# Patient Record
Sex: FEMALE | Race: WHITE | NOT HISPANIC OR LATINO | Employment: FULL TIME | URBAN - METROPOLITAN AREA
[De-identification: names, ages, dates, MRNs, and addresses within clinical notes are randomized per-mention and may not be internally consistent; named-entity substitution may affect disease eponyms.]

---

## 2017-01-02 ENCOUNTER — GENERIC CONVERSION - ENCOUNTER (OUTPATIENT)
Dept: OTHER | Facility: OTHER | Age: 62
End: 2017-01-02

## 2017-04-14 ENCOUNTER — GENERIC CONVERSION - ENCOUNTER (OUTPATIENT)
Dept: OTHER | Facility: OTHER | Age: 62
End: 2017-04-14

## 2017-04-14 ENCOUNTER — APPOINTMENT (OUTPATIENT)
Dept: LAB | Facility: HOSPITAL | Age: 62
End: 2017-04-14
Payer: COMMERCIAL

## 2017-04-14 ENCOUNTER — ALLSCRIPTS OFFICE VISIT (OUTPATIENT)
Dept: OTHER | Facility: OTHER | Age: 62
End: 2017-04-14

## 2017-04-14 DIAGNOSIS — R82.998 OTHER ABNORMAL FINDINGS IN URINE: ICD-10-CM

## 2017-04-14 LAB
BILIRUB UR QL STRIP: NEGATIVE
GLUCOSE (HISTORICAL): NEGATIVE
HGB UR QL STRIP.AUTO: ABNORMAL
KETONES UR STRIP-MCNC: NEGATIVE MG/DL
LEUKOCYTE ESTERASE UR QL STRIP: ABNORMAL
NITRITE UR QL STRIP: NEGATIVE
PH UR STRIP.AUTO: 7.5 [PH]
PROT UR STRIP-MCNC: NEGATIVE MG/DL
SP GR UR STRIP.AUTO: 1.01
UROBILINOGEN UR QL STRIP.AUTO: 0.2

## 2017-04-14 PROCEDURE — 87086 URINE CULTURE/COLONY COUNT: CPT

## 2017-04-16 ENCOUNTER — GENERIC CONVERSION - ENCOUNTER (OUTPATIENT)
Dept: OTHER | Facility: OTHER | Age: 62
End: 2017-04-16

## 2017-04-16 LAB — BACTERIA UR CULT: NORMAL

## 2018-01-13 NOTE — RESULT NOTES
Verified Results  (1) URINE CULTURE 14Apr2017 05:20PM Waqas Young Order Number: [id number]     Test Name Result Flag Reference   CLINICAL REPORT (Report)     Test:        Urine culture  Specimen Type:   Urine  Specimen Date:   4/14/2017 5:20 PM  Result Date:    4/16/2017 4:19 PM  Result Status:   Final result  Resulting Lab:   [place] [address]            Tel: [phone]      CULTURE                                       ------------------                                   40,000-49,000 cfu/ml Mixed Contaminants X4

## 2018-01-14 VITALS
BODY MASS INDEX: 26.55 KG/M2 | HEIGHT: 65 IN | OXYGEN SATURATION: 98 % | TEMPERATURE: 97.7 F | WEIGHT: 159.38 LBS | HEART RATE: 80 BPM | SYSTOLIC BLOOD PRESSURE: 118 MMHG | DIASTOLIC BLOOD PRESSURE: 74 MMHG

## 2018-01-18 NOTE — RESULT NOTES
Message   1  CBC is normal    2  Vit D just slightly low 29  If she is currently taking 2000 units per day, would increase to 3000 units per day  3  Her triglycerides are slightly high  watch fats and carbs  Please fax these results to her rheumatologist      Verified Results  (1) VITAMIN D 25-HYDROXY [id number] 08:37AM Waqas Young Order Number: [id number]     Test Name Result Flag Reference   VIT D 25-HYDROX 29 1 ng/mL L 30 0-100 0   This assay is a certified procedure of the CDC Vitamin D Standardization Certification Program (VDSCP)     Deficiency <20ng/ml   Insufficiency 20-30ng/ml   Sufficient  ng/ml     *Patients undergoing fluorescein dye angiography may retain small amounts of fluorescein in the body for 48-72 hours post procedure  Samples containing fluorescein can produce falsely elevated Vitamin D values  If the patient had this procedure, a specimen should be resubmitted post fluorescein clearance  (1) LIPID PANEL FASTING W DIRECT LDL REFLEX [id number] 08:37AM Waqas Young Order Number: [id number]     Test Name Result Flag Reference   CHOLESTEROL 162 mg/dL     LDL CHOLESTEROL CALCULATED 65 mg/dL  0-100   - Patient Instructions: This is a fasting blood test  Water, black tea or black coffee only after 9:00pm the night before test   Drink 2 glasses of water the morning of test     - Patient Instructions:  This is a fasting blood test  Water, black tea or black coffee only after 9:00pm the night before test Drink 2 glasses of water the morning of test   Triglyceride:         Normal              <150 mg/dl       Borderline High    150-199 mg/dl       High               200-499 mg/dl       Very High          >499 mg/dl  Cholesterol:         Desirable        <200 mg/dl      Borderline High  200-239 mg/dl      High             >239 mg/dl  HDL Cholesterol:        High    >59 mg/dL      Low     <41 mg/dL  LDL Cholesterol:        Optimal          <100 mg/dl Near Optimal     100-129 mg/dl        Above Optimal          Borderline High   130-159 mg/dl          High              160-189 mg/dl          Very High        >189 mg/dl  LDL CALCULATED:    This screening LDL is a calculated result  It does not have the accuracy of the Direct Measured LDL in the monitoring of patients with hyperlipidemia and/or statin therapy  Direct Measure LDL (YJS537) must be ordered separately in these patients  TRIGLYCERIDES 165 mg/dL H <=150   Specimen collection should occur prior to N-Acetylcysteine or Metamizole administration due to the potential for falsely depressed results  HDL,DIRECT 64 mg/dL H 40-60   Specimen collection should occur prior to Metamizole administration due to the potential for falsely depressed results  (1) CBC/ PLT (NO DIFF) [id number] 08:37AM Lindsey Lemus Order Number: [id number]     Test Name Result Flag Reference   HEMATOCRIT 41 0 %  34 8-46 1   HEMOGLOBIN 14 0 g/dL  11 5-15 4   MCHC 34 1 g/dL  31 4-37 4   MCH 33 6 pg  26 8-34 3   MCV 98 fL  82-98   PLATELET COUNT 278 Thousands/uL  149-390   RBC COUNT 4 17 Million/uL  3 81-5 12   RDW 12 4 %  11 6-15 1   WBC COUNT 6 86 Thousand/uL  4 31-10 16   MPV 9 5 fL  8 9-12 7     (1) COMPREHENSIVE METABOLIC PANEL [id number] 47:51OZ Lindsey Lemus Order Number: [id number]     Test Name Result Flag Reference   GLUCOSE,RANDM 92 mg/dL     If the patient is fasting, the ADA then defines impaired fasting glucose as > 100 mg/dL and diabetes as > or equal to 123 mg/dL     SODIUM 140 mmol/L  136-145   POTASSIUM 3 8 mmol/L  3 5-5 3   CHLORIDE 104 mmol/L  100-108   CARBON DIOXIDE 28 mmol/L  21-32   ANION GAP (CALC) 8 mmol/L  4-13   BLOOD UREA NITROGEN 10 mg/dL  5-25   CREATININE 0 72 mg/dL  0 60-1 30   Standardized to IDMS reference method   CALCIUM 8 8 mg/dL  8 3-10 1   BILI, TOTAL 0 31 mg/dL  0 20-1 00   ALK PHOSPHATAS 93 U/L     ALT (SGPT) 25 U/L  12-78   AST(SGOT) 13 U/L  5-45 ALBUMIN 3 5 g/dL  3 5-5 0   TOTAL PROTEIN 7 4 g/dL  6 4-8 2   eGFR Non-African American      >60 0 ml/min/1 73sq m   - Patient Instructions: This is a fasting blood test  Water, black tea or black coffee only after 9:00pm the night before test Drink 2 glasses of water the morning of test   National Kidney Disease Education Program recommendations are as follows:  GFR calculation is accurate only with a steady state creatinine  Chronic Kidney disease less than 60 ml/min/1 73 sq  meters  Kidney failure less than 15 ml/min/1 73 sq  meters

## 2018-02-15 ENCOUNTER — OFFICE VISIT (OUTPATIENT)
Dept: FAMILY MEDICINE CLINIC | Facility: CLINIC | Age: 63
End: 2018-02-15
Payer: COMMERCIAL

## 2018-02-15 VITALS
HEIGHT: 66 IN | HEART RATE: 98 BPM | DIASTOLIC BLOOD PRESSURE: 78 MMHG | SYSTOLIC BLOOD PRESSURE: 108 MMHG | OXYGEN SATURATION: 72 % | WEIGHT: 166.2 LBS | BODY MASS INDEX: 26.71 KG/M2 | TEMPERATURE: 98.3 F

## 2018-02-15 DIAGNOSIS — J32.9 SINUSITIS, UNSPECIFIED CHRONICITY, UNSPECIFIED LOCATION: Primary | ICD-10-CM

## 2018-02-15 DIAGNOSIS — Z13.220 LIPID SCREENING: ICD-10-CM

## 2018-02-15 DIAGNOSIS — R53.83 FATIGUE, UNSPECIFIED TYPE: ICD-10-CM

## 2018-02-15 PROCEDURE — 99214 OFFICE O/P EST MOD 30 MIN: CPT | Performed by: FAMILY MEDICINE

## 2018-02-15 RX ORDER — MULTIVIT-MIN/IRON/FOLIC ACID/K 18-600-40
4000 CAPSULE ORAL DAILY
COMMUNITY

## 2018-02-15 RX ORDER — ACETAMINOPHEN 500 MG
TABLET ORAL EVERY 6 HOURS PRN
COMMUNITY

## 2018-02-15 RX ORDER — ESTROGEN,CON/M-PROGEST ACET 0.3-1.5MG
TABLET ORAL
COMMUNITY
Start: 2018-01-04 | End: 2019-09-25

## 2018-02-15 RX ORDER — AMOXICILLIN 875 MG/1
875 TABLET, COATED ORAL 2 TIMES DAILY
Qty: 20 TABLET | Refills: 0 | Status: SHIPPED | OUTPATIENT
Start: 2018-02-15 | End: 2018-02-25

## 2018-02-15 RX ORDER — IBANDRONATE SODIUM 150 MG/1
150 TABLET, FILM COATED ORAL
COMMUNITY

## 2018-02-15 NOTE — PROGRESS NOTES
Assessment/Plan:       Diagnoses and all orders for this visit:    Sinusitis, unspecified chronicity, unspecified location  -     amoxicillin (AMOXIL) 875 mg tablet; Take 1 tablet (875 mg total) by mouth 2 (two) times a day for 10 days    Fatigue, unspecified type  -     CBC and differential; Future  -     TSH, 3rd generation with T4 reflex; Future  -     Comprehensive metabolic panel; Future    Lipid screening  -     Lipid panel; Future    Other orders  -     ibandronate (BONIVA) 150 MG tablet; Take by mouth  -     Cholecalciferol (VITAMIN D) 2000 units CAPS; Take 4,000 Units by mouth  -     PREMPRO 0 3-1 5 MG per tablet;   -     acetaminophen (TYLENOL) 500 mg tablet; Take by mouth          Subjective:      Patient ID: Ana Boateng is a 63 y o  female  Fatigue, cough, congestion x 2 weeks  Taking OTC Dayquil  No relief  She is also complaining of fatigue x 2 months  Would like to get labs done  No GI symptoms  No current treatment  Does not sleep well  Up early  Sore Throat    This is a new problem  The current episode started 1 to 4 weeks ago  The problem has been gradually worsening  There has been no fever  The pain is mild  Associated symptoms include congestion and coughing  The treatment provided no relief  Cough   Associated symptoms include a sore throat  Pertinent negatives include no chills, fever or wheezing  The following portions of the patient's history were reviewed and updated as appropriate: allergies, current medications, past family history, past medical history, past social history, past surgical history and problem list     Review of Systems   Constitutional: Positive for fatigue  Negative for chills and fever  HENT: Positive for congestion and sore throat  Respiratory: Positive for cough  Negative for wheezing  Cardiovascular: Negative  Gastrointestinal: Negative  Musculoskeletal: Negative            Objective:    Vitals:    02/15/18 1635   BP: 108/78 Pulse: 98   Temp: 98 3 °F (36 8 °C)   SpO2: (!) 72%        Physical Exam   Constitutional: She is oriented to person, place, and time  She appears well-developed and well-nourished  No distress  HENT:   Right Ear: Hearing, tympanic membrane, external ear and ear canal normal    Left Ear: Hearing, tympanic membrane, external ear and ear canal normal    Nose: Nose normal    Mouth/Throat: Uvula is midline  Posterior oropharyngeal erythema present  No oropharyngeal exudate  Eyes: Conjunctivae are normal  Pupils are equal, round, and reactive to light  Cardiovascular: Normal rate and normal heart sounds  Exam reveals no gallop and no friction rub  No murmur heard  Pulmonary/Chest: Effort normal and breath sounds normal  No respiratory distress  She has no wheezes  She has no rales  Lymphadenopathy:     She has no cervical adenopathy  Neurological: She is alert and oriented to person, place, and time  Skin: Skin is warm  No rash noted  Psychiatric: She has a normal mood and affect  Her behavior is normal  Judgment and thought content normal    Nursing note and vitals reviewed

## 2018-02-16 ENCOUNTER — LAB (OUTPATIENT)
Dept: LAB | Facility: CLINIC | Age: 63
End: 2018-02-16
Payer: COMMERCIAL

## 2018-02-16 ENCOUNTER — TRANSCRIBE ORDERS (OUTPATIENT)
Dept: ADMINISTRATIVE | Facility: HOSPITAL | Age: 63
End: 2018-02-16

## 2018-02-16 DIAGNOSIS — R53.83 FATIGUE, UNSPECIFIED TYPE: ICD-10-CM

## 2018-02-16 DIAGNOSIS — Z13.220 LIPID SCREENING: ICD-10-CM

## 2018-02-16 LAB
ALBUMIN SERPL BCP-MCNC: 3.6 G/DL (ref 3.5–5)
ALP SERPL-CCNC: 71 U/L (ref 46–116)
ALT SERPL W P-5'-P-CCNC: 25 U/L (ref 12–78)
ANION GAP SERPL CALCULATED.3IONS-SCNC: 7 MMOL/L (ref 4–13)
AST SERPL W P-5'-P-CCNC: 16 U/L (ref 5–45)
BASOPHILS # BLD AUTO: 0.02 THOUSANDS/ΜL (ref 0–0.1)
BASOPHILS NFR BLD AUTO: 0 % (ref 0–1)
BILIRUB SERPL-MCNC: 0.59 MG/DL (ref 0.2–1)
BUN SERPL-MCNC: 12 MG/DL (ref 5–25)
CALCIUM SERPL-MCNC: 8.9 MG/DL (ref 8.3–10.1)
CHLORIDE SERPL-SCNC: 105 MMOL/L (ref 100–108)
CHOLEST SERPL-MCNC: 161 MG/DL (ref 50–200)
CO2 SERPL-SCNC: 28 MMOL/L (ref 21–32)
CREAT SERPL-MCNC: 0.7 MG/DL (ref 0.6–1.3)
EOSINOPHIL # BLD AUTO: 0.39 THOUSAND/ΜL (ref 0–0.61)
EOSINOPHIL NFR BLD AUTO: 5 % (ref 0–6)
ERYTHROCYTE [DISTWIDTH] IN BLOOD BY AUTOMATED COUNT: 12.6 % (ref 11.6–15.1)
GFR SERPL CREATININE-BSD FRML MDRD: 94 ML/MIN/1.73SQ M
GLUCOSE P FAST SERPL-MCNC: 81 MG/DL (ref 65–99)
HCT VFR BLD AUTO: 40.7 % (ref 34.8–46.1)
HDLC SERPL-MCNC: 64 MG/DL (ref 40–60)
HGB BLD-MCNC: 13.7 G/DL (ref 11.5–15.4)
LDLC SERPL CALC-MCNC: 68 MG/DL (ref 0–100)
LYMPHOCYTES # BLD AUTO: 2.92 THOUSANDS/ΜL (ref 0.6–4.47)
LYMPHOCYTES NFR BLD AUTO: 37 % (ref 14–44)
MCH RBC QN AUTO: 33.3 PG (ref 26.8–34.3)
MCHC RBC AUTO-ENTMCNC: 33.7 G/DL (ref 31.4–37.4)
MCV RBC AUTO: 99 FL (ref 82–98)
MONOCYTES # BLD AUTO: 0.52 THOUSAND/ΜL (ref 0.17–1.22)
MONOCYTES NFR BLD AUTO: 7 % (ref 4–12)
NEUTROPHILS # BLD AUTO: 4.01 THOUSANDS/ΜL (ref 1.85–7.62)
NEUTS SEG NFR BLD AUTO: 51 % (ref 43–75)
NRBC BLD AUTO-RTO: 0 /100 WBCS
PLATELET # BLD AUTO: 315 THOUSANDS/UL (ref 149–390)
PMV BLD AUTO: 9 FL (ref 8.9–12.7)
POTASSIUM SERPL-SCNC: 4.1 MMOL/L (ref 3.5–5.3)
PROT SERPL-MCNC: 7.2 G/DL (ref 6.4–8.2)
RBC # BLD AUTO: 4.12 MILLION/UL (ref 3.81–5.12)
SODIUM SERPL-SCNC: 140 MMOL/L (ref 136–145)
TRIGL SERPL-MCNC: 146 MG/DL
TSH SERPL DL<=0.05 MIU/L-ACNC: 1.29 UIU/ML (ref 0.36–3.74)
WBC # BLD AUTO: 7.88 THOUSAND/UL (ref 4.31–10.16)

## 2018-02-16 PROCEDURE — 36415 COLL VENOUS BLD VENIPUNCTURE: CPT

## 2018-02-16 PROCEDURE — 80053 COMPREHEN METABOLIC PANEL: CPT

## 2018-02-16 PROCEDURE — 85025 COMPLETE CBC W/AUTO DIFF WBC: CPT

## 2018-02-16 PROCEDURE — 80061 LIPID PANEL: CPT

## 2018-02-16 PROCEDURE — 84443 ASSAY THYROID STIM HORMONE: CPT

## 2018-02-21 ENCOUNTER — TELEPHONE (OUTPATIENT)
Dept: FAMILY MEDICINE CLINIC | Facility: CLINIC | Age: 63
End: 2018-02-21

## 2018-02-21 NOTE — TELEPHONE ENCOUNTER
NOTIFIED----- Message from Vivian Rashid MD sent at 2/18/2018  8:15 PM EST -----  Please call patient and let them know their labs were normal

## 2018-04-05 ENCOUNTER — HOSPITAL ENCOUNTER (EMERGENCY)
Facility: HOSPITAL | Age: 63
Discharge: HOME | End: 2018-04-05
Attending: EMERGENCY MEDICINE | Admitting: EMERGENCY MEDICINE
Payer: COMMERCIAL

## 2018-04-05 ENCOUNTER — APPOINTMENT (EMERGENCY)
Dept: RADIOLOGY | Facility: HOSPITAL | Age: 63
End: 2018-04-05
Payer: COMMERCIAL

## 2018-04-05 ENCOUNTER — APPOINTMENT (EMERGENCY)
Dept: CT IMAGING | Facility: HOSPITAL | Age: 63
End: 2018-04-05
Payer: COMMERCIAL

## 2018-04-05 VITALS
TEMPERATURE: 98 F | DIASTOLIC BLOOD PRESSURE: 80 MMHG | HEART RATE: 77 BPM | SYSTOLIC BLOOD PRESSURE: 133 MMHG | OXYGEN SATURATION: 100 % | RESPIRATION RATE: 18 BRPM | HEIGHT: 65 IN | WEIGHT: 164 LBS | BODY MASS INDEX: 27.32 KG/M2

## 2018-04-05 DIAGNOSIS — R55 SYNCOPE: Primary | ICD-10-CM

## 2018-04-05 LAB
ALBUMIN SERPL BCP-MCNC: 3.7 G/DL (ref 3.5–5)
ALP SERPL-CCNC: 77 U/L (ref 46–116)
ALT SERPL W P-5'-P-CCNC: 27 U/L (ref 12–78)
ANION GAP SERPL CALCULATED.3IONS-SCNC: 8 MMOL/L (ref 4–13)
AST SERPL W P-5'-P-CCNC: 15 U/L (ref 5–45)
ATRIAL RATE: 78 BPM
BASOPHILS # BLD AUTO: 0.04 THOUSANDS/ΜL (ref 0–0.1)
BASOPHILS NFR BLD AUTO: 1 % (ref 0–1)
BILIRUB SERPL-MCNC: 0.3 MG/DL (ref 0.2–1)
BUN SERPL-MCNC: 12 MG/DL (ref 5–25)
CALCIUM SERPL-MCNC: 9.4 MG/DL (ref 8.3–10.1)
CHLORIDE SERPL-SCNC: 103 MMOL/L (ref 100–108)
CO2 SERPL-SCNC: 29 MMOL/L (ref 21–32)
CREAT SERPL-MCNC: 0.72 MG/DL (ref 0.6–1.3)
EOSINOPHIL # BLD AUTO: 0.08 THOUSAND/ΜL (ref 0–0.61)
EOSINOPHIL NFR BLD AUTO: 1 % (ref 0–6)
ERYTHROCYTE [DISTWIDTH] IN BLOOD BY AUTOMATED COUNT: 12.5 % (ref 11.6–15.1)
GFR SERPL CREATININE-BSD FRML MDRD: 91 ML/MIN/1.73SQ M
GLUCOSE SERPL-MCNC: 101 MG/DL (ref 65–140)
HCT VFR BLD AUTO: 42.1 % (ref 34.8–46.1)
HGB BLD-MCNC: 14.1 G/DL (ref 11.5–15.4)
LYMPHOCYTES # BLD AUTO: 2.49 THOUSANDS/ΜL (ref 0.6–4.47)
LYMPHOCYTES NFR BLD AUTO: 31 % (ref 14–44)
MCH RBC QN AUTO: 32.9 PG (ref 26.8–34.3)
MCHC RBC AUTO-ENTMCNC: 33.5 G/DL (ref 31.4–37.4)
MCV RBC AUTO: 98 FL (ref 82–98)
MONOCYTES # BLD AUTO: 0.43 THOUSAND/ΜL (ref 0.17–1.22)
MONOCYTES NFR BLD AUTO: 5 % (ref 4–12)
NEUTROPHILS # BLD AUTO: 5.06 THOUSANDS/ΜL (ref 1.85–7.62)
NEUTS SEG NFR BLD AUTO: 62 % (ref 43–75)
NRBC BLD AUTO-RTO: 0 /100 WBCS
P AXIS: 67 DEGREES
PLATELET # BLD AUTO: 324 THOUSANDS/UL (ref 149–390)
PMV BLD AUTO: 8.3 FL (ref 8.9–12.7)
POTASSIUM SERPL-SCNC: 3.8 MMOL/L (ref 3.5–5.3)
PR INTERVAL: 186 MS
PROT SERPL-MCNC: 7.6 G/DL (ref 6.4–8.2)
QRS AXIS: 21 DEGREES
QRSD INTERVAL: 80 MS
QT INTERVAL: 392 MS
QTC INTERVAL: 446 MS
RBC # BLD AUTO: 4.28 MILLION/UL (ref 3.81–5.12)
SODIUM SERPL-SCNC: 140 MMOL/L (ref 136–145)
T WAVE AXIS: 51 DEGREES
TROPONIN I SERPL-MCNC: <0.02 NG/ML
VENTRICULAR RATE: 78 BPM
WBC # BLD AUTO: 8.13 THOUSAND/UL (ref 4.31–10.16)

## 2018-04-05 PROCEDURE — 85025 COMPLETE CBC W/AUTO DIFF WBC: CPT | Performed by: EMERGENCY MEDICINE

## 2018-04-05 PROCEDURE — 93005 ELECTROCARDIOGRAM TRACING: CPT

## 2018-04-05 PROCEDURE — 99285 EMERGENCY DEPT VISIT HI MDM: CPT

## 2018-04-05 PROCEDURE — 84484 ASSAY OF TROPONIN QUANT: CPT | Performed by: EMERGENCY MEDICINE

## 2018-04-05 PROCEDURE — 93010 ELECTROCARDIOGRAM REPORT: CPT | Performed by: INTERNAL MEDICINE

## 2018-04-05 PROCEDURE — 80053 COMPREHEN METABOLIC PANEL: CPT | Performed by: EMERGENCY MEDICINE

## 2018-04-05 PROCEDURE — 71046 X-RAY EXAM CHEST 2 VIEWS: CPT

## 2018-04-05 PROCEDURE — 70450 CT HEAD/BRAIN W/O DYE: CPT

## 2018-04-05 PROCEDURE — 36415 COLL VENOUS BLD VENIPUNCTURE: CPT | Performed by: EMERGENCY MEDICINE
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
## 2018-04-05 NOTE — DISCHARGE INSTRUCTIONS
Syncope   WHAT YOU NEED TO KNOW:   Syncope is also called fainting or passing out  Syncope is a sudden, temporary loss of consciousness, followed by a fall from a standing or sitting position  Syncope ranges from not serious to a sign of a more serious condition that needs to be treated  You can control some health conditions that cause syncope  Your healthcare providers can help you create a plan to manage syncope and prevent episodes  DISCHARGE INSTRUCTIONS:   Seek care immediately if:   · You are bleeding because you hit your head when you fainted  · You suddenly have double vision, difficulty speaking, numbness, and cannot move your arms or legs  · You have chest pain and trouble breathing  · You vomit blood or material that looks like coffee grounds  · You see blood in your bowel movement  Contact your healthcare provider if:   · You have new or worsening symptoms  · You have another syncope episode  · You have a headache, fast heartbeat, or feel too dizzy to stand up  · You have questions or concerns about your condition or care  Follow up with your healthcare provider as directed:  Write down your questions so you remember to ask them during your visits  Manage syncope:   · Keep a record of your syncope episodes  Include your symptoms and your activity before and after the episode  The record can help your healthcare provider find the cause of your syncope and help you manage episodes  · Sit or lie down when needed  This includes when you feel dizzy, your throat is getting tight, and your vision changes  Raise your legs above the level of your heart  · Take slow, deep breaths if you start to breathe faster with anxiety or fear  This can help decrease dizziness and the feeling that you might faint  · Check your blood pressure often  This is important if you take medicine to lower your blood pressure   Check your blood pressure when you are lying down and when you are standing  Ask how often to check during the day  Keep a record of your blood pressure numbers  Your healthcare provider may use the record to help plan your treatment  Prevent a syncope episode:   · Move slowly and let yourself get used to one position before you move to another position  This is very important when you change from a lying or sitting position to a standing position  Take some deep breaths before you stand up from a lying position  Stand up slowly  Sudden movements may cause a fainting spell  Sit on the side of the bed or couch for a few minutes before you stand up  If you are on bedrest, try to be upright for about 2 hours each day, or as directed  Do not lock your legs if you are standing for a long period of time  Move your legs and bend your knees to keep blood flowing  · Follow your healthcare provider's recommendations  Your provider may  recommend that you drink more liquids to prevent dehydration  You may also need to have more salt to keep your blood pressure from dropping too low and causing syncope  Your provider will tell you how much liquid and sodium to have each day  · Watch for signs of low blood sugar  These include hunger, nervousness, sweating, and fast or fluttery heartbeats  Talk with your healthcare provider about ways to keep your blood sugar level steady  · Do not strain if you are constipated  You may faint if you strain to have a bowel movement  Walking is the best way to get your bowels moving  Eat foods high in fiber to make it easier to have a bowel movement  Good examples are high-fiber cereals, beans, vegetables, and whole-grain breads  Prune juice may help make bowel movements softer  · Be careful in hot weather  Heat can cause a syncope episode  Limit activity done outside on hot days  Physical activity in hot weather can lead to dehydration  This can cause an episode    © 2017 [employer] Information is for End User's use only and may not be sold, redistributed or otherwise used for commercial purposes  All illustrations and images included in CareNotes® are the copyrighted property of A [employer] , Inc  or Asim Stokes  The above information is an  only  It is not intended as medical advice for individual conditions or treatments  Talk to your doctor, nurse or pharmacist before following any medical regimen to see if it is safe and effective for you

## 2018-05-31 ENCOUNTER — OFFICE VISIT (OUTPATIENT)
Dept: FAMILY MEDICINE CLINIC | Facility: CLINIC | Age: 63
End: 2018-05-31
Payer: COMMERCIAL

## 2018-05-31 VITALS
DIASTOLIC BLOOD PRESSURE: 80 MMHG | SYSTOLIC BLOOD PRESSURE: 128 MMHG | BODY MASS INDEX: 27.82 KG/M2 | TEMPERATURE: 98.1 F | HEART RATE: 83 BPM | RESPIRATION RATE: 18 BRPM | WEIGHT: 167 LBS | OXYGEN SATURATION: 99 % | HEIGHT: 65 IN

## 2018-05-31 DIAGNOSIS — Z01.818 PRE-OP EVALUATION: Primary | ICD-10-CM

## 2018-05-31 PROCEDURE — 3008F BODY MASS INDEX DOCD: CPT | Performed by: NURSE PRACTITIONER

## 2018-05-31 PROCEDURE — 99214 OFFICE O/P EST MOD 30 MIN: CPT | Performed by: NURSE PRACTITIONER

## 2018-05-31 RX ORDER — BROMFENAC SODIUM 0.7 MG/ML
SOLUTION/ DROPS OPHTHALMIC
COMMUNITY
Start: 2018-05-30 | End: 2019-08-12 | Stop reason: ALTCHOICE

## 2018-05-31 RX ORDER — POLYMYXIN B SULFATE AND TRIMETHOPRIM 1; 10000 MG/ML; [USP'U]/ML
SOLUTION OPHTHALMIC
COMMUNITY
Start: 2018-05-30 | End: 2019-08-12 | Stop reason: ALTCHOICE

## 2018-05-31 NOTE — PROGRESS NOTES
Assessment/Plan:     Diagnoses and all orders for this visit:    Pre-op evaluation    Other orders  -     PROLENSA 0 07 % SOLN;   -     polymyxin b-trimethoprim (POLYTRIM) ophthalmic solution; Exam benign, No abnormal findings that would prevent patient from having cataract surgery  Subjective:      Patient ID: Lupe Feldman is a 66 y o  female  Patient is here for a pre-op clearnace for cataract surgery at [address] [place] [address] next week  Her medical hx is significant for pancreatis in 2014  Cholecystis with cholecystectomy in 2014 as well  Patient has no significant cardiac, renal, pulmonary, hepatic, neurological, or hematological disease  She takes Boniva for osteoporosis, and vitamin D 4000 units every day  The following portions of the patient's history were reviewed and updated as appropriate:   She  has a past medical history of Osteoporosis  She   Patient Active Problem List    Diagnosis Date Noted    Pre-op evaluation 05/31/2018    Sinusitis 02/15/2018    Fatigue 02/15/2018     She  has a past surgical history that includes Cholecystectomy  Her family history is not on file  She  reports that she has never smoked  She has never used smokeless tobacco  She reports that she does not drink alcohol or use drugs  Current Outpatient Prescriptions   Medication Sig Dispense Refill    acetaminophen (TYLENOL) 500 mg tablet Take by mouth      Cholecalciferol (VITAMIN D) 2000 units CAPS Take 4,000 Units by mouth      ibandronate (BONIVA) 150 MG tablet Take by mouth      polymyxin b-trimethoprim (POLYTRIM) ophthalmic solution       PREMPRO 0 3-1 5 MG per tablet       PROLENSA 0 07 % SOLN        No current facility-administered medications for this visit        Current Outpatient Prescriptions on File Prior to Visit   Medication Sig    acetaminophen (TYLENOL) 500 mg tablet Take by mouth    Cholecalciferol (VITAMIN D) 2000 units CAPS Take 4,000 Units by mouth    ibandronate (BONIVA) 150 MG tablet Take by mouth    PREMPRO 0 3-1 5 MG per tablet      No current facility-administered medications on file prior to visit  She is allergic to sulfa antibiotics; ciprofloxacin; cyclopentolate; and scopolamine       Review of Systems   Constitutional: Negative for activity change, appetite change, fatigue and fever  HENT: Negative  Eyes: Positive for visual disturbance  Negative for photophobia, pain and redness  Respiratory: Negative for apnea, cough, chest tightness, shortness of breath, wheezing and stridor  Cardiovascular: Negative for chest pain, palpitations and leg swelling  Gastrointestinal: Negative for anal bleeding, blood in stool, constipation, diarrhea, nausea and vomiting  Endocrine: Negative  Genitourinary: Negative for flank pain, frequency, hematuria and vaginal bleeding  Musculoskeletal: Negative  Skin: Negative  Allergic/Immunologic: Positive for environmental allergies  Negative for food allergies  Neurological: Negative for seizures, weakness, light-headedness, numbness and headaches  Hematological: Negative for adenopathy  Does not bruise/bleed easily  Psychiatric/Behavioral: Negative  All other systems reviewed and are negative  Objective:      /80 (BP Location: Right arm, Patient Position: Sitting)   Pulse 83   Temp 98 1 °F (36 7 °C)   Resp 18   Ht 5' 5" (1 651 m)   Wt 75 8 kg (167 lb)   SpO2 99%   BMI 27 79 kg/m²          Physical Exam   Constitutional: She is oriented to person, place, and time  She appears well-developed and well-nourished  No distress  HENT:   Head: Normocephalic and atraumatic  Right Ear: External ear normal    Left Ear: External ear normal    Nose: Nose normal    Mouth/Throat: Oropharynx is clear and moist  No oropharyngeal exudate  Eyes: Conjunctivae and EOM are normal  Pupils are equal, round, and reactive to light  Right eye exhibits no discharge  Left eye exhibits no discharge  Patient wearing contact lenses    Neck: Normal range of motion  Neck supple  No JVD present  No thyromegaly present  Cardiovascular: Normal rate, regular rhythm, normal heart sounds and intact distal pulses  No murmur heard  Pulmonary/Chest: Effort normal and breath sounds normal  No respiratory distress  She has no wheezes  Abdominal: Soft  Bowel sounds are normal  She exhibits no distension  There is no tenderness  Musculoskeletal: Normal range of motion  She exhibits no edema  Lymphadenopathy:     She has no cervical adenopathy  Neurological: She is alert and oriented to person, place, and time  She has normal reflexes  Coordination normal    Patient [place] and wears hearing aids   Skin: Skin is warm and dry  No rash noted  She is not diaphoretic  No erythema  No pallor  Psychiatric: She has a normal mood and affect  Her behavior is normal  Judgment and thought content normal    Nursing note and vitals reviewed

## 2018-05-31 NOTE — PATIENT INSTRUCTIONS
Patient is cleared for surgery for removal of cataracts    Chronic condition is osteoporosis- this is stable on biphosphonate

## 2019-02-13 ENCOUNTER — OFFICE VISIT (OUTPATIENT)
Dept: FAMILY MEDICINE CLINIC | Facility: CLINIC | Age: 64
End: 2019-02-13
Payer: COMMERCIAL

## 2019-02-13 VITALS
HEART RATE: 112 BPM | RESPIRATION RATE: 18 BRPM | HEIGHT: 65 IN | DIASTOLIC BLOOD PRESSURE: 80 MMHG | OXYGEN SATURATION: 95 % | BODY MASS INDEX: 27.66 KG/M2 | SYSTOLIC BLOOD PRESSURE: 138 MMHG | WEIGHT: 166 LBS | TEMPERATURE: 100 F

## 2019-02-13 DIAGNOSIS — J06.9 VIRAL UPPER RESPIRATORY TRACT INFECTION: ICD-10-CM

## 2019-02-13 DIAGNOSIS — R05.9 COUGH: ICD-10-CM

## 2019-02-13 DIAGNOSIS — J11.1 INFLUENZA: Primary | ICD-10-CM

## 2019-02-13 PROCEDURE — 1036F TOBACCO NON-USER: CPT | Performed by: NURSE PRACTITIONER

## 2019-02-13 PROCEDURE — 99214 OFFICE O/P EST MOD 30 MIN: CPT | Performed by: NURSE PRACTITIONER

## 2019-02-13 PROCEDURE — 3008F BODY MASS INDEX DOCD: CPT | Performed by: NURSE PRACTITIONER

## 2019-02-13 RX ORDER — AZITHROMYCIN 250 MG/1
500 TABLET, FILM COATED ORAL EVERY 24 HOURS
Qty: 10 TABLET | Refills: 0 | Status: SHIPPED | OUTPATIENT
Start: 2019-02-13 | End: 2019-02-18

## 2019-02-13 RX ORDER — PROMETHAZINE HYDROCHLORIDE AND CODEINE PHOSPHATE 6.25; 1 MG/5ML; MG/5ML
5 SYRUP ORAL EVERY 4 HOURS PRN
Qty: 120 ML | Refills: 0 | Status: SHIPPED | OUTPATIENT
Start: 2019-02-13 | End: 2019-02-20

## 2019-02-13 RX ORDER — METRONIDAZOLE 7.5 MG/G
LOTION TOPICAL
COMMUNITY
Start: 2018-12-28 | End: 2019-08-12 | Stop reason: ALTCHOICE

## 2019-02-13 NOTE — PROGRESS NOTES
Assessment/Plan:    No problem-specific Assessment & Plan notes found for this encounter  Diagnoses and all orders for this visit:    Influenza    Viral upper respiratory tract infection    Other orders  -     METRONIDAZOLE, TOPICAL, 0 75 % LOTN  -     estrogen, conjugated,-medroxyprogesterone (PREMPRO) 0 3-1 5 MG per tablet; 1daily          Subjective:      Patient ID: Марина Barroso is a 66 y o  female  Patient is here with chills, fever and loss of voice for 3-4 days  She has a dry cough and nasal congestion  She did try OTC tylenol and cough medicine      The following portions of the patient's history were reviewed and updated as appropriate:   She  has a past medical history of Osteoporosis  She   Patient Active Problem List    Diagnosis Date Noted    Influenza 02/13/2019    Pre-op evaluation 05/31/2018    Sinusitis 02/15/2018    Fatigue 02/15/2018     She  has a past surgical history that includes Cholecystectomy; Dental surgery; Hallux valgus correction; Incisional breast biopsy; and Rhinoplasty  Her family history includes Lung cancer in her father; No Known Problems in her mother  She  reports that she has never smoked  She has never used smokeless tobacco  She reports that she does not drink alcohol or use drugs  Current Outpatient Medications   Medication Sig Dispense Refill    estrogen, conjugated,-medroxyprogesterone (PREMPRO) 0 3-1 5 MG per tablet 1daily      acetaminophen (TYLENOL) 500 mg tablet Take by mouth      Cholecalciferol (VITAMIN D) 2000 units CAPS Take 4,000 Units by mouth      ibandronate (BONIVA) 150 MG tablet Take by mouth      METRONIDAZOLE, TOPICAL, 0 75 % LOTN       polymyxin b-trimethoprim (POLYTRIM) ophthalmic solution       PREMPRO 0 3-1 5 MG per tablet       PROLENSA 0 07 % SOLN        No current facility-administered medications for this visit        Current Outpatient Medications on File Prior to Visit   Medication Sig    estrogen, conjugated,-medroxyprogesterone (PREMPRO) 0 3-1 5 MG per tablet 1daily    acetaminophen (TYLENOL) 500 mg tablet Take by mouth    Cholecalciferol (VITAMIN D) 2000 units CAPS Take 4,000 Units by mouth    ibandronate (BONIVA) 150 MG tablet Take by mouth    METRONIDAZOLE, TOPICAL, 0 75 % LOTN     polymyxin b-trimethoprim (POLYTRIM) ophthalmic solution     PREMPRO 0 3-1 5 MG per tablet     PROLENSA 0 07 % SOLN      No current facility-administered medications on file prior to visit  She is allergic to sulfa antibiotics; ciprofloxacin; cyclopentolate; and scopolamine       Review of Systems   Constitutional: Positive for chills and fever  Negative for fatigue  HENT: Positive for congestion, sore throat and voice change  Negative for ear pain, postnasal drip, sinus pressure and sinus pain  Respiratory: Positive for cough  Negative for chest tightness and shortness of breath  Cardiovascular: Negative for chest pain and palpitations  Gastrointestinal: Negative for abdominal pain  Skin: Negative for color change, pallor and rash  Allergic/Immunologic: Negative for immunocompromised state  Neurological: Negative for headaches  Hematological: Negative for adenopathy  All other systems reviewed and are negative  Objective:      /80 (BP Location: Left arm, Patient Position: Sitting)   Pulse (!) 112   Temp 100 °F (37 8 °C)   Resp 18   Ht 5' 5" (1 651 m)   Wt 75 3 kg (166 lb)   SpO2 95%   BMI 27 62 kg/m²          Physical Exam   Constitutional: She is oriented to person, place, and time  She appears well-developed and well-nourished  No distress  HENT:   Head: Normocephalic and atraumatic  Right Ear: External ear normal    Left Ear: External ear normal    Nose: Nose normal    Mouth/Throat: Oropharynx is clear and moist  No oropharyngeal exudate  Eyes: Pupils are equal, round, and reactive to light  Conjunctivae are normal    Neck: Normal range of motion  Neck supple  Cardiovascular: Normal rate, regular rhythm and normal heart sounds  Exam reveals no gallop and no friction rub  No murmur heard  Pulmonary/Chest: Effort normal and breath sounds normal  No respiratory distress  She has no wheezes  She has no rales  Persistent barking cough   Abdominal: Soft  Musculoskeletal: Normal range of motion  Lymphadenopathy:     She has no cervical adenopathy  Neurological: She is alert and oriented to person, place, and time  Skin: Skin is warm and dry  No rash noted  She is not diaphoretic  Psychiatric: She has a normal mood and affect  Her behavior is normal  Judgment and thought content normal    Nursing note and vitals reviewed

## 2019-02-13 NOTE — LETTER
February 13, 2019     Patient: Evan Burris   YOB: 1955   Date of Visit: 2/13/2019       To Whom it May Concern:    Evan Burris is under my professional care  She was seen in my office on 2/13/2019  She may return to work on 2-18-19  If you have any questions or concerns, please don't hesitate to call           Sincerely,          VIVIEN Robledo        CC: No Recipients

## 2019-03-27 ENCOUNTER — OFFICE VISIT (OUTPATIENT)
Dept: FAMILY MEDICINE CLINIC | Facility: CLINIC | Age: 64
End: 2019-03-27
Payer: COMMERCIAL

## 2019-03-27 VITALS
WEIGHT: 167.8 LBS | SYSTOLIC BLOOD PRESSURE: 128 MMHG | DIASTOLIC BLOOD PRESSURE: 82 MMHG | OXYGEN SATURATION: 99 % | BODY MASS INDEX: 27.96 KG/M2 | HEIGHT: 65 IN | TEMPERATURE: 98.3 F | HEART RATE: 84 BPM

## 2019-03-27 DIAGNOSIS — E55.9 VITAMIN D DEFICIENCY: ICD-10-CM

## 2019-03-27 DIAGNOSIS — M81.0 AGE-RELATED OSTEOPOROSIS WITHOUT CURRENT PATHOLOGICAL FRACTURE: ICD-10-CM

## 2019-03-27 DIAGNOSIS — Z12.11 COLON CANCER SCREENING: ICD-10-CM

## 2019-03-27 DIAGNOSIS — Z11.59 NEED FOR HEPATITIS C SCREENING TEST: ICD-10-CM

## 2019-03-27 DIAGNOSIS — R53.83 FATIGUE, UNSPECIFIED TYPE: Primary | ICD-10-CM

## 2019-03-27 DIAGNOSIS — Z13.220 LIPID SCREENING: ICD-10-CM

## 2019-03-27 PROBLEM — J11.1 INFLUENZA: Status: RESOLVED | Noted: 2019-02-13 | Resolved: 2019-03-27

## 2019-03-27 PROBLEM — Z01.818 PRE-OP EVALUATION: Status: RESOLVED | Noted: 2018-05-31 | Resolved: 2019-03-27

## 2019-03-27 PROBLEM — J32.9 SINUSITIS: Status: RESOLVED | Noted: 2018-02-15 | Resolved: 2019-03-27

## 2019-03-27 PROCEDURE — 1036F TOBACCO NON-USER: CPT | Performed by: FAMILY MEDICINE

## 2019-03-27 PROCEDURE — 3008F BODY MASS INDEX DOCD: CPT | Performed by: FAMILY MEDICINE

## 2019-03-27 PROCEDURE — 99214 OFFICE O/P EST MOD 30 MIN: CPT | Performed by: FAMILY MEDICINE

## 2019-03-27 RX ORDER — IVERMECTIN 10 MG/G
CREAM TOPICAL DAILY
COMMUNITY

## 2019-03-27 NOTE — PROGRESS NOTES
Assessment/Plan:    No problem-specific Assessment & Plan notes found for this encounter  Diagnoses and all orders for this visit:    Fatigue, unspecified type  -     CBC and differential; Future  -     Comprehensive metabolic panel; Future  -     TSH, 3rd generation with Free T4 reflex; Future  -     Iron; Future    Age-related osteoporosis without current pathological fracture    BMI 27 0-27 9,adult    Vitamin D deficiency  -     Vitamin D 25 hydroxy; Future    Need for hepatitis C screening test  -     Hepatitis C antibody; Future    Colon cancer screening  -     Ambulatory referral to Gastroenterology; Future  -     Cologuard; Future    Lipid screening  -     Lipid Panel with Direct LDL reflex; Future    Other orders  -     Ivermectin (SOOLANTRA) 1 % CREA; Apply topically        Check labs  Offered sleep study - she declined  Advised her to get her cancer screening UTD  She has a Gyn, gave her referral for colonoscopy  She may have transportation issues, so will get Cologaurd  BMI Counseling: Body mass index is 27 92 kg/m²  Discussed the patient's BMI with her  The BMI is above average  BMI counseling and education was provided to the patient  Nutrition recommendations include reducing portion sizes and decreasing overall calorie intake  Exercise recommendations include exercising 3-5 times per week  Advised her to count calories  I showed her nap on the smart phone to start checking her calories  Also advised to increase exercise  Subjective:      Patient ID: Edward Dudley is a 66 y o  female  She has fatigue for months  She feels more tired than she thinks she should  She states she has to sit down after vacuuming  She also says she has cold hands and feet  She lost her handicapped  about 2 5 years ago  She has not slept well in years due to having to get up and take care of him for so many years  She does not feel depressed  PHQ 2 is negative       She is not happy with her weight  She does not exercise  BMI 27  Has never tracked her calories  She has osteoporosis and is on Boniva  She sees rheumatology  Had labs done in December which are in the chart - normal Vit D, TSH, CMP, PTH  The following portions of the patient's history were reviewed and updated as appropriate:   She  has a past medical history of Osteoporosis  She   Patient Active Problem List    Diagnosis Date Noted    Age-related osteoporosis without current pathological fracture 03/27/2019    Fatigue 02/15/2018     She  has a past surgical history that includes Cholecystectomy; Dental surgery; Hallux valgus correction; Incisional breast biopsy; and Rhinoplasty  Her family history includes Lung cancer in her father; No Known Problems in her mother  She  reports that she has never smoked  She has never used smokeless tobacco  She reports that she does not drink alcohol or use drugs  Current Outpatient Medications   Medication Sig Dispense Refill    acetaminophen (TYLENOL) 500 mg tablet Take by mouth      Cholecalciferol (VITAMIN D) 2000 units CAPS Take 4,000 Units by mouth      estrogen, conjugated,-medroxyprogesterone (PREMPRO) 0 3-1 5 MG per tablet 1daily      ibandronate (BONIVA) 150 MG tablet Take by mouth      Ivermectin (SOOLANTRA) 1 % CREA Apply topically      METRONIDAZOLE, TOPICAL, 0 75 % LOTN       polymyxin b-trimethoprim (POLYTRIM) ophthalmic solution       PREMPRO 0 3-1 5 MG per tablet       PROLENSA 0 07 % SOLN        No current facility-administered medications for this visit        Current Outpatient Medications on File Prior to Visit   Medication Sig    acetaminophen (TYLENOL) 500 mg tablet Take by mouth    Cholecalciferol (VITAMIN D) 2000 units CAPS Take 4,000 Units by mouth    estrogen, conjugated,-medroxyprogesterone (PREMPRO) 0 3-1 5 MG per tablet 1daily    ibandronate (BONIVA) 150 MG tablet Take by mouth    Ivermectin (SOOLANTRA) 1 % CREA Apply topically    METRONIDAZOLE, TOPICAL, 0 75 % LOTN     polymyxin b-trimethoprim (POLYTRIM) ophthalmic solution     PREMPRO 0 3-1 5 MG per tablet     PROLENSA 0 07 % SOLN      No current facility-administered medications on file prior to visit  She is allergic to sulfa antibiotics; ciprofloxacin; cyclopentolate; and scopolamine       Review of Systems   Constitutional: Positive for fatigue  Negative for activity change  HENT: Positive for hearing loss (wears hearing aids)  Respiratory: Negative for chest tightness and shortness of breath  Cardiovascular: Negative for chest pain and leg swelling  Musculoskeletal:        Cold hands and feet   Neurological: Negative for headaches  Psychiatric/Behavioral: Positive for sleep disturbance  Negative for dysphoric mood  The patient is not nervous/anxious  Objective:      /82   Pulse 84   Temp 98 3 °F (36 8 °C) (Tympanic)   Ht 5' 5" (1 651 m)   Wt 76 1 kg (167 lb 12 8 oz)   SpO2 99%   BMI 27 92 kg/m²          Physical Exam   Constitutional: She is oriented to person, place, and time  She appears well-developed and well-nourished  No distress  HENT:   Head: Normocephalic and atraumatic  Hard of hearing, hearing aids   Eyes: Pupils are equal, round, and reactive to light  Conjunctivae are normal    Neck: Neck supple  Cardiovascular: Normal rate, regular rhythm and normal heart sounds  Exam reveals no gallop and no friction rub  No murmur heard  Pulmonary/Chest: Effort normal and breath sounds normal  No respiratory distress  She has no wheezes  She has no rales  She exhibits no tenderness  Musculoskeletal: Normal range of motion  She exhibits no edema  Neurological: She is alert and oriented to person, place, and time  Skin: Skin is warm and dry  No rash noted  She is not diaphoretic  Psychiatric: She has a normal mood and affect  Her behavior is normal  Judgment and thought content normal    Nursing note and vitals reviewed

## 2019-03-30 ENCOUNTER — APPOINTMENT (OUTPATIENT)
Dept: LAB | Facility: CLINIC | Age: 64
End: 2019-03-30
Payer: COMMERCIAL

## 2019-03-30 DIAGNOSIS — R53.83 FATIGUE, UNSPECIFIED TYPE: ICD-10-CM

## 2019-03-30 DIAGNOSIS — E55.9 VITAMIN D DEFICIENCY: ICD-10-CM

## 2019-03-30 DIAGNOSIS — Z13.220 LIPID SCREENING: ICD-10-CM

## 2019-03-30 DIAGNOSIS — Z11.59 NEED FOR HEPATITIS C SCREENING TEST: ICD-10-CM

## 2019-03-30 LAB
25(OH)D3 SERPL-MCNC: 52 NG/ML (ref 30–100)
ALBUMIN SERPL BCP-MCNC: 3.8 G/DL (ref 3.5–5)
ALP SERPL-CCNC: 52 U/L (ref 46–116)
ALT SERPL W P-5'-P-CCNC: 23 U/L (ref 12–78)
ANION GAP SERPL CALCULATED.3IONS-SCNC: 5 MMOL/L (ref 4–13)
AST SERPL W P-5'-P-CCNC: 15 U/L (ref 5–45)
BASOPHILS # BLD AUTO: 0.04 THOUSANDS/ΜL (ref 0–0.1)
BASOPHILS NFR BLD AUTO: 1 % (ref 0–1)
BILIRUB SERPL-MCNC: 0.48 MG/DL (ref 0.2–1)
BUN SERPL-MCNC: 11 MG/DL (ref 5–25)
CALCIUM SERPL-MCNC: 9.1 MG/DL (ref 8.3–10.1)
CHLORIDE SERPL-SCNC: 106 MMOL/L (ref 100–108)
CHOLEST SERPL-MCNC: 181 MG/DL (ref 50–200)
CO2 SERPL-SCNC: 29 MMOL/L (ref 21–32)
CREAT SERPL-MCNC: 0.81 MG/DL (ref 0.6–1.3)
EOSINOPHIL # BLD AUTO: 0.18 THOUSAND/ΜL (ref 0–0.61)
EOSINOPHIL NFR BLD AUTO: 3 % (ref 0–6)
ERYTHROCYTE [DISTWIDTH] IN BLOOD BY AUTOMATED COUNT: 12.9 % (ref 11.6–15.1)
GFR SERPL CREATININE-BSD FRML MDRD: 79 ML/MIN/1.73SQ M
GLUCOSE P FAST SERPL-MCNC: 93 MG/DL (ref 65–99)
HCT VFR BLD AUTO: 43.3 % (ref 34.8–46.1)
HDLC SERPL-MCNC: 65 MG/DL (ref 40–60)
HGB BLD-MCNC: 14 G/DL (ref 11.5–15.4)
IMM GRANULOCYTES # BLD AUTO: 0.01 THOUSAND/UL (ref 0–0.2)
IMM GRANULOCYTES NFR BLD AUTO: 0 % (ref 0–2)
IRON SERPL-MCNC: 98 UG/DL (ref 50–170)
LDLC SERPL CALC-MCNC: 89 MG/DL (ref 0–100)
LYMPHOCYTES # BLD AUTO: 2.59 THOUSANDS/ΜL (ref 0.6–4.47)
LYMPHOCYTES NFR BLD AUTO: 39 % (ref 14–44)
MCH RBC QN AUTO: 33.4 PG (ref 26.8–34.3)
MCHC RBC AUTO-ENTMCNC: 32.3 G/DL (ref 31.4–37.4)
MCV RBC AUTO: 103 FL (ref 82–98)
MONOCYTES # BLD AUTO: 0.41 THOUSAND/ΜL (ref 0.17–1.22)
MONOCYTES NFR BLD AUTO: 6 % (ref 4–12)
NEUTROPHILS # BLD AUTO: 3.46 THOUSANDS/ΜL (ref 1.85–7.62)
NEUTS SEG NFR BLD AUTO: 51 % (ref 43–75)
NRBC BLD AUTO-RTO: 0 /100 WBCS
PLATELET # BLD AUTO: 353 THOUSANDS/UL (ref 149–390)
PMV BLD AUTO: 9.4 FL (ref 8.9–12.7)
POTASSIUM SERPL-SCNC: 4.4 MMOL/L (ref 3.5–5.3)
PROT SERPL-MCNC: 7.3 G/DL (ref 6.4–8.2)
RBC # BLD AUTO: 4.19 MILLION/UL (ref 3.81–5.12)
SODIUM SERPL-SCNC: 140 MMOL/L (ref 136–145)
TRIGL SERPL-MCNC: 136 MG/DL
TSH SERPL DL<=0.05 MIU/L-ACNC: 1.54 UIU/ML (ref 0.36–3.74)
WBC # BLD AUTO: 6.69 THOUSAND/UL (ref 4.31–10.16)

## 2019-03-30 PROCEDURE — 80053 COMPREHEN METABOLIC PANEL: CPT

## 2019-03-30 PROCEDURE — 36415 COLL VENOUS BLD VENIPUNCTURE: CPT

## 2019-03-30 PROCEDURE — 85025 COMPLETE CBC W/AUTO DIFF WBC: CPT

## 2019-03-30 PROCEDURE — 84443 ASSAY THYROID STIM HORMONE: CPT

## 2019-03-30 PROCEDURE — 86803 HEPATITIS C AB TEST: CPT

## 2019-03-30 PROCEDURE — 80061 LIPID PANEL: CPT

## 2019-03-30 PROCEDURE — 83540 ASSAY OF IRON: CPT

## 2019-03-30 PROCEDURE — 82306 VITAMIN D 25 HYDROXY: CPT

## 2019-03-31 LAB — HCV AB SER QL: NORMAL

## 2019-05-23 ENCOUNTER — TELEPHONE (OUTPATIENT)
Dept: GASTROENTEROLOGY | Facility: CLINIC | Age: 64
End: 2019-05-23

## 2019-05-29 DIAGNOSIS — R92.8 ABNORMAL MAMMOGRAM OF LEFT BREAST: Primary | ICD-10-CM

## 2019-07-16 DIAGNOSIS — R92.8 ABNORMAL MAMMOGRAM OF LEFT BREAST: ICD-10-CM

## 2019-07-18 ENCOUNTER — TELEPHONE (OUTPATIENT)
Dept: FAMILY MEDICINE CLINIC | Facility: CLINIC | Age: 64
End: 2019-07-18

## 2019-07-18 DIAGNOSIS — R92.8 ABNORMAL ULTRASOUND OF BREAST: Primary | ICD-10-CM

## 2019-07-18 DIAGNOSIS — N63.0 BREAST MASS IN FEMALE: ICD-10-CM

## 2019-07-18 NOTE — TELEPHONE ENCOUNTER
They need an order for a Ultrasound guided breast biopsy of the left breast faxed to them at [phone]

## 2019-08-12 ENCOUNTER — TELEPHONE (OUTPATIENT)
Dept: SURGICAL ONCOLOGY | Facility: CLINIC | Age: 64
End: 2019-08-12

## 2019-08-12 ENCOUNTER — TELEPHONE (OUTPATIENT)
Dept: MAMMOGRAPHY | Facility: CLINIC | Age: 64
End: 2019-08-12

## 2019-08-12 ENCOUNTER — OFFICE VISIT (OUTPATIENT)
Dept: FAMILY MEDICINE CLINIC | Facility: CLINIC | Age: 64
End: 2019-08-12
Payer: COMMERCIAL

## 2019-08-12 VITALS
OXYGEN SATURATION: 98 % | WEIGHT: 169 LBS | TEMPERATURE: 96.9 F | SYSTOLIC BLOOD PRESSURE: 128 MMHG | DIASTOLIC BLOOD PRESSURE: 80 MMHG | HEIGHT: 65 IN | HEART RATE: 79 BPM | BODY MASS INDEX: 28.16 KG/M2

## 2019-08-12 DIAGNOSIS — C50.912 MALIGNANT NEOPLASM OF LEFT FEMALE BREAST, UNSPECIFIED ESTROGEN RECEPTOR STATUS, UNSPECIFIED SITE OF BREAST (HCC): Primary | ICD-10-CM

## 2019-08-12 PROCEDURE — 99213 OFFICE O/P EST LOW 20 MIN: CPT | Performed by: FAMILY MEDICINE

## 2019-08-12 NOTE — TELEPHONE ENCOUNTER
Rec'd phone call from [facility] that they ordered a breast biopsy on this patient and pt had it done at [facility] and now she needs direction, discussed options of breast surgeons and locations and pt states she would like to be seen at the [facility], discussed available times, adv I would set up an appt and get back to her with appt, adv patient she would need to get her imaging from [facility] to bring to her first appt

## 2019-08-12 NOTE — PROGRESS NOTES
Assessment/Plan:     Diagnoses and all orders for this visit:    Malignant neoplasm of left female breast, unspecified estrogen receptor status, unspecified site of breast [facility])        Patient referred to and scheduled with Dr Arjun Villatoro  Subjective:      Patient ID: Christos Jara is a 60 y o  female  62-year-old female recently diagnosed with breast cancer  She had a breast biopsy at [facility]  She brought the pathology report and it is scanned into her record -"invasive lobular carcinoma"  She is unsure who she wants to go to for treatment and would like to discuss this  She states she took herself off Prempro  She was taking this for 20 years  She is a non smoker  No FH of breast cancer  The following portions of the patient's history were reviewed and updated as appropriate:   She  has a past medical history of Osteoporosis  She   Patient Active Problem List    Diagnosis Date Noted    Malignant neoplasm of left female breast ([facility] [address] ) 08/12/2019    Age-related osteoporosis without current pathological fracture 03/27/2019    Fatigue 02/15/2018     She  has a past surgical history that includes Cholecystectomy; Dental surgery; Hallux valgus correction; Incisional breast biopsy; and Rhinoplasty  Her family history includes Lung cancer in her father; No Known Problems in her mother  She  reports that she has never smoked  She has never used smokeless tobacco  She reports that she does not drink alcohol or use drugs    Current Outpatient Medications   Medication Sig Dispense Refill    acetaminophen (TYLENOL) 500 mg tablet Take by mouth      Cholecalciferol (VITAMIN D) 2000 units CAPS Take 4,000 Units by mouth      ibandronate (BONIVA) 150 MG tablet Take by mouth      Ivermectin (SOOLANTRA) 1 % CREA Apply topically      estrogen, conjugated,-medroxyprogesterone (PREMPRO) 0 3-1 5 MG per tablet 1daily      PREMPRO 0 3-1 5 MG per tablet        No current facility-administered medications for this visit  Current Outpatient Medications on File Prior to Visit   Medication Sig    acetaminophen (TYLENOL) 500 mg tablet Take by mouth    Cholecalciferol (VITAMIN D) 2000 units CAPS Take 4,000 Units by mouth    ibandronate (BONIVA) 150 MG tablet Take by mouth    Ivermectin (SOOLANTRA) 1 % CREA Apply topically    estrogen, conjugated,-medroxyprogesterone (PREMPRO) 0 3-1 5 MG per tablet 1daily    PREMPRO 0 3-1 5 MG per tablet     [DISCONTINUED] METRONIDAZOLE, TOPICAL, 0 75 % LOTN     [DISCONTINUED] polymyxin b-trimethoprim (POLYTRIM) ophthalmic solution     [DISCONTINUED] PROLENSA 0 07 % SOLN      No current facility-administered medications on file prior to visit  She is allergic to sulfa antibiotics; ciprofloxacin; cyclopentolate; and scopolamine       Review of Systems   Constitutional: Negative for activity change  Respiratory: Negative for chest tightness and shortness of breath  Cardiovascular: Negative for chest pain and leg swelling  Neurological: Negative for headaches  Psychiatric/Behavioral: Negative for dysphoric mood  The patient is not nervous/anxious  Objective:      /80   Pulse 79   Temp (!) 96 9 °F (36 1 °C)   Ht 5' 5" (1 651 m)   Wt 76 7 kg (169 lb)   SpO2 98%   BMI 28 12 kg/m²          Physical Exam   Constitutional: She appears well-developed and well-nourished  No distress  HENT:   Head: Normocephalic and atraumatic  Skin: She is not diaphoretic  Psychiatric: She has a normal mood and affect  Nursing note and vitals reviewed

## 2019-08-12 NOTE — TELEPHONE ENCOUNTER
New Patient Encounter      New Patient Intake Form   Patient Details:  Krystal Salas  1955  [id number]    Background Information:  [address] starts by opening a telephone encounter and gathering the following information   Who is calling to schedule? If not self, relationship to patient? RBC Lisy    Referring Provider RBC   What is the diagnosis? LEFT BC   When was the diagnosis? 7/23/2019   Is patient aware of diagnosis? Yes   Reason for visit? NP DX   Have you had any testing done? If so: when, where? Yes 7/23/2019 [facility]    Are records in [place]? No   Was the patient told to bring a disk? Yes   Scheduling Information:   Preferred [place]:  Any   Requesting Specific Provider? Shanthi Hughes   Are there any dates/time the patient cannot be seen? na   Counseling Pre-Screen:  If the patient answers YES to any of the below questions, please route to the appropriate location specific counselor    Have you felt anxious or worried about cancer and the treatment you are receiving? No   Has your diagnosis caused physical, emotional, or financial hardship for you? No   Do you anticipate any transportation issues to get to your appointment? No   Miscellaneous: Pt was told it was ok to bring reports and disk in at the time of her appt per Stevenson Kimball  After completing the above information, please route to Financial Counselor and the appropriate Nurse Navigator for review

## 2019-08-12 NOTE — TELEPHONE ENCOUNTER
Call placed back to [facility], adv that appt set up for Wed 8/14 with Dr Austin Sanchez at the [address] [place] [address] at 18, pt needs to arrive by [phone] to complete new patient paperwork, pt needs to bring outside imaging to appt, pt given directions to appt, and my contact information for any questions/needs

## 2019-08-13 ENCOUNTER — DOCUMENTATION (OUTPATIENT)
Dept: HEMATOLOGY ONCOLOGY | Facility: CLINIC | Age: 64
End: 2019-08-13

## 2019-08-13 PROBLEM — C50.412 MALIGNANT NEOPLASM OF UPPER-OUTER QUADRANT OF LEFT FEMALE BREAST (HCC): Status: ACTIVE | Noted: 2019-08-12

## 2019-08-13 NOTE — PROGRESS NOTES
[facility] Nurse Navigator:   Referral received from [facility]  Called patient for initial navigation outreach  Confirmed upcoming appointment/time/location  Directions given for [address]  Discussed and answered her questions regarding what will happen at the MD visit  Pathology report given, exam,questions answered etc Patient stated that she will be coming alone to the visit and does not have any transportation issues at this time  Patient reported that she is  and has two adult sons  She began to weep when we discussed her late  who was in a traumatic brain injury accident  I offered emotional support and therapeutic listening  She voiced no other concerns at this time  I will plan to meet patient on 8/14/19 at MD visit, & possible genetic testing  She was given my office phone and instructed to call for concerns

## 2019-08-14 ENCOUNTER — DOCUMENTATION (OUTPATIENT)
Dept: HEMATOLOGY ONCOLOGY | Facility: CLINIC | Age: 64
End: 2019-08-14

## 2019-08-14 ENCOUNTER — CONSULT (OUTPATIENT)
Dept: SURGICAL ONCOLOGY | Facility: CLINIC | Age: 64
End: 2019-08-14
Payer: COMMERCIAL

## 2019-08-14 VITALS
HEART RATE: 86 BPM | SYSTOLIC BLOOD PRESSURE: 128 MMHG | TEMPERATURE: 97.9 F | WEIGHT: 167 LBS | BODY MASS INDEX: 27.82 KG/M2 | DIASTOLIC BLOOD PRESSURE: 80 MMHG | HEIGHT: 65 IN | RESPIRATION RATE: 16 BRPM

## 2019-08-14 DIAGNOSIS — C50.412 MALIGNANT NEOPLASM OF UPPER-OUTER QUADRANT OF LEFT FEMALE BREAST, UNSPECIFIED ESTROGEN RECEPTOR STATUS (HCC): Primary | ICD-10-CM

## 2019-08-14 DIAGNOSIS — C50.912 MALIGNANT NEOPLASM OF LEFT FEMALE BREAST, UNSPECIFIED ESTROGEN RECEPTOR STATUS, UNSPECIFIED SITE OF BREAST (HCC): ICD-10-CM

## 2019-08-14 PROCEDURE — 99245 OFF/OP CONSLTJ NEW/EST HI 55: CPT | Performed by: SURGERY

## 2019-08-14 RX ORDER — MECLIZINE HCL 12.5 MG/1
12.5 TABLET ORAL 3 TIMES DAILY PRN
COMMUNITY

## 2019-08-14 RX ORDER — SUMATRIPTAN 100 MG/1
100 TABLET, FILM COATED ORAL ONCE AS NEEDED
COMMUNITY

## 2019-08-14 NOTE — PROGRESS NOTES
[facility] Nurse navigator:   Met with patient after Md visit  Provided her with written material for cancer support  LAFF, Cancer counselors, Osvaldo Sloan, Nutrition & treatment classes etc  I answered all her questions to her satisfaction and provided her with my contact information and encouraged her to call for any f/u questions or concerns

## 2019-08-14 NOTE — PROGRESS NOTES
Surgical Oncology Consult Note       [address] [facility],[address]  [facility] [place]  [address] [facility] [address]    Kris Alcala  1955  [id number]  [address] [facility],[address]  [facility] [place]  [address] [address]      Chief Complaint:   No chief complaint on file  Assessment and Plan:   Assessment/Plan    patient presents with a new diagnosis of left breast cancer  She is a T1 N0 M0 ER positive SC positive HER2 negative small breast cancer  The patient is interested in breast conservation therapy  She was treated at [facility]  I have recommended we have her films reviewed as well as her pathology and see her back to most likely undergo consenting for breast conservation therapy with radiation therapy and surgery to be done most likely at the [facility] [place] based on the patient's wishes  Oncology History:        Malignant neoplasm of upper-outer quadrant of left female breast ([address] )    7/23/2019 Biopsy     Left breast biopsy  1 o'clock, 5 cm from nipple  Invasive lobular carcinoma  Grade 1      HER2 0    Done at [facility]         History of Present Illness: This is a 42-year-old woman who went for a screening mammogram which showed no abnormalities on the right side however on the left side there was a 9 millimeter mass ( ultrasound) at the 1 o'clock position 5 centimeters from the nipple  This was biopsied and shown to be invasive lobular carcinoma measuring 9 millimeters in size grade 1  percent  percent HER2 Mateo 0/negative  Patient presents now for an opinion regarding further management  Review of Systems:   Review of Systems   Constitutional: Negative for activity change, appetite change and fatigue  HENT: Negative  Eyes: Negative  Respiratory: Negative for cough, shortness of breath and wheezing  Cardiovascular: Negative for chest pain and leg swelling  Gastrointestinal: Negative  Endocrine: Negative  Genitourinary: Negative  Musculoskeletal:        No new changes or complaints of bone pain   Skin: Negative  Allergic/Immunologic: Negative  Neurological: Negative  Hematological: Negative  Psychiatric/Behavioral: Negative  Past Medical History:      Patient Active Problem List   Diagnosis    Fatigue    Age-related osteoporosis without current pathological fracture    Malignant neoplasm of upper-outer quadrant of left female breast [facility])        Past Medical History:   Diagnosis Date    Osteoporosis         Past Surgical History:   Procedure Laterality Date    CHOLECYSTECTOMY      DENTAL SURGERY      implant    HALLUX VALGUS CORRECTION      INCISIONAL BREAST BIOPSY      RHINOPLASTY          Family History   Problem Relation Age of Onset    No Known Problems Mother     Lung cancer Father         Social History     Socioeconomic History    Marital status:       Spouse name: Not on file    Number of children: Not on file    Years of education: Not on file    Highest education level: Not on file   Occupational History    Occupation: curretnly works part-time    Occupation: medical professional   Social Needs    Financial resource strain: Not on file    Food insecurity:     Worry: Not on file     Inability: Not on file   [employer] needs:     Medical: Not on file     Non-medical: Not on file   Tobacco Use    Smoking status: Never Smoker    Smokeless tobacco: Never Used   Substance and Sexual Activity    Alcohol use: No    Drug use: No    Sexual activity: Not on file   Lifestyle    Physical activity:     Days per week: Not on file     Minutes per session: Not on file    Stress: Not on file   Relationships    Social connections:     Talks on phone: Not on file     Gets together: Not on file     Attends Sabianist service: Not on file     Active member of club or organization: Not on file     Attends meetings of clubs or organizations: Not on file     Relationship status: Not on file    Intimate partner violence:     Fear of current or ex partner: Not on file     Emotionally abused: Not on file     Physically abused: Not on file     Forced sexual activity: Not on file   Other Topics Concern    Not on file   Social History Narrative    Not on file        Current Outpatient Medications:     acetaminophen (TYLENOL) 500 mg tablet, Take by mouth, Disp: , Rfl:     Cholecalciferol (VITAMIN D) 2000 units CAPS, Take 4,000 Units by mouth, Disp: , Rfl:     estrogen, conjugated,-medroxyprogesterone (PREMPRO) 0 3-1 5 MG per tablet, 1daily, Disp: , Rfl:     ibandronate (BONIVA) 150 MG tablet, Take by mouth, Disp: , Rfl:     Ivermectin (SOOLANTRA) 1 % CREA, Apply topically, Disp: , Rfl:     PREMPRO 0 3-1 5 MG per tablet, , Disp: , Rfl:      Allergies   Allergen Reactions    Sulfa Antibiotics Hives and Rash    Ciprofloxacin Seizures    Cyclopentolate      resp distress, tachycardia    Scopolamine Other (See Comments)     Precautionary for cycopentolate allergy       Physical Exam:     Vitals:    08/14/19 1347   BP: 128/80   Pulse: 86   Resp: 16   Temp: 97 9 °F (36 6 °C)     Physical Exam   Constitutional: She is oriented to person, place, and time  She appears well-developed and well-nourished  HENT:   Head: Normocephalic and atraumatic  Mouth/Throat: Oropharynx is clear and moist    Eyes: Pupils are equal, round, and reactive to light  EOM are normal    Neck: Normal range of motion  Neck supple  No JVD present  No tracheal deviation present  No thyromegaly present  Cardiovascular: Normal rate, regular rhythm, normal heart sounds and intact distal pulses  Exam reveals no gallop and no friction rub  No murmur heard  Pulmonary/Chest: Effort normal and breath sounds normal  No respiratory distress  She has no wheezes  She has no rales  Both breasts were examined in the sitting and supine position   There are no worrisome skin lesions, no nipple retraction and no nipple discharge  There are no dominant masses, axillary adenopathy or supraclavicular adenopathy on either side  Abdominal: Soft  She exhibits no distension and no mass  There is no hepatomegaly  There is no tenderness  There is no rebound and no guarding  Musculoskeletal: Normal range of motion  She exhibits no edema or tenderness  Lymphadenopathy:     She has no cervical adenopathy  Neurological: She is alert and oriented to person, place, and time  No cranial nerve deficit  Skin: Skin is warm and dry  No rash noted  No erythema  Psychiatric: She has a normal mood and affect  Her behavior is normal    Vitals reviewed  Results:     I reviewed her pathology report as well as her imaging reports  I have also seeing her films and concur that there consistent with the findings and reports however II recommended that both of these areas be reviewed by [address] [place] [address] physicians  Discussion/Summary:     Clinically this is a left T1 N0 M0 M0 grade 1 ER positive [place] positive HER2 negative 9 millimeter cancer  I have recommended breast conservation therapy  We did discuss the possibility of a mastectomy though the patient is not interested in this  She could make it too radiation therapy and would prefer that most likely to be at the [facility] but she will determine whether she wants to do this at the [facility] as well  We will see her back in approximately 1-2 weeks following her image review and pathology review  All questions were answered the patient's satisfaction  Advance Care Planning/Advance Directives:  I discussed the disease status, treatment plans and follow-up with the patient

## 2019-08-22 ENCOUNTER — TELEPHONE (OUTPATIENT)
Dept: HEMATOLOGY ONCOLOGY | Facility: CLINIC | Age: 64
End: 2019-08-22

## 2019-08-27 ENCOUNTER — HOSPITAL ENCOUNTER (OUTPATIENT)
Dept: MAMMOGRAPHY | Facility: CLINIC | Age: 64
Discharge: HOME | End: 2019-08-27

## 2019-08-27 DIAGNOSIS — Z76.89 REFERRAL OF PATIENT WITHOUT EXAMINATION OR TREATMENT: ICD-10-CM

## 2019-08-27 PROBLEM — Z17.0 MALIGNANT NEOPLASM OF UPPER-OUTER QUADRANT OF LEFT BREAST IN FEMALE, ESTROGEN RECEPTOR POSITIVE (HCC): Status: ACTIVE | Noted: 2019-08-12

## 2019-08-28 ENCOUNTER — APPOINTMENT (OUTPATIENT)
Dept: LAB | Facility: CLINIC | Age: 64
End: 2019-08-28
Payer: COMMERCIAL

## 2019-08-28 ENCOUNTER — TELEPHONE (OUTPATIENT)
Dept: HEMATOLOGY ONCOLOGY | Facility: CLINIC | Age: 64
End: 2019-08-28

## 2019-08-28 ENCOUNTER — OFFICE VISIT (OUTPATIENT)
Dept: SURGICAL ONCOLOGY | Facility: CLINIC | Age: 64
End: 2019-08-28
Payer: COMMERCIAL

## 2019-08-28 ENCOUNTER — HOSPITAL ENCOUNTER (OUTPATIENT)
Dept: RADIOLOGY | Facility: HOSPITAL | Age: 64
Discharge: HOME | End: 2019-08-28
Attending: SURGERY
Payer: COMMERCIAL

## 2019-08-28 ENCOUNTER — OFFICE VISIT (OUTPATIENT)
Dept: LAB | Facility: CLINIC | Age: 64
End: 2019-08-28
Payer: COMMERCIAL

## 2019-08-28 ENCOUNTER — TRANSCRIBE ORDERS (OUTPATIENT)
Dept: LAB | Facility: CLINIC | Age: 64
End: 2019-08-28

## 2019-08-28 VITALS
SYSTOLIC BLOOD PRESSURE: 120 MMHG | RESPIRATION RATE: 16 BRPM | HEART RATE: 94 BPM | TEMPERATURE: 98.1 F | HEIGHT: 65 IN | DIASTOLIC BLOOD PRESSURE: 80 MMHG | BODY MASS INDEX: 27.82 KG/M2 | WEIGHT: 167 LBS

## 2019-08-28 DIAGNOSIS — C50.412 MALIGNANT NEOPLASM OF UPPER-OUTER QUADRANT OF LEFT BREAST IN FEMALE, ESTROGEN RECEPTOR POSITIVE (HCC): ICD-10-CM

## 2019-08-28 DIAGNOSIS — Z17.0 MALIGNANT NEOPLASM OF UPPER-OUTER QUADRANT OF LEFT BREAST IN FEMALE, ESTROGEN RECEPTOR POSITIVE (HCC): Primary | ICD-10-CM

## 2019-08-28 DIAGNOSIS — Z17.0 MALIGNANT NEOPLASM OF UPPER-OUTER QUADRANT OF LEFT BREAST IN FEMALE, ESTROGEN RECEPTOR POSITIVE (HCC): ICD-10-CM

## 2019-08-28 DIAGNOSIS — C50.412 MALIGNANT NEOPLASM OF UPPER-OUTER QUADRANT OF LEFT BREAST IN FEMALE, ESTROGEN RECEPTOR POSITIVE (HCC): Primary | ICD-10-CM

## 2019-08-28 DIAGNOSIS — Z01.818 PREOP EXAMINATION: ICD-10-CM

## 2019-08-28 LAB
ALBUMIN SERPL BCP-MCNC: 3.7 G/DL (ref 3.5–5)
ALP SERPL-CCNC: 90 U/L (ref 46–116)
ALT SERPL W P-5'-P-CCNC: 34 U/L (ref 12–78)
ANION GAP SERPL CALCULATED.3IONS-SCNC: 8 MMOL/L (ref 4–13)
AST SERPL W P-5'-P-CCNC: 23 U/L (ref 5–45)
ATRIAL RATE: 69 BPM
BASOPHILS # BLD AUTO: 0.06 THOUSANDS/ΜL (ref 0–0.1)
BASOPHILS NFR BLD AUTO: 1 % (ref 0–1)
BILIRUB SERPL-MCNC: 0.4 MG/DL (ref 0.2–1)
BUN SERPL-MCNC: 11 MG/DL (ref 5–25)
CALCIUM SERPL-MCNC: 9.4 MG/DL (ref 8.3–10.1)
CHLORIDE SERPL-SCNC: 109 MMOL/L (ref 100–108)
CO2 SERPL-SCNC: 30 MMOL/L (ref 21–32)
CREAT SERPL-MCNC: 0.73 MG/DL (ref 0.6–1.3)
EOSINOPHIL # BLD AUTO: 0.27 THOUSAND/ΜL (ref 0–0.61)
EOSINOPHIL NFR BLD AUTO: 3 % (ref 0–6)
ERYTHROCYTE [DISTWIDTH] IN BLOOD BY AUTOMATED COUNT: 12.3 % (ref 11.6–15.1)
GFR SERPL CREATININE-BSD FRML MDRD: 89 ML/MIN/1.73SQ M
GLUCOSE SERPL-MCNC: 86 MG/DL (ref 65–140)
HCT VFR BLD AUTO: 42.2 % (ref 34.8–46.1)
HGB BLD-MCNC: 13.9 G/DL (ref 11.5–15.4)
IMM GRANULOCYTES # BLD AUTO: 0.02 THOUSAND/UL (ref 0–0.2)
IMM GRANULOCYTES NFR BLD AUTO: 0 % (ref 0–2)
LYMPHOCYTES # BLD AUTO: 3 THOUSANDS/ΜL (ref 0.6–4.47)
LYMPHOCYTES NFR BLD AUTO: 36 % (ref 14–44)
MCH RBC QN AUTO: 33.3 PG (ref 26.8–34.3)
MCHC RBC AUTO-ENTMCNC: 32.9 G/DL (ref 31.4–37.4)
MCV RBC AUTO: 101 FL (ref 82–98)
MONOCYTES # BLD AUTO: 0.57 THOUSAND/ΜL (ref 0.17–1.22)
MONOCYTES NFR BLD AUTO: 7 % (ref 4–12)
NEUTROPHILS # BLD AUTO: 4.51 THOUSANDS/ΜL (ref 1.85–7.62)
NEUTS SEG NFR BLD AUTO: 53 % (ref 43–75)
NRBC BLD AUTO-RTO: 0 /100 WBCS
P AXIS: 60 DEGREES
PLATELET # BLD AUTO: 319 THOUSANDS/UL (ref 149–390)
PMV BLD AUTO: 9.3 FL (ref 8.9–12.7)
POTASSIUM SERPL-SCNC: 4 MMOL/L (ref 3.5–5.3)
PR INTERVAL: 186 MS
PROT SERPL-MCNC: 7.5 G/DL (ref 6.4–8.2)
QRS AXIS: 1 DEGREES
QRSD INTERVAL: 74 MS
QT INTERVAL: 412 MS
QTC INTERVAL: 441 MS
RBC # BLD AUTO: 4.18 MILLION/UL (ref 3.81–5.12)
SODIUM SERPL-SCNC: 147 MMOL/L (ref 136–145)
T WAVE AXIS: 27 DEGREES
VENTRICULAR RATE: 69 BPM
WBC # BLD AUTO: 8.43 THOUSAND/UL (ref 4.31–10.16)

## 2019-08-28 PROCEDURE — 85025 COMPLETE CBC W/AUTO DIFF WBC: CPT

## 2019-08-28 PROCEDURE — 93005 ELECTROCARDIOGRAM TRACING: CPT

## 2019-08-28 PROCEDURE — 71046 X-RAY EXAM CHEST 2 VIEWS: CPT

## 2019-08-28 PROCEDURE — 80053 COMPREHEN METABOLIC PANEL: CPT

## 2019-08-28 PROCEDURE — 99214 OFFICE O/P EST MOD 30 MIN: CPT | Performed by: SURGERY

## 2019-08-28 PROCEDURE — 36415 COLL VENOUS BLD VENIPUNCTURE: CPT

## 2019-08-28 PROCEDURE — 93010 ELECTROCARDIOGRAM REPORT: CPT | Performed by: INTERNAL MEDICINE

## 2019-08-28 RX ORDER — OXYCODONE HYDROCHLORIDE AND ACETAMINOPHEN 5; 325 MG/1; MG/1
1 TABLET ORAL EVERY 6 HOURS PRN
Qty: 6 TABLET | Refills: 0 | Status: SHIPPED | OUTPATIENT
Start: 2019-08-28 | End: 2019-10-30 | Stop reason: ALTCHOICE

## 2019-08-28 NOTE — PROGRESS NOTES
Surgical Oncology Follow Up       [address] [facility]  [facility] [place]  [address] [facility] [place]  [facility] [address]    Atul Gonzalez  1955  [id number]  [address]  [facility] [place]  [address] [address]    Chief Complaint   Patient presents with    Breast Cancer     Pt is here for 2 weeks follow up           Assessment & Plan:    the patient presents for a follow-up visit for her left breast cancer  Her pathology and her imaging reviews were concordant with the original reports  We went through the process of informed consent for a left breast needle localization lumpectomy in conjunction with a sentinel lymph node biopsy and possible axillary dissection  We will coordinate the surgery next mutual convenience  Cancer History:        Malignant neoplasm of upper-outer quadrant of left breast in female, estrogen receptor positive ([address] )    7/23/2019 Biopsy     Left breast biopsy  1 o'clock, 5 cm from nipple  Invasive lobular carcinoma  Grade 1      HER2 0    Done at [facility]           Interval History:   See Assessment & Plan    Review of Systems:   Review of Systems   Constitutional: Negative for activity change, appetite change and fatigue  HENT: Negative  Eyes: Negative  Respiratory: Negative for cough, shortness of breath and wheezing  Cardiovascular: Negative for chest pain and leg swelling  Gastrointestinal: Negative  Endocrine: Negative  Genitourinary: Negative  Musculoskeletal:        No new changes or complaints of bone pain   Skin: Negative  Allergic/Immunologic: Negative  Neurological: Negative  Hematological: Negative  Psychiatric/Behavioral: Negative          Past Medical History     Patient Active Problem List   Diagnosis    Fatigue    Age-related osteoporosis without current pathological fracture    Malignant ne[facility] of upper-outer quadrant of left breast in female, estrogen receptor positive [facility]     Past Medical History:   Diagnosis Date    Osteoporosis      Past Surgical History:   Procedure Laterality Date    CHOLECYSTECTOMY      DENTAL SURGERY      implant    HALLUX VALGUS CORRECTION      INCISIONAL BREAST BIOPSY      RHINOPLASTY       Family History   Problem Relation Age of Onset    No Known Problems Mother     Lung cancer Father 71     Social History     Socioeconomic History    Marital status:       Spouse name: Not on file    Number of children: Not on file    Years of education: Not on file    Highest education level: Not on file   Occupational History    Occupation: curretnly works part-time    Occupation: medical professional   Social Needs    Financial resource strain: Not on file    Food insecurity:     Worry: Not on file     Inability: Not on file   [employer] needs:     Medical: Not on file     Non-medical: Not on file   Tobacco Use    Smoking status: Never Smoker    Smokeless tobacco: Never Used   Substance and Sexual Activity    Alcohol use: No    Drug use: No    Sexual activity: Not on file   Lifestyle    Physical activity:     Days per week: Not on file     Minutes per session: Not on file    Stress: Not on file   Relationships    Social connections:     Talks on phone: Not on file     Gets together: Not on file     Attends Judaism service: Not on file     Active member of club or organization: Not on file     Attends meetings of clubs or organizations: Not on file     Relationship status: Not on file    Intimate partner violence:     Fear of current or ex partner: Not on file     Emotionally abused: Not on file     Physically abused: Not on file     Forced sexual activity: Not on file   Other Topics Concern    Not on file   Social History Narrative    Not on file       Current Outpatient Medications:     acetaminophen (TYLENOL) 500 mg tablet, Take by mouth, Disp: , Rfl:     Cholecalciferol (VITAMIN D) 2000 units CAPS, Take 4,000 Units by mouth, Disp: , Rfl:     ibandronate (BONIVA) 150 MG tablet, Take by mouth, Disp: , Rfl:     Ivermectin (SOOLANTRA) 1 % CREA, Apply topically, Disp: , Rfl:     meclizine (ANTIVERT) 12 5 MG tablet, Take 12 5 mg by mouth 3 (three) times a day as needed for dizziness, Disp: , Rfl:     SUMAtriptan (IMITREX) 100 mg tablet, Take 100 mg by mouth once as needed for migraine, Disp: , Rfl:     estrogen, conjugated,-medroxyprogesterone (PREMPRO) 0 3-1 5 MG per tablet, 1daily, Disp: , Rfl:     PREMPRO 0 3-1 5 MG per tablet, , Disp: , Rfl:   Allergies   Allergen Reactions    Sulfa Antibiotics Hives and Rash    Ciprofloxacin Seizures    Cyclopentolate      resp distress, tachycardia    Scopolamine Other (See Comments)     Precautionary for cycopentolate allergy       Physical Exam:     Vitals:    08/28/19 1123   BP: 120/80   Pulse: 94   Resp: 16   Temp: 98 1 °F (36 7 °C)     Physical Exam   Constitutional: She is oriented to person, place, and time  She appears well-developed and well-nourished  HENT:   Head: Normocephalic and atraumatic  Mouth/Throat: Oropharynx is clear and moist    Eyes: Pupils are equal, round, and reactive to light  EOM are normal    Neck: Normal range of motion  Neck supple  No JVD present  No tracheal deviation present  No thyromegaly present  Cardiovascular: Normal rate, regular rhythm, normal heart sounds and intact distal pulses  Exam reveals no gallop and no friction rub  No murmur heard  Pulmonary/Chest: Effort normal and breath sounds normal  No respiratory distress  She has no wheezes  She has no rales  Both breasts were examined in the sitting and supine position  There are no worrisome skin lesions, no nipple retraction and no nipple discharge  There are no dominant masses, axillary adenopathy or supraclavicular adenopathy on either side  Abdominal: Soft  She exhibits no distension and no mass   There is no hepatomegaly  There is no tenderness  There is no rebound and no guarding  Musculoskeletal: Normal range of motion  She exhibits no edema or tenderness  Lymphadenopathy:     She has no cervical adenopathy  Neurological: She is alert and oriented to person, place, and time  No cranial nerve deficit  Skin: Skin is warm and dry  No rash noted  No erythema  Psychiatric: She has a normal mood and affect  Her behavior is normal    Vitals reviewed  Results & Discussion:     The patient has a approximately 2 cm mass radiographically though I do not appreciate clinically  We went through the process of informed consent for left breast needle localization lumpectomy in conjunction with a sentinel lymph node biopsy and possible axillary dissection  All complications as outlined on the consent form were reviewed with the patient in detail  All questions were answered  We will coordinate the surgery next mutual convenience  Advance Care Planning/Advance Directives:  I discussed the disease status, treatment plans and follow-up with the patient

## 2019-08-28 NOTE — PATIENT INSTRUCTIONS
Pre-Surgery Instructions:   Medication Instructions    acetaminophen (TYLENOL) 500 mg tablet Stop taking 1 days prior to surgery    Cholecalciferol (VITAMIN D) 2000 units CAPS Stop taking 1 days prior to surgery    ibandronate (BONIVA) 150 MG tablet Stop taking 1 days prior to surgery    Ivermectin (SOOLANTRA) 1 % CREA Stop taking 1 days prior to surgery    meclizine (ANTIVERT) 12 5 MG tablet Stop taking 1 days prior to surgery    SUMAtriptan (IMITREX) 100 mg tablet Stop taking 1 days prior to surgery             Breast Lumpectomy   AMBULATORY CARE:   What you need to know about a lumpectomy:  A lumpectomy is surgery to remove a mass in your breast  Breast tissue that surrounds the mass may also be taken  A lumpectomy is also known as breast-conserving surgery, a partial mastectomy, or a segmental mastectomy  How to prepare for a lumpectomy:   · You may need a mammogram or ultrasound before surgery  These tests may be done the same day as your surgery or at an earlier time  Your healthcare provider may use pictures from these tests to jose rafael the location of the mass  The marker will show him where to make your incision  · Your healthcare provider will talk to you about how to prepare for surgery  He may tell you not to eat or drink anything after midnight on the day of your surgery  He will tell you what medicines to take or not take on the day of your surgery  You may need to stop taking blood thinners or aspirin several days before your surgery  Arrange for someone to drive you home and stay with you for 24 hours after surgery  This person can help care for you, and monitor for any problems  What will happen during a lumpectomy:  You will be given general anesthesia to keep you asleep and free from pain during surgery  You may be given an antibiotic through your IV to help prevent a bacterial infection  Your healthcare provider will make an incision in your breast and remove the mass   He may also remove breast tissue or lymph nodes that are close to the mass  A drain may be inserted near your incision to remove extra fluid  This will decrease swelling and help your incision heal  Your healthcare provider will close your incision with stitches or strips of medical tape and cover it with a bandage  He may also wrap a tight-fitting bandage around both of your breasts  This may decrease swelling, bleeding, and pain  What will happen after a lumpectomy:  Healthcare providers will monitor you until you are awake  You may able to go home when you are awake and your pain is controlled  Instead you may need to spend the night in the hospital    Risks of a lumpectomy:  You may bleed more than expected or get an infection  Nerves, blood vessels, and muscles may be damaged during your surgery  You may have swelling in your arm closest to the lumpectomy or where lymph nodes were removed  This swelling is called lymphedema  Lymphedema may cause tingling, numbness, stiffness, and weakness in your arm  This may be permanent  You may get a blood clot in your arm or leg  The blood clot may travel to your heart lungs, or brain  This may become life-threatening  Call 911 for any of the following:   · You feel lightheaded, short of breath, and have chest pain  · You cough up blood  · You have trouble breathing  Seek care immediately if:   · Blood soaks through your bandage  · Your stitches come apart  · Your bruise suddenly gets bigger  · Your leg or arm is larger than normal and painful  Contact your healthcare provider if:   · You have a fever or chills  · Your wound is red, swollen, or draining pus  · You have nausea or are vomiting  · Your skin is itchy, swollen, or you have a rash  · Your pain does not get better after you take pain medicine  · Your drain falls out or stops draining fluid  · Your drain has pus or foul-smelling fluid coming out of it       · You have questions or concerns about your condition or care  Medicines: You may need any of the following:  · Antibiotics  help prevent a bacterial infection  · Prescription pain medicine  may be given  Ask your healthcare provider how to take this medicine safely  Some prescription pain medicines contain acetaminophen  Do not take other medicines that contain acetaminophen without talking to your healthcare provider  Too much acetaminophen may cause liver damage  Prescription pain medicine may cause constipation  Ask your healthcare provider how to prevent or treat constipation  · NSAIDs , such as ibuprofen, help decrease swelling, pain, and fever  NSAIDs can cause stomach bleeding or kidney problems in certain people  If you take blood thinner medicine, always ask your healthcare provider if NSAIDs are safe for you  Always read the medicine label and follow directions  · Take your medicine as directed  Contact your healthcare provider if you think your medicine is not helping or if you have side effects  Tell him or her if you are allergic to any medicine  Keep a list of the medicines, vitamins, and herbs you take  Include the amounts, and when and why you take them  Bring the list or the pill bottles to follow-up visits  Carry your medicine list with you in case of an emergency  Care for your wound as directed: If you have a tight-fitting bandage, you can remove it in 24 to 48 hours, or as directed  Ask your healthcare provider when your incision can get wet  You may need to take a sponge bath until your drain is removed  Carefully wash around the incision with soap and water  It is okay to allow the soap and water to gently run over your incision  Gently pat dry the area and put on new, clean bandages as directed  Change your bandages when they get wet or dirty  Check your incision every day for redness, pus, or swelling  Self-care:   · Apply ice  on your breast for 15 to 20 minutes every hour or as directed   Use an ice pack, or put crushed ice in a plastic bag  Cover it with a towel  Ice helps prevent tissue damage and decreases swelling and pain  · Rest  as directed  Do not lift anything heavy  Do not push or pull with your arms  Take short walks around the house  Gradually walk further as you feel better  Ask your healthcare provider when you can return to your normal activities  · Empty your drain  as directed  You may need to write down how much you empty from your drain each day  Ask your healthcare provider for more information about how to empty your drain  · Wear a supportive bra  as directed  Wait until you remove the tight-fitting bandage to wear a bra  You may be given a surgical bra or told to wear a sports bra  A supportive bra may help hold your bandages in place  It may also help with swelling and pain  Do not  wear bras with lace or underwire  They may rub against your incision and cause discomfort  Arm stretches: Your healthcare provider may show you how to do arm stretches  Arm stretches may prevent stiff arms or shoulders  You may need to wait until after your drains are removed to begin stretching  Do not do arm stretches until your healthcare provider says it is okay  Ask your healthcare provider for more information about arm stretches  Follow up with your healthcare provider as directed:  Write down your questions so you remember to ask them during your visits  © 2017 [address] Information is for End User's use only and may not be sold, redistributed or otherwise used for commercial purposes  All illustrations and images included in CareNotes® are the copyrighted property of [employer]  or [employer]im [employer]  The above information is an  only  It is not intended as medical advice for individual conditions or treatments   Talk to your doctor, nurse or pharmacist before following any medical regimen to see if it is safe and effective for you           Breast Cancer [place] Lymph Node Biopsy   AMBULATORY CARE:   What you need to know about a sentinel lymph node biopsy (SLNB):  A sentinel lymph node (SLN) is usually the lymph node closest to the breast tumor  It is usually found in the armpit, or along the sternum (breastbone) or collarbone  A biopsy is a procedure used to find and remove a SLN  During the biopsy, the SLN will be tested for cancer cells  If the test is positive, it may mean that breast cancer has spread outside of your breast  This information can help your healthcare provider decide what other treatments you need  How to prepare for a SLNB:   · You may need a nuclear scan before your procedure  During a nuclear scan, healthcare providers will inject a small amount of radioactive liquid in your breast  Radioactive liquid will move to the location of your lymph nodes and help them show up better in pictures  A camera will take pictures of the lymph nodes  The pictures will help your healthcare provider plan for your procedure  · Your healthcare provider will talk to you about how to prepare for your procedure  He may tell you not to eat or drink anything after midnight on the day of your procedure  He will tell you what medicines to take or not take on the day of your procedure  You may be given contrast liquid during your biopsy  Tell your healthcare provider if you have ever had an allergic reaction to contrast liquid  Arrange for someone to drive you home and stay with you after your procedure  What will happen during a SLNB:   · You may be given an antibiotic through your IV to help prevent a bacterial infection  Tell the healthcare provider if you have ever had an allergic reaction to an antibiotic  You may be given general anesthesia to keep you asleep and free from pain during your procedure  You may instead be given local anesthesia to numb the area   With local anesthesia, you may still feel pressure or pushing during the procedure, but you should not feel any pain  · Your healthcare provider will inject blue contrast liquid, radioactive liquid, or both near the tumor  The liquid will move to the SLN  Your healthcare provider may use an instrument to help find the SLN  He will do this by gently moving an instrument over your skin  The instrument will show pictures of the SLN on a monitor  Your healthcare provider will make a small incision in the skin that covers the SLN  The incision is usually in your armpit or chest  The SLN will be removed and checked for cancer cells  If cancer is found, your healthcare provider may remove several more lymph nodes for testing  Your incision may be closed with stitches or strips of medical tape and covered with a bandage  What will happen after a SLNB:  Healthcare providers will monitor you until you are awake  You may be able to go home after you are awake and your pain is controlled  Your urine or bowel movement may be blue for 24 to 48 hours after your procedure  This is caused by the blue contrast liquid given to you during the procedure  You may have bruising or swelling at the biopsy site  This is normal and expected  The arm closest to the biopsy site may be sore  This should get better within 48 to 72 hours  Risks of a SLNB:  You may bleed more than expected or get an infection  You may develop a condition called lymphedema  Lymphedema is tissue swelling in your arm nearest to where the SLN was removed  You may have long-term pain or discomfort in your arm  Your skin in the arm may be permanently thick or hard  Your nerves may be damaged during your procedure  This may cause numbness or tingling in your arm  It may also cause difficulty moving your arm  You may have an allergic reaction to the contrast liquid  This may require medicine or other treatments  Seek care immediately if:   · Blood soaks through your bandage  · Your stitches come apart      · Your bruise suddenly gets larger and feels firm  Contact your healthcare provider if:   · You have a fever or chills  · Your wound is red, swollen, or draining pus  · You have nausea or are vomiting  · Your skin is itchy, swollen, or you have a rash  · Your pain does not get better after you take medicine for pain  · You have questions or concerns about your condition or care  Medicines: You may need any of the following:  · NSAIDs , such as ibuprofen, help decrease swelling, pain, and fever  This medicine is available with or without a doctor's order  NSAIDs can cause stomach bleeding or kidney problems in certain people  If you take blood thinner medicine, always ask your healthcare provider if NSAIDs are safe for you  Always read the medicine label and follow directions  · Acetaminophen  decreases pain and fever  It is available without a doctor's order  Ask how much to take and how often to take it  Follow directions  Read the labels of all other medicines you are using to see if they also contain acetaminophen, or ask your doctor or pharmacist  Acetaminophen can cause liver damage if not taken correctly  Do not use more than 4 grams (4,000 milligrams) total of acetaminophen in one day  · Prescription pain medicine  may be given  Ask your healthcare provider how to take this medicine safely  Some prescription pain medicines contain acetaminophen  Do not take other medicines that contain acetaminophen without talking to your healthcare provider  Too much acetaminophen may cause liver damage  Prescription pain medicine may cause constipation  Ask your healthcare provider how to prevent or treat constipation  · Take your medicine as directed  Contact your healthcare provider if you think your medicine is not helping or if you have side effects  Tell him or her if you are allergic to any medicine  Keep a list of the medicines, vitamins, and herbs you take   Include the amounts, and when and why you take them  Bring the list or the pill bottles to follow-up visits  Carry your medicine list with you in case of an emergency  Care for your incision wound as directed:  Ask your healthcare provider when your wound can get wet  Carefully wash around the wound with soap and water  It is okay to let soap and water gently run over your wound  Do not  scrub your wound  Gently pat dry the area and put on new, clean bandages as directed  Change your bandages when they get wet or dirty  If you have strips of medical tape, let them fall of on their own  It may take 10 to 14 days for them to fall off  Check your wound every day for signs of infection, such as redness, swelling, or pus  Do not put powders or lotions on your wound  If lymph nodes have been taken from your armpit, ask your healthcare provider when you can wear deodorant  Self-care:   · Apply ice  on your wound for 15 to 20 minutes every hour or as directed  Use an ice pack, or put crushed ice in a plastic bag  Cover it with a towel before you apply it to your skin  Ice helps prevent tissue damage and decreases swelling and pain  · Elevate  your arm nearest to the biopsy site as often as you can  This will help decrease swelling and pain  Prop your arm on pillows or blankets to keep it elevated above the level of your heart comfortably  · Do not do strenuous activities  for 24 to 48 hours  Strenuous activities include heavy lifting, sports, or running  If lymph nodes were taken from your armpit, do not push or pull with your arm  These activities may put too much stress on your wound  Rest and take short walks around the house  Ask your healthcare provider when you can return to your normal activities  · Drink plenty of liquids  as directed  This will help flush out contrast liquid from your body  Ask how much liquid to drink each day and which liquids are best for you    Ask your healthcare provider how to prevent lymphedema and infection:  Lymphedema is fluid buildup in fatty tissues under your skin  Lymphedema may happen in the arm closest to where lymph nodes were removed  An infection in your skin can make lymphedema worse  Ask your healthcare provider how you can decrease your risk for skin infections and lymphedema  Follow up with your healthcare provider as directed:  Write down your questions so you remember to ask them during your visits  © 2017 [address] [facility] Information is for End User's use only and may not be sold, redistributed or otherwise used for commercial purposes  All illustrations and images included in CareNotes® are the copyrighted property of [employer]  or [employer]im [employer]  The above information is an  only  It is not intended as medical advice for individual conditions or treatments  Talk to your doctor, nurse or pharmacist before following any medical regimen to see if it is safe and effective for you

## 2019-09-06 ENCOUNTER — DOCUMENTATION (OUTPATIENT)
Dept: RADIATION ONCOLOGY | Facility: HOSPITAL | Age: 64
End: 2019-09-06

## 2019-09-06 NOTE — PROGRESS NOTES
Received a consult from Luma Rzao in Surg Onc re pt  Att pc to the pt this morning  There was no answer nor was there an answering machine/voice mail operating at this time  Will discuss with Luma Razo and Fela Adan Breast Cancer Nurse Navigator re pt and determine what ongoing plan will be for providing support to her

## 2019-09-25 NOTE — PRE-PROCEDURE INSTRUCTIONS
Pre-Surgery Instructions:   Medication Instructions    acetaminophen (TYLENOL) 500 mg tablet Instructed patient per Anesthesia Guidelines   Cholecalciferol (VITAMIN D) 2000 units CAPS Instructed patient per Anesthesia Guidelines   ibandronate (BONIVA) 150 MG tablet Instructed patient per Anesthesia Guidelines   Ivermectin (SOOLANTRA) 1 % CREA Instructed patient per Anesthesia Guidelines   meclizine (ANTIVERT) 12 5 MG tablet Instructed patient per Anesthesia Guidelines   SUMAtriptan (IMITREX) 100 mg tablet Instructed patient per Anesthesia Guidelines      Pre op,Medicines and showering instructions reviewed- Patient has hibiclens

## 2019-09-26 ENCOUNTER — OFFICE VISIT (OUTPATIENT)
Dept: SURGICAL ONCOLOGY | Facility: CLINIC | Age: 64
End: 2019-09-26

## 2019-09-26 VITALS
DIASTOLIC BLOOD PRESSURE: 80 MMHG | HEIGHT: 65 IN | BODY MASS INDEX: 28.16 KG/M2 | HEART RATE: 84 BPM | TEMPERATURE: 97 F | RESPIRATION RATE: 16 BRPM | WEIGHT: 169 LBS | SYSTOLIC BLOOD PRESSURE: 120 MMHG

## 2019-09-26 DIAGNOSIS — C50.412 MALIGNANT NEOPLASM OF UPPER-OUTER QUADRANT OF LEFT BREAST IN FEMALE, ESTROGEN RECEPTOR POSITIVE (HCC): Primary | ICD-10-CM

## 2019-09-26 DIAGNOSIS — Z17.0 MALIGNANT NEOPLASM OF UPPER-OUTER QUADRANT OF LEFT BREAST IN FEMALE, ESTROGEN RECEPTOR POSITIVE (HCC): Primary | ICD-10-CM

## 2019-09-26 PROCEDURE — PREOP: Performed by: NURSE PRACTITIONER

## 2019-09-26 NOTE — H&P (VIEW-ONLY)
Surgical Oncology Follow Up       [address] [facility],[address]  [facility] [place]  [address]  [place] [address]    Lisbet Virkmishel  1955  [id number]  [address]  [facility] [place]  [address] [id number]    Chief Complaint   Patient presents with    Breast Cancer     Pt is here to updated H&P       Assessment/Plan:  1  Malignant neoplasm of upper-outer quadrant of left breast in female, estrogen receptor positive ([facility] [address] )  - surgery as planned with Dr Lul Ribera     Discussion/Summary:  Patient is a 72-year-old female who presented to our office in August of 2019 with a new diagnosis of left breast cancer  This was detected on screening mammography  She underwent a left breast biopsy at the 1 o'clock position, 5 cm from the nipple  Her pathology revealed invasive lobular carcinoma, ER/%, her 2-  She met with Dr Lul Ribera and has consented to a left breast needle localization lumpectomy and a sentinel node biopsy, possible axillary dissection  This is scheduled for October 1, 2019  She presents today for a preoperative history and physical  She completed her preadmission testing  She has no new complaints today and there are no concerns on today's exam   We will plan to proceed with surgery as planned  She was instructed to call with any new concerns or symptoms prior to her surgery date  All of her questions were answered today  History of Present Illness:        Malignant neoplasm of upper-outer quadrant of left breast in female, estrogen receptor positive ([facility] [address] )    7/23/2019 Biopsy     Left breast biopsy  1 o'clock, 5 cm from nipple  Invasive lobular carcinoma  Grade 1      HER2 0    Done at [facility]          -Interval History:  Patient presents today for a preoperative history and physical   She is scheduled for surgery with Dr Lul Ribera on October 1, 2019  She has no new complaints today    She denies headaches, back pain, bone pain, chest pain, palpitations, shortness of breath, abdominal pain, nausea, vomiting, diarrhea  She report a slight cough the past couple days which she feels is resolving  Review of Systems:  Review of Systems   Constitutional: Negative for activity change, appetite change, chills, fatigue, fever and unexpected weight change  HENT: Negative for trouble swallowing  Eyes: Negative for pain, redness and visual disturbance  Respiratory: Positive for cough  Negative for shortness of breath and wheezing  Cardiovascular: Negative for chest pain, palpitations and leg swelling  Gastrointestinal: Negative for abdominal pain, constipation, diarrhea, nausea and vomiting  Endocrine: Negative for cold intolerance and heat intolerance  Musculoskeletal: Positive for arthralgias  Negative for back pain, gait problem and myalgias  Skin: Negative for color change and rash  Neurological: Negative for dizziness, syncope, light-headedness, numbness and headaches  Hematological: Negative for adenopathy  Psychiatric/Behavioral: Negative for agitation and confusion  All other systems reviewed and are negative        Patient Active Problem List   Diagnosis    Fatigue    Age-related osteoporosis without current pathological fracture    Malignant neoplasm of upper-outer quadrant of left breast in female, estrogen receptor positive ([facility] [address] )     Past Medical History:   Diagnosis Date    Kidney stone     age 17    Migraine     Osteoporosis     Pancreatitis     Pleurisy     PONV (postoperative nausea and vomiting)     Vertigo      Past Surgical History:   Procedure Laterality Date    CHOLECYSTECTOMY      COLONOSCOPY      DENTAL SURGERY      Bone graft with implant    HALLUX VALGUS CORRECTION Bilateral     INCISIONAL BREAST BIOPSY      MAMMO STEREOTACTIC BREAST BIOPSY LEFT (ALL INC)      RHINOPLASTY       Family History   Problem Relation Age of Onset    No Known Problems Mother     Lung cancer Father 71     Social History     Socioeconomic History    Marital status:       Spouse name: Not on file    Number of children: Not on file    Years of education: Not on file    Highest education level: Not on file   Occupational History    Occupation: [employer] works part-time    Occupation: medical professional   Social Needs    Financial resource strain: Not on file    Food insecurity:     Worry: Not on file     Inability: Not on file   [employer] needs:     Medical: Not on file     Non-medical: Not on file   Tobacco Use    Smoking status: Never Smoker    Smokeless tobacco: Never Used   Substance and Sexual Activity    Alcohol use: No    Drug use: No    Sexual activity: Not on file   Lifestyle    Physical activity:     Days per week: Not on file     Minutes per session: Not on file    Stress: Not on file   Relationships    Social connections:     Talks on phone: Not on file     Gets together: Not on file     Attends Caodaism service: Not on file     Active member of club or organization: Not on file     Attends meetings of clubs or organizations: Not on file     Relationship status: Not on file    Intimate partner violence:     Fear of current or ex partner: Not on file     Emotionally abused: Not on file     Physically abused: Not on file     Forced sexual activity: Not on file   Other Topics Concern    Not on file   Social History Narrative    Not on file       Current Outpatient Medications:     acetaminophen (TYLENOL) 500 mg tablet, Take by mouth every 6 (six) hours as needed , Disp: , Rfl:     Cholecalciferol (VITAMIN D) 2000 units CAPS, Take 4,000 Units by mouth daily , Disp: , Rfl:     ibandronate (BONIVA) 150 MG tablet, Take 150 mg by mouth every 30 (thirty) days , Disp: , Rfl:     Ivermectin (SOOLANTRA) 1 % CREA, Apply topically daily , Disp: , Rfl:     meclizine (ANTIVERT) 12 5 MG tablet, Take 12 5 mg by mouth 3 (three) times a day as needed for dizziness, Disp: , Rfl:     SUMAtriptan (IMITREX) 100 mg tablet, Take 100 mg by mouth once as needed for migraine, Disp: , Rfl:     estrogen, conjugated,-medroxyprogesterone (PREMPRO) 0 3-1 5 MG per tablet, 1daily, Disp: , Rfl:     oxyCODONE-acetaminophen (PERCOCET) 5-325 mg per tablet, Take 1 tablet by mouth every 6 (six) hours as needed for moderate pain for up to 6 dosesMax Daily Amount: 4 tablets (Patient not taking: Reported on 9/26/2019), Disp: 6 tablet, Rfl: 0  Allergies   Allergen Reactions    Ciprofloxacin Seizures    Cyclopentolate Other (See Comments) and Tachycardia     Resp distress    Sulfa Antibiotics Hives and Rash    Scopolamine Other (See Comments)     Precautionary for cycopentolate allergy     Vitals:    09/26/19 1508   BP: 120/80   Pulse: 84   Resp: 16   Temp: (!) 97 °F (36 1 °C)       Physical Exam   Constitutional: She is oriented to person, place, and time  Vital signs are normal  She appears well-developed and well-nourished  No distress  HENT:   Head: Normocephalic and atraumatic  Neck: Normal range of motion  Carotid bruit is not present  Cardiovascular: Normal rate, regular rhythm and normal heart sounds  Pulmonary/Chest: Effort normal and breath sounds normal    Bilateral breasts were examined in the sitting and supine position  There is a palpable lump at the 1:00 position in the periareolar region in the supine position- this most likely represents hematoma s/p biopsy  There is a well-healed left lateral breast biopsy site  There are no masses, skin nodules, nipple changes or nipple discharge  There is no bilateral supraclavicular or axillary lymphadenopathy noted  Abdominal: Soft  Normal appearance  She exhibits no mass  There is no hepatosplenomegaly  There is no tenderness  Musculoskeletal: Normal range of motion  Lymphadenopathy:     She has no axillary adenopathy  Right: No supraclavicular adenopathy present          Left: No supraclavicular adenopathy present  Neurological: She is alert and oriented to person, place, and time  Skin: Skin is warm, dry and intact  No rash noted  She is not diaphoretic  Psychiatric: She has a normal mood and affect  Her speech is normal    Vitals reviewed  Results:    Imaging  Xr Chest Pa & Lateral    Result Date: 9/3/2019  Narrative: CHEST INDICATION:   C50 412: Malignant neoplasm of upper-outer quadrant of left female breast Z17 0: Estrogen receptor positive status (ER+)  COMPARISON:  04/05/2018 EXAM PERFORMED/VIEWS:  XR CHEST PA & LATERAL  The frontal view was performed utilizing dual energy radiographic technique  Images: 4 FINDINGS: Cardiomediastinal silhouette appears unremarkable  The lungs are clear  No pneumothorax or pleural effusion  Osseous structures appear within normal limits for patient age  Impression: No acute cardiopulmonary disease  Workstation performed: [id number]       I reviewed the above imaging data  Advance Care Planning/Advance Directives:  Discussed disease status, cancer treatment plans and/or cancer treatment goals with the patient

## 2019-09-26 NOTE — PROGRESS NOTES
Surgical Oncology Follow Up       [address] [facility]  [facility] [place]  [address] [facility] [address] [facility] [address]    Krystal Salas  1955  [id number]  [address]  [facility] [place]  [address] [address]    Chief Complaint   Patient presents with    Breast Cancer     Pt is here to updated H&P       Assessment/Plan:  1  Malignant neoplasm of upper-outer quadrant of left breast in female, estrogen receptor positive ([facility] [address] )  - surgery as planned with Dr Shanthi Hughes     Discussion/Summary:  Patient is a 68-year-old female who presented to our office in August of 2019 with a new diagnosis of left breast cancer  This was detected on screening mammography  She underwent a left breast biopsy at the 1 o'clock position, 5 cm from the nipple  Her pathology revealed invasive lobular carcinoma, ER/%, her 2-  She met with Dr Shanthi Hughes and has consented to a left breast needle localization lumpectomy and a sentinel node biopsy, possible axillary dissection  This is scheduled for October 1, 2019  She presents today for a preoperative history and physical  She completed her preadmission testing  She has no new complaints today and there are no concerns on today's exam   We will plan to proceed with surgery as planned  She was instructed to call with any new concerns or symptoms prior to her surgery date  All of her questions were answered today  History of Present Illness:        Malignant neoplasm of upper-outer quadrant of left breast in female, estrogen receptor positive ([facility] [address] )    7/23/2019 Biopsy     Left breast biopsy  1 o'clock, 5 cm from nipple  Invasive lobular carcinoma  Grade 1      HER2 0    Done at [facility]          -Interval History:  Patient presents today for a preoperative history and physical   She is scheduled for surgery with Dr Shanthi Hughes on October 1, 2019  She has no new complaints today    She denies headaches, back pain, bone pain, chest pain, palpitations, shortness of breath, abdominal pain, nausea, vomiting, diarrhea  She report a slight cough the past couple days which she feels is resolving  Review of Systems:  Review of Systems   Constitutional: Negative for activity change, appetite change, chills, fatigue, fever and unexpected weight change  HENT: Negative for trouble swallowing  Eyes: Negative for pain, redness and visual disturbance  Respiratory: Positive for cough  Negative for shortness of breath and wheezing  Cardiovascular: Negative for chest pain, palpitations and leg swelling  Gastrointestinal: Negative for abdominal pain, constipation, diarrhea, nausea and vomiting  Endocrine: Negative for cold intolerance and heat intolerance  Musculoskeletal: Positive for arthralgias  Negative for back pain, gait problem and myalgias  Skin: Negative for color change and rash  Neurological: Negative for dizziness, syncope, light-headedness, numbness and headaches  Hematological: Negative for adenopathy  Psychiatric/Behavioral: Negative for agitation and confusion  All other systems reviewed and are negative        Patient Active Problem List   Diagnosis    Fatigue    Age-related osteoporosis without current pathological fracture    Malignant neoplasm of upper-outer quadrant of left breast in female, estrogen receptor positive ([facility] [address] )     Past Medical History:   Diagnosis Date    Kidney stone     age 17    Migraine     Osteoporosis     Pancreatitis     Pleurisy     PONV (postoperative nausea and vomiting)     Vertigo      Past Surgical History:   Procedure Laterality Date    CHOLECYSTECTOMY      COLONOSCOPY      DENTAL SURGERY      Bone graft with implant    HALLUX VALGUS CORRECTION Bilateral     INCISIONAL BREAST BIOPSY      MAMMO STEREOTACTIC BREAST BIOPSY LEFT (ALL INC)      RHINOPLASTY       Family History   Problem Relation Age of Onset    No Known Problems Mother     Lung cancer Father 71     Social History     Socioeconomic History    Marital status:       Spouse name: Not on file    Number of children: Not on file    Years of education: Not on file    Highest education level: Not on file   Occupational History    Occupation: [employer] works part-time    Occupation: medical professional   Social Needs    Financial resource strain: Not on file    Food insecurity:     Worry: Not on file     Inability: Not on file   tracx needs:     Medical: Not on file     Non-medical: Not on file   Tobacco Use    Smoking status: Never Smoker    Smokeless tobacco: Never Used   Substance and Sexual Activity    Alcohol use: No    Drug use: No    Sexual activity: Not on file   Lifestyle    Physical activity:     Days per week: Not on file     Minutes per session: Not on file    Stress: Not on file   Relationships    Social connections:     Talks on phone: Not on file     Gets together: Not on file     Attends Jainism service: Not on file     Active member of club or organization: Not on file     Attends meetings of clubs or organizations: Not on file     Relationship status: Not on file    Intimate partner violence:     Fear of current or ex partner: Not on file     Emotionally abused: Not on file     Physically abused: Not on file     Forced sexual activity: Not on file   Other Topics Concern    Not on file   Social History Narrative    Not on file       Current Outpatient Medications:     acetaminophen (TYLENOL) 500 mg tablet, Take by mouth every 6 (six) hours as needed , Disp: , Rfl:     Cholecalciferol (VITAMIN D) 2000 units CAPS, Take 4,000 Units by mouth daily , Disp: , Rfl:     ibandronate (BONIVA) 150 MG tablet, Take 150 mg by mouth every 30 (thirty) days , Disp: , Rfl:     Ivermectin (SOOLANTRA) 1 % CREA, Apply topically daily , Disp: , Rfl:     meclizine (ANTIVERT) 12 5 MG tablet, Take 12 5 mg by mouth 3 (three) times a day as needed for dizziness, Disp: , Rfl:     SUMAtriptan (IMITREX) 100 mg tablet, Take 100 mg by mouth once as needed for migraine, Disp: , Rfl:     estrogen, conjugated,-medroxyprogesterone (PREMPRO) 0 3-1 5 MG per tablet, 1daily, Disp: , Rfl:     oxyCODONE-acetaminophen (PERCOCET) 5-325 mg per tablet, Take 1 tablet by mouth every 6 (six) hours as needed for moderate pain for up to 6 dosesMax Daily Amount: 4 tablets (Patient not taking: Reported on 9/26/2019), Disp: 6 tablet, Rfl: 0  Allergies   Allergen Reactions    Ciprofloxacin Seizures    Cyclopentolate Other (See Comments) and Tachycardia     Resp distress    Sulfa Antibiotics Hives and Rash    Scopolamine Other (See Comments)     Precautionary for cycopentolate allergy     Vitals:    09/26/19 1508   BP: 120/80   Pulse: 84   Resp: 16   Temp: (!) 97 °F (36 1 °C)       Physical Exam   Constitutional: She is oriented to person, place, and time  Vital signs are normal  She appears well-developed and well-nourished  No distress  HENT:   Head: Normocephalic and atraumatic  Neck: Normal range of motion  Carotid bruit is not present  Cardiovascular: Normal rate, regular rhythm and normal heart sounds  Pulmonary/Chest: Effort normal and breath sounds normal    Bilateral breasts were examined in the sitting and supine position  There is a palpable lump at the 1:00 position in the periareolar region in the supine position- this most likely represents hematoma s/p biopsy  There is a well-healed left lateral breast biopsy site  There are no masses, skin nodules, nipple changes or nipple discharge  There is no bilateral supraclavicular or axillary lymphadenopathy noted  Abdominal: Soft  Normal appearance  She exhibits no mass  There is no hepatosplenomegaly  There is no tenderness  Musculoskeletal: Normal range of motion  Lymphadenopathy:     She has no axillary adenopathy  Right: No supraclavicular adenopathy present          Left: No supraclavicular adenopathy present  Neurological: She is alert and oriented to person, place, and time  Skin: Skin is warm, dry and intact  No rash noted  She is not diaphoretic  Psychiatric: She has a normal mood and affect  Her speech is normal    Vitals reviewed  Results:    Imaging  Xr Chest Pa & Lateral    Result Date: 9/3/2019  Narrative: CHEST INDICATION:   C50 412: Malignant neoplasm of upper-outer quadrant of left female breast Z17 0: Estrogen receptor positive status (ER+)  COMPARISON:  04/05/2018 EXAM PERFORMED/VIEWS:  XR CHEST PA & LATERAL  The frontal view was performed utilizing dual energy radiographic technique  Images: 4 FINDINGS: Cardiomediastinal silhouette appears unremarkable  The lungs are clear  No pneumothorax or pleural effusion  Osseous structures appear within normal limits for patient age  Impression: No acute cardiopulmonary disease  Workstation performed: [id number]       I reviewed the above imaging data  Advance Care Planning/Advance Directives:  Discussed disease status, cancer treatment plans and/or cancer treatment goals with the patient

## 2019-09-30 ENCOUNTER — ANESTHESIA EVENT (OUTPATIENT)
Dept: PERIOP | Facility: HOSPITAL | Age: 64
End: 2019-09-30
Payer: COMMERCIAL

## 2019-09-30 NOTE — ANESTHESIA PREPROCEDURE EVALUATION
Review of Systems/Medical History  Patient summary reviewed  Chart reviewed  History of anesthetic complications PONV    Cardiovascular  Negative cardio ROS Exercise tolerance (METS): >4,  No angina , No SOLIS,    Pulmonary  Negative pulmonary ROS No shortness of breath, No recent URI ,        GI/Hepatic  Negative GI/hepatic ROS   No GERD ,        Negative  ROS Kidney stones,        Endo/Other  Negative endo/other ROS      GYN       Hematology   Musculoskeletal  Negative musculoskeletal ROS        Neurology  Seizures (2/2 cipro) ,  Headaches,    Psychology           Physical Exam    Airway    Mallampati score: II  TM Distance: >3 FB  Neck ROM: full     Dental   No notable dental hx     Cardiovascular  Comment: Negative ROS, Rhythm: regular, Rate: normal,     Pulmonary  Breath sounds clear to auscultation,     Other Findings        Anesthesia Plan  ASA Score- 1     Anesthesia Type- general with ASA Monitors  Additional Monitors:   Airway Plan: LMA  Comment: Patient seen and examined, history reviewed  Patient to be done under general anesthesia with LMA and routine monitors  Risks discussed with the patient, consent obtained        Plan Factors-    Induction- intravenous  Postoperative Plan- Plan for postoperative opioid use  Planned trial extubation    Informed Consent- Anesthetic plan and risks discussed with patient  I personally reviewed this patient with the CRNA  Discussed and agreed on the Anesthesia Plan with the CRNA  Shannon Crump

## 2019-10-01 ENCOUNTER — APPOINTMENT (OUTPATIENT)
Dept: MAMMOGRAPHY | Facility: HOSPITAL | Age: 64
End: 2019-10-01
Payer: COMMERCIAL

## 2019-10-01 ENCOUNTER — ANESTHESIA (OUTPATIENT)
Dept: PERIOP | Facility: HOSPITAL | Age: 64
End: 2019-10-01
Payer: COMMERCIAL

## 2019-10-01 ENCOUNTER — HOSPITAL ENCOUNTER (OUTPATIENT)
Facility: HOSPITAL | Age: 64
Setting detail: OUTPATIENT SURGERY
Discharge: HOME | End: 2019-10-01
Attending: SURGERY | Admitting: SURGERY
Payer: COMMERCIAL

## 2019-10-01 ENCOUNTER — HOSPITAL ENCOUNTER (OUTPATIENT)
Dept: MAMMOGRAPHY | Facility: HOSPITAL | Age: 64
Discharge: HOME | End: 2019-10-01
Attending: SURGERY
Payer: COMMERCIAL

## 2019-10-01 ENCOUNTER — HOSPITAL ENCOUNTER (OUTPATIENT)
Dept: NUCLEAR MEDICINE | Facility: HOSPITAL | Age: 64
Discharge: HOME | End: 2019-10-01
Attending: SURGERY
Payer: COMMERCIAL

## 2019-10-01 VITALS
BODY MASS INDEX: 27.82 KG/M2 | OXYGEN SATURATION: 97 % | HEIGHT: 65 IN | DIASTOLIC BLOOD PRESSURE: 57 MMHG | RESPIRATION RATE: 18 BRPM | WEIGHT: 167 LBS | SYSTOLIC BLOOD PRESSURE: 116 MMHG | HEART RATE: 80 BPM | TEMPERATURE: 97.5 F

## 2019-10-01 VITALS — BODY MASS INDEX: 28.16 KG/M2 | HEIGHT: 65 IN | WEIGHT: 169 LBS

## 2019-10-01 DIAGNOSIS — Z17.0 MALIGNANT NEOPLASM OF UPPER-OUTER QUADRANT OF LEFT BREAST IN FEMALE, ESTROGEN RECEPTOR POSITIVE (HCC): ICD-10-CM

## 2019-10-01 DIAGNOSIS — C50.412 MALIGNANT NEOPLASM OF UPPER-OUTER QUADRANT OF LEFT BREAST IN FEMALE, ESTROGEN RECEPTOR POSITIVE (HCC): ICD-10-CM

## 2019-10-01 PROCEDURE — 88342 IMHCHEM/IMCYTCHM 1ST ANTB: CPT | Performed by: PATHOLOGY

## 2019-10-01 PROCEDURE — 88305 TISSUE EXAM BY PATHOLOGIST: CPT | Performed by: PATHOLOGY

## 2019-10-01 PROCEDURE — 38525 BIOPSY/REMOVAL LYMPH NODES: CPT | Performed by: SURGERY

## 2019-10-01 PROCEDURE — 38900 IO MAP OF SENT LYMPH NODE: CPT | Performed by: SURGERY

## 2019-10-01 PROCEDURE — 38792 RA TRACER ID OF SENTINL NODE: CPT

## 2019-10-01 PROCEDURE — 88307 TISSUE EXAM BY PATHOLOGIST: CPT | Performed by: PATHOLOGY

## 2019-10-01 PROCEDURE — 19281 PERQ DEVICE BREAST 1ST IMAG: CPT

## 2019-10-01 PROCEDURE — A9541 TC99M SULFUR COLLOID: HCPCS

## 2019-10-01 PROCEDURE — 38792 RA TRACER ID OF SENTINL NODE: CPT | Performed by: SURGERY

## 2019-10-01 PROCEDURE — 19301 PARTIAL MASTECTOMY: CPT | Performed by: SURGERY

## 2019-10-01 RX ORDER — LIDOCAINE HYDROCHLORIDE 10 MG/ML
0.5 INJECTION, SOLUTION EPIDURAL; INFILTRATION; INTRACAUDAL; PERINEURAL ONCE AS NEEDED
Status: DISCONTINUED | OUTPATIENT
Start: 2019-10-01 | End: 2019-10-01 | Stop reason: HOSPADM

## 2019-10-01 RX ORDER — ACETAMINOPHEN 325 MG/1
975 TABLET ORAL ONCE
Status: COMPLETED | OUTPATIENT
Start: 2019-10-01 | End: 2019-10-01

## 2019-10-01 RX ORDER — PROPOFOL 10 MG/ML
INJECTION, EMULSION INTRAVENOUS AS NEEDED
Status: DISCONTINUED | OUTPATIENT
Start: 2019-10-01 | End: 2019-10-01 | Stop reason: SURG

## 2019-10-01 RX ORDER — MAGNESIUM HYDROXIDE 1200 MG/15ML
LIQUID ORAL AS NEEDED
Status: DISCONTINUED | OUTPATIENT
Start: 2019-10-01 | End: 2019-10-01 | Stop reason: HOSPADM

## 2019-10-01 RX ORDER — ONDANSETRON 2 MG/ML
INJECTION INTRAMUSCULAR; INTRAVENOUS AS NEEDED
Status: DISCONTINUED | OUTPATIENT
Start: 2019-10-01 | End: 2019-10-01 | Stop reason: SURG

## 2019-10-01 RX ORDER — MIDAZOLAM HYDROCHLORIDE 1 MG/ML
INJECTION INTRAMUSCULAR; INTRAVENOUS AS NEEDED
Status: DISCONTINUED | OUTPATIENT
Start: 2019-10-01 | End: 2019-10-01 | Stop reason: SURG

## 2019-10-01 RX ORDER — CEFAZOLIN SODIUM 1 G/50ML
1000 SOLUTION INTRAVENOUS ONCE
Status: COMPLETED | OUTPATIENT
Start: 2019-10-01 | End: 2019-10-01

## 2019-10-01 RX ORDER — DEXAMETHASONE SODIUM PHOSPHATE 10 MG/ML
INJECTION, SOLUTION INTRAMUSCULAR; INTRAVENOUS AS NEEDED
Status: DISCONTINUED | OUTPATIENT
Start: 2019-10-01 | End: 2019-10-01 | Stop reason: SURG

## 2019-10-01 RX ORDER — ONDANSETRON 2 MG/ML
4 INJECTION INTRAMUSCULAR; INTRAVENOUS ONCE AS NEEDED
Status: DISCONTINUED | OUTPATIENT
Start: 2019-10-01 | End: 2019-10-01 | Stop reason: HOSPADM

## 2019-10-01 RX ORDER — LIDOCAINE HYDROCHLORIDE 10 MG/ML
5 INJECTION, SOLUTION EPIDURAL; INFILTRATION; INTRACAUDAL; PERINEURAL ONCE
Status: COMPLETED | OUTPATIENT
Start: 2019-10-01 | End: 2019-10-01

## 2019-10-01 RX ORDER — LIDOCAINE HYDROCHLORIDE 10 MG/ML
INJECTION, SOLUTION INFILTRATION; PERINEURAL AS NEEDED
Status: DISCONTINUED | OUTPATIENT
Start: 2019-10-01 | End: 2019-10-01 | Stop reason: SURG

## 2019-10-01 RX ORDER — FENTANYL CITRATE 50 UG/ML
INJECTION, SOLUTION INTRAMUSCULAR; INTRAVENOUS AS NEEDED
Status: DISCONTINUED | OUTPATIENT
Start: 2019-10-01 | End: 2019-10-01 | Stop reason: SURG

## 2019-10-01 RX ORDER — PROMETHAZINE HYDROCHLORIDE 25 MG/ML
12.5 INJECTION, SOLUTION INTRAMUSCULAR; INTRAVENOUS
Status: DISCONTINUED | OUTPATIENT
Start: 2019-10-01 | End: 2019-10-01 | Stop reason: HOSPADM

## 2019-10-01 RX ORDER — PROPOFOL 10 MG/ML
INJECTION, EMULSION INTRAVENOUS CONTINUOUS PRN
Status: DISCONTINUED | OUTPATIENT
Start: 2019-10-01 | End: 2019-10-01 | Stop reason: SURG

## 2019-10-01 RX ORDER — OXYCODONE HYDROCHLORIDE AND ACETAMINOPHEN 5; 325 MG/1; MG/1
1 TABLET ORAL EVERY 4 HOURS PRN
Status: DISCONTINUED | OUTPATIENT
Start: 2019-10-01 | End: 2019-10-01 | Stop reason: HOSPADM

## 2019-10-01 RX ORDER — SODIUM CHLORIDE, SODIUM LACTATE, POTASSIUM CHLORIDE, CALCIUM CHLORIDE 600; 310; 30; 20 MG/100ML; MG/100ML; MG/100ML; MG/100ML
125 INJECTION, SOLUTION INTRAVENOUS CONTINUOUS
Status: DISCONTINUED | OUTPATIENT
Start: 2019-10-01 | End: 2019-10-01 | Stop reason: HOSPADM

## 2019-10-01 RX ORDER — HYDROMORPHONE HCL/PF 1 MG/ML
0.2 SYRINGE (ML) INJECTION
Status: DISCONTINUED | OUTPATIENT
Start: 2019-10-01 | End: 2019-10-01 | Stop reason: HOSPADM

## 2019-10-01 RX ORDER — FENTANYL CITRATE/PF 50 MCG/ML
25 SYRINGE (ML) INJECTION
Status: DISCONTINUED | OUTPATIENT
Start: 2019-10-01 | End: 2019-10-01 | Stop reason: HOSPADM

## 2019-10-01 RX ORDER — BUPIVACAINE HYDROCHLORIDE 2.5 MG/ML
INJECTION, SOLUTION EPIDURAL; INFILTRATION; INTRACAUDAL AS NEEDED
Status: DISCONTINUED | OUTPATIENT
Start: 2019-10-01 | End: 2019-10-01 | Stop reason: HOSPADM

## 2019-10-01 RX ADMIN — CEFAZOLIN SODIUM 1000 MG: 1 SOLUTION INTRAVENOUS at 08:54

## 2019-10-01 RX ADMIN — PROPOFOL 200 MG: 10 INJECTION, EMULSION INTRAVENOUS at 08:50

## 2019-10-01 RX ADMIN — SODIUM CHLORIDE, SODIUM LACTATE, POTASSIUM CHLORIDE, AND CALCIUM CHLORIDE: .6; .31; .03; .02 INJECTION, SOLUTION INTRAVENOUS at 08:45

## 2019-10-01 RX ADMIN — ACETAMINOPHEN 975 MG: 325 TABLET, FILM COATED ORAL at 08:39

## 2019-10-01 RX ADMIN — PROPOFOL 150 MCG/KG/MIN: 10 INJECTION, EMULSION INTRAVENOUS at 08:52

## 2019-10-01 RX ADMIN — DEXAMETHASONE SODIUM PHOSPHATE 4 MG: 10 INJECTION, SOLUTION INTRAMUSCULAR; INTRAVENOUS at 08:50

## 2019-10-01 RX ADMIN — ONDANSETRON 4 MG: 2 INJECTION INTRAMUSCULAR; INTRAVENOUS at 08:50

## 2019-10-01 RX ADMIN — PROPOFOL 20 MG: 10 INJECTION, EMULSION INTRAVENOUS at 09:56

## 2019-10-01 RX ADMIN — FENTANYL CITRATE 50 MCG: 50 INJECTION INTRAMUSCULAR; INTRAVENOUS at 09:25

## 2019-10-01 RX ADMIN — PROPOFOL 50 MG: 10 INJECTION, EMULSION INTRAVENOUS at 09:29

## 2019-10-01 RX ADMIN — LIDOCAINE HYDROCHLORIDE 3 ML: 10 INJECTION, SOLUTION EPIDURAL; INFILTRATION; INTRACAUDAL; PERINEURAL at 08:05

## 2019-10-01 RX ADMIN — MIDAZOLAM HYDROCHLORIDE 2 MG: 1 INJECTION, SOLUTION INTRAMUSCULAR; INTRAVENOUS at 08:46

## 2019-10-01 RX ADMIN — FENTANYL CITRATE 50 MCG: 50 INJECTION INTRAMUSCULAR; INTRAVENOUS at 08:50

## 2019-10-01 RX ADMIN — SODIUM CHLORIDE, SODIUM LACTATE, POTASSIUM CHLORIDE, AND CALCIUM CHLORIDE: .6; .31; .03; .02 INJECTION, SOLUTION INTRAVENOUS at 09:51

## 2019-10-01 RX ADMIN — LIDOCAINE HYDROCHLORIDE 50 MG: 10 INJECTION, SOLUTION INFILTRATION; PERINEURAL at 08:50

## 2019-10-01 NOTE — OP NOTE
OPERATIVE REPORT  PATIENT NAME: Lisbet Mccauley    :  1955  MRN: [id number]  Pt Location: AN OR ROOM 02    SURGERY DATE: 10/1/2019    Surgeon(s) and Role:     * Jo Ann Tran MD - Primary    Preop Diagnosis:  Malignant neoplasm of upper-outer quadrant of left breast in female, estrogen receptor positive ([facility] [address] ) [C50 412, Z17 0]    Post-Op Diagnosis Codes:     * Malignant neoplasm of upper-outer quadrant of left breast in female, estrogen receptor positive ([facility] [address] ) [C50 412, Z17 0]    Procedure(s) (LRB):  BREAST NEEDLE LOCALIZED LUMPECTOMY (NEEDLE LOC AT 0730) (Left)  SENTINEL LYMPH NODE BIOPSY; LYMPHATIC MAPPING WITH BLUE DYE AND RADIOACTIVE DYE (INJECT AT 0830 BY DR HA IN THE OR) (Left)    Specimen(s):  ID Type Source Tests Collected by Time Destination   1 : left breast lumpectomy Tissue Breast, NOS TISSUE EXAM Jo Ann Tran MD 10/1/2019 1234    2 : left breast lumpectomy/anterior margin- green Tissue Breast, NOS TISSUE EXAM Jo Ann Tran MD 10/1/2019 1042    3 : left breast lumpectomy/inferior margin- blue Tissue Breast, NOS TISSUE EXAM Jo Ann Tran MD 10/1/2019 4756    4 : left breast lumpectomy/lateral margin- red Tissue Breast, NOS TISSUE EXAM Jo Ann Tran MD 10/1/2019 4149    5 : left breast lumpectomy/medial margin- yellow Tissue Breast, NOS TISSUE EXAM Jo Ann Tran MD 10/1/2019 4746    6 : left breast lumpectomy/posterior margin- black Tissue Breast, NOS TISSUE EXAM Jo Ann Tran MD 10/1/2019 2441    7 : left breast lumpectomy/superior margin- orange Tissue Breast, NOS TISSUE EXAM Jo Ann Tran MD 10/1/2019 9708    8 : left axillary sentinel lymph node Tissue Lymph Node, Dale TISSUE EXAM Jo Ann Tran MD 10/1/2019 1491        Estimated Blood Loss:   Minimal    Drains:  * No LDAs found *    Anesthesia Type:   General    Operative Indications:  Malignant neoplasm of upper-outer quadrant of left breast in female, estrogen receptor positive ([facility] [address] ) [C50 412, Z17 0]      Operative Findings:  Dale lymph node was blue and radioactive and grossly normal there is no evidence of extracapsular extension  Good specimen radiograph    Complications:   None    Procedure and Technique:  Lumpectomy  The patient was brought to the operation room and placed under general anesthesia  Attention was paid to ensure appropriate padding and correct positioning  The left breast was injected intradermally with 0 3cc of methylene blue followed by technetium sulfur colloid  This area was vigorously massaged to facilitate identification of the sentinel lymph node (SLN)  The breast and axillary region were then prepped and draped in a sterile fashion  I initiated a time-out, identifying the patient, the correct side and the above procedure  All parties agreed with the time out  The planned incision was then injected with 0 25% Marcaine and epinephrine for local anesthesia  The incision was sharply incised  The localizing wire was used to guide the dissection  Superior, inferior, medial and lateral planes were developed around the localizing wire and the specimen truncated posteriorly with electrocautery just beyond the tip of the wire  The specimen was then inked for orientation purposes  A specimen radiograph was taken in two dimensions and additional margins were removed to optimize complete removal of the tumor  The additional margins were inked on the most distal area  All specimens were sent to pathology for permanent analysis  Superficial bleeding controlled with electrocautery and the wound was extensively irrigated  The wound was closed with two layers, an inner layer of 3-0 vicryl and a subcuticular layer of 4-0 monocryl  Exofin and steri strips were applied  SLN Biopsy  The previously marked location of the sentinel lymph node was injected with 0 25% Marcaine and epinephrine  A curivilinear incision was made and dissection was taken down into the axillar proper with electrocautery     The hdl therapeutics counter was used to help localize the SLN  The sentinel lymph node was identified and removed with electrocautery  SLN Findings  The SLN was blue and radioactive  The lymph node was grossly normal and sent for permanent pathologic analysis       I was present for the entire procedure    Patient Disposition:  PACU     SIGNATURE: Anais Funes MD  DATE: October 1, 2019  TIME: 9:50 AM

## 2019-10-01 NOTE — PROGRESS NOTES
Pt ambulated to BR with Asst 1 without difficulty  Upon returning to bed, pt reported "I feel hot" and lost consciousness  Pt was assisted to wheelchair by 3 RNs without falling to floor  Pt was returned to stretcher, IVs were run open, VSS, and she became conscious again  Pt was placed on 4 liters O2 NC  Pt was transferred to an [place] [address] bed at this time

## 2019-10-01 NOTE — ANESTHESIA POSTPROCEDURE EVALUATION
Post-Op Assessment Note    CV Status:  Stable  Pain Score: 0    Pain management: adequate     Mental Status:  Alert and awake   Hydration Status:  Euvolemic   PONV Controlled:  Controlled   Airway Patency:  Patent   Post Op Vitals Reviewed: Yes      Staff: Anesthesiologist           BP   129/67   Temp   97 8   Pulse  70   Resp   13   SpO2   100

## 2019-10-01 NOTE — DISCHARGE INSTRUCTIONS
POST-OPERATIVE BREAST CARE INSTRUCTIONS    Wound Closure/Dressing: Your wound is closed with:   Steri strips-white pieces of tape that hold your incision together along with surgical glue           Dissolvable sutures-underneath the skin    Wound care:     If you have gauze over your wound you may remove it the day after surgery  Leave the Steri-strips on, they will fall off on their own in 1-2 weeks   You may shower using soap and water to clean your wound  Gently pat dry  Other:       You can begin wearing your own bra when it feels comfortable   You may resume using deodorant the day after surgery                 Post-op visit:  your post-op visit is scheduled for:  2019 @ 9:45 AM    Call our office at [phone] if you have any  of the followin  Redness, swelling, heat, unusual drainage or heavy bleeding from wound  2  Fever greater than 101 °  3  Pain not relieved by medication

## 2019-10-08 ENCOUNTER — TELEPHONE (OUTPATIENT)
Dept: HEMATOLOGY ONCOLOGY | Facility: CLINIC | Age: 64
End: 2019-10-08

## 2019-10-10 ENCOUNTER — TELEPHONE (OUTPATIENT)
Dept: SURGICAL ONCOLOGY | Facility: CLINIC | Age: 64
End: 2019-10-10

## 2019-10-10 NOTE — TELEPHONE ENCOUNTER
I called his and conveyed that her pathology demonstrated that her cancer was all removed and that is not in the lymph nodes  I will see her on her appointment on Monday  She is appreciative of the phone call

## 2019-10-14 ENCOUNTER — OFFICE VISIT (OUTPATIENT)
Dept: SURGICAL ONCOLOGY | Facility: CLINIC | Age: 64
End: 2019-10-14
Payer: COMMERCIAL

## 2019-10-14 VITALS
WEIGHT: 167.5 LBS | BODY MASS INDEX: 27.91 KG/M2 | DIASTOLIC BLOOD PRESSURE: 80 MMHG | RESPIRATION RATE: 16 BRPM | HEART RATE: 84 BPM | SYSTOLIC BLOOD PRESSURE: 120 MMHG | HEIGHT: 65 IN | TEMPERATURE: 98.7 F

## 2019-10-14 DIAGNOSIS — Z17.0 MALIGNANT NEOPLASM OF UPPER-OUTER QUADRANT OF LEFT BREAST IN FEMALE, ESTROGEN RECEPTOR POSITIVE (HCC): Primary | ICD-10-CM

## 2019-10-14 DIAGNOSIS — Z98.890 STATUS POST LEFT BREAST LUMPECTOMY: ICD-10-CM

## 2019-10-14 DIAGNOSIS — C50.412 MALIGNANT NEOPLASM OF UPPER-OUTER QUADRANT OF LEFT BREAST IN FEMALE, ESTROGEN RECEPTOR POSITIVE (HCC): Primary | ICD-10-CM

## 2019-10-14 PROCEDURE — 99213 OFFICE O/P EST LOW 20 MIN: CPT | Performed by: SURGERY

## 2019-10-14 NOTE — PROGRESS NOTES
Surgical Oncology Breast Post-Op       [address]  [facility] [place]  [address] [facility] [address]    Parag Plater  1955  [phone]  [address]  [facility] [place]  [address] [address]    Chief Complaint:   Jackelyn Baez is seen for a post-operative visit of her recent breast surgery  Oncology History:        Malignant neoplasm of upper-outer quadrant of left breast in female, estrogen receptor positive ([facility] [address] )    7/23/2019 Biopsy     Left breast biopsy  1 o'clock, 5 cm from nipple  Invasive lobular carcinoma  Grade 1      HER2 0    Done at [facility]      10/1/2019 Surgery     Left breast needle localized lumpectomy with sentinel lymph node biopsy  Invasive lobular carcinoma  Grade 1  1 8 cm  Margins negative  0/2 Lymph nodes  Anatomic/Prognostic Stage IA         Assessment & Plan:   Assessment/Plan    The patient presents for a postoperative visit  I reviewed her pathology with Dr Gaby Butler he is recommending anti hormonal therapy alone  We will also recommend radiation oncology  The patient is trying to decide what is the easiest route/location to be treated with radiation therapy  Clinical exam shows her wound is healing well  We will see her back in 3 months and then a six-month intervals  History of Present Illness:   See Oncology History    Interval History:   See Assessment & Plan    Review of Systems:   Review of Systems   All other systems reviewed and are negative        Past Medical History:     Patient Active Problem List   Diagnosis    Fatigue    Age-related osteoporosis without current pathological fracture    Malignant neoplasm of upper-outer quadrant of left breast in female, estrogen receptor positive ([facility] [address] )     Past Medical History:   Diagnosis Date    Kidney stone     age 17    Migraine     Osteoporosis     Pancreatitis     Pleurisy     PONV (postoperative nausea and vomiting)     Vertigo      Past Surgical History:   Procedure Laterality Date    BREAST LUMPECTOMY Left 10/1/2019    Procedure: BREAST NEEDLE LOCALIZED LUMPECTOMY (NEEDLE LOC AT 0730); Surgeon: Jose Hernandez MD;  [facility];  Service: Surgical Oncology    CHOLECYSTECTOMY      COLONOSCOPY      DENTAL SURGERY      Bone graft with implant    HALLUX VALGUS CORRECTION Bilateral     INCISIONAL BREAST BIOPSY      LYMPH NODE BIOPSY Left 10/1/2019    Procedure: SENTINEL LYMPH NODE BIOPSY; LYMPHATIC MAPPING WITH BLUE DYE AND RADIOACTIVE DYE (INJECT AT 0830 BY DR HA IN THE OR); Surgeon: Jose Hernandez MD;  [facility];  Service: Surgical Oncology    MAMMO NEEDLE LOCALIZATION LEFT (ALL INC) Left 10/1/2019    MAMMO STEREOTACTIC BREAST BIOPSY LEFT (ALL INC)      RHINOPLASTY       Family History   Problem Relation Age of Onset    No Known Problems Mother     Lung cancer Father 71     Social History     Socioeconomic History    Marital status:       Spouse name: Not on file    Number of children: Not on file    Years of education: Not on file    Highest education level: Not on file   Occupational History    Occupation: curretnly works part-time    Occupation: medical professional   Social Needs    Financial resource strain: Not on file    Food insecurity:     Worry: Not on file     Inability: Not on file   [employer] needs:     Medical: Not on file     Non-medical: Not on file   Tobacco Use    Smoking status: Never Smoker    Smokeless tobacco: Never Used   Substance and Sexual Activity    Alcohol use: No    Drug use: No    Sexual activity: Not on file   Lifestyle    Physical activity:     Days per week: Not on file     Minutes per session: Not on file    Stress: Not on file   Relationships    Social connections:     Talks on phone: Not on file     Gets together: Not on file     Attends Taoist service: Not on file     Active member of club or organization: Not on file     Attends meetings of clubs or organizations: Not on file     Relationship status: Not on file    Intimate partner violence:     Fear of current or ex partner: Not on file     Emotionally abused: Not on file     Physically abused: Not on file     Forced sexual activity: Not on file   Other Topics Concern    Not on file   Social History Narrative    Not on file       Current Outpatient Medications:     acetaminophen (TYLENOL) 500 mg tablet, Take by mouth every 6 (six) hours as needed , Disp: , Rfl:     Cholecalciferol (VITAMIN D) 2000 units CAPS, Take 4,000 Units by mouth daily , Disp: , Rfl:     ibandronate (BONIVA) 150 MG tablet, Take 150 mg by mouth every 30 (thirty) days , Disp: , Rfl:     Ivermectin (SOOLANTRA) 1 % CREA, Apply topically daily , Disp: , Rfl:     meclizine (ANTIVERT) 12 5 MG tablet, Take 12 5 mg by mouth 3 (three) times a day as needed for dizziness, Disp: , Rfl:     SUMAtriptan (IMITREX) 100 mg tablet, Take 100 mg by mouth once as needed for migraine, Disp: , Rfl:     estrogen, conjugated,-medroxyprogesterone (PREMPRO) 0 3-1 5 MG per tablet, 1daily, Disp: , Rfl:     oxyCODONE-acetaminophen (PERCOCET) 5-325 mg per tablet, Take 1 tablet by mouth every 6 (six) hours as needed for moderate pain for up to 6 dosesMax Daily Amount: 4 tablets (Patient not taking: Reported on 9/26/2019), Disp: 6 tablet, Rfl: 0  Allergies   Allergen Reactions    Ciprofloxacin Seizures    Cyclopentolate Other (See Comments) and Tachycardia     Resp distress    Sulfa Antibiotics Hives and Rash    Scopolamine Other (See Comments)     Precautionary for cycopentolate allergy       Physical Exams:     Vitals:    10/14/19 0939   BP: 120/80   Pulse: 84   Resp: 16   Temp: 98 7 °F (37 1 °C)     Physical Exam   Pulmonary/Chest:   Examination of the left breast incisions demonstrate a healing edge  There is no evidence of infection hematoma or seroma         Results & Discussion:   Patient presents for postoperative visit  She is healing well  She will follow up with medical as well as Radiation Oncology  I have discussed her situation with Dr Carla Lopez we agree no chemotherapy this was communicated to the patient  We will see her back in 3 months  She is agreeable to see our advanced practitioner if she is at the [place] office  I saw the patient for over 15 minutes and discussed her pathology as well as outcome  She was interested in what estrogen and progesterone receptor positivity meant I reviewed this with her and explained that we would not recommend chemotherapy but would recommend the anti hormone treatment we talked about possible side effects  We also talked about the need for radiation therapy as well as possible work schedules and side effects  All questions were answered the patient's satisfaction  Greater than 50% of the time was spent face-to-face with the patient reviewing these additional future treatments

## 2019-10-23 ENCOUNTER — TELEPHONE (OUTPATIENT)
Dept: HEMATOLOGY ONCOLOGY | Facility: CLINIC | Age: 64
End: 2019-10-23

## 2019-10-23 NOTE — TELEPHONE ENCOUNTER
Called patient on 10/23 left a message stating that I know we have previously moved her appointment up from 10:20 to 10:00am however there was a cancellation and I moved patient's appointment up to 9:20 per Dr Alejandra Preston  Stated that if patient has any questions and to concerns to please give the office a call back

## 2019-10-30 ENCOUNTER — RADIATION ONCOLOGY CONSULT (OUTPATIENT)
Dept: RADIATION ONCOLOGY | Facility: HOSPITAL | Age: 64
End: 2019-10-30
Attending: STUDENT IN AN ORGANIZED HEALTH CARE EDUCATION/TRAINING PROGRAM
Payer: COMMERCIAL

## 2019-10-30 ENCOUNTER — CONSULT (OUTPATIENT)
Dept: HEMATOLOGY ONCOLOGY | Facility: CLINIC | Age: 64
End: 2019-10-30
Payer: COMMERCIAL

## 2019-10-30 VITALS
RESPIRATION RATE: 16 BRPM | HEART RATE: 79 BPM | SYSTOLIC BLOOD PRESSURE: 116 MMHG | WEIGHT: 167.6 LBS | TEMPERATURE: 98.2 F | BODY MASS INDEX: 27.92 KG/M2 | DIASTOLIC BLOOD PRESSURE: 78 MMHG | HEIGHT: 65 IN

## 2019-10-30 VITALS
SYSTOLIC BLOOD PRESSURE: 118 MMHG | RESPIRATION RATE: 16 BRPM | OXYGEN SATURATION: 98 % | WEIGHT: 168 LBS | HEIGHT: 65 IN | HEART RATE: 81 BPM | DIASTOLIC BLOOD PRESSURE: 80 MMHG | BODY MASS INDEX: 27.99 KG/M2 | TEMPERATURE: 98.3 F

## 2019-10-30 DIAGNOSIS — C50.412 MALIGNANT NEOPLASM OF UPPER-OUTER QUADRANT OF LEFT BREAST IN FEMALE, ESTROGEN RECEPTOR POSITIVE (HCC): Primary | ICD-10-CM

## 2019-10-30 DIAGNOSIS — Z17.0 MALIGNANT NEOPLASM OF UPPER-OUTER QUADRANT OF LEFT BREAST IN FEMALE, ESTROGEN RECEPTOR POSITIVE (HCC): Primary | ICD-10-CM

## 2019-10-30 DIAGNOSIS — C50.412 MALIGNANT NEOPLASM OF UPPER-OUTER QUADRANT OF LEFT BREAST IN FEMALE, ESTROGEN RECEPTOR POSITIVE (HCC): ICD-10-CM

## 2019-10-30 DIAGNOSIS — Z17.0 MALIGNANT NEOPLASM OF UPPER-OUTER QUADRANT OF LEFT BREAST IN FEMALE, ESTROGEN RECEPTOR POSITIVE (HCC): ICD-10-CM

## 2019-10-30 PROCEDURE — G0463 HOSPITAL OUTPT CLINIC VISIT: HCPCS | Performed by: STUDENT IN AN ORGANIZED HEALTH CARE EDUCATION/TRAINING PROGRAM

## 2019-10-30 PROCEDURE — 99211 OFF/OP EST MAY X REQ PHY/QHP: CPT | Performed by: STUDENT IN AN ORGANIZED HEALTH CARE EDUCATION/TRAINING PROGRAM

## 2019-10-30 PROCEDURE — 99245 OFF/OP CONSLTJ NEW/EST HI 55: CPT | Performed by: INTERNAL MEDICINE

## 2019-10-30 RX ORDER — ANASTROZOLE 1 MG/1
1 TABLET ORAL DAILY
Qty: 90 TABLET | Refills: 1 | Status: SHIPPED | OUTPATIENT
Start: 2019-10-30 | End: 2020-04-23 | Stop reason: SDUPTHER

## 2019-10-30 NOTE — LETTER
October 30, 2019     Albania Hopper MD  [address]  [address]    Patient: Frank Spencer   YOB: 1955   Date of Visit: 10/30/2019       Dear Dr Balbir Post: Thank you for referring Frank Spencer to me for evaluation  Below are my notes for this consultation  If you have questions, please do not hesitate to call me  I look forward to following your patient along with you  Sincerely,        Bettye Harrison MD        CC: MD Renita Souza MD Renford Greenspan, MD  10/30/2019 10:11 AM  Sign at close encounter  Hematology / Oncology Outpatient Consult Note    Frank Spencer 60 y o  female [id number]         Date:  10/30/2019    Assessment / Plan:  A 60-year-old postmenopausal woman with newly diagnosed stage IA left breast cancer, grade 1, with invasive lobular histology, % positive, % positive, HER2 negative disease  She underwent lumpectomy and sentinel lymph node biopsy, resulting in JOSUÉ  She presented today to discuss adjuvant treatment options  We had extensive discussion regarding the diagnosis, staging information, phenotype, good prognosis and treatment options  Based on her stage, age as well as phenotype, adjuvant chemotherapy is not indicated  I recommended her to start adjuvant hormonal therapy with anastrozole 1 mg once a day, probably for 5 years  Side effects of anastrozole was thoroughly discussed, including but not limited to hot flashes, musculoskeletal symptom and bone mineral density loss  She is currently on Boniva which I recommended her to continue as well as vitamin-D  She is in agreement with my recommendation  Since she is not going to have adjuvant chemotherapy, she can start radiation therapy any time  I will see her again in 6 months for routine follow-up  Subjective:     HPI:  A 60-year-old postmenopausal woman who was on hormone replacement therapy in the past for 20 years    She was recently found to have abnormality in her left breast based on a screening mammography  Therefore, she underwent left breast biopsy in July 23, 2019 which showed invasive lobular carcinoma, grade 1  Subsequently, she underwent lumpectomy and sentinel lymph node biopsy by Dr Kaitlin Mcmanus in October 1, 2019  She had 1 8 cm of invasive lobular carcinoma, grade 1  2 sentinel lymph nodes were negative for metastatic disease  This was % positive, % positive, HER2 negative disease  She presents today to discuss adjuvant treatment options  She has history of osteoporosis  Since 2016, she has been on Boniva  Based on a DEXA scan in 2018, her T-score was -2 0, consistent with osteopenia  She has no complaint of pain  She denied any respiratory symptoms  Her weight is stable  She has no pain  She has some insomnia  She is a lifetime never smoker  She has no family history of breast cancer  Her performance status is normal           Interval History:          Objective:     Primary Diagnosis:    Left breast cancer, stage IA (pT1c, pN0, M0) grade 1 with invasive lobular histology, % positive, % positive, HER2 negative disease  Diagnosed in October 2019  Cancer Staging:  Cancer Staging  Malignant neoplasm of upper-outer quadrant of left breast in female, estrogen receptor positive ([facility] [address] )  Staging form: Breast, AJCC 8th Edition  - Pathologic: Stage IA (pT1c, pN0(sn), cM0, G1, ER+, AL+, HER2-) - Unsigned  Neoadjuvant therapy: No  Laterality: Left  Tumor size (mm): 18  Method of lymph node assessment: [place] lymph node biopsy  Histologic grading system: 3 grade system        Previous Hematologic/ Oncologic Treatment:         Current Hematologic/ Oncologic Treatment:      Adjuvant hormonal therapy with anastrozole to be started in October 2019  Disease Status:     JOSUÉ status post lumpectomy and sentinel lymph node biopsy      Test Results:    Pathology:    1 8 cm of invasive lobular carcinoma, grade 1  2 sentinel lymph nodes was negative for metastatic disease  % positive, % positive, HER2 negative disease  Stage IA (pT1c, pN0, M0)    Radiology:    Chest x-ray was negative for pulmonary disease  Laboratory:    See below  Physical Exam:      General Appearance:    Alert, oriented        Eyes:    PERRL   Ears:    Normal external ear canals, both ears   Nose:   Nares normal, septum midline   Throat:   Mucosa moist  Pharynx without injection  Neck:   Supple       Lungs:     Clear to auscultation bilaterally   Chest Wall:    No tenderness or deformity    Heart:    Regular rate and rhythm       Abdomen:     Soft, non-tender, bowel sounds +, no organomegaly           Extremities:   Extremities no cyanosis or edema       Skin:   no rash or icterus  Lymph nodes:   Cervical, supraclavicular, and axillary nodes normal   Neurologic:   CNII-XII intact, normal strength, sensation and reflexes     Throughout          Breast exam:   Lumpectomy scar at outer upper quadrant of her left breast with no palpable abnormality  Right breast exam is negative  ROS: Review of Systems   All other systems reviewed and are negative  Imaging: Mammo Needle Localization Left (all Inc)    Result Date: 10/1/2019  Narrative: Indication:  60year old; left breast carcinoma  Preoperative needle localization  Comparison: Prior imaging studies of the left breast Procedure: After verifying patient and side of interest and initialing side of concern (time out procedure), utilizing digital mammographic guidance and sterile technique, a needle localization procedure of the ribbon clip in the upper outer quadrant was performed from a lateral approach  The localizing  wire was positioned with the thick portion adjacent to the ribbon clip  The patient tolerated the procedure well, leaving the department in satisfactory condition, with annotated guidance images available on PACS       Impression: Impression: Successful needle localization procedure of the ribbon clip in the left upper outer breast  Workstation performed: [id number]     Nm Sentinal Node Inj    Result Date: 10/2/2019  Narrative: SENTINEL NODE INJECTION INDICATION:  C50 412: Malignant neoplasm of upper-outer quadrant of left female breast Z17 0: Estrogen receptor positive status (ER+)  FINDINGS: 0 47 mCi Tc-99m sulfur colloid (0 6 cc volume) was administered intradermally into the left breast by EDISON HA in the OR  No imaging was performed  Impression: Injection of  0 47 mCi Tc-99m sulfur colloid into the left breast in the OR  Workstation performed: [id number]     Mammo Breast Specimen Left (no Charge)    Result Date: 10/1/2019  Narrative: Digital mammography of left breast surgical specimen reveals that the stereotactic clip in question is located within the biopsy specimen  The distance from the clip to the closest biopsy margin is 1 6cm  The specimen also includes the localizing wire  These findings were independently reviewed in the operating room by Dr Yasmeen Alexander  Workstation performed: [id number]         Labs:   Lab Results   Component Value Date    WBC 8 43 08/28/2019    HGB 13 9 08/28/2019    HCT 42 2 08/28/2019     (H) 08/28/2019     08/28/2019     Lab Results   Component Value Date    K 4 0 08/28/2019     (H) 08/28/2019    CO2 30 08/28/2019    BUN 11 08/28/2019    CREATININE 0 73 08/28/2019    GLUF 93 03/30/2019    CALCIUM 9 4 08/28/2019    AST 23 08/28/2019    ALT 34 08/28/2019    ALKPHOS 90 08/28/2019    EGFR 89 08/28/2019         Lab Results   Component Value Date    IRON 98 03/30/2019         Vital Sign:    Body surface area is 1 84 meters squared      Wt Readings from Last 3 Encounters:   10/30/19 76 2 kg (168 lb)   10/14/19 76 kg (167 lb 8 oz)   09/25/19 75 8 kg (167 lb)        Temp Readings from Last 3 Encounters:   10/30/19 98 3 °F (36 8 °C) (Oral)   10/14/19 98 7 °F (37 1 °C)   10/01/19 97 5 °F (36 4 °C)        BP Readings from Last 3 Encounters:   10/30/19 118/80   10/14/19 120/80   10/01/19 116/57         Pulse Readings from Last 3 Encounters:   10/30/19 81   10/14/19 84   10/01/19 80     @LASTSAO2(3)@    Active Problems:   Patient Active Problem List   Diagnosis    Fatigue    Age-related osteoporosis without current pathological fracture    Malignant neoplasm of upper-outer quadrant of left breast in female, estrogen receptor positive ([facility] [address] )       Past Medical History:   Past Medical History:   Diagnosis Date    Kidney stone     age 17    Migraine     Osteoporosis     Pancreatitis     Pleurisy     PONV (postoperative nausea and vomiting)     Vertigo        Surgical History:   Past Surgical History:   Procedure Laterality Date    BREAST LUMPECTOMY Left 10/1/2019    Procedure: BREAST NEEDLE LOCALIZED LUMPECTOMY (NEEDLE LOC AT 0730); Surgeon: Maame Hoover MD;  [facility];  Service: Surgical Oncology    CHOLECYSTECTOMY      COLONOSCOPY      DENTAL SURGERY      Bone graft with implant    HALLUX VALGUS CORRECTION Bilateral     INCISIONAL BREAST BIOPSY      LYMPH NODE BIOPSY Left 10/1/2019    Procedure: SENTINEL LYMPH NODE BIOPSY; LYMPHATIC MAPPING WITH BLUE DYE AND RADIOACTIVE DYE (INJECT AT 0830 BY DR HA IN THE OR); Surgeon: Maame Hoover MD;  [facility];  Service: Surgical Oncology    MAMMO NEEDLE LOCALIZATION LEFT (ALL INC) Left 10/1/2019    MAMMO STEREOTACTIC BREAST BIOPSY LEFT (ALL INC)      RHINOPLASTY         Family History:    Family History   Problem Relation Age of Onset    No Known Problems Mother     Lung cancer Father 71       Cancer-related family history includes Lung cancer (age of onset: 71) in her father  Social History:   Social History     Socioeconomic History    Marital status:       Spouse name: Not on file    Number of children: Not on file    Years of education: Not on file    Highest education level: Not on file   Occupational History    Occupation: elda works part-time    Occupation: medical professional   Social Needs    Financial resource strain: Not on file    Food insecurity:     Worry: Not on file     Inability: Not on file   [employer] needs:     Medical: Not on file     Non-medical: Not on file   Tobacco Use    Smoking status: Never Smoker    Smokeless tobacco: Never Used   Substance and Sexual Activity    Alcohol use: No    Drug use: No    Sexual activity: Not on file   Lifestyle    Physical activity:     Days per week: Not on file     Minutes per session: Not on file    Stress: Not on file   Relationships    Social connections:     Talks on phone: Not on file     Gets together: Not on file     Attends Zoroastrianism service: Not on file     Active member of club or organization: Not on file     Attends meetings of clubs or organizations: Not on file     Relationship status: Not on file    Intimate partner violence:     Fear of current or ex partner: Not on file     Emotionally abused: Not on file     Physically abused: Not on file     Forced sexual activity: Not on file   Other Topics Concern    Not on file   Social History Narrative    Not on file       Current Medications:   Current Outpatient Medications   Medication Sig Dispense Refill    acetaminophen (TYLENOL) 500 mg tablet Take by mouth every 6 (six) hours as needed       Cholecalciferol (VITAMIN D) 2000 units CAPS Take 4,000 Units by mouth daily       ibandronate (BONIVA) 150 MG tablet Take 150 mg by mouth every 30 (thirty) days       Ivermectin (SOOLANTRA) 1 % CREA Apply topically daily       meclizine (ANTIVERT) 12 5 MG tablet Take 12 5 mg by mouth 3 (three) times a day as needed for dizziness      SUMAtriptan (IMITREX) 100 mg tablet Take 100 mg by mouth once as needed for migraine      estrogen, conjugated,-medroxyprogesterone (PREMPRO) 0 3-1 5 MG per tablet 1daily      oxyCODONE-acetaminophen (PERCOCET) 5-325 mg per tablet Take 1 tablet by mouth every 6 (six) hours as needed for moderate pain for up to 6 dosesMax Daily Amount: 4 tablets (Patient not taking: Reported on 9/26/2019) 6 tablet 0     No current facility-administered medications for this visit  Allergies:    Allergies   Allergen Reactions    Ciprofloxacin Seizures    Cyclopentolate Other (See Comments) and Tachycardia     Resp distress    Sulfa Antibiotics Hives and Rash    Scopolamine Other (See Comments)     Precautionary for cycopentolate allergy

## 2019-10-30 NOTE — PROGRESS NOTES
Hematology / Oncology Outpatient Consult Note    Deondre Pena 60 y o  female [id number]         Date:  10/30/2019    Assessment / Plan:  A 37-year-old postmenopausal woman with newly diagnosed stage IA left breast cancer, grade 1, with invasive lobular histology, % positive, % positive, HER2 negative disease  She underwent lumpectomy and sentinel lymph node biopsy, resulting in JOSUÉ  She presented today to discuss adjuvant treatment options  We had extensive discussion regarding the diagnosis, staging information, phenotype, good prognosis and treatment options  Based on her stage, age as well as phenotype, adjuvant chemotherapy is not indicated  I recommended her to start adjuvant hormonal therapy with anastrozole 1 mg once a day, probably for 5 years  Side effects of anastrozole was thoroughly discussed, including but not limited to hot flashes, musculoskeletal symptom and bone mineral density loss  She is currently on Boniva which I recommended her to continue as well as vitamin-D  She is in agreement with my recommendation  Since she is not going to have adjuvant chemotherapy, she can start radiation therapy any time  I will see her again in 6 months for routine follow-up  Subjective:     HPI:  A 37-year-old postmenopausal woman who was on hormone replacement therapy in the past for 20 years  She was recently found to have abnormality in her left breast based on a screening mammography  Therefore, she underwent left breast biopsy in July 23, 2019 which showed invasive lobular carcinoma, grade 1  Subsequently, she underwent lumpectomy and sentinel lymph node biopsy by Dr Jesi Ray in October 1, 2019  She had 1 8 cm of invasive lobular carcinoma, grade 1  2 sentinel lymph nodes were negative for metastatic disease  This was % positive, % positive, HER2 negative disease  She presents today to discuss adjuvant treatment options    She has history of osteoporosis  Since 2016, she has been on Boniva  Based on a DEXA scan in 2018, her T-score was -2 0, consistent with osteopenia  She has no complaint of pain  She denied any respiratory symptoms  Her weight is stable  She has no pain  She has some insomnia  She is a lifetime never smoker  She has no family history of breast cancer  Her performance status is normal           Interval History:          Objective:     Primary Diagnosis:    Left breast cancer, stage IA (pT1c, pN0, M0) grade 1 with invasive lobular histology, % positive, % positive, HER2 negative disease  Diagnosed in October 2019  Cancer Staging:  Cancer Staging  Malignant neoplasm of upper-outer quadrant of left breast in female, estrogen receptor positive ([facility] [address] )  Staging form: Breast, AJCC 8th Edition  - Pathologic: Stage IA (pT1c, pN0(sn), cM0, G1, ER+, CA+, HER2-) - Unsigned  Neoadjuvant therapy: No  Laterality: Left  Tumor size (mm): 18  Method of lymph node assessment: [place] lymph node biopsy  Histologic grading system: 3 grade system        Previous Hematologic/ Oncologic Treatment:         Current Hematologic/ Oncologic Treatment:      Adjuvant hormonal therapy with anastrozole to be started in October 2019  Disease Status:     JOSUÉ status post lumpectomy and sentinel lymph node biopsy  Test Results:    Pathology:    1 8 cm of invasive lobular carcinoma, grade 1  2 sentinel lymph nodes was negative for metastatic disease  % positive, % positive, HER2 negative disease  Stage IA (pT1c, pN0, M0)    Radiology:    Chest x-ray was negative for pulmonary disease  Laboratory:    See below  Physical Exam:      General Appearance:    Alert, oriented        Eyes:    PERRL   Ears:    Normal external ear canals, both ears   Nose:   Nares normal, septum midline   Throat:   Mucosa moist  Pharynx without injection      Neck:   Supple       Lungs:     Clear to auscultation bilaterally   Chest Wall:    No tenderness or deformity    Heart:    Regular rate and rhythm       Abdomen:     Soft, non-tender, bowel sounds +, no organomegaly           Extremities:   Extremities no cyanosis or edema       Skin:   no rash or icterus  Lymph nodes:   Cervical, supraclavicular, and axillary nodes normal   Neurologic:   CNII-XII intact, normal strength, sensation and reflexes     Throughout          Breast exam:   Lumpectomy scar at outer upper quadrant of her left breast with no palpable abnormality  Right breast exam is negative  ROS: Review of Systems   All other systems reviewed and are negative  Imaging: Mammo Needle Localization Left (all Inc)    Result Date: 10/1/2019  Narrative: Indication:  60year old; left breast carcinoma  Preoperative needle localization  Comparison: Prior imaging studies of the left breast Procedure: After verifying patient and side of interest and initialing side of concern (time out procedure), utilizing digital mammographic guidance and sterile technique, a needle localization procedure of the ribbon clip in the upper outer quadrant was performed from a lateral approach  The localizing  wire was positioned with the thick portion adjacent to the ribbon clip  The patient tolerated the procedure well, leaving the department in satisfactory condition, with annotated guidance images available on PACS  Impression: Impression: Successful needle localization procedure of the ribbon clip in the left upper outer breast  Workstation performed: [id number]     Nm Sentinal Node Inj    Result Date: 10/2/2019  Narrative: SENTINEL NODE INJECTION INDICATION:  C50 412: Malignant neoplasm of upper-outer quadrant of left female breast Z17 0: Estrogen receptor positive status (ER+)  FINDINGS: 0 47 mCi Tc-99m sulfur colloid (0 6 cc volume) was administered intradermally into the left breast by EDISON HA in the OR  No imaging was performed       Impression: Injection of  0 47 mCi Tc-99m sulfur colloid into the left breast in the OR  Workstation performed: [id number]     Mammo Breast Specimen Left (no Charge)    Result Date: 10/1/2019  Narrative: Digital mammography of left breast surgical specimen reveals that the stereotactic clip in question is located within the biopsy specimen  The distance from the clip to the closest biopsy margin is 1 6cm  The specimen also includes the localizing wire  These findings were independently reviewed in the operating room by Dr Lul Ribera  Workstation performed: [id number]         Labs:   Lab Results   Component Value Date    WBC 8 43 08/28/2019    HGB 13 9 08/28/2019    HCT 42 2 08/28/2019     (H) 08/28/2019     08/28/2019     Lab Results   Component Value Date    K 4 0 08/28/2019     (H) 08/28/2019    CO2 30 08/28/2019    BUN 11 08/28/2019    CREATININE 0 73 08/28/2019    GLUF 93 03/30/2019    CALCIUM 9 4 08/28/2019    AST 23 08/28/2019    ALT 34 08/28/2019    ALKPHOS 90 08/28/2019    EGFR 89 08/28/2019         Lab Results   Component Value Date    IRON 98 03/30/2019         Vital Sign:    Body surface area is 1 84 meters squared      Wt Readings from Last 3 Encounters:   10/30/19 76 2 kg (168 lb)   10/14/19 76 kg (167 lb 8 oz)   09/25/19 75 8 kg (167 lb)        Temp Readings from Last 3 Encounters:   10/30/19 98 3 °F (36 8 °C) (Oral)   10/14/19 98 7 °F (37 1 °C)   10/01/19 97 5 °F (36 4 °C)        BP Readings from Last 3 Encounters:   10/30/19 118/80   10/14/19 120/80   10/01/19 116/57         Pulse Readings from Last 3 Encounters:   10/30/19 81   10/14/19 84   10/01/19 80     @LASTSAO2(3)@    Active Problems:   Patient Active Problem List   Diagnosis    Fatigue    Age-related osteoporosis without current pathological fracture    Malignant neoplasm of upper-outer quadrant of left breast in female, estrogen receptor positive ([address] )       Past Medical History:   Past Medical History:   Diagnosis Date    Kidney stone     age 17    Migraine     Osteoporosis     Pancreatitis     Pleurisy     PONV (postoperative nausea and vomiting)     Vertigo        Surgical History:   Past Surgical History:   Procedure Laterality Date    BREAST LUMPECTOMY Left 10/1/2019    Procedure: BREAST NEEDLE LOCALIZED LUMPECTOMY (NEEDLE LOC AT 0730); Surgeon: Brianne Gan MD;  [facility];  Service: Surgical Oncology    CHOLECYSTECTOMY      COLONOSCOPY      DENTAL SURGERY      Bone graft with implant    HALLUX VALGUS CORRECTION Bilateral     INCISIONAL BREAST BIOPSY      LYMPH NODE BIOPSY Left 10/1/2019    Procedure: SENTINEL LYMPH NODE BIOPSY; LYMPHATIC MAPPING WITH BLUE DYE AND RADIOACTIVE DYE (INJECT AT 0830 BY DR HA IN THE OR); Surgeon: Brianne Gan MD;  [facility];  Service: Surgical Oncology    MAMMO NEEDLE LOCALIZATION LEFT (ALL INC) Left 10/1/2019    MAMMO STEREOTACTIC BREAST BIOPSY LEFT (ALL [employer])      RHINOPLASTY         Family History:    Family History   Problem Relation Age of Onset    No Known Problems Mother     Lung cancer Father 71       Cancer-related family history includes Lung cancer (age of onset: 71) in her father  Social History:   Social History     Socioeconomic History    Marital status:       Spouse name: Not on file    Number of children: Not on file    Years of education: Not on file    Highest education level: Not on file   Occupational History    Occupation: curretnly works part-time    Occupation: medical professional   Social Needs    Financial resource strain: Not on file    Food insecurity:     Worry: Not on file     Inability: Not on file   [employer] needs:     Medical: Not on file     Non-medical: Not on file   Tobacco Use    Smoking status: Never Smoker    Smokeless tobacco: Never Used   Substance and Sexual Activity    Alcohol use: No    Drug use: No    Sexual activity: Not on file   Lifestyle    Physical activity:     Days per week: Not on file     Minutes per session: Not on file    Stress: Not on file   Relationships    Social connections:     Talks on phone: Not on file     Gets together: Not on file     Attends [employer] service: Not on file     Active member of club or organization: Not on file     Attends meetings of clubs or organizations: Not on file     Relationship status: Not on file    Intimate partner violence:     Fear of current or ex partner: Not on file     Emotionally abused: Not on file     Physically abused: Not on file     Forced sexual activity: Not on file   Other Topics Concern    Not on file   Social History Narrative    Not on file       Current Medications:   Current Outpatient Medications   Medication Sig Dispense Refill    acetaminophen (TYLENOL) 500 mg tablet Take by mouth every 6 (six) hours as needed       Cholecalciferol (VITAMIN D) 2000 units CAPS Take 4,000 Units by mouth daily       ibandronate (BONIVA) 150 MG tablet Take 150 mg by mouth every 30 (thirty) days       Ivermectin (SOOLANTRA) 1 % CREA Apply topically daily       meclizine (ANTIVERT) 12 5 MG tablet Take 12 5 mg by mouth 3 (three) times a day as needed for dizziness      SUMAtriptan (IMITREX) 100 mg tablet Take 100 mg by mouth once as needed for migraine      estrogen, conjugated,-medroxyprogesterone (PREMPRO) 0 3-1 5 MG per tablet 1daily      oxyCODONE-acetaminophen (PERCOCET) 5-325 mg per tablet Take 1 tablet by mouth every 6 (six) hours as needed for moderate pain for up to 6 dosesMax Daily Amount: 4 tablets (Patient not taking: Reported on 9/26/2019) 6 tablet 0     No current facility-administered medications for this visit  Allergies:    Allergies   Allergen Reactions    Ciprofloxacin Seizures    Cyclopentolate Other (See Comments) and Tachycardia     Resp distress    Sulfa Antibiotics Hives and Rash    Scopolamine Other (See Comments)     Precautionary for cycopentolate allergy

## 2019-10-30 NOTE — PROGRESS NOTES
Archana Natarajan 1955 is a 60 y o  female  Pt is a 60 y o female with newly diagnosed L breast ca  9 mm mass was found on a screening mammogram  Biopsy showed invasive lobular carcinoma grade 1  percent  percent HER2 Mateo 0/negative  19 Dr Magdy Henderson- pt interested in breast conservation therapy  She was treated at [facility], will review films and pathology and f/u with pt    10/1/19 Left breast needle localized lumpectomy SLN biopsy    10/14/19 Dr Magdy Henderson- reviewed pathology with Dr Ambrosio Blackwell who is recommending hormonal therapy  Recommend radiation  Wound is healing well  F/u in 3 months    10/30/19 Dr Ambrosio Blackwell: To start anastrozole  20 Florene Bollard     Malignant neoplasm of upper-outer quadrant of left breast in female, estrogen receptor positive ([facility] [address] )    2019 Biopsy     Left breast biopsy  1 o'clock, 5 cm from nipple  Invasive lobular carcinoma  Grade 1      HER2 0    Done at [facility]      10/1/2019 Surgery     Left breast needle localized lumpectomy with sentinel lymph node biopsy  Invasive lobular carcinoma  Grade 1  1 8 cm  Margins negative  0/2 Lymph nodes  Anatomic/Prognostic Stage IA      10/30/2019 -  Hormone Therapy     Anastrozole 1 mg daily  Dr Ambrosio Blackwell     Clinical Trial: no    OB/GYN History:  The patient underwent menarche at 14 years  Menopause Status Post  No LMP recorded  Patient is postmenopausal   Menopause at 40 years  Menopause Reason Natural  Hormone replacement therapy: yes  Years used 25 years   2   Para 2   Age at first delivery being 29 years     Nursing: no    Birth control pills: no   Years used N/A    Health Maintenance   Topic Date Due    DTaP,Tdap,and Td Vaccines (1 - Tdap) 1978    Pneumococcal Vaccine: Pediatrics (0 to 5 Years) and At-Risk Patients (6 to 59 Years) (2 of 3 - PCV13) 2014    CRC Screening: Colonoscopy  2019    INFLUENZA VACCINE  2019    Depression Screening PHQ  2020    BMI: Followup Plan  03/27/2020    MAMMOGRAM  07/15/2020    BMI: Adult  10/14/2020    Cervical Cancer Screening  04/20/2021    Pneumococcal Vaccine: 65+ Years (1 of 2 - PCV13) 04/11/2022    CRC Screening: Cologuard  04/28/2022    Hepatitis C Screening  Completed    HEPATITIS B VACCINES  Aged Out     Past Medical History:   Diagnosis Date    Breast cancer ([address] )     Kidney stone     age 17    Migraine     Osteoporosis     Pancreatitis     Pleurisy     PONV (postoperative nausea and vomiting)     Vertigo      Past Surgical History:   Procedure Laterality Date    BREAST LUMPECTOMY Left 10/1/2019    Procedure: BREAST NEEDLE LOCALIZED LUMPECTOMY (NEEDLE LOC AT 0730); Surgeon: Daniela Cruz MD;  [facility];  Service: Surgical Oncology    CHOLECYSTECTOMY      COLONOSCOPY      DENTAL SURGERY      Bone graft with implant    HALLUX VALGUS CORRECTION Bilateral     INCISIONAL BREAST BIOPSY      LYMPH NODE BIOPSY Left 10/1/2019    Procedure: SENTINEL LYMPH NODE BIOPSY; LYMPHATIC MAPPING WITH BLUE DYE AND RADIOACTIVE DYE (INJECT AT 0830 BY DR HA IN THE OR);   Surgeon: Daniela Cruz MD;  [facility];  Service: Surgical Oncology    MAMMO NEEDLE LOCALIZATION LEFT (ALL INC) Left 10/1/2019    MAMMO STEREOTACTIC BREAST BIOPSY LEFT (ALL INC)      RHINOPLASTY       Family History   Problem Relation Age of Onset    No Known Problems Mother     Lung cancer Father 71     Social History     Tobacco Use    Smoking status: Never Smoker    Smokeless tobacco: Never Used   Substance Use Topics    Alcohol use: No    Drug use: No        Current Outpatient Medications:     acetaminophen (TYLENOL) 500 mg tablet, Take by mouth every 6 (six) hours as needed , Disp: , Rfl:     anastrozole (ARIMIDEX) 1 mg tablet, Take 1 tablet (1 mg total) by mouth daily, Disp: 90 tablet, Rfl: 1    Cholecalciferol (VITAMIN D) 2000 units CAPS, Take 4,000 Units by mouth daily , Disp: , Rfl:     ibandronate (BONIVA) 150 MG tablet, Take 150 mg by mouth every 30 (thirty) days , Disp: , Rfl:     Ivermectin (SOOLANTRA) 1 % CREA, Apply topically daily , Disp: , Rfl:     meclizine (ANTIVERT) 12 5 MG tablet, Take 12 5 mg by mouth 3 (three) times a day as needed for dizziness, Disp: , Rfl:     SUMAtriptan (IMITREX) 100 mg tablet, Take 100 mg by mouth once as needed for migraine, Disp: , Rfl:     Allergies   Allergen Reactions    Ciprofloxacin Seizures    Cyclopentolate Other (See Comments) and Tachycardia     Resp distress    Sulfa Antibiotics Hives and Rash    Scopolamine Other (See Comments)     Precautionary for cycopentolate allergy      Review of Systems:  Review of Systems   Constitutional: Positive for fatigue (Severe)  Hot flashes   HENT: Negative  Eyes:        History of bilateral cataracts  Respiratory: Positive for shortness of breath (SOLIS)  Cardiovascular: Positive for leg swelling (Right ankle)  Gastrointestinal: Positive for abdominal pain and nausea  Endocrine: Negative  Genitourinary: Positive for frequency  Musculoskeletal: Positive for arthralgias and back pain  Skin: Negative  Allergic/Immunologic: Positive for food allergies (Strawberries, Lactose)  Neurological: Positive for dizziness (History of vertigo) and headaches (History of migraines)  Psychiatric/Behavioral: Positive for sleep disturbance (Can sleep too much)  Vitals:    10/30/19 1127   BP: 116/78   BP Location: Right arm   Patient Position: Sitting   Cuff Size: Standard   Pulse: 79   Resp: 16   Temp: 98 2 °F (36 8 °C)   TempSrc: Oral   Weight: 76 kg (167 lb 9 6 oz)   Height: 5' 5" (1 651 m)   Pain assessment: 0    Pain Score: 0-No pain    Imaging:No images are attached to the encounter  Teaching NCI packet    RT to breast

## 2019-10-30 NOTE — PROGRESS NOTES
Consultation - Radiation Oncology     NWQ:[id number] : 1955  Encounter: [id number]  Patient Information: Rosanne Mohan      CHIEF COMPLAINT  Chief Complaint   Patient presents with    Consult     Breast Cancer     Cancer Staging  Malignant neoplasm of upper-outer quadrant of left breast in female, estrogen receptor positive ([facility] [address] )  Staging form: Breast, AJCC 8th Edition  - Pathologic: Stage IA (pT1c, pN0(sn), cM0, G1, ER+, AZ+, HER2-) - Unsigned  Neoadjuvant therapy: No  Laterality: Left  Tumor size (mm): 18  Method of lymph node assessment: [place] lymph node biopsy  Histologic grading system: 3 grade system           History of Present Illness   Rosanne Mohan is a 60y o  year old female who presents with newly diagnosed L breast ca  9 mm mass was found on a screening mammogram  Biopsy showed invasive lobular carcinoma grade 1  percent  percent HER2 Mateo 0/negative       19 Dr Manda Cervantes- pt interested in breast conservation therapy  She was treated at [facility], will review films and pathology and f/u with pt     10/1/19 Left breast needle localized lumpectomy SLN biopsy     10/14/19 Dr Manda Cervantes- reviewed pathology with Dr Otoniel Blake who is recommending hormonal therapy  Recommend radiation  Wound is healing well  F/u in 3 months     10/30/19 Dr Otoniel Blake:   To start anastrozole       20 José Fatima      Historical Information      Malignant neoplasm of upper-outer quadrant of left breast in female, estrogen receptor positive ([facility] [address] )    2019 Biopsy     Left breast biopsy  1 o'clock, 5 cm from nipple  Invasive lobular carcinoma  Grade 1      HER2 0    Done at [facility]      10/1/2019 Surgery     Left breast needle localized lumpectomy with sentinel lymph node biopsy  Invasive lobular carcinoma  Grade 1  1 8 cm  Margins negative  0/2 Lymph nodes  Anatomic/Prognostic Stage IA      10/30/2019 -  Hormone Therapy     Anastrozole 1 mg daily  Dr Otoniel Blake           Past Medical History:   Diagnosis Date    Breast cancer ([facility] [address] )     Kidney stone     age 17    Migraine     Osteoporosis     Pancreatitis     Pleurisy     PONV (postoperative nausea and vomiting)     Vertigo      Past Surgical History:   Procedure Laterality Date    BREAST LUMPECTOMY Left 10/1/2019    Procedure: BREAST NEEDLE LOCALIZED LUMPECTOMY (NEEDLE LOC AT 0730); Surgeon: Cathy Soria MD;  [facility];  Service: Surgical Oncology    CHOLECYSTECTOMY      COLONOSCOPY      DENTAL SURGERY      Bone graft with implant    HALLUX VALGUS CORRECTION Bilateral     INCISIONAL BREAST BIOPSY      LYMPH NODE BIOPSY Left 10/1/2019    Procedure: SENTINEL LYMPH NODE BIOPSY; LYMPHATIC MAPPING WITH BLUE DYE AND RADIOACTIVE DYE (INJECT AT 0830 BY DR HA IN THE OR);   Surgeon: Cathy Soria MD;  [facility];  Service: Surgical Oncology    MAMMO NEEDLE LOCALIZATION LEFT (ALL INC) Left 10/1/2019    MAMMO STEREOTACTIC BREAST BIOPSY LEFT (ALL INC)      RHINOPLASTY         Family History   Problem Relation Age of Onset    No Known Problems Mother     Lung cancer Father 71       Social History   Social History     Substance and Sexual Activity   Alcohol Use No     Social History     Substance and Sexual Activity   Drug Use No     Social History     Tobacco Use   Smoking Status Never Smoker   Smokeless Tobacco Never Used         Meds/Allergies     Current Outpatient Medications:     acetaminophen (TYLENOL) 500 mg tablet, Take by mouth every 6 (six) hours as needed , Disp: , Rfl:     anastrozole (ARIMIDEX) 1 mg tablet, Take 1 tablet (1 mg total) by mouth daily, Disp: 90 tablet, Rfl: 1    Cholecalciferol (VITAMIN D) 2000 units CAPS, Take 4,000 Units by mouth daily , Disp: , Rfl:     ibandronate (BONIVA) 150 MG tablet, Take 150 mg by mouth every 30 (thirty) days , Disp: , Rfl:     Ivermectin (SOOLANTRA) 1 % CREA, Apply topically daily , Disp: , Rfl:     meclizine (ANTIVERT) 12 5 MG tablet, Take 12 5 mg by mouth 3 (three) times a day as needed for dizziness, Disp: , Rfl:     SUMAtriptan (IMITREX) 100 mg tablet, Take 100 mg by mouth once as needed for migraine, Disp: , Rfl:   Allergies   Allergen Reactions    Ciprofloxacin Seizures    Cyclopentolate Other (See Comments) and Tachycardia     Resp distress    Sulfa Antibiotics Hives and Rash    Scopolamine Other (See Comments)     Precautionary for cycopentolate allergy     Clinical Trial: no     OB/GYN History:  The patient underwent menarche at 14 years  Menopause Status Post  No LMP recorded  Patient is postmenopausal   Menopause at 40 years  Menopause Reason Natural  Hormone replacement therapy: yes  Years used 25 years   2   Para 2   Age at first delivery being 29 years  Nursing: no    Birth control pills: no   Years used N/A      Review of Systems Constitutional: Positive for fatigue (Severe)  Hot flashes   HENT: Negative  Eyes:        History of bilateral cataracts  Respiratory: Positive for shortness of breath (SOLIS)  Cardiovascular: Positive for leg swelling (Right ankle)  Gastrointestinal: Positive for abdominal pain and nausea  Endocrine: Negative  Genitourinary: Positive for frequency  Musculoskeletal: Positive for arthralgias and back pain  Skin: Negative  Allergic/Immunologic: Positive for food allergies (Strawberries, Lactose)  Neurological: Positive for dizziness (History of vertigo) and headaches (History of migraines)  Psychiatric/Behavioral: Positive for sleep disturbance (Can sleep too much)  OBJECTIVE:   /78 (BP Location: Right arm, Patient Position: Sitting, Cuff Size: Standard)   Pulse 79   Temp 98 2 °F (36 8 °C) (Oral)   Resp 16   Ht 5' 5" (1 651 m)   Wt 76 kg (167 lb 9 6 oz)   BMI 27 89 kg/m²   Pain Assessment:  0  Performance Status: ECOG/Zubrod/WHO: 0 - Asymptomatic    Physical Exam GENERAL:  Appears stated age, in no apparent distress  Alert and oriented    HEENT:  Normocephalic, atraumatic   extraocular muscles intact  Oral mucosa moist   LYMPHATICS:  No cervical, supraclavicular, or infraclavicular lymphadenopathy noted bilaterally  PULMONARY:  Respirations unlabored  CARDIOVASCULAR:  Regular rate  ABDOMEN:  Soft, nondistended  NEUROLOGIC: Moving all extremities, No focal deficits noted  EXTREMITIES: no clubbing, cyanosis, or edema  PSYCHIATRIC: normal mood and affect  Appropriate thought content and judgement  BREASTS:  Patient examined in the seated position today  No gross asymmetry noted  Left breast with incision site healing well, with mild seroma palpable  Otherwise  no palpable lesions in bilateral breasts or axilla         RESULTS  Lab Results    Chemistry        Component Value Date/Time    K 4 0 08/28/2019 1309     (H) 08/28/2019 1309    CO2 30 08/28/2019 1309    BUN 11 08/28/2019 1309    CREATININE 0 73 08/28/2019 1309        Component Value Date/Time    CALCIUM 9 4 08/28/2019 1309    ALKPHOS 90 08/28/2019 1309    AST 23 08/28/2019 1309    ALT 34 08/28/2019 1309            Lab Results   Component Value Date    WBC 8 43 08/28/2019    HGB 13 9 08/28/2019    HCT 42 2 08/28/2019     (H) 08/28/2019     08/28/2019         Imaging Studies  Mammo Needle Localization Left (all Inc)    Result Date: 10/1/2019  Narrative: Indication:  60year old; left breast carcinoma  Preoperative needle localization  Comparison: Prior imaging studies of the left breast Procedure: After verifying patient and side of interest and initialing side of concern (time out procedure), utilizing digital mammographic guidance and sterile technique, a needle localization procedure of the ribbon clip in the upper outer quadrant was performed from a lateral approach  The localizing  wire was positioned with the thick portion adjacent to the ribbon clip   The patient tolerated the procedure well, leaving the department in satisfactory condition, with annotated guidance images available on PACS      Impression: Impression: Successful needle localization procedure of the ribbon clip in the left upper outer breast  Workstation performed: [id number]     Nm Sentinal Node Inj    Result Date: 10/2/2019  Narrative: SENTINEL NODE INJECTION INDICATION:  C50 412: Malignant neoplasm of upper-outer quadrant of left female breast Z17 0: Estrogen receptor positive status (ER+)  FINDINGS: 0 47 mCi Tc-99m sulfur colloid (0 6 cc volume) was administered intradermally into the left breast by EDISON HA in the OR  No imaging was performed  Impression: Injection of  0 47 mCi Tc-99m sulfur colloid into the left breast in the OR  Workstation performed: [id number]     Mammo Breast Specimen Left (no Charge)    Result Date: 10/1/2019  Narrative: Digital mammography of left breast surgical specimen reveals that the stereotactic clip in question is located within the biopsy specimen  The distance from the clip to the closest biopsy margin is 1 6cm  The specimen also includes the localizing wire  These findings were independently reviewed in the operating room by Dr Jenny Carrera  Workstation performed: [id number]         Pathology:G1 invasive lobular carcinoma, 18 mm, Er/CA+, Her2-, negative margins        ASSESSMENT  1  Malignant neoplasm of upper-outer quadrant of left breast in female, estrogen receptor positive ([facility] [address] )  Ambulatory referral to Radiation Oncology     Cancer Staging  Malignant neoplasm of upper-outer quadrant of left breast in female, estrogen receptor positive ([facility] [address] )  Staging form: Breast, AJCC 8th Edition  - Pathologic: Stage IA (pT1c, pN0(sn), cM0, G1, ER+, CA+, HER2-) - Unsigned  Neoadjuvant therapy: No  Laterality: Left  Tumor size (mm): 18  Method of lymph node assessment: [place] lymph node biopsy  Histologic grading system: 3 grade system        PLAN/DISCUSSION  No orders of the defined types were placed in this encounter           Jonny Chavez is a 60y o  year old female with pT1cN0 cM0, ER/CA+, Her2- invasive lobular carcinoma of the left breast, status post left breast lumpectomy +SLNbx, with pathology showing 18mm tumor, negative margins and 0/2 SLNs  Patient has been seen by medical oncology, with recommendations for endocrine therapy  We discussed external beam breast radiation therapy in the setting of breast conservation  Adjuvant radiation to the whole breast has been shown to reduce the risk of local recurrence by greater than 60%, and improves overall survival   We discussed options for standard fractionation versus hypofractionation, and explained that while I standardly recommend hypofractionation, if at the time of planning, constraints cannot be satisfied, we may have to switch to a standard fractionation plan  We discussed options for boost to lumpectomy cavity with margin, which I explained does reduced the risk of local recurrence, with most benefit seen in younger patients and those with high grade disease  As her disease was low grade, omission of boost would be reasonable  We discussed the mechanism of action of irradiation, logistics of treatment including CT simulation and treatment with deep inspiration breath hold to reduce cardiovascular risk, and side effects including but not limited to fatigue, erythema, desquamation of skin, weakened bone in treatment field, pneumonitis, cardiovascular risks, and secondary malignancy  After this discussion, we agreed to the following:    PLAN:  Left whole breast external beam radiation with 40 05 Gy in 15 fractions  Patient would like for treatment at [facility]    Will arrange consent and simulation with Dr Mychal Ray MD  10/30/2019,12:25 PM      Portions of the record may have been created with voice recognition software   Occasional wrong word or "sound a like" substitutions may have occurred due to the inherent limitations of voice recognition software   Read the chart carefully and recognize, using context, where substitutions have occurred

## 2019-10-31 ENCOUNTER — DOCUMENTATION (OUTPATIENT)
Dept: RADIATION ONCOLOGY | Facility: HOSPITAL | Age: 64
End: 2019-10-31

## 2019-10-31 NOTE — PROGRESS NOTES
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

## 2019-11-04 ENCOUNTER — APPOINTMENT (OUTPATIENT)
Dept: RADIATION ONCOLOGY | Facility: CLINIC | Age: 64
End: 2019-11-04
Attending: RADIOLOGY
Payer: COMMERCIAL

## 2019-11-04 ENCOUNTER — RADIATION ONCOLOGY CONSULT (OUTPATIENT)
Dept: RADIATION ONCOLOGY | Facility: CLINIC | Age: 64
End: 2019-11-04
Attending: RADIOLOGY

## 2019-11-04 DIAGNOSIS — Z17.0 MALIGNANT NEOPLASM OF UPPER-OUTER QUADRANT OF LEFT BREAST IN FEMALE, ESTROGEN RECEPTOR POSITIVE (HCC): Primary | ICD-10-CM

## 2019-11-04 DIAGNOSIS — C50.412 MALIGNANT NEOPLASM OF UPPER-OUTER QUADRANT OF LEFT BREAST IN FEMALE, ESTROGEN RECEPTOR POSITIVE (HCC): Primary | ICD-10-CM

## 2019-11-04 PROCEDURE — 77333 RADIATION TREATMENT AID(S): CPT | Performed by: RADIOLOGY

## 2019-11-04 PROCEDURE — 77290 THER RAD SIMULAJ FIELD CPLX: CPT | Performed by: RADIOLOGY

## 2019-11-11 DIAGNOSIS — C50.412 MALIGNANT NEOPLASM OF UPPER-OUTER QUADRANT OF LEFT BREAST IN FEMALE, ESTROGEN RECEPTOR POSITIVE (HCC): Primary | ICD-10-CM

## 2019-11-11 DIAGNOSIS — Z17.0 MALIGNANT NEOPLASM OF UPPER-OUTER QUADRANT OF LEFT BREAST IN FEMALE, ESTROGEN RECEPTOR POSITIVE (HCC): Primary | ICD-10-CM

## 2019-11-11 PROCEDURE — 77300 RADIATION THERAPY DOSE PLAN: CPT | Performed by: RADIOLOGY

## 2019-11-11 PROCEDURE — 77295 3-D RADIOTHERAPY PLAN: CPT | Performed by: RADIOLOGY

## 2019-11-11 PROCEDURE — 77334 RADIATION TREATMENT AID(S): CPT | Performed by: RADIOLOGY

## 2019-11-12 ENCOUNTER — RADIATION THERAPY TREATMENT (OUTPATIENT)
Dept: RADIATION ONCOLOGY | Facility: CLINIC | Age: 64
End: 2019-11-12
Attending: RADIOLOGY
Payer: COMMERCIAL

## 2019-11-12 PROCEDURE — 77280 THER RAD SIMULAJ FIELD SMPL: CPT | Performed by: RADIOLOGY
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
## 2019-11-13 ENCOUNTER — APPOINTMENT (OUTPATIENT)
Dept: RADIATION ONCOLOGY | Facility: CLINIC | Age: 64
End: 2019-11-13
Attending: RADIOLOGY
Payer: COMMERCIAL

## 2019-11-13 PROCEDURE — 77331 SPECIAL RADIATION DOSIMETRY: CPT | Performed by: RADIOLOGY

## 2019-11-13 PROCEDURE — 77412 RADIATION TX DELIVERY LVL 3: CPT | Performed by: RADIOLOGY

## 2019-11-13 PROCEDURE — 77387 GUIDANCE FOR RADJ TX DLVR: CPT | Performed by: RADIOLOGY

## 2019-11-14 ENCOUNTER — APPOINTMENT (OUTPATIENT)
Dept: RADIATION ONCOLOGY | Facility: CLINIC | Age: 64
End: 2019-11-14
Attending: RADIOLOGY
Payer: COMMERCIAL

## 2019-11-14 PROCEDURE — 77412 RADIATION TX DELIVERY LVL 3: CPT | Performed by: RADIOLOGY

## 2019-11-14 PROCEDURE — 77387 GUIDANCE FOR RADJ TX DLVR: CPT | Performed by: RADIOLOGY

## 2019-11-15 ENCOUNTER — APPOINTMENT (OUTPATIENT)
Dept: RADIATION ONCOLOGY | Facility: CLINIC | Age: 64
End: 2019-11-15
Attending: RADIOLOGY
Payer: COMMERCIAL

## 2019-11-15 PROCEDURE — 77387 GUIDANCE FOR RADJ TX DLVR: CPT | Performed by: RADIOLOGY

## 2019-11-15 PROCEDURE — 77412 RADIATION TX DELIVERY LVL 3: CPT | Performed by: RADIOLOGY

## 2019-11-15 NOTE — PROGRESS NOTES
Follow-up - Radiation Oncology   Carmella Oh 1955 60 y o  female [id number]      History of Present Illness   Cancer Staging  Malignant neoplasm of upper-outer quadrant of left breast in female, estrogen receptor positive ([facility] [address] )  Staging form: Breast, AJCC 8th Edition  - Pathologic: Stage IA (pT1c, pN0(sn), cM0, G1, ER+, VA+, HER2-) - Unsigned  Neoadjuvant therapy: No  Laterality: Left  Tumor size (mm): 18  Method of lymph node assessment: [place] lymph node biopsy  Histologic grading system: 3 grade system      Carmella Elias is a 60y o  year old female with a history of pT1cN0 cM0, ER/VA+, Her2- invasive lobular carcinoma of the left breast, status post left breast lumpectomy +SLNbx, with pathology showing 18mm tumor, negative margins and 0/2 SLNs  Adjuvant hormone therapy is planned  Adjuvant hypofractionated whole breast was recommended for consultation on 10/30/2019  She now presents for CT simulation and begin radiation  Currently, the patient offers no complaints  She denies any breast pain or tenderness  She denies any palpable nodules, suspicious skin changes, or new or nipple discharge of the breasts bilaterally  She denies any upper extremity edema or shoulder restriction        LIMITED PHYSICAL EXAM   Physical Exam   The patient is a well-developed, well-nourished woman sitting in no acute distress  She is hearing impaired but has no difficulty with speech our discussion  Breast examination demonstrates a well-healed left breast lumpectomy scar  There are no palpable nodules or suspicious skin changes of the breasts bilaterally  There is no upper extremity edema  Assessment/Plan    Carmella Elias is a 60y o  year old female with a history of pT1cN0 cM0, ER/VA+, Her2- invasive lobular carcinoma of the left breast, status post left breast lumpectomy +SLNbx, with pathology showing 18mm tumor, negative margins and 0/2 SLNs  Adjuvant hormone therapy is planned    Adjuvant hypo fractionated radiation to the left breast to a total dose of 40 Gy without boost was recommended at her initial consultation on 10/30/2019  She presents today for CT simulation to begin radiation  Given her clinical presentation as well as our constraints I think that the recommended treatment plan is reasonable  The rationale and potential benefits, as well as the risks and acute and late side effects and potential toxicities of radiation were again discussed with the patient  Side effects discussed included, but were not limited to: Fatigue, skin erythema, hyperpigmentation, desquamation, fibrosis, shrinkage of the breast resulting asymmetry, rib weakening, pulmonary fibrosis, lymphedema, increased risk for cardiovascular disease  She wished to proceed with the recommended treatment plan  We will begin her radiation as soon as possible  Soo Hood MD  11/15/2019,3:06 PM    Portions of the record may have been created with voice recognition software   Occasional wrong word or "sound a like" substitutions may have occurred due to the inherent limitations of voice recognition software   Read the chart carefully and recognize, using context, where substitutions have occurred

## 2019-11-18 ENCOUNTER — APPOINTMENT (OUTPATIENT)
Dept: RADIATION ONCOLOGY | Facility: CLINIC | Age: 64
End: 2019-11-18
Attending: RADIOLOGY
Payer: COMMERCIAL

## 2019-11-18 ENCOUNTER — DOCUMENTATION (OUTPATIENT)
Dept: INFUSION CENTER | Facility: CLINIC | Age: 64
End: 2019-11-18

## 2019-11-18 PROCEDURE — 77387 GUIDANCE FOR RADJ TX DLVR: CPT | Performed by: RADIOLOGY

## 2019-11-18 PROCEDURE — 77412 RADIATION TX DELIVERY LVL 3: CPT | Performed by: RADIOLOGY

## 2019-11-18 NOTE — SOCIAL WORK
MSW met with pt in [address] [place] [address] today at her request, she has completed her FMLA forms and has had Dr Massiel Tipton complete and sign as well  Pt would like me to review it for her prior to submitting it  MSDANE reviewed her application and I do not see any issues or missed areas that she should be concerned about  She will sign it today and submit to her employer tomorrow  She is appreciative of the review  Otherwise, she says that she is doing well  No other needs or concerns at this time

## 2019-11-19 ENCOUNTER — APPOINTMENT (OUTPATIENT)
Dept: RADIATION ONCOLOGY | Facility: CLINIC | Age: 64
End: 2019-11-19
Attending: RADIOLOGY
Payer: COMMERCIAL

## 2019-11-19 PROCEDURE — 77387 GUIDANCE FOR RADJ TX DLVR: CPT | Performed by: RADIOLOGY

## 2019-11-19 PROCEDURE — 77336 RADIATION PHYSICS CONSULT: CPT | Performed by: RADIOLOGY

## 2019-11-19 PROCEDURE — 77412 RADIATION TX DELIVERY LVL 3: CPT | Performed by: RADIOLOGY

## 2019-11-20 ENCOUNTER — APPOINTMENT (OUTPATIENT)
Dept: LAB | Facility: HOSPITAL | Age: 64
End: 2019-11-20
Attending: RADIOLOGY
Payer: COMMERCIAL

## 2019-11-20 DIAGNOSIS — C50.412 MALIGNANT NEOPLASM OF UPPER-OUTER QUADRANT OF LEFT BREAST IN FEMALE, ESTROGEN RECEPTOR POSITIVE (HCC): ICD-10-CM

## 2019-11-20 DIAGNOSIS — Z17.0 MALIGNANT NEOPLASM OF UPPER-OUTER QUADRANT OF LEFT BREAST IN FEMALE, ESTROGEN RECEPTOR POSITIVE (HCC): ICD-10-CM

## 2019-11-20 LAB
BASOPHILS # BLD AUTO: 0.03 THOUSANDS/ΜL (ref 0–0.1)
BASOPHILS NFR BLD AUTO: 0 % (ref 0–1)
EOSINOPHIL # BLD AUTO: 0.16 THOUSAND/ΜL (ref 0–0.61)
EOSINOPHIL NFR BLD AUTO: 2 % (ref 0–6)
ERYTHROCYTE [DISTWIDTH] IN BLOOD BY AUTOMATED COUNT: 11.9 % (ref 11.6–15.1)
HCT VFR BLD AUTO: 41.9 % (ref 34.8–46.1)
HGB BLD-MCNC: 13.7 G/DL (ref 11.5–15.4)
IMM GRANULOCYTES # BLD AUTO: 0.03 THOUSAND/UL (ref 0–0.2)
IMM GRANULOCYTES NFR BLD AUTO: 0 % (ref 0–2)
LYMPHOCYTES # BLD AUTO: 2.17 THOUSANDS/ΜL (ref 0.6–4.47)
LYMPHOCYTES NFR BLD AUTO: 28 % (ref 14–44)
MCH RBC QN AUTO: 32.7 PG (ref 26.8–34.3)
MCHC RBC AUTO-ENTMCNC: 32.7 G/DL (ref 31.4–37.4)
MCV RBC AUTO: 100 FL (ref 82–98)
MONOCYTES # BLD AUTO: 0.6 THOUSAND/ΜL (ref 0.17–1.22)
MONOCYTES NFR BLD AUTO: 8 % (ref 4–12)
NEUTROPHILS # BLD AUTO: 4.9 THOUSANDS/ΜL (ref 1.85–7.62)
NEUTS SEG NFR BLD AUTO: 62 % (ref 43–75)
NRBC BLD AUTO-RTO: 0 /100 WBCS
PLATELET # BLD AUTO: 313 THOUSANDS/UL (ref 149–390)
PMV BLD AUTO: 9 FL (ref 8.9–12.7)
RBC # BLD AUTO: 4.19 MILLION/UL (ref 3.81–5.12)
WBC # BLD AUTO: 7.89 THOUSAND/UL (ref 4.31–10.16)

## 2019-11-20 PROCEDURE — 77387 GUIDANCE FOR RADJ TX DLVR: CPT | Performed by: RADIOLOGY

## 2019-11-20 PROCEDURE — 77412 RADIATION TX DELIVERY LVL 3: CPT | Performed by: RADIOLOGY

## 2019-11-20 PROCEDURE — 77417 THER RADIOLOGY PORT IMAGE(S): CPT | Performed by: RADIOLOGY

## 2019-11-20 PROCEDURE — 36415 COLL VENOUS BLD VENIPUNCTURE: CPT

## 2019-11-20 PROCEDURE — 85025 COMPLETE CBC W/AUTO DIFF WBC: CPT

## 2019-11-21 ENCOUNTER — APPOINTMENT (OUTPATIENT)
Dept: RADIATION ONCOLOGY | Facility: CLINIC | Age: 64
End: 2019-11-21
Attending: RADIOLOGY
Payer: COMMERCIAL

## 2019-11-21 PROCEDURE — 77387 GUIDANCE FOR RADJ TX DLVR: CPT | Performed by: RADIOLOGY

## 2019-11-21 PROCEDURE — 77412 RADIATION TX DELIVERY LVL 3: CPT | Performed by: RADIOLOGY

## 2019-11-22 PROCEDURE — 77412 RADIATION TX DELIVERY LVL 3: CPT | Performed by: RADIOLOGY

## 2019-11-22 PROCEDURE — 77387 GUIDANCE FOR RADJ TX DLVR: CPT | Performed by: RADIOLOGY

## 2019-11-24 ENCOUNTER — APPOINTMENT (OUTPATIENT)
Dept: RADIATION ONCOLOGY | Facility: CLINIC | Age: 64
End: 2019-11-24
Attending: RADIOLOGY
Payer: COMMERCIAL

## 2019-11-24 PROCEDURE — 77387 GUIDANCE FOR RADJ TX DLVR: CPT | Performed by: RADIOLOGY

## 2019-11-24 PROCEDURE — 77412 RADIATION TX DELIVERY LVL 3: CPT | Performed by: RADIOLOGY

## 2019-11-25 ENCOUNTER — APPOINTMENT (OUTPATIENT)
Dept: RADIATION ONCOLOGY | Facility: CLINIC | Age: 64
End: 2019-11-25
Attending: RADIOLOGY
Payer: COMMERCIAL

## 2019-11-25 PROCEDURE — 77336 RADIATION PHYSICS CONSULT: CPT | Performed by: RADIOLOGY

## 2019-11-25 PROCEDURE — 77412 RADIATION TX DELIVERY LVL 3: CPT | Performed by: RADIOLOGY

## 2019-11-25 PROCEDURE — 77387 GUIDANCE FOR RADJ TX DLVR: CPT | Performed by: RADIOLOGY

## 2019-11-26 PROCEDURE — 77387 GUIDANCE FOR RADJ TX DLVR: CPT | Performed by: RADIOLOGY

## 2019-11-26 PROCEDURE — 77417 THER RADIOLOGY PORT IMAGE(S): CPT | Performed by: RADIOLOGY

## 2019-11-26 PROCEDURE — 77412 RADIATION TX DELIVERY LVL 3: CPT | Performed by: RADIOLOGY

## 2019-11-27 ENCOUNTER — APPOINTMENT (OUTPATIENT)
Dept: RADIATION ONCOLOGY | Facility: CLINIC | Age: 64
End: 2019-11-27
Attending: RADIOLOGY
Payer: COMMERCIAL

## 2019-11-27 PROCEDURE — 77387 GUIDANCE FOR RADJ TX DLVR: CPT | Performed by: RADIOLOGY

## 2019-11-27 PROCEDURE — 77412 RADIATION TX DELIVERY LVL 3: CPT | Performed by: RADIOLOGY

## 2019-12-02 ENCOUNTER — APPOINTMENT (OUTPATIENT)
Dept: RADIATION ONCOLOGY | Facility: CLINIC | Age: 64
End: 2019-12-02
Attending: RADIOLOGY
Payer: COMMERCIAL

## 2019-12-02 PROCEDURE — 77412 RADIATION TX DELIVERY LVL 3: CPT | Performed by: RADIOLOGY

## 2019-12-02 PROCEDURE — 77387 GUIDANCE FOR RADJ TX DLVR: CPT | Performed by: RADIOLOGY

## 2019-12-03 ENCOUNTER — APPOINTMENT (OUTPATIENT)
Dept: RADIATION ONCOLOGY | Facility: CLINIC | Age: 64
End: 2019-12-03
Attending: RADIOLOGY
Payer: COMMERCIAL

## 2019-12-03 PROCEDURE — 77412 RADIATION TX DELIVERY LVL 3: CPT | Performed by: RADIOLOGY

## 2019-12-03 PROCEDURE — 77387 GUIDANCE FOR RADJ TX DLVR: CPT | Performed by: RADIOLOGY

## 2019-12-04 ENCOUNTER — APPOINTMENT (OUTPATIENT)
Dept: RADIATION ONCOLOGY | Facility: CLINIC | Age: 64
End: 2019-12-04
Attending: RADIOLOGY
Payer: COMMERCIAL

## 2019-12-04 PROCEDURE — 77336 RADIATION PHYSICS CONSULT: CPT | Performed by: RADIOLOGY

## 2019-12-04 PROCEDURE — 77412 RADIATION TX DELIVERY LVL 3: CPT | Performed by: RADIOLOGY

## 2019-12-04 PROCEDURE — 77387 GUIDANCE FOR RADJ TX DLVR: CPT | Performed by: RADIOLOGY

## 2019-12-16 ENCOUNTER — DOCUMENTATION (OUTPATIENT)
Dept: HEMATOLOGY ONCOLOGY | Facility: CLINIC | Age: 64
End: 2019-12-16

## 2019-12-16 DIAGNOSIS — M81.0 AGE-RELATED OSTEOPOROSIS WITHOUT CURRENT PATHOLOGICAL FRACTURE: Primary | ICD-10-CM

## 2019-12-16 NOTE — PROGRESS NOTES
BCN Navigator:  Returned patients call  Patient voiced several questions  1  She had concerns that she would run out of Arimidex RX in January  I informed her that the RX would carry her to aprox  Feb 12,  (Pt started RX on 11/12/19) then she has an additional refill with a 90 day supply, which will carry her to aprox May 12, 2020  I told her to call office if for any reason she should need a refill  She voiced good understanding  2  Patient asked about Dexascan  She stated that she recently was seen by Rheumatologist who told her that she could repeat her [address] in March of 2020  Office notified, and AFSANEH Santiago will place order and have  call patient  3  Pt asking about referral to Gynecologist and RX for Mammo  I provided her with [employer]  for doctor directory and provided her with several names /numbers in the [facility]  I informed her that she will see A  Aleta Finely in January and will be given an order for Mammo at the time of that visit  Patient reported having hot flashes from the Arimidex  We discussed natural ways to treat, fans, ice water, light clothing, layers etc  Patient is opposed to any anti-depressants for hot flash intervention  She was appreciative of our discussion, and all her questions were answered to her satisfaction  She has my contact information and was encouraged to call for further concerns

## 2020-01-03 ENCOUNTER — RADIATION ONCOLOGY FOLLOW-UP (OUTPATIENT)
Dept: RADIATION ONCOLOGY | Facility: CLINIC | Age: 65
End: 2020-01-03
Attending: RADIOLOGY
Payer: COMMERCIAL

## 2020-01-03 VITALS
HEIGHT: 65 IN | WEIGHT: 173 LBS | HEART RATE: 96 BPM | RESPIRATION RATE: 16 BRPM | BODY MASS INDEX: 28.82 KG/M2 | DIASTOLIC BLOOD PRESSURE: 68 MMHG | SYSTOLIC BLOOD PRESSURE: 124 MMHG

## 2020-01-03 DIAGNOSIS — Z17.0 MALIGNANT NEOPLASM OF UPPER-OUTER QUADRANT OF LEFT BREAST IN FEMALE, ESTROGEN RECEPTOR POSITIVE (HCC): Primary | ICD-10-CM

## 2020-01-03 DIAGNOSIS — C50.412 MALIGNANT NEOPLASM OF UPPER-OUTER QUADRANT OF LEFT BREAST IN FEMALE, ESTROGEN RECEPTOR POSITIVE (HCC): Primary | ICD-10-CM

## 2020-01-03 PROCEDURE — 99211 OFF/OP EST MAY X REQ PHY/QHP: CPT | Performed by: RADIOLOGY

## 2020-01-03 PROCEDURE — G0463 HOSPITAL OUTPT CLINIC VISIT: HCPCS | Performed by: RADIOLOGY

## 2020-01-03 NOTE — PROGRESS NOTES
Follow-up - Radiation Oncology   Genesis Galdamez 1955 60 y o  female [id number]      History of Present Illness   Cancer Staging  Malignant neoplasm of upper-outer quadrant of left breast in female, estrogen receptor positive ([facility] [address] )  Staging form: Breast, AJCC 8th Edition  - Pathologic: Stage IA (pT1c, pN0(sn), cM0, G1, ER+, IA+, HER2-) - Unsigned  Neoadjuvant therapy: No  Laterality: Left  Tumor size (mm): 18  Method of lymph node assessment: [place] lymph node biopsy  Histologic grading system: 3 grade system      Genesis Galdamez is a 60y o  year old female with a history of [id number] invasive lobular carcinoma of the left breast status post resection achieving negative margins  Tumor cells were ER/ IA positive and HER2 Mateo negative  She completed a course of adjuvant whole breast hypofractionated radiation on 12/4/19  The patient's main complaint is hot flashes associated with Arimidex therapy  Her skin has healed well  She denies any new palpable nodules, suspicious skin changes, or nipple discharge of the breasts bilaterally  She denies any significant breast pain or tenderness  She denies any upper extremity edema or shoulder restriction  Bilateral mammogram due in May 2020  She denies vaginal bleeding or discharge      Upcoming appointments:  1/16/20 [place] Bren Lentz, surgical oncology  4/2/19 DXA scheduled  5/6/19 Dr Char Woodard   Oncology History    [facility] for follow up post adjuvant whole breast hypo fractionated radiation [facility]  4/2/19 DXA scheduled  5/6/19 Dr Dameon Allison    Bilateral mammogram due in May    Having hot flashes            Malignant neoplasm of upper-outer quadrant of left breast in female, estrogen receptor positive ([facility] [address] )    7/23/2019 Biopsy     Left breast biopsy  1 o'clock, 5 cm from nipple  Invasive lobular carcinoma  Grade 1      HER2 0    Done at [facility]      10/1/2019 Surgery Left breast needle localized lumpectomy with sentinel lymph node biopsy  Invasive lobular carcinoma  Grade 1  1 8 cm  Margins negative  0/2 Lymph nodes  Anatomic/Prognostic Stage IA      10/30/2019 -  Hormone Therapy     Anastrozole 1 mg daily  Dr Blayne Palomino         Past Medical History:   Diagnosis Date    Breast cancer [facility])     Kidney stone     age 17    Migraine     Osteoporosis     Pancreatitis     Pleurisy     PONV (postoperative nausea and vomiting)     Vertigo      Past Surgical History:   Procedure Laterality Date    BREAST LUMPECTOMY Left 10/1/2019    Procedure: BREAST NEEDLE LOCALIZED LUMPECTOMY (NEEDLE LOC AT 0730); Surgeon: Irwin Bonner MD;  [facility];  Service: Surgical Oncology    CHOLECYSTECTOMY      COLONOSCOPY      DENTAL SURGERY      Bone graft with implant    HALLUX VALGUS CORRECTION Bilateral     INCISIONAL BREAST BIOPSY      LYMPH NODE BIOPSY Left 10/1/2019    Procedure: SENTINEL LYMPH NODE BIOPSY; LYMPHATIC MAPPING WITH BLUE DYE AND RADIOACTIVE DYE (INJECT AT 0830 BY DR HA IN THE OR);   Surgeon: Irwin Bonner MD;  [facility];  Service: Surgical Oncology    MAMMO NEEDLE LOCALIZATION LEFT (ALL INC) Left 10/1/2019    MAMMO STEREOTACTIC BREAST BIOPSY LEFT (ALL INC)      RHINOPLASTY         Social History   Social History     Substance and Sexual Activity   Alcohol Use No     Social History     Substance and Sexual Activity   Drug Use No     Social History     Tobacco Use   Smoking Status Never Smoker   Smokeless Tobacco Never Used       Meds/Allergies     Current Outpatient Medications:     acetaminophen (TYLENOL) 500 mg tablet, Take by mouth every 6 (six) hours as needed , Disp: , Rfl:     anastrozole (ARIMIDEX) 1 mg tablet, Take 1 tablet (1 mg total) by mouth daily, Disp: 90 tablet, Rfl: 1    Cholecalciferol (VITAMIN D) 2000 units CAPS, Take 4,000 Units by mouth daily , Disp: , Rfl:     ibandronate (BONIVA) 150 MG tablet, Take 150 mg by mouth every 30 (thirty) days , Disp: , Rfl:     Ivermectin (SOOLANTRA) 1 % CREA, Apply topically daily , Disp: , Rfl:     meclizine (ANTIVERT) 12 5 MG tablet, Take 12 5 mg by mouth 3 (three) times a day as needed for dizziness, Disp: , Rfl:     SUMAtriptan (IMITREX) 100 mg tablet, Take 100 mg by mouth once as needed for migraine, Disp: , Rfl:   Allergies   Allergen Reactions    Ciprofloxacin Seizures    Cyclopentolate Other (See Comments) and Tachycardia     Resp distress    Sulfa Antibiotics Hives and Rash    Scopolamine Other (See Comments)     Precautionary for cycopentolate allergy       Review of Systems   Constitutional: Positive for fatigue  HENT: Positive for hearing loss  Eyes: Negative  Respiratory: Negative  Cardiovascular: Positive for leg swelling (right)  Gastrointestinal: Negative  Endocrine: Negative  Genitourinary: Negative  Musculoskeletal: Negative  Skin: Negative  Allergic/Immunologic: Negative  Neurological: Negative  Hematological: Negative  Psychiatric/Behavioral: Negative  OBJECTIVE:   /68 (BP Location: Right arm, Patient Position: Sitting, Cuff Size: Standard)   Pulse 96   Resp 16   Ht 5' 5" (1 651 m)   Wt 78 5 kg (173 lb)   BMI 28 79 kg/m²   Pain Assessment:  0  Karnofsky: 90 - Able to carry on normal activity; minor signs or symptoms of disease     Physical Exam   Constitutional: She is oriented to person, place, and time  She appears well-developed and well-nourished  No distress  Eyes: Conjunctivae are normal  No scleral icterus  Cardiovascular: Normal rate, regular rhythm and normal heart sounds  No murmur heard  Pulmonary/Chest: Effort normal and breath sounds normal  No respiratory distress  She has no wheezes  She has no rhonchi  She has no rales  Breast examination demonstrates the left breast to be slightly smaller than right  Countors are symmetric    There is a well-healed lumpectomy scar with mild retration in the outer left breast associated with increased fibrosis and mild retraction  There is mild diffuse fibrosis and slight diffuse hyperpigmentation of the left breast   There are no palpable masses or suspicious skin changes of the breasts bilaterally  Abdominal: Soft  She exhibits no distension  There is no tenderness  Musculoskeletal: She exhibits no edema  Full range of motion of the upper extremities  No upper extremity edema  Lymphadenopathy:     She has no cervical adenopathy  She has no axillary adenopathy  Right: No supraclavicular adenopathy present  Left: No supraclavicular adenopathy present  Neurological: She is alert and oriented to person, place, and time  Nursing note and vitals reviewed  Assessment/Plan:   Parag Mo is a 60y o  year old female with a history of [id number] invasive lobular carcinoma of the left breast status post resection achieving negative margins  Tumor cells were ER/ GA positive and HER2 Mateo negative  She completed a course of adjuvant whole breast hypofractionated radiation on 12/4/19  She is currently maintained on Arimidex  She is recovering well from radiation  She is currently clinically JOSUÉ  I instructed her to continue using daily emollients and to practice sun protection to the irradiated skin  I recommended daily light skin massage to the breast in an effort to continue tissue healing  She will return for follow-up in 6 months  We are available for any questions or concerns that may arise in the interim  Soo Hood MD  1/3/2020,4:22 PM    Portions of the record may have been created with voice recognition software   Occasional wrong word or "sound a like" substitutions may have occurred due to the inherent limitations of voice recognition software   Read the chart carefully and recognize, using context, where substitutions have occurred

## 2020-01-03 NOTE — PROGRESS NOTES
Brian Knox 1955 is a 60 y o  female     Follow up visit       Oncology History    Returns for follow up post adjuvant whole breast hypo fractionated radiation completed on 12/4/19 1/16/20 [address]  4/2/19 DXA scheduled  5/6/19 Dr Danish Nguyen    Bilateral mammogram due in May    Having hot flashes            Malignant neoplasm of upper-outer quadrant of left breast in female, estrogen receptor positive ([facility] [address] )    7/23/2019 Biopsy     Left breast biopsy  1 o'clock, 5 cm from nipple  Invasive lobular carcinoma  Grade 1      HER2 0    Done at [facility]      10/1/2019 Surgery     Left breast needle localized lumpectomy with sentinel lymph node biopsy  Invasive lobular carcinoma  Grade 1  1 8 cm  Margins negative  0/2 Lymph nodes  Anatomic/Prognostic Stage IA      10/30/2019 -  Hormone Therapy     Anastrozole 1 mg daily  Dr Barron Solis Maintenance   Topic Date Due    DTaP,Tdap,and Td Vaccines (1 - Tdap) 04/11/1968    HIV Screening  04/11/1972    CRC Screening: Colonoscopy  03/24/2019    BMI: Followup Plan  03/27/2020    MAMMOGRAM  07/15/2020    Depression Screening PHQ  10/30/2020    BMI: Adult  10/30/2020    Cervical Cancer Screening  04/20/2021    Pneumococcal Vaccine: 65+ Years (1 of 2 - PCV13) 04/11/2022    CRC Screening: Cologuard  04/28/2022    Hepatitis C Screening  Completed    Influenza Vaccine  Completed    Pneumococcal Vaccine: Pediatrics (0 to 5 Years) and At-Risk Patients (6 to 59 Years)  Aged Out    HIB Vaccine  Aged Out    Hepatitis B Vaccine  Aged Out    IPV Vaccine  Aged Out    Hepatitis A Vaccine  Aged Out    Meningococcal ACWY Vaccine  Aged Out    HPV Vaccine  Aged Out       Patient Active Problem List   Diagnosis    Fatigue    Age-related osteoporosis without current pathological fracture    Malignant neoplasm of upper-outer quadrant of left breast in female, estrogen receptor positive ([facility] [address] )     Past Medical History:   Diagnosis Date  Breast cancer ([facility] [address] )     Kidney stone     age 17    Migraine     Osteoporosis     Pancreatitis     Pleurisy     PONV (postoperative nausea and vomiting)     Vertigo      Past Surgical History:   Procedure Laterality Date    BREAST LUMPECTOMY Left 10/1/2019    Procedure: BREAST NEEDLE LOCALIZED LUMPECTOMY (NEEDLE LOC AT 0730); Surgeon: Haylee Wolfe MD;  [facility];  Service: Surgical Oncology    CHOLECYSTECTOMY      COLONOSCOPY      DENTAL SURGERY      Bone graft with implant    HALLUX VALGUS CORRECTION Bilateral     INCISIONAL BREAST BIOPSY      LYMPH NODE BIOPSY Left 10/1/2019    Procedure: SENTINEL LYMPH NODE BIOPSY; LYMPHATIC MAPPING WITH BLUE DYE AND RADIOACTIVE DYE (INJECT AT 0830 BY DR HA IN THE OR); Surgeon: Haylee Wolfe MD;  [facility];  Service: Surgical Oncology    MAMMO NEEDLE LOCALIZATION LEFT (ALL INC) Left 10/1/2019    MAMMO STEREOTACTIC BREAST BIOPSY LEFT (ALL INC)      RHINOPLASTY       Family History   Problem Relation Age of Onset    No Known Problems Mother     Lung cancer Father 71     Social History     Socioeconomic History    Marital status:       Spouse name: Not on file    Number of children: 2    Years of education: Not on file    Highest education level: Not on file   Occupational History    Occupation: curretnly works part-time    Occupation: medical professional   Social Needs    Financial resource strain: Not on file    Food insecurity:     Worry: Not on file     Inability: Not on file   [employer] needs:     Medical: Not on file     Non-medical: Not on file   Tobacco Use    Smoking status: Never Smoker    Smokeless tobacco: Never Used   Substance and Sexual Activity    Alcohol use: No    Drug use: No    Sexual activity: Not on file   Lifestyle    Physical activity:     Days per week: Not on file     Minutes per session: Not on file    Stress: Not on file   Relationships    Social connections:     Talks on phone: Not on file     Gets together: Not on file     Attends Scientology service: Not on file     Active member of club or organization: Not on file     Attends meetings of clubs or organizations: Not on file     Relationship status: Not on file    Intimate partner violence:     Fear of current or ex partner: Not on file     Emotionally abused: Not on file     Physically abused: Not on file     Forced sexual activity: Not on file   Other Topics Concern    Not on file   Social History Narrative    Not on file       Current Outpatient Medications:     acetaminophen (TYLENOL) 500 mg tablet, Take by mouth every 6 (six) hours as needed , Disp: , Rfl:     anastrozole (ARIMIDEX) 1 mg tablet, Take 1 tablet (1 mg total) by mouth daily, Disp: 90 tablet, Rfl: 1    Cholecalciferol (VITAMIN D) 2000 units CAPS, Take 4,000 Units by mouth daily , Disp: , Rfl:     ibandronate (BONIVA) 150 MG tablet, Take 150 mg by mouth every 30 (thirty) days , Disp: , Rfl:     Ivermectin (SOOLANTRA) 1 % CREA, Apply topically daily , Disp: , Rfl:     meclizine (ANTIVERT) 12 5 MG tablet, Take 12 5 mg by mouth 3 (three) times a day as needed for dizziness, Disp: , Rfl:     SUMAtriptan (IMITREX) 100 mg tablet, Take 100 mg by mouth once as needed for migraine, Disp: , Rfl:   Allergies   Allergen Reactions    Ciprofloxacin Seizures    Cyclopentolate Other (See Comments) and Tachycardia     Resp distress    Sulfa Antibiotics Hives and Rash    Scopolamine Other (See Comments)     Precautionary for cycopentolate allergy       Review of Systems:  Review of Systems   Constitutional: Positive for fatigue  HENT: Positive for hearing loss  Eyes: Negative  Respiratory: Negative  Cardiovascular: Positive for leg swelling (right)  Gastrointestinal: Negative  Endocrine: Negative  Genitourinary: Negative  Musculoskeletal: Negative  Skin: Negative  Allergic/Immunologic: Negative  Neurological: Negative  Hematological: Negative  Psychiatric/Behavioral: Negative  Vitals:    01/03/20 1451   BP: 124/68   BP Location: Right arm   Patient Position: Sitting   Cuff Size: Standard   Pulse: 96   Resp: 16   Weight: 78 5 kg (173 lb)   Height: 5' 5" (1 651 m)    Oxygen 98%    Pain assessment:0    Pain Score: 0-No pain      Imaging:No results found      Teaching Moisturizing skin

## 2020-01-16 ENCOUNTER — OFFICE VISIT (OUTPATIENT)
Dept: SURGICAL ONCOLOGY | Facility: CLINIC | Age: 65
End: 2020-01-16
Payer: COMMERCIAL

## 2020-01-16 VITALS
BODY MASS INDEX: 28.99 KG/M2 | RESPIRATION RATE: 16 BRPM | HEIGHT: 65 IN | HEART RATE: 84 BPM | TEMPERATURE: 98.7 F | DIASTOLIC BLOOD PRESSURE: 80 MMHG | SYSTOLIC BLOOD PRESSURE: 142 MMHG | WEIGHT: 174 LBS

## 2020-01-16 DIAGNOSIS — C50.412 MALIGNANT NEOPLASM OF UPPER-OUTER QUADRANT OF LEFT BREAST IN FEMALE, ESTROGEN RECEPTOR POSITIVE (HCC): Primary | ICD-10-CM

## 2020-01-16 DIAGNOSIS — Z79.811 USE OF ANASTROZOLE (ARIMIDEX): ICD-10-CM

## 2020-01-16 DIAGNOSIS — Z17.0 MALIGNANT NEOPLASM OF UPPER-OUTER QUADRANT OF LEFT BREAST IN FEMALE, ESTROGEN RECEPTOR POSITIVE (HCC): Primary | ICD-10-CM

## 2020-01-16 PROCEDURE — 99213 OFFICE O/P EST LOW 20 MIN: CPT | Performed by: NURSE PRACTITIONER

## 2020-01-16 NOTE — PROGRESS NOTES
Surgical Oncology Follow Up       [address] [facility]  [facility] [place]  [address]  [place] [address]    Yessi Stephani  1955  [id number]  [address]  [facility] [place]  [address] [id number]    Chief Complaint   Patient presents with    Breast Cancer     Pt is here for 3 month f/u       Assessment/Plan:  1  Malignant neoplasm of upper-outer quadrant of left breast in female, estrogen receptor positive ([facility] [address] )  - Mammo diagnostic bilateral w 3d & cad; Future  - 6 mo f/u visit    2  Use of anastrozole (Arimidex)  - Monitor hot flashes- call medical oncologist if symptoms persist and are not tolerable    Discussion/Summary:  Patient is a 72-year-old female who presents today for a three-month follow-up visit for left breast cancer diagnosed in July of 2019  Her pathology revealed invasive lobular carcinoma, ER/%, her 2-  She underwent a left lumpectomy and sentinel node biopsy by Dr Jacek Siddiqi  She completed whole breast radiation therapy in December  She has started taking anastrozole and reports frequent hot flashes  I did encourage her to continue the medication for a total of 3 months and if her side effects do not decrease, I encouraged her to contact her medical oncologist   There are no worrisome findings on today's exam   She does have some scar tissue at her lumpectomy site and I encouraged her to massage this area  I will order a bilateral diagnostic mammogram for May and we will plan to see the patient back in 6 months or sooner if the need arises  She was instructed call with any new concerns or symptoms prior to that time  All of her questions were answered today      History of Present Illness:     Oncology History    Returns for follow up post adjuvant whole breast hypo fractionated radiation completed on 12/4/19 1/16/20 [address] [place], [place] [address]VIVIEN  4/2/19 DXA scheduled  5/6/19  Gail Fall    Bilateral mammogram due in May    Having hot flashes            Malignant neoplasm of upper-outer quadrant of left breast in female, estrogen receptor positive ([facility] [address] )    7/23/2019 Biopsy     Left breast biopsy  1 o'clock, 5 cm from nipple  Invasive lobular carcinoma  Grade 1      HER2 0    Done at [facility]      10/1/2019 Surgery     Left breast needle localized lumpectomy with sentinel lymph node biopsy  Invasive lobular carcinoma  Grade 1  1 8 cm  Margins negative  0/2 Lymph nodes  Anatomic/Prognostic Stage IA      10/30/2019 -  Hormone Therapy     Anastrozole 1 mg daily  Dr Gail Fall          -Interval History:  Patient presents today for a three-month follow-up visit for left breast cancer diagnosed in October of 2019  She completed radiation therapy approximately 1 month ago and has recovered well  She does report some lingering fatigue  She reports frequent hot flashes from the anastrozole therapy  She has no new complaints today-denies headaches, back pain, bone pain, cough, shortness breath, abdominal pain  Review of Systems:  Review of Systems   Constitutional: Negative for activity change, appetite change, chills, fatigue, fever and unexpected weight change  HENT: Negative for trouble swallowing  Eyes: Negative for pain, redness and visual disturbance  Respiratory: Negative for cough, shortness of breath and wheezing  Cardiovascular: Negative for chest pain, palpitations and leg swelling  Gastrointestinal: Negative for abdominal pain, constipation, diarrhea, nausea and vomiting  Endocrine: Negative for cold intolerance and heat intolerance  Musculoskeletal: Positive for arthralgias (Left knee)  Negative for back pain, gait problem and myalgias  Skin: Negative for color change and rash  Neurological: Negative for dizziness, syncope, light-headedness, numbness and headaches  Hematological: Negative for adenopathy     Psychiatric/Behavioral: Negative for agitation and confusion  All other systems reviewed and are negative  Patient Active Problem List   Diagnosis    Fatigue    Age-related osteoporosis without current pathological fracture    Malignant neoplasm of upper-outer quadrant of left breast in female, estrogen receptor positive ([facility] [address] [facility]    Use of anastrozole (Arimidex)     Past Medical History:   Diagnosis Date    Breast cancer ([facility] [address] )     Kidney stone     age 17    Migraine     Osteoporosis     Pancreatitis     Pleurisy     PONV (postoperative nausea and vomiting)     Vertigo      Past Surgical History:   Procedure Laterality Date    BREAST LUMPECTOMY Left 10/1/2019    Procedure: BREAST NEEDLE LOCALIZED LUMPECTOMY (NEEDLE LOC AT 0730); Surgeon: Haylee Wolfe MD;  [facility];  Service: Surgical Oncology    CHOLECYSTECTOMY      COLONOSCOPY      DENTAL SURGERY      Bone graft with implant    HALLUX VALGUS CORRECTION Bilateral     INCISIONAL BREAST BIOPSY      LYMPH NODE BIOPSY Left 10/1/2019    Procedure: SENTINEL LYMPH NODE BIOPSY; LYMPHATIC MAPPING WITH BLUE DYE AND RADIOACTIVE DYE (INJECT AT 0830 BY DR HA IN THE OR); Surgeon: Haylee Wolfe MD;  [facility];  Service: Surgical Oncology    MAMMO NEEDLE LOCALIZATION LEFT (ALL INC) Left 10/1/2019    MAMMO STEREOTACTIC BREAST BIOPSY LEFT (ALL INC)      RHINOPLASTY       Family History   Problem Relation Age of Onset    No Known Problems Mother     Lung cancer Father 71     Social History     Socioeconomic History    Marital status:       Spouse name: Not on file    Number of children: 2    Years of education: Not on file    Highest education level: Not on file   Occupational History    Occupation: [employer] works part-time    Occupation: medical professional   Social Needs    Financial resource strain: Not on file   [place] insecurity:     Worry: Not on file     Inability: Not on file   [employer] needs:     Medical: Not on file     Non-medical: Not on file   Tobacco Use    Smoking status: Never Smoker    Smokeless tobacco: Never Used   Substance and Sexual Activity    Alcohol use: No    Drug use: No    Sexual activity: Not on file   Lifestyle    Physical activity:     Days per week: Not on file     Minutes per session: Not on file    Stress: Not on file   Relationships    Social connections:     Talks on phone: Not on file     Gets together: Not on file     Attends Evangelical service: Not on file     Active member of club or organization: Not on file     Attends meetings of clubs or organizations: Not on file     Relationship status: Not on file    Intimate partner violence:     Fear of current or ex partner: Not on file     Emotionally abused: Not on file     Physically abused: Not on file     Forced sexual activity: Not on file   Other Topics Concern    Not on file   Social History Narrative    Not on file       Current Outpatient Medications:     acetaminophen (TYLENOL) 500 mg tablet, Take by mouth every 6 (six) hours as needed , Disp: , Rfl:     anastrozole (ARIMIDEX) 1 mg tablet, Take 1 tablet (1 mg total) by mouth daily, Disp: 90 tablet, Rfl: 1    Cholecalciferol (VITAMIN D) 2000 units CAPS, Take 4,000 Units by mouth daily , Disp: , Rfl:     ibandronate (BONIVA) 150 MG tablet, Take 150 mg by mouth every 30 (thirty) days , Disp: , Rfl:     Ivermectin (SOOLANTRA) 1 % CREA, Apply topically daily , Disp: , Rfl:     meclizine (ANTIVERT) 12 5 MG tablet, Take 12 5 mg by mouth 3 (three) times a day as needed for dizziness, Disp: , Rfl:     SUMAtriptan (IMITREX) 100 mg tablet, Take 100 mg by mouth once as needed for migraine, Disp: , Rfl:   Allergies   Allergen Reactions    Ciprofloxacin Seizures    Cyclopentolate Other (See Comments) and Tachycardia     Resp distress    Sulfa Antibiotics Hives and Rash    Scopolamine Other (See Comments)     Precautionary for cycopentolate allergy     Vitals:    01/16/20 1458   BP: 142/80   Pulse: 84   Resp: 16 Temp: 98 7 °F (37 1 °C)       Physical Exam   Constitutional: She is oriented to person, place, and time  Vital signs are normal  She appears well-developed and well-nourished  No distress  HENT:   Head: Normocephalic and atraumatic  Neck: Normal range of motion  Cardiovascular: Normal rate, regular rhythm and normal heart sounds  Pulmonary/Chest: Effort normal and breath sounds normal    Bilateral breasts were examined in the sitting and supine position  Left breast and left axillary surgical scars present  There is some scarring at the lumpectomy site- encouraged massage to this area  Her skin is healing well s/p RT  There are no masses, skin nodules, nipple changes or nipple discharge  There is no bilateral supraclavicular or axillary lymphadenopathy noted  Abdominal: Soft  Normal appearance  She exhibits no mass  There is no hepatosplenomegaly  There is no tenderness  Musculoskeletal: Normal range of motion  Lymphadenopathy:     She has no axillary adenopathy  Right: No supraclavicular adenopathy present  Left: No supraclavicular adenopathy present  Neurological: She is alert and oriented to person, place, and time  Skin: Skin is warm, dry and intact  No rash noted  She is not diaphoretic  Psychiatric: She has a normal mood and affect  Her speech is normal    Vitals reviewed  Advance Care Planning/Advance Directives:  Discussed disease status, cancer treatment plans and/or cancer treatment goals with the patient

## 2020-02-06 ENCOUNTER — OFFICE VISIT (OUTPATIENT)
Dept: FAMILY MEDICINE CLINIC | Facility: CLINIC | Age: 65
End: 2020-02-06
Payer: COMMERCIAL

## 2020-02-06 VITALS
BODY MASS INDEX: 28.66 KG/M2 | SYSTOLIC BLOOD PRESSURE: 130 MMHG | OXYGEN SATURATION: 99 % | TEMPERATURE: 99.1 F | WEIGHT: 172 LBS | DIASTOLIC BLOOD PRESSURE: 78 MMHG | HEART RATE: 99 BPM | HEIGHT: 65 IN

## 2020-02-06 DIAGNOSIS — J06.9 VIRAL URI: Primary | ICD-10-CM

## 2020-02-06 PROCEDURE — 3008F BODY MASS INDEX DOCD: CPT | Performed by: FAMILY MEDICINE

## 2020-02-06 PROCEDURE — 99213 OFFICE O/P EST LOW 20 MIN: CPT | Performed by: FAMILY MEDICINE

## 2020-02-06 PROCEDURE — 1036F TOBACCO NON-USER: CPT | Performed by: FAMILY MEDICINE

## 2020-02-06 RX ORDER — PIMECROLIMUS 10 MG/G
CREAM TOPICAL
COMMUNITY
Start: 2020-01-20

## 2020-02-06 NOTE — PROGRESS NOTES
Lisbet Mccauley 1955 female MRN: [id number]    Acute Visit        ASSESSMENT/PLAN  Problem List Items Addressed This Visit        Respiratory    Viral URI - Primary            This is a viral upper respiratory infection  The expected course is 7-10 days  We reviewed OTC medications that are recommended to treat the symptoms  Tylenol or Motrin can be used as needed for pain or fevers  Future Appointments   Date Time Provider [place]   4/2/2020  4:30 PM AN MAMMO POC 1 AN POC Mammo AN POC [facility]   5/6/2020  1:40 PM Sloane Ma MD [facility]   5/29/2020  1:15 PM MO STEREO RBC 1 MO RBC Mammo MO RBC   7/1/2020 10:30 AM MO RADONC RESOURCE MO Rad Onc MO MOB   7/1/2020 11:00 AM Cora Sterling MD MO Rad Onc MO MOB   7/23/2020  2:30 PM VIVIEN Demarco SURG ONC [facility]        SUBJECTIVE  CC: URI and Diarrhea       60year old here for URI symptoms  Congestion, cough x 2 days  No fevers, chills or body aches  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  Tried [facility]  Lisbet Mccauley is a 60 y o  female who presented for an acute visit complaining of  Review of Systems   Constitutional: Negative for activity change, appetite change, chills and fever  HENT: Positive for congestion, postnasal drip and sore throat  Eyes: Negative  Respiratory: Positive for cough  Negative for shortness of breath and wheezing  Musculoskeletal: Negative for myalgias  Historical Information   The patient history was reviewed as follows:  Past Medical History:   Diagnosis Date    Breast cancer ([address] )     Kidney stone     age 17    Migraine     Osteoporosis     Pancreatitis     Pleurisy     PONV (postoperative nausea and vomiting)     Vertigo      Past Surgical History:   Procedure Laterality Date    BREAST LUMPECTOMY Left 10/1/2019    Procedure: BREAST NEEDLE LOCALIZED LUMPECTOMY (NEEDLE LOC AT 0730);   Surgeon: Jo Ann Tran MD;  [facility]; Service: Surgical Oncology    CHOLECYSTECTOMY      COLONOSCOPY      DENTAL SURGERY      Bone graft with implant    HALLUX VALGUS CORRECTION Bilateral     INCISIONAL BREAST BIOPSY      LYMPH NODE BIOPSY Left 10/1/2019    Procedure: SENTINEL LYMPH NODE BIOPSY; LYMPHATIC MAPPING WITH BLUE DYE AND RADIOACTIVE DYE (INJECT AT 0830 BY DR HA IN THE OR); Surgeon: Chava Cummings MD;  [facility];  Service: Surgical Oncology    MAMMO NEEDLE LOCALIZATION LEFT (ALL INC) Left 10/1/2019    MAMMO STEREOTACTIC BREAST BIOPSY LEFT (ALL INC)      RHINOPLASTY       Family History   Problem Relation Age of Onset    No Known Problems Mother     Lung cancer Father 71      Social History   Social History     Substance and Sexual Activity   Alcohol Use No     Social History     Substance and Sexual Activity   Drug Use No     Social History     Tobacco Use   Smoking Status Never Smoker   Smokeless Tobacco Never Used       Medications:   Meds/Allergies   Current Outpatient Medications   Medication Sig Dispense Refill    acetaminophen (TYLENOL) 500 mg tablet Take by mouth every 6 (six) hours as needed       anastrozole (ARIMIDEX) 1 mg tablet Take 1 tablet (1 mg total) by mouth daily 90 tablet 1    Cholecalciferol (VITAMIN D) 2000 units CAPS Take 4,000 Units by mouth daily       ibandronate (BONIVA) 150 MG tablet Take 150 mg by mouth every 30 (thirty) days       Ivermectin (SOOLANTRA) 1 % CREA Apply topically daily       meclizine (ANTIVERT) 12 5 MG tablet Take 12 5 mg by mouth 3 (three) times a day as needed for dizziness      pimecrolimus (ELIDEL) 1 % cream       SUMAtriptan (IMITREX) 100 mg tablet Take 100 mg by mouth once as needed for migraine       No current facility-administered medications for this visit          Allergies   Allergen Reactions    Ciprofloxacin Seizures    Cyclopentolate Other (See Comments) and Tachycardia     Resp distress    Sulfa Antibiotics Hives and Rash    Scopolamine Other (See Comments)     Precautionary for cycopentolate allergy       OBJECTIVE  Vitals:   Vitals:    02/06/20 1503   BP: 130/78   Pulse: 99   Temp: 99 1 °F (37 3 °C)   TempSrc: Tympanic   SpO2: 99%   Weight: 78 kg (172 lb)   Height: 5' 5" (1 651 m)       Invasive Devices     Peripheral Intravenous Line            Peripheral IV 10/01/19 Right Hand 128 days                Physical Exam   Constitutional: She is oriented to person, place, and time  She appears well-developed and well-nourished  No distress  HENT:   Right Ear: Hearing, tympanic membrane, external ear and ear canal normal    Left Ear: Hearing, tympanic membrane, external ear and ear canal normal    Nose: Nose normal    Mouth/Throat: Uvula is midline  No oropharyngeal exudate or posterior oropharyngeal erythema  Eyes: Pupils are equal, round, and reactive to light  Conjunctivae are normal    Cardiovascular: Normal rate and normal heart sounds  Exam reveals no gallop and no friction rub  No murmur heard  Pulmonary/Chest: Effort normal and breath sounds normal  No respiratory distress  She has no wheezes  She has no rales  Lymphadenopathy:     She has no cervical adenopathy  Neurological: She is alert and oriented to person, place, and time  Skin: Skin is warm  No rash noted  Psychiatric: She has a normal mood and affect  Her behavior is normal  Judgment and thought content normal    Nursing note and vitals reviewed  Lab:  I have personally reviewed all pertinent results

## 2020-02-06 NOTE — PATIENT INSTRUCTIONS
Upper Respiratory Infection, Ambulatory Care   GENERAL INFORMATION:   An upper respiratory infection  is also called a common cold  It can affect your nose, throat, ears, and sinuses  Common symptoms include the following:   · Runny or stuffy nose    · Sneezing and coughing    · Sore throat or hoarseness    · Red, watery, and sore eyes    · Tiredness or restlessness    · Chills and fever    · Headache, body aches, or sore muscles  Seek immediate care for the following symptoms:   · Headaches or a stiff neck    · Bright lights hurt your eyes    · Chest pain or trouble breathing  Treatment for an upper respiratory infection  may include any of the following:  · Decongestants  help decrease nasal congestion and improve your breathing  Do not use decongestant sprays for more than a few days  · Cough suppressants  help decrease coughing  Ask your healthcare provider which type of cough medicine is best for you  Some cough medicines need a doctor's order  · NSAIDs  help decrease swelling and pain or fever  This medicine is available with or without a doctor's order  NSAIDs can cause stomach bleeding or kidney problems in certain people  If you take blood thinner medicine, always ask your healthcare provider if NSAIDs are safe for you  Always read the medicine label and follow directions  Care for an upper respiratory infection:   · Rest  until your fever is gone or you feel better  · Drink liquids as directed to prevent dehydration  You may need to drink 8 to 10 cups of liquid each day  Good liquids to drink include water, ginger ale, tea, or fruit juices  · Gargle  with warm salt water to help your sore throat feel better  Mix ¼ teaspoon salt with 1 cup warm water  You may also suck on hard candy or throat lozenges  · Saline nasal drops  help loosen your nasal congestion  They can be bought without a doctor's order      · Take a warm bath or shower  to help decrease body aches and help you breathe easier  · Use a cool-mist humidifier  to increase air moisture and make it easier for you to breathe  Prevent the spread of germs:   · Avoid others for the first 2 to 3 days of your cold  Germs are easily spread during this time  · Do not share food, drinks,  towels, or personal items with others  · Wash your hands often  Use soap and water  Wash your hands after you use the bathroom, change a child's diapers, or sneeze  Wash your hands before you prepare or eat food  Cover your mouth and nose with a tissue when you sneeze or cough  Follow up with your healthcare provider as directed:  Write down your questions so you remember to ask them during your visits  CARE AGREEMENT:   You have the right to help plan your care  Learn about your health condition and how it may be treated  Discuss treatment options with your caregivers to decide what care you want to receive  You always have the right to refuse treatment  The above information is an  only  It is not intended as medical advice for individual conditions or treatments  Talk to your doctor, nurse or pharmacist before following any medical regimen to see if it is safe and effective for you  © 2014 [address] is for End User's use only and may not be sold, redistributed or otherwise used for commercial purposes  All illustrations and images included in CareNotes® are the copyrighted property of [employer]  or Asim Stokes

## 2020-03-10 ENCOUNTER — TELEPHONE (OUTPATIENT)
Dept: HEMATOLOGY ONCOLOGY | Facility: CLINIC | Age: 65
End: 2020-03-10

## 2020-03-10 NOTE — TELEPHONE ENCOUNTER
Left Vm for patient on her cell phone in regards to the following  Patient sent a letter to the office about her persistent hot flashes  She was wondering if Dr Yasmin Dodge would be able to prescribe medication for her to help with the hot flashes  Unfortunately, there is not a medication available for this on our end  He would like patient to take a 7 day break from the Anastrozole and then give our office a call back to see how the symptoms are at that time  She may then restart the medication  Left call back number if she has any questions

## 2020-04-23 ENCOUNTER — TELEPHONE (OUTPATIENT)
Dept: HEMATOLOGY ONCOLOGY | Facility: MEDICAL CENTER | Age: 65
End: 2020-04-23

## 2020-04-23 DIAGNOSIS — Z17.0 MALIGNANT NEOPLASM OF UPPER-OUTER QUADRANT OF LEFT BREAST IN FEMALE, ESTROGEN RECEPTOR POSITIVE (HCC): ICD-10-CM

## 2020-04-23 DIAGNOSIS — C50.412 MALIGNANT NEOPLASM OF UPPER-OUTER QUADRANT OF LEFT BREAST IN FEMALE, ESTROGEN RECEPTOR POSITIVE (HCC): ICD-10-CM

## 2020-04-23 DIAGNOSIS — R23.2 HOT FLASHES: Primary | ICD-10-CM

## 2020-04-23 RX ORDER — ANASTROZOLE 1 MG/1
1 TABLET ORAL DAILY
Qty: 90 TABLET | Refills: 1 | Status: SHIPPED | OUTPATIENT
Start: 2020-04-23 | End: 2020-10-16

## 2020-04-23 RX ORDER — VENLAFAXINE 37.5 MG/1
37.5 TABLET ORAL DAILY
Qty: 60 TABLET | Refills: 3 | Status: SHIPPED | OUTPATIENT
Start: 2020-04-23 | End: 2020-12-08 | Stop reason: SDUPTHER

## 2020-05-06 ENCOUNTER — OFFICE VISIT (OUTPATIENT)
Dept: HEMATOLOGY ONCOLOGY | Facility: CLINIC | Age: 65
End: 2020-05-06
Payer: COMMERCIAL

## 2020-05-06 VITALS
OXYGEN SATURATION: 97 % | SYSTOLIC BLOOD PRESSURE: 134 MMHG | HEIGHT: 66 IN | DIASTOLIC BLOOD PRESSURE: 84 MMHG | WEIGHT: 165 LBS | BODY MASS INDEX: 26.52 KG/M2 | RESPIRATION RATE: 18 BRPM | HEART RATE: 90 BPM

## 2020-05-06 DIAGNOSIS — Z17.0 MALIGNANT NEOPLASM OF UPPER-OUTER QUADRANT OF LEFT BREAST IN FEMALE, ESTROGEN RECEPTOR POSITIVE (HCC): Primary | ICD-10-CM

## 2020-05-06 DIAGNOSIS — C50.412 MALIGNANT NEOPLASM OF UPPER-OUTER QUADRANT OF LEFT BREAST IN FEMALE, ESTROGEN RECEPTOR POSITIVE (HCC): Primary | ICD-10-CM

## 2020-05-06 PROCEDURE — 3008F BODY MASS INDEX DOCD: CPT | Performed by: INTERNAL MEDICINE

## 2020-05-06 PROCEDURE — 1036F TOBACCO NON-USER: CPT | Performed by: INTERNAL MEDICINE

## 2020-05-06 PROCEDURE — 99214 OFFICE O/P EST MOD 30 MIN: CPT | Performed by: INTERNAL MEDICINE

## 2020-05-29 ENCOUNTER — HOSPITAL ENCOUNTER (OUTPATIENT)
Dept: MAMMOGRAPHY | Facility: CLINIC | Age: 65
Discharge: HOME | End: 2020-05-29
Payer: COMMERCIAL

## 2020-05-29 VITALS — WEIGHT: 168 LBS | BODY MASS INDEX: 27 KG/M2 | HEIGHT: 66 IN

## 2020-05-29 DIAGNOSIS — C50.412 MALIGNANT NEOPLASM OF UPPER-OUTER QUADRANT OF LEFT BREAST IN FEMALE, ESTROGEN RECEPTOR POSITIVE (HCC): ICD-10-CM

## 2020-05-29 DIAGNOSIS — Z17.0 MALIGNANT NEOPLASM OF UPPER-OUTER QUADRANT OF LEFT BREAST IN FEMALE, ESTROGEN RECEPTOR POSITIVE (HCC): ICD-10-CM

## 2020-05-29 PROCEDURE — 77066 DX MAMMO INCL CAD BI: CPT

## 2020-05-29 PROCEDURE — G0279 TOMOSYNTHESIS, MAMMO: HCPCS

## 2020-06-23 ENCOUNTER — HOSPITAL ENCOUNTER (OUTPATIENT)
Dept: MAMMOGRAPHY | Facility: CLINIC | Age: 65
Discharge: HOME | End: 2020-06-23
Payer: COMMERCIAL

## 2020-06-23 DIAGNOSIS — M81.0 AGE-RELATED OSTEOPOROSIS WITHOUT CURRENT PATHOLOGICAL FRACTURE: ICD-10-CM

## 2020-06-23 PROCEDURE — 77080 DXA BONE DENSITY AXIAL: CPT

## 2020-07-01 ENCOUNTER — RADIATION ONCOLOGY FOLLOW-UP (OUTPATIENT)
Dept: RADIATION ONCOLOGY | Facility: CLINIC | Age: 65
End: 2020-07-01
Attending: RADIOLOGY
Payer: COMMERCIAL

## 2020-07-01 VITALS
TEMPERATURE: 98.6 F | HEIGHT: 66 IN | RESPIRATION RATE: 16 BRPM | HEART RATE: 81 BPM | BODY MASS INDEX: 27.97 KG/M2 | SYSTOLIC BLOOD PRESSURE: 132 MMHG | DIASTOLIC BLOOD PRESSURE: 80 MMHG | WEIGHT: 174 LBS

## 2020-07-01 DIAGNOSIS — Z17.0 MALIGNANT NEOPLASM OF UPPER-OUTER QUADRANT OF LEFT BREAST IN FEMALE, ESTROGEN RECEPTOR POSITIVE (HCC): Primary | ICD-10-CM

## 2020-07-01 DIAGNOSIS — C50.412 MALIGNANT NEOPLASM OF UPPER-OUTER QUADRANT OF LEFT BREAST IN FEMALE, ESTROGEN RECEPTOR POSITIVE (HCC): Primary | ICD-10-CM

## 2020-07-01 PROCEDURE — G0463 HOSPITAL OUTPT CLINIC VISIT: HCPCS | Performed by: RADIOLOGY

## 2020-07-01 PROCEDURE — 99211 OFF/OP EST MAY X REQ PHY/QHP: CPT | Performed by: RADIOLOGY

## 2020-07-01 NOTE — PROGRESS NOTES
Sarthak Rios 1955 is a 63 y o  female     Follow up visit     Oncology History    Returns for follow up post adjuvant whole breast hypo fractionated radiation completed on 12/4/19  She was last seen in follow up on 1/3/20    1/16/20 Bren Hackett  Surgical Oncology  does have some scar tissue at her lumpectomy site and was encouraged  to massage this area    5/6/20 Olga Lidia Perez MD   Clinically no evidence of recurrent disease; continue with anastrozole   Taking  venlafaxine for hot flashes    5/29/20 Bilateral diagnostic mammogram  IMPRESSION: No evidence of malignancy  ASSESSMENT/BI-RADS CATEGORY:  Left: 2 - Benign  Right: 2 - Benign  Overall: 2 - Benign    6/23/20 DXA  IMPRESSION:  1  Based on the [facility] classification, the T-score of -1 6 in the left hip is consistent with LOW BONE MINERAL DENSITY (OSTEOPENIA)       7/23/20 VIVIEN Hackett  11/24/20 Olga Lidia Perez MD        Malignant neoplasm of upper-outer quadrant of left breast in female, estrogen receptor positive ([facility] [address] )    7/23/2019 Biopsy     Left breast biopsy  1 o'clock, 5 cm from nipple  Invasive lobular carcinoma  Grade 1      HER2 0    Done at [facility]      10/1/2019 Surgery     Left breast needle localized lumpectomy with sentinel lymph node biopsy  Invasive lobular carcinoma  Grade 1  1 8 cm  Margins negative  0/2 Lymph nodes  Anatomic/Prognostic Stage IA      10/30/2019 -  Hormone Therapy     Anastrozole 1 mg daily  Dr Tamir Montano         Clinical Trial: no    Health Maintenance   Topic Date Due    DTaP,Tdap,and Td Vaccines (1 - Tdap) 04/11/1968    HIV Screening  04/11/1972    Annual Physical  04/11/1975    BMI: Followup Plan  03/27/2020    Influenza Vaccine  07/01/2020    Depression Screening PHQ  01/03/2021    Cervical Cancer Screening  04/20/2021    BMI: Adult  05/29/2021    MAMMOGRAM  05/29/2021    Pneumococcal Vaccine: 65+ Years (1 of 2 - PCV13) 04/11/2022    CRC Screening: Cologuard  04/28/2022  Hepatitis C Screening  Completed    Pneumococcal Vaccine: Pediatrics (0 to 5 Years) and At-Risk Patients (6 to 59 Years)  Aged Out    HIB Vaccine  Aged Out    Hepatitis B Vaccine  Aged Out    IPV Vaccine  Aged Out    Hepatitis A Vaccine  Aged Out    Meningococcal ACWY Vaccine  Aged Out    HPV Vaccine  Aged Out       Patient Active Problem List   Diagnosis    Fatigue    Age-related osteoporosis without current pathological fracture    Malignant neoplasm of upper-outer quadrant of left breast in female, estrogen receptor positive ([facility] )    Use of anastrozole (Arimidex)    Viral URI     Past Medical History:   Diagnosis Date    Breast cancer ([facility] [address] ) 09/2019    History of radiation therapy 10/2019    left breast cancer    Kidney stone     age 17    Migraine     Osteoporosis     Pancreatitis     Pleurisy     PONV (postoperative nausea and vomiting)     Vertigo      Past Surgical History:   Procedure Laterality Date    BREAST LUMPECTOMY Left 10/1/2019    Procedure: BREAST NEEDLE LOCALIZED LUMPECTOMY (NEEDLE LOC AT 0730); Surgeon: Faisal Jarquin MD;  [facility];  Service: Surgical Oncology    CHOLECYSTECTOMY      COLONOSCOPY      DENTAL SURGERY      Bone graft with implant    HALLUX VALGUS CORRECTION Bilateral     INCISIONAL BREAST BIOPSY      LYMPH NODE BIOPSY Left 10/1/2019    Procedure: SENTINEL LYMPH NODE BIOPSY; LYMPHATIC MAPPING WITH BLUE DYE AND RADIOACTIVE DYE (INJECT AT 0830 BY DR HA IN THE OR); Surgeon: Faisal Jarquin MD;  [facility];  Service: Surgical Oncology    MAMMO NEEDLE LOCALIZATION LEFT (ALL INC) Left 10/1/2019    MAMMO STEREOTACTIC BREAST BIOPSY LEFT (ALL INC)      RHINOPLASTY       Family History   Problem Relation Age of Onset    No Known Problems Mother     Lung cancer Father 71    Breast cancer Neg Hx      Social History     Socioeconomic History    Marital status:       Spouse name: Not on file    Number of children: 2    Years of education: Not on file    Highest education level: Not on file   Occupational History    Occupation: [employer] works part-time    Occupation: medical professional   Social Needs    Financial resource strain: Not on file    Food insecurity:     Worry: Not on file     Inability: Not on file   [employer] needs:     Medical: Not on file     Non-medical: Not on file   Tobacco Use    Smoking status: Never Smoker    Smokeless tobacco: Never Used   Substance and Sexual Activity    Alcohol use: No    Drug use: No    Sexual activity: Not on file   Lifestyle    Physical activity:     Days per week: Not on file     Minutes per session: Not on file    Stress: Not on file   Relationships    Social connections:     Talks on phone: Not on file     Gets together: Not on file     Attends Buddhist service: Not on file     Active member of club or organization: Not on file     Attends meetings of clubs or organizations: Not on file     Relationship status: Not on file    Intimate partner violence:     Fear of current or ex partner: Not on file     Emotionally abused: Not on file     Physically abused: Not on file     Forced sexual activity: Not on file   Other Topics Concern    Not on file   Social History Narrative    Not on file       Current Outpatient Medications:     acetaminophen (TYLENOL) 500 mg tablet, Take by mouth every 6 (six) hours as needed , Disp: , Rfl:     anastrozole (ARIMIDEX) 1 mg tablet, Take 1 tablet (1 mg total) by mouth daily, Disp: 90 tablet, Rfl: 1    Cholecalciferol (VITAMIN D) 2000 units CAPS, Take 4,000 Units by mouth daily , Disp: , Rfl:     ibandronate (BONIVA) 150 MG tablet, Take 150 mg by mouth every 30 (thirty) days , Disp: , Rfl:     Ivermectin (SOOLANTRA) 1 % CREA, Apply topically daily , Disp: , Rfl:     meclizine (ANTIVERT) 12 5 MG tablet, Take 12 5 mg by mouth 3 (three) times a day as needed for dizziness, Disp: , Rfl:     pimecrolimus (ELIDEL) 1 % cream, , Disp: , Rfl:     SUMAtriptan (IMITREX) 100 mg tablet, Take 100 mg by mouth once as needed for migraine, Disp: , Rfl:     venlafaxine (EFFEXOR) 37 5 mg tablet, Take 1 tablet (37 5 mg total) by mouth daily, Disp: 60 tablet, Rfl: 3  Allergies   Allergen Reactions    Ciprofloxacin Seizures    Cyclopentolate Other (See Comments) and Tachycardia     Resp distress    Sulfa Antibiotics Hives and Rash    Scopolamine Other (See Comments)     Precautionary for cycopentolate allergy       Review of Systems:  Review of Systems   Constitutional: Negative  HENT: Negative  Eyes: Negative  Respiratory: Negative  Cardiovascular: Negative  Gastrointestinal: Negative  Endocrine: Negative  Genitourinary: Negative  Musculoskeletal: Positive for arthralgias (fingers  Seeing rheumatologist)  Skin: Negative  Allergic/Immunologic: Negative  Neurological: Negative  Hematological: Negative  Psychiatric/Behavioral: Negative  Vitals:    07/01/20 1044   BP: 132/80   Pulse: 81   Resp: 16   Temp: 98 6 °F (37 °C)   Weight: 78 9 kg (174 lb)   Height: 5' 5 5" (1 664 m)        Pain Score: 0-No pain      Imaging:Dxa Bone Density Spine Hip And Pelvis    Result Date: 6/24/2020  Narrative: CENTRAL  DXA SCAN CLINICAL HISTORY:   63year old post-menopausal  female risk factors include breast carcinoma with anastrozole use  Boniva use  TECHNIQUE: Bone densitometry was performed using a Hologic Horizon C bone densitometer  Regions of interest appear properly placed  There are no obvious fractures or other confounding variables which could limit the study  COMPARISON:  None  RESULTS: LUMBAR SPINE:  L1-L4: BMD 0 923 gm/cm2 T-score -1 1 Z-score 0 5 LEFT TOTAL HIP: BMD 0 886 gm/cm2 T-score -0 5 Z-score 0 7 LEFT FEMORAL NECK: BMD 0 667 gm/cm2 T-score -1 6 Z-score -0 2     Impression: 1    Based on the [facility] classification, the T-score of -1 6 in the left hip is consistent with LOW BONE MINERAL DENSITY (OSTEOPENIA)  2   Any secondary causes of low bone mineral density should be excluded prior to treatment, if clinically indicated  3   A daily intake of at least 1200 mg calcium and 800 - 1000 IU of Vitamin D, as well as weight bearing and muscle strengthening exercise, fall prevention and avoidance of tobacco and excessive alcohol intake as basic preventive measures are suggested  The 10 year risk of hip fracture is 1%, with the 10 year risk of major osteoporotic fracture being 9%, as calculated by the [facility] fracture risk assessment tool (FRAX)  The current NOF guidelines recommend treating patients with FRAX 10 year risk score of >3% for hip fracture and >20% for major osteoporotic fracture   WHO CLASSIFICATION: Normal (a T-score of -1 0 or higher) Low bone mineral density (a T-score of less than -1 0 but higher than -2 5) Osteoporosis (a T-score of -2 5 or less) Severe osteoporosis (a T-score of -2 5 or less with a fragility fracture) Workstation performed: [id number]

## 2020-07-04 NOTE — PROGRESS NOTES
Follow-up - Radiation Oncology   Yessy Rodriguez 1955 63 y o  female [id number]      History of Present Illness   Cancer Staging  Malignant neoplasm of upper-outer quadrant of left breast in female, estrogen receptor positive ([facility] )  Staging form: Breast, AJCC 8th Edition  - Pathologic: Stage IA (pT1c, pN0(sn), cM0, G1, ER+, SD+, HER2-) - Unsigned  Neoadjuvant therapy: No  Laterality: Left  Tumor size (mm): 18  Method of lymph node assessment: [place] lymph node biopsy  Histologic grading system: 3 grade system      Yessy Rodriguez is a 63y o  year old female with a history of [id number] invasive lobular carcinoma of the left breast status post resection achieving negative margins  Tumor cells were ER/ SD positive and HER2 Mateo negative  She completed a course of adjuvant whole breast hypofractionated radiation on 12/4/19  She is maintained on anastrazole  Today, she returns for follow-up evaluation      She denies any new palpable nodules, suspicious skin changes, or nipple discharge of the breasts bilaterally  She denies any significant breast pain or tenderness  She denies any upper extremity edema or shoulder restriction  Bilateral mammogram on 5/29/20 was benign  6/23/20 DXA demonstrated osteopenia  The patient is maintained on anastrazole  She continues to experience hot flashes, but was started on venlafaxine by Dr Cassandria Lombard with significant improvement  She has developed significant tenderness and pain in her wrists and along the tendons of her thumbs bilaterally  She notes some weakness in due to pain  She has difficulty gripping  This is stable, but bothersome  She is scheduled to see a Rheumatologist in a few weeks    She denies any vaginal discharge of bleeding      Upcoming appointments:  7/23/20 VIVIEN Corral  11/24/20 Lucien Galindo MD       Historical Information      Malignant neoplasm of upper-outer quadrant of left breast in female, estrogen receptor positive ([facility] [address] ) 7/23/2019 Biopsy     Left breast biopsy  1 o'clock, 5 cm from nipple  Invasive lobular carcinoma  Grade 1      HER2 0    Done at [facility]      10/1/2019 Surgery     Left breast needle localized lumpectomy with sentinel lymph node biopsy  Invasive lobular carcinoma  Grade 1  1 8 cm  Margins negative  0/2 Lymph nodes  Anatomic/Prognostic Stage IA      10/30/2019 -  Hormone Therapy     Anastrozole 1 mg daily  Dr Claude Junes         Past Medical History:   Diagnosis Date    Breast cancer ([address] ) 09/2019    History of radiation therapy 10/2019    left breast cancer    Kidney stone     age 17    Migraine     Osteoporosis     Pancreatitis     Pleurisy     PONV (postoperative nausea and vomiting)     Vertigo      Past Surgical History:   Procedure Laterality Date    BREAST LUMPECTOMY Left 10/1/2019    Procedure: BREAST NEEDLE LOCALIZED LUMPECTOMY (NEEDLE LOC AT 0730); Surgeon: Kiki Pike MD;  [facility];  Service: Surgical Oncology    CHOLECYSTECTOMY      COLONOSCOPY      DENTAL SURGERY      Bone graft with implant    HALLUX VALGUS CORRECTION Bilateral     INCISIONAL BREAST BIOPSY      LYMPH NODE BIOPSY Left 10/1/2019    Procedure: SENTINEL LYMPH NODE BIOPSY; LYMPHATIC MAPPING WITH BLUE DYE AND RADIOACTIVE DYE (INJECT AT 0830 BY DR HA IN THE OR);   Surgeon: Kiki Pike MD;  [facility];  Service: Surgical Oncology    MAMMO NEEDLE LOCALIZATION LEFT (ALL INC) Left 10/1/2019    MAMMO STEREOTACTIC BREAST BIOPSY LEFT (ALL INC)      RHINOPLASTY         Social History   Social History     Substance and Sexual Activity   Alcohol Use No     Social History     Substance and Sexual Activity   Drug Use No     Social History     Tobacco Use   Smoking Status Never Smoker   Smokeless Tobacco Never Used       Meds/Allergies     Current Outpatient Medications:     acetaminophen (TYLENOL) 500 mg tablet, Take by mouth every 6 (six) hours as needed , Disp: , Rfl:     anastrozole (ARIMIDEX) 1 mg tablet, Take 1 tablet (1 mg total) by mouth daily, Disp: 90 tablet, Rfl: 1    Cholecalciferol (VITAMIN D) 2000 units CAPS, Take 4,000 Units by mouth daily , Disp: , Rfl:     ibandronate (BONIVA) 150 MG tablet, Take 150 mg by mouth every 30 (thirty) days , Disp: , Rfl:     Ivermectin (SOOLANTRA) 1 % CREA, Apply topically daily , Disp: , Rfl:     meclizine (ANTIVERT) 12 5 MG tablet, Take 12 5 mg by mouth 3 (three) times a day as needed for dizziness, Disp: , Rfl:     pimecrolimus (ELIDEL) 1 % cream, , Disp: , Rfl:     SUMAtriptan (IMITREX) 100 mg tablet, Take 100 mg by mouth once as needed for migraine, Disp: , Rfl:     venlafaxine (EFFEXOR) 37 5 mg tablet, Take 1 tablet (37 5 mg total) by mouth daily, Disp: 60 tablet, Rfl: 3  Allergies   Allergen Reactions    Ciprofloxacin Seizures    Cyclopentolate Other (See Comments) and Tachycardia     Resp distress    Sulfa Antibiotics Hives and Rash    Scopolamine Other (See Comments)     Precautionary for cycopentolate allergy       Review of Systems    Constitutional: Negative  HENT: Negative  Eyes: Negative  Respiratory: Negative  Cardiovascular: Negative  Gastrointestinal: Negative  Endocrine: Negative  Genitourinary: Negative  Musculoskeletal: Positive for arthralgias (fingers  Seeing rheumatologist)  Skin: Negative  Allergic/Immunologic: Negative  Neurological: Negative  Hematological: Negative  Psychiatric/Behavioral: Negative  OBJECTIVE:   /80   Pulse 81   Temp 98 6 °F (37 °C)   Resp 16   Ht 5' 5 5" (1 664 m)   Wt 78 9 kg (174 lb)   BMI 28 51 kg/m²   Karnofsky: 90 - Able to carry on normal activity; minor signs or symptoms of disease     Physical Exam   Constitutional: She is oriented to person, place, and time  She appears well-developed and well-nourished  No distress  Eyes: No scleral icterus  Cardiovascular: Normal rate and regular rhythm  No murmur heard    Pulmonary/Chest: Effort normal and breath sounds normal  No respiratory distress  She has no wheezes  She has no rhonchi  She has no rales  Breast examination demonstrates breasts to be symmetric in contour, but left is slightly smaller in size  The left breat has a well-healed upper outer scar  There is diffuse fibrosis more pronounced near scar  There is slight diffuse hyperpigmentation  There are no palpable nodules or suspicious skin changes bilaterally  Abdominal: Soft  She exhibits no distension  There is no tenderness  Musculoskeletal: She exhibits no edema  No upper extremity edema bilaterally  She has tenderness to mild palpation of the dorsal surface of lower thumb joints  There is no definite swelling or erythema, but her  strength is reduced due to pain  Lymphadenopathy:     She has no cervical adenopathy  She has no axillary adenopathy  Right: No supraclavicular adenopathy present  Left: No supraclavicular adenopathy present  Neurological: She is alert and oriented to person, place, and time  Nursing note and vitals reviewed  RESULTS    Imaging Studies:Dxa Bone Density Spine Hip And Pelvis    Result Date: 6/24/2020  Narrative: CENTRAL  DXA SCAN CLINICAL HISTORY:   63year old post-menopausal  female risk factors include breast carcinoma with anastrozole use  Boniva use  TECHNIQUE: Bone densitometry was performed using a [employer] Horizon C bone densitometer  Regions of interest appear properly placed  There are no obvious fractures or other confounding variables which could limit the study  COMPARISON:  None  RESULTS: LUMBAR SPINE:  L1-L4: BMD 0 923 gm/cm2 T-score -1 1 Z-score 0 5 LEFT TOTAL HIP: BMD 0 886 gm/cm2 T-score -0 5 Z-score 0 7 LEFT FEMORAL NECK: BMD 0 667 gm/cm2 T-score -1 6 Z-score -0 2     Impression: 1  Based on the [facility] classification, the T-score of -1 6 in the left hip is consistent with LOW BONE MINERAL DENSITY (OSTEOPENIA)   2   Any secondary causes of low bone mineral density should be excluded prior to treatment, if clinically indicated  3   A daily intake of at least 1200 mg calcium and 800 - 1000 IU of Vitamin D, as well as weight bearing and muscle strengthening exercise, fall prevention and avoidance of tobacco and excessive alcohol intake as basic preventive measures are suggested  The 10 year risk of hip fracture is 1%, with the 10 year risk of major osteoporotic fracture being 9%, as calculated by the [facility] fracture risk assessment tool (FRAX)  The current NOF guidelines recommend treating patients with FRAX 10 year risk score of >3% for hip fracture and >20% for major osteoporotic fracture  WHO CLASSIFICATION: Normal (a T-score of -1 0 or higher) Low bone mineral density (a T-score of less than -1 0 but higher than -2 5) Osteoporosis (a T-score of -2 5 or less) Severe osteoporosis (a T-score of -2 5 or less with a fragility fracture) Workstation performed: [id number]       Assessment/Plan:  Efrem Ruelas is a 63y o  year old female with a history of [id number] invasive lobular carcinoma of the left breast status post resection achieving negative margins  Tumor cells were ER/ NM positive and HER2 Mateo negative  She completed a course of adjuvant whole breast hypofractionated radiation on 12/4/19  She is currently maintained on Arimidex  She is currently clinically JOSUÉ     I instructed her to practice sun protection to the irradiated skin  She appears to be tolerating anastrazole, but has developed symptoms concerning for tendinitis bilaterally in her thumbs  She is following up with her Rheumatologist for evaluation  I have recommended that she use NSAIDs as needed as it is affecting her quality of life and her ability to use her hands for certain activities  I also encouraged her to discuss her symptoms and recommendations from Rheumatology with Dr Inga Aquino at her next follow-up as anastrazole can sometimes result in severe arthritic symptoms    She will return for follow-up in 6 months  We are available for any questions or concerns that may arise in the interim  Kuldeep Romano MD  7/4/2020,3:08 PM    Portions of the record may have been created with voice recognition software   Occasional wrong word or "sound a like" substitutions may have occurred due to the inherent limitations of voice recognition software   Read the chart carefully and recognize, using context, where substitutions have occurred

## 2020-07-22 ENCOUNTER — TELEPHONE (OUTPATIENT)
Dept: SURGICAL ONCOLOGY | Facility: CLINIC | Age: 65
End: 2020-07-22

## 2020-07-23 ENCOUNTER — OFFICE VISIT (OUTPATIENT)
Dept: SURGICAL ONCOLOGY | Facility: CLINIC | Age: 65
End: 2020-07-23
Payer: COMMERCIAL

## 2020-07-23 VITALS
HEIGHT: 65 IN | WEIGHT: 174 LBS | BODY MASS INDEX: 28.99 KG/M2 | RESPIRATION RATE: 16 BRPM | TEMPERATURE: 98.1 F | SYSTOLIC BLOOD PRESSURE: 132 MMHG | DIASTOLIC BLOOD PRESSURE: 80 MMHG | HEART RATE: 97 BPM

## 2020-07-23 DIAGNOSIS — Z79.811 USE OF ANASTROZOLE (ARIMIDEX): ICD-10-CM

## 2020-07-23 DIAGNOSIS — C50.412 MALIGNANT NEOPLASM OF UPPER-OUTER QUADRANT OF LEFT BREAST IN FEMALE, ESTROGEN RECEPTOR POSITIVE (HCC): Primary | ICD-10-CM

## 2020-07-23 DIAGNOSIS — Z17.0 MALIGNANT NEOPLASM OF UPPER-OUTER QUADRANT OF LEFT BREAST IN FEMALE, ESTROGEN RECEPTOR POSITIVE (HCC): Primary | ICD-10-CM

## 2020-07-23 PROCEDURE — 99214 OFFICE O/P EST MOD 30 MIN: CPT | Performed by: NURSE PRACTITIONER

## 2020-07-23 PROCEDURE — 3008F BODY MASS INDEX DOCD: CPT | Performed by: NURSE PRACTITIONER

## 2020-07-23 PROCEDURE — 1036F TOBACCO NON-USER: CPT | Performed by: NURSE PRACTITIONER

## 2020-07-23 NOTE — PROGRESS NOTES
Surgical Oncology Follow Up       [address]  [facility] [place]  [address]  [facility] [id number]    [place]  1955  [id number]  [address]  [facility] [place]  [address] [address]    Chief Complaint   Patient presents with    Follow-up     Pt here today for overdue six month follow up       Assessment/Plan:  1  Malignant neoplasm of upper-outer quadrant of left breast in female, estrogen receptor positive ([address] )  - 6 mo f/u visit    2  Use of anastrozole (Arimidex)  - Continue use per medical oncology     Discussion/Summary: Patient is a 80-year-old female who presents today for a three-month follow-up visit for left breast cancer diagnosed in July of 2019  Her pathology revealed invasive lobular carcinoma, ER/%, her 2-  She underwent a left lumpectomy and sentinel node biopsy by Dr Disha Dutta  She completed whole breast radiation therapy and is taking Anastrozole  She had a bilateral 3D diagnostic mammogram on May 29, 2020 which was BI-RADS 2, category 2 density  She has no new complaints today aside from issues with her right thumb for which she is following with her rheumatologist next week  This may be partially related to her anastrozole therapy  She will also follow up with her medical oncologist   There are no worrisome findings on her exam today  I will plan to see her back in 6 months or sooner if the need arises  She was instructed to call with any new concerns or symptoms prior to that time  All of her questions were answered today        History of Present Illness:        Malignant neoplasm of upper-outer quadrant of left breast in female, estrogen receptor positive ([address] )    7/23/2019 Biopsy     Left breast biopsy  1 o'clock, 5 cm from nipple  Invasive lobular carcinoma  Grade 1      HER2 0    Done at [facility]      10/1/2019 Surgery     Left breast needle localized lumpectomy with sentinel lymph node biopsy  Invasive lobular carcinoma  Grade 1  1 8 cm  Margins negative  0/2 Lymph nodes  Anatomic/Prognostic Stage IA      10/30/2019 -  Hormone Therapy     Anastrozole 1 mg daily  Dr Vince Waddell          -Interval History:  Patient presents today for follow-up visit for left breast cancer diagnosed in July of 2019  She notices no changes on her self breast exam   Her hot flashes have improved with the initiation of Effexor  She is however experiencing issues with her right thumb  She is seeing her rheumatologist next week  She denies headaches, back pain or bone pain, cough, shortness of breath, abdominal pain  Bilateral mammogram was BI-RADS 2  Review of Systems:  Review of Systems   Constitutional: Negative for activity change, appetite change, chills, fatigue, fever and unexpected weight change  HENT: Negative for trouble swallowing  Eyes: Negative for pain, redness and visual disturbance  Respiratory: Negative for cough, shortness of breath and wheezing  Cardiovascular: Negative for chest pain, palpitations and leg swelling  Gastrointestinal: Negative for abdominal pain, constipation, diarrhea, nausea and vomiting  Endocrine: Negative for cold intolerance and heat intolerance (hot flashes improved)  Musculoskeletal: Positive for arthralgias (thumb)  Negative for back pain, gait problem and myalgias  Skin: Negative for color change and rash  Neurological: Negative for dizziness, syncope, light-headedness, numbness and headaches  Hematological: Negative for adenopathy  Psychiatric/Behavioral: Negative for agitation and confusion  All other systems reviewed and are negative        Patient Active Problem List   Diagnosis    Fatigue    Age-related osteoporosis without current pathological fracture    Malignant neoplasm of upper-outer quadrant of left breast in female, estrogen receptor positive ([facility] [address] )    Use of anastrozole (Arimidex)    Viral URI     Past Medical History:   Diagnosis Date    Breast cancer ([address] ) 09/2019    History of radiation therapy 10/2019    left breast cancer    Kidney stone     age 17    Migraine     Osteoporosis     Pancreatitis     Pleurisy     PONV (postoperative nausea and vomiting)     Vertigo      Past Surgical History:   Procedure Laterality Date    BREAST LUMPECTOMY Left 10/1/2019    Procedure: BREAST NEEDLE LOCALIZED LUMPECTOMY (NEEDLE LOC AT 0730); Surgeon: Sudheer Diamond MD;  [facility];  Service: Surgical Oncology    CHOLECYSTECTOMY      COLONOSCOPY      DENTAL SURGERY      Bone graft with implant    HALLUX VALGUS CORRECTION Bilateral     INCISIONAL BREAST BIOPSY      LYMPH NODE BIOPSY Left 10/1/2019    Procedure: SENTINEL LYMPH NODE BIOPSY; LYMPHATIC MAPPING WITH BLUE DYE AND RADIOACTIVE DYE (INJECT AT 0830 BY DR HA IN THE OR); Surgeon: Sudheer Diamond MD;  [facility];  Service: Surgical Oncology    MAMMO NEEDLE LOCALIZATION LEFT (ALL INC) Left 10/1/2019    MAMMO STEREOTACTIC BREAST BIOPSY LEFT (ALL INC)      RHINOPLASTY       Family History   Problem Relation Age of Onset    No Known Problems Mother     Lung cancer Father 71    Breast cancer Neg Hx      Social History     Socioeconomic History    Marital status:       Spouse name: Not on file    Number of children: 2    Years of education: Not on file    Highest education level: Not on file   Occupational History    Occupation: curretnly works part-time    Occupation: medical professional   Social Needs    Financial resource strain: Not on file    Food insecurity:     Worry: Not on file     Inability: Not on file   Chlorine Genie needs:     Medical: Not on file     Non-medical: Not on file   Tobacco Use    Smoking status: Never Smoker    Smokeless tobacco: Never Used   Substance and Sexual Activity    Alcohol use: No    Drug use: No    Sexual activity: Not on file   Lifestyle    Physical activity:     Days per week: Not on file     Minutes per session: Not on file    Stress: Not on file   Relationships    Social connections:     Talks on phone: Not on file     Gets together: Not on file     Attends [employer] service: Not on file     Active member of club or organization: Not on file     Attends meetings of clubs or organizations: Not on file     Relationship status: Not on file    Intimate partner violence:     Fear of current or ex partner: Not on file     Emotionally abused: Not on file     Physically abused: Not on file     Forced sexual activity: Not on file   Other Topics Concern    Not on file   Social History Narrative    Not on file       Current Outpatient Medications:     acetaminophen (TYLENOL) 500 mg tablet, Take by mouth every 6 (six) hours as needed , Disp: , Rfl:     anastrozole (ARIMIDEX) 1 mg tablet, Take 1 tablet (1 mg total) by mouth daily, Disp: 90 tablet, Rfl: 1    Cholecalciferol (VITAMIN D) 2000 units CAPS, Take 4,000 Units by mouth daily , Disp: , Rfl:     ibandronate (BONIVA) 150 MG tablet, Take 150 mg by mouth every 30 (thirty) days , Disp: , Rfl:     Ivermectin (SOOLANTRA) 1 % CREA, Apply topically daily , Disp: , Rfl:     meclizine (ANTIVERT) 12 5 MG tablet, Take 12 5 mg by mouth 3 (three) times a day as needed for dizziness, Disp: , Rfl:     pimecrolimus (ELIDEL) 1 % cream, , Disp: , Rfl:     SUMAtriptan (IMITREX) 100 mg tablet, Take 100 mg by mouth once as needed for migraine, Disp: , Rfl:     venlafaxine (EFFEXOR) 37 5 mg tablet, Take 1 tablet (37 5 mg total) by mouth daily, Disp: 60 tablet, Rfl: 3  Allergies   Allergen Reactions    Ciprofloxacin Seizures    Cyclopentolate Other (See Comments) and Tachycardia     Resp distress    Sulfa Antibiotics Hives and Rash    Scopolamine Other (See Comments)     Precautionary for cycopentolate allergy     Vitals:    07/23/20 1418   BP: 132/80   Pulse: 97   Resp: 16   Temp: 98 1 °F (36 7 °C)       Physical Exam Constitutional: She is oriented to person, place, and time  Vital signs are normal  She appears well-developed and well-nourished  No distress  HENT:   Head: Normocephalic and atraumatic  Neck: Normal range of motion  Cardiovascular: Normal rate, regular rhythm and normal heart sounds  Pulmonary/Chest: Effort normal and breath sounds normal    Bilateral breasts were examined in the sitting and supine position  Left breast and left axillary surgical scars present  There is some scarring at the lumpectomy site- encouraged continued massage  Her skin is healing well s/p RT  There are no masses, skin nodules, nipple changes or nipple discharge  There is no bilateral supraclavicular or axillary lymphadenopathy noted  Abdominal: Soft  Normal appearance  She exhibits no mass  There is no hepatosplenomegaly  There is no tenderness  Musculoskeletal: Normal range of motion  Lymphadenopathy:     She has no axillary adenopathy  Right: No supraclavicular adenopathy present  Left: No supraclavicular adenopathy present  Neurological: She is alert and oriented to person, place, and time  Skin: Skin is warm, dry and intact  No rash noted  She is not diaphoretic  Psychiatric: She has a normal mood and affect  Her speech is normal    Vitals reviewed  Advance Care Planning/Advance Directives:  Discussed disease status, cancer treatment plans and/or cancer treatment goals with the patient

## 2020-08-27 ENCOUNTER — TELEPHONE (OUTPATIENT)
Dept: HEMATOLOGY ONCOLOGY | Facility: CLINIC | Age: 65
End: 2020-08-27

## 2020-08-27 NOTE — TELEPHONE ENCOUNTER
Reschedule Appointment     Who is calling in  Patient    Doctor Appointment Scheduled with Dr Melony Pedersen date and time 11/24 at 1:20pm   New date and time 12/08 at 1:40pm   Location [facility]   Patient verbalized understanding    Yes

## 2020-10-15 DIAGNOSIS — Z17.0 MALIGNANT NEOPLASM OF UPPER-OUTER QUADRANT OF LEFT BREAST IN FEMALE, ESTROGEN RECEPTOR POSITIVE (HCC): ICD-10-CM

## 2020-10-15 DIAGNOSIS — C50.412 MALIGNANT NEOPLASM OF UPPER-OUTER QUADRANT OF LEFT BREAST IN FEMALE, ESTROGEN RECEPTOR POSITIVE (HCC): ICD-10-CM

## 2020-10-16 RX ORDER — ANASTROZOLE 1 MG/1
1 TABLET ORAL DAILY
Qty: 90 TABLET | Refills: 1 | Status: SHIPPED | OUTPATIENT
Start: 2020-10-16 | End: 2021-03-05

## 2020-10-30 ENCOUNTER — OFFICE VISIT (OUTPATIENT)
Dept: URGENT CARE | Facility: CLINIC | Age: 65
End: 2020-10-30
Payer: COMMERCIAL

## 2020-10-30 VITALS
OXYGEN SATURATION: 99 % | DIASTOLIC BLOOD PRESSURE: 88 MMHG | WEIGHT: 174 LBS | SYSTOLIC BLOOD PRESSURE: 140 MMHG | BODY MASS INDEX: 28.99 KG/M2 | RESPIRATION RATE: 18 BRPM | HEART RATE: 105 BPM | HEIGHT: 65 IN | TEMPERATURE: 99.1 F

## 2020-10-30 DIAGNOSIS — R10.9 FLANK PAIN: Primary | ICD-10-CM

## 2020-10-30 LAB
SL AMB  POCT GLUCOSE, UA: NEGATIVE
SL AMB LEUKOCYTE ESTERASE,UA: NEGATIVE
SL AMB POCT BILIRUBIN,UA: NEGATIVE
SL AMB POCT BLOOD,UA: ABNORMAL
SL AMB POCT CLARITY,UA: CLEAR
SL AMB POCT COLOR,UA: YELLOW
SL AMB POCT KETONES,UA: NEGATIVE
SL AMB POCT NITRITE,UA: NEGATIVE
SL AMB POCT PH,UA: 5
SL AMB POCT SPECIFIC GRAVITY,UA: 1.01
SL AMB POCT URINE PROTEIN: NEGATIVE
SL AMB POCT UROBILINOGEN: 0.2

## 2020-10-30 PROCEDURE — 99213 OFFICE O/P EST LOW 20 MIN: CPT | Performed by: PHYSICIAN ASSISTANT

## 2020-10-30 PROCEDURE — 81002 URINALYSIS NONAUTO W/O SCOPE: CPT | Performed by: PHYSICIAN ASSISTANT

## 2020-10-30 PROCEDURE — 87086 URINE CULTURE/COLONY COUNT: CPT | Performed by: PHYSICIAN ASSISTANT

## 2020-10-30 RX ORDER — CEPHALEXIN 500 MG/1
500 CAPSULE ORAL EVERY 6 HOURS SCHEDULED
Qty: 28 CAPSULE | Refills: 0 | Status: SHIPPED | OUTPATIENT
Start: 2020-10-30 | End: 2020-11-06

## 2020-11-01 LAB — BACTERIA UR CULT: NORMAL

## 2020-11-06 NOTE — SOCIAL WORK
MSW met with pt in [address] [place] [address] today, she was here for her simulation and will be starting treatment shortly  Pt and MSW knew each other from several years ago and pt was happy to have a familiar face  She shared with me that her children are grown and doing well, although she wishes that they were dating anyone seriously or   Her one son lives locally and works at the [employer], and her younger son is a  and lives in [place]  Pt works as a school nurse and is hoping that her job will be able to accommodate her treatment time without having to wipe out her PTO time  She floats between schools, which is why she is not sure yet how it will work out with her schedule  She shared with me that her  passed away having surviving a MVA, which left him with a TBI  She cared for him at home for 12 years  She is tearful about his passing, however she also shared what a struggle it was to care for him for that long  She says that they had aides to help at home, however the aides were caught stealing from her or were showing up to work drunk, so it overall was not very helpful  Pt shared that she is nervous for her treatment, but that her boys are very supportive to her and she knows that she needs to "get this taken care of"  She is very complimentary of her doctors and their care of her up until this point  She is agreeable to continuing to talk with MSW as she is here for treatment  MSW will continue to provide pt emotional support as needed moving forward  No other concerns today  [Normal Breath Sounds] : Normal breath sounds [Normal Heart Sounds] : normal heart sounds [No Rash or Lesion] : No rash or lesion [Alert] : alert [Oriented to Person] : oriented to person [Oriented to Place] : oriented to place [Oriented to Time] : oriented to time [Calm] : calm [JVD] : no jugular venous distention  [de-identified] : WNL [de-identified] : WNL [de-identified] : Overweight, soft, nondistended but somewhat protuberant. Moderate degree of upper midline diastasis. Well-healed lower midline surgical scar. At the umbilicus, 1.5 cm nontender, reducible, fat-containing hernia. In the midline, 4-5 cm above the umbilicus, 4-5 cm nontender, reducible, fat-containing epigastric hernia. [de-identified] : Full ROM

## 2020-12-08 ENCOUNTER — OFFICE VISIT (OUTPATIENT)
Dept: HEMATOLOGY ONCOLOGY | Facility: CLINIC | Age: 65
End: 2020-12-08
Payer: COMMERCIAL

## 2020-12-08 VITALS
HEART RATE: 121 BPM | TEMPERATURE: 97.8 F | OXYGEN SATURATION: 96 % | HEIGHT: 65 IN | BODY MASS INDEX: 28.99 KG/M2 | SYSTOLIC BLOOD PRESSURE: 138 MMHG | WEIGHT: 174 LBS | RESPIRATION RATE: 16 BRPM | DIASTOLIC BLOOD PRESSURE: 86 MMHG

## 2020-12-08 DIAGNOSIS — R23.2 HOT FLASHES: ICD-10-CM

## 2020-12-08 DIAGNOSIS — C50.412 MALIGNANT NEOPLASM OF UPPER-OUTER QUADRANT OF LEFT BREAST IN FEMALE, ESTROGEN RECEPTOR POSITIVE (HCC): Primary | ICD-10-CM

## 2020-12-08 DIAGNOSIS — Z17.0 MALIGNANT NEOPLASM OF UPPER-OUTER QUADRANT OF LEFT BREAST IN FEMALE, ESTROGEN RECEPTOR POSITIVE (HCC): Primary | ICD-10-CM

## 2020-12-08 PROCEDURE — 99214 OFFICE O/P EST MOD 30 MIN: CPT | Performed by: INTERNAL MEDICINE

## 2020-12-08 PROCEDURE — 1036F TOBACCO NON-USER: CPT | Performed by: INTERNAL MEDICINE

## 2020-12-08 PROCEDURE — 3008F BODY MASS INDEX DOCD: CPT | Performed by: INTERNAL MEDICINE

## 2020-12-08 RX ORDER — VENLAFAXINE 37.5 MG/1
37.5 TABLET ORAL DAILY
Qty: 90 TABLET | Refills: 3 | Status: SHIPPED | OUTPATIENT
Start: 2020-12-08 | End: 2021-11-04

## 2020-12-30 ENCOUNTER — CLINICAL SUPPORT (OUTPATIENT)
Dept: RADIATION ONCOLOGY | Facility: CLINIC | Age: 65
End: 2020-12-30
Attending: RADIOLOGY
Payer: COMMERCIAL

## 2020-12-30 VITALS
RESPIRATION RATE: 18 BRPM | SYSTOLIC BLOOD PRESSURE: 128 MMHG | TEMPERATURE: 97.7 F | DIASTOLIC BLOOD PRESSURE: 70 MMHG | HEIGHT: 65 IN | HEART RATE: 81 BPM | BODY MASS INDEX: 29.49 KG/M2 | WEIGHT: 177 LBS

## 2020-12-30 DIAGNOSIS — C50.412 MALIGNANT NEOPLASM OF UPPER-OUTER QUADRANT OF LEFT BREAST IN FEMALE, ESTROGEN RECEPTOR POSITIVE (HCC): Primary | ICD-10-CM

## 2020-12-30 DIAGNOSIS — Z17.0 MALIGNANT NEOPLASM OF UPPER-OUTER QUADRANT OF LEFT BREAST IN FEMALE, ESTROGEN RECEPTOR POSITIVE (HCC): Primary | ICD-10-CM

## 2020-12-30 PROCEDURE — 99211 OFF/OP EST MAY X REQ PHY/QHP: CPT | Performed by: RADIOLOGY

## 2020-12-30 PROCEDURE — G0463 HOSPITAL OUTPT CLINIC VISIT: HCPCS | Performed by: RADIOLOGY

## 2020-12-30 NOTE — PROGRESS NOTES
Sony Saavedra 1955 is a 63 y o  female     Follow up visit     Oncology History Overview Note   Returns for follow up post adjuvant whole breast hypo fractionated radiation completed on 12/4/19  She was last seen in follow up on 7/1/20 7/23/20 VIVIEN Couch  No complaint  Negative exam    12/8/20 James Roland MD  no evidence of recurrent disease;  continue with anastrozole 1 mg once a day as well as venlafaxine (Effexor) 37 5 mg daily  2/9/21 VIVIEN Charles  12/28/21 James Roland MD    Mammogram done in May     Malignant neoplasm of upper-outer quadrant of left breast in female, estrogen receptor positive ([facility] [address] )   7/23/2019 Biopsy    Left breast biopsy  1 o'clock, 5 cm from nipple  Invasive lobular carcinoma  Grade 1      HER2 0    Done at [facility]     10/1/2019 Surgery    Left breast needle localized lumpectomy with sentinel lymph node biopsy  Invasive lobular carcinoma  Grade 1  1 8 cm  Margins negative  0/2 Lymph nodes  Anatomic/Prognostic Stage IA     10/30/2019 -  Hormone Therapy    Anastrozole 1 mg daily  Dr Ramos Sweet     11/13/2019 - 12/4/2019 Radiation    entire right breast to 4005cGy in 15 daily 267cGy fractions    Dr Barba Bi: no    Health Maintenance   Topic Date Due    HIV Screening  04/11/1972    Annual Physical  04/11/1975    DTaP,Tdap,and Td Vaccines (1 - Tdap) 04/11/1978    BMI: Followup Plan  03/27/2020    Cervical Cancer Screening  04/20/2021    MAMMOGRAM  05/29/2021    Depression Screening PHQ  07/01/2021    BMI: Adult  12/08/2021    Colorectal Cancer Screening  04/28/2022    Hepatitis C Screening  Completed    Influenza Vaccine  Completed    Pneumococcal Vaccine: Pediatrics (0 to 5 Years) and At-Risk Patients (6 to 59 Years)  Aged Out    HIB Vaccine  Aged Out    Hepatitis B Vaccine  Aged Out    IPV Vaccine  Aged Out    Hepatitis A Vaccine  Aged Out    Meningococcal ACWY Vaccine  Aged Out    HPV Vaccine  Aged Out       Patient Active Problem List   Diagnosis    Fatigue    Age-related osteoporosis without current pathological fracture    Malignant neoplasm of upper-outer quadrant of left breast in female, estrogen receptor positive ([facility] [address] )    Use of anastrozole (Arimidex)    Viral URI     Past Medical History:   Diagnosis Date    Breast cancer ([facility] [address] ) 09/2019    History of radiation therapy 10/2019    left breast cancer    Kidney stone     age 17    Migraine     Osteoporosis     Pancreatitis     Pleurisy     PONV (postoperative nausea and vomiting)     Vertigo      Past Surgical History:   Procedure Laterality Date    BREAST LUMPECTOMY Left 10/1/2019    Procedure: BREAST NEEDLE LOCALIZED LUMPECTOMY (NEEDLE LOC AT 0730); Surgeon: Deidre Gibbons MD;  [facility];  Service: Surgical Oncology    CHOLECYSTECTOMY      COLONOSCOPY      DENTAL SURGERY      Bone graft with implant    HALLUX VALGUS CORRECTION Bilateral     INCISIONAL BREAST BIOPSY      LYMPH NODE BIOPSY Left 10/1/2019    Procedure: SENTINEL LYMPH NODE BIOPSY; LYMPHATIC MAPPING WITH BLUE DYE AND RADIOACTIVE DYE (INJECT AT 0830 BY DR HA IN THE OR); Surgeon: Deidre Gibbons MD;  [facility];  Service: Surgical Oncology    MAMMO NEEDLE LOCALIZATION LEFT (ALL INC) Left 10/1/2019    MAMMO STEREOTACTIC BREAST BIOPSY LEFT (ALL INC)      RHINOPLASTY       Family History   Problem Relation Age of Onset    No Known Problems Mother     Lung cancer Father 71    Breast cancer Neg Hx      Social History     Socioeconomic History    Marital status:       Spouse name: Not on file    Number of children: 2    Years of education: Not on file    Highest education level: Not on file   Occupational History    Occupation: [employer] works part-time    Occupation: medical professional   Social Needs    Financial resource strain: Not on file    Food insecurity     Worry: Not on file     Inability: Not on file   [employer] needs Medical: Not on file     Non-medical: Not on file   Tobacco Use    Smoking status: Never Smoker    Smokeless tobacco: Never Used   Substance and Sexual Activity    Alcohol use: No    Drug use: No    Sexual activity: Not on file   Lifestyle    Physical activity     Days per week: Not on file     Minutes per session: Not on file    Stress: Not on file   Relationships    Social connections     Talks on phone: Not on file     Gets together: Not on file     Attends [employer] service: Not on file     Active member of club or organization: Not on file     Attends meetings of clubs or organizations: Not on file     Relationship status: Not on file    Intimate partner violence     Fear of current or ex partner: Not on file     Emotionally abused: Not on file     Physically abused: Not on file     Forced sexual activity: Not on file   Other Topics Concern    Not on file   Social History Narrative    Not on file       Current Outpatient Medications:     acetaminophen (TYLENOL) 500 mg tablet, Take by mouth every 6 (six) hours as needed , Disp: , Rfl:     anastrozole (ARIMIDEX) 1 mg tablet, TAKE 1 TABLET (1 MG TOTAL) BY MOUTH DAILY, Disp: 90 tablet, Rfl: 1    Cholecalciferol (VITAMIN D) 2000 units CAPS, Take 4,000 Units by mouth daily , Disp: , Rfl:     ibandronate (BONIVA) 150 MG tablet, Take 150 mg by mouth every 30 (thirty) days , Disp: , Rfl:     Ivermectin (SOOLANTRA) 1 % CREA, Apply topically daily , Disp: , Rfl:     meclizine (ANTIVERT) 12 5 MG tablet, Take 12 5 mg by mouth 3 (three) times a day as needed for dizziness, Disp: , Rfl:     pimecrolimus (ELIDEL) 1 % cream, , Disp: , Rfl:     SUMAtriptan (IMITREX) 100 mg tablet, Take 100 mg by mouth once as needed for migraine, Disp: , Rfl:     venlafaxine (EFFEXOR) 37 5 mg tablet, Take 1 tablet (37 5 mg total) by mouth daily, Disp: 90 tablet, Rfl: 3  Allergies   Allergen Reactions    Ciprofloxacin Seizures    Cyclopentolate Other (See Comments) and Tachycardia     Resp distress    Sulfa Antibiotics Hives and Rash    Scopolamine Other (See Comments)     Precautionary for cycopentolate allergy       Review of Systems:  Review of Systems   Constitutional: Negative  HENT: Negative  Eyes: Negative  Respiratory: Negative  Cardiovascular: Negative  Gastrointestinal: Negative  Endocrine: Negative  Genitourinary: Negative  Musculoskeletal: Positive for back pain  Leg pain   Skin: Negative  Allergic/Immunologic: Negative  Neurological: Negative  Hematological: Negative  Psychiatric/Behavioral: Negative  Vitals:    12/30/20 1034   BP: 128/70   Pulse: 81   Resp: 18   Temp: 97 7 °F (36 5 °C)   Weight: 80 3 kg (177 lb)   Height: 5' 5" (1 651 m)    Oxygen 97%    Pain Score: 0-No pain      Imaging:No results found

## 2021-01-06 ENCOUNTER — OFFICE VISIT (OUTPATIENT)
Dept: FAMILY MEDICINE CLINIC | Facility: CLINIC | Age: 66
End: 2021-01-06
Payer: COMMERCIAL

## 2021-01-06 VITALS
RESPIRATION RATE: 16 BRPM | DIASTOLIC BLOOD PRESSURE: 72 MMHG | TEMPERATURE: 97.3 F | BODY MASS INDEX: 29.32 KG/M2 | HEART RATE: 90 BPM | SYSTOLIC BLOOD PRESSURE: 118 MMHG | HEIGHT: 65 IN | OXYGEN SATURATION: 97 % | WEIGHT: 176 LBS

## 2021-01-06 DIAGNOSIS — E66.3 OVERWEIGHT (BMI 25.0-29.9): ICD-10-CM

## 2021-01-06 DIAGNOSIS — Z01.419 ENCOUNTER FOR GYNECOLOGICAL EXAMINATION WITHOUT ABNORMAL FINDING: ICD-10-CM

## 2021-01-06 DIAGNOSIS — M70.62 GREATER TROCHANTERIC BURSITIS OF LEFT HIP: Primary | ICD-10-CM

## 2021-01-06 PROBLEM — J06.9 VIRAL URI: Status: RESOLVED | Noted: 2020-02-06 | Resolved: 2021-01-06

## 2021-01-06 PROCEDURE — 99214 OFFICE O/P EST MOD 30 MIN: CPT | Performed by: FAMILY MEDICINE

## 2021-01-06 PROCEDURE — 1036F TOBACCO NON-USER: CPT | Performed by: FAMILY MEDICINE

## 2021-01-06 PROCEDURE — 20610 DRAIN/INJ JOINT/BURSA W/O US: CPT | Performed by: FAMILY MEDICINE

## 2021-01-06 PROCEDURE — 3008F BODY MASS INDEX DOCD: CPT | Performed by: FAMILY MEDICINE

## 2021-01-06 RX ORDER — LIDOCAINE HYDROCHLORIDE 10 MG/ML
2 INJECTION, SOLUTION EPIDURAL; INFILTRATION; INTRACAUDAL; PERINEURAL
Status: COMPLETED | OUTPATIENT
Start: 2021-01-06 | End: 2021-01-06

## 2021-01-06 RX ORDER — METHYLPREDNISOLONE ACETATE 40 MG/ML
1 INJECTION, SUSPENSION INTRA-ARTICULAR; INTRALESIONAL; INTRAMUSCULAR; SOFT TISSUE
Status: COMPLETED | OUTPATIENT
Start: 2021-01-06 | End: 2021-01-06

## 2021-01-06 RX ADMIN — LIDOCAINE HYDROCHLORIDE 2 ML: 10 INJECTION, SOLUTION EPIDURAL; INFILTRATION; INTRACAUDAL; PERINEURAL at 10:59

## 2021-01-06 RX ADMIN — METHYLPREDNISOLONE ACETATE 1 ML: 40 INJECTION, SUSPENSION INTRA-ARTICULAR; INTRALESIONAL; INTRAMUSCULAR; SOFT TISSUE at 10:59

## 2021-01-06 NOTE — PROGRESS NOTES
Follow-up - Radiation Oncology   Anitra Lucero 1955 63 y o  female [id number]      History of Present Illness   Cancer Staging  Malignant neoplasm of upper-outer quadrant of left breast in female, estrogen receptor positive ([facility] [address] )  Staging form: Breast, AJCC 8th Edition  - Pathologic: Stage IA (pT1c, pN0(sn), cM0, G1, ER+, AR+, HER2-) - Unsigned  Neoadjuvant therapy: No  Method of lymph node assessment: Klemme lymph node biopsy  Histologic grading system: 3 grade system  Laterality: Left  Tumor size (mm): 18      Anitra Lucero is a 63y o  year old female with a history of [id number] invasive lobular carcinoma of the left breast status post resection achieving negative margins   Tumor cells were ER/ AR positive and HER2 Mateo negative  She completed a course of adjuvant whole breast hypofractionated radiation on 12/4/19  She is maintained on anastrazole  Today, she returns for follow-up evaluation      She denies any new palpable nodules, suspicious skin changes, or nipple discharge of the breasts bilaterally   She denies any significant breast pain or tenderness   She denies any upper extremity edema or shoulder restriction  Bilateral mammogram on 5/29/20 was benign      6/23/20 DXA demonstrated osteopenia      The patient is maintained on anastrazole  She continues to experience hot flashes, but was started on venlafaxine by Dr Ketty Lucero with significant improvement  She has developed significant tenderness and pain in her wrists and along the tendons of her thumbs bilaterally  She notes some weakness in due to pain  She has difficulty gripping  This is stable, but bothersome  She is scheduled to see a Rheumatologist in a few weeks  She denies any vaginal discharge of bleeding  Her last bilateral mammogram from 5/29/20 was benign (BIRADS-2) with 1 year follow-up planned      12/8/20 Ursula Mcgee MD  no evidence of recurrent disease;  continue with anastrozole 1 mg once a day as well as venlafaxine (Effexor) 37 5 mg daily  Her main complaint is leg and back pain  She is scheduled for Rheumatology evaluation in about 1 month  Her pain decreases mobility  She has tried otc medications with mild relief  She denies numbness, weakness or swelling  Upcoming appointment:  2/9/21 VIVIEN Cr  12/28/21 Serg Nguyen MD          Historical Information   Oncology History   Malignant neoplasm of upper-outer quadrant of left breast in female, estrogen receptor positive ([facility] [address] )   7/23/2019 Biopsy    Left breast biopsy  1 o'clock, 5 cm from nipple  Invasive lobular carcinoma  Grade 1      HER2 0    Done at [facility]     10/1/2019 Surgery    Left breast needle localized lumpectomy with sentinel lymph node biopsy  Invasive lobular carcinoma  Grade 1  1 8 cm  Margins negative  0/2 Lymph nodes  Anatomic/Prognostic Stage IA     10/30/2019 -  Hormone Therapy    Anastrozole 1 mg daily  Dr Sherry Stallings     11/13/2019 - 12/4/2019 Radiation    entire right breast to 4005cGy in 15 daily 267cGy fractions  Dr Astrid Pittman         Past Medical History:   Diagnosis Date    Breast cancer [facility]) 09/2019    History of radiation therapy 10/2019    left breast cancer    Kidney stone     age 17    Migraine     Osteoporosis     Pancreatitis     Pleurisy     PONV (postoperative nausea and vomiting)     Vertigo      Past Surgical History:   Procedure Laterality Date    BREAST LUMPECTOMY Left 10/1/2019    Procedure: BREAST NEEDLE LOCALIZED LUMPECTOMY (NEEDLE LOC AT 0730); Surgeon: Rafy Luu MD;  [facility];  Service: Surgical Oncology    CHOLECYSTECTOMY      COLONOSCOPY      DENTAL SURGERY      Bone graft with implant    HALLUX VALGUS CORRECTION Bilateral     INCISIONAL BREAST BIOPSY      LYMPH NODE BIOPSY Left 10/1/2019    Procedure: SENTINEL LYMPH NODE BIOPSY; LYMPHATIC MAPPING WITH BLUE DYE AND RADIOACTIVE DYE (INJECT AT 0830 BY DR HA IN THE OR);   Surgeon: Rafy Luu MD; [facility];  Service: Surgical Oncology    MAMMO NEEDLE LOCALIZATION LEFT (ALL INC) Left 10/1/2019    MAMMO STEREOTACTIC BREAST BIOPSY LEFT (ALL INC)      RHINOPLASTY         Social History   Social History     Substance and Sexual Activity   Alcohol Use No     Social History     Substance and Sexual Activity   Drug Use No     Social History     Tobacco Use   Smoking Status Never Smoker   Smokeless Tobacco Never Used         Meds/Allergies     Current Outpatient Medications:     acetaminophen (TYLENOL) 500 mg tablet, Take by mouth every 6 (six) hours as needed , Disp: , Rfl:     anastrozole (ARIMIDEX) 1 mg tablet, TAKE 1 TABLET (1 MG TOTAL) BY MOUTH DAILY, Disp: 90 tablet, Rfl: 1    Cholecalciferol (VITAMIN D) 2000 units CAPS, Take 4,000 Units by mouth daily , Disp: , Rfl:     ibandronate (BONIVA) 150 MG tablet, Take 150 mg by mouth every 30 (thirty) days , Disp: , Rfl:     Ivermectin (SOOLANTRA) 1 % CREA, Apply topically daily , Disp: , Rfl:     meclizine (ANTIVERT) 12 5 MG tablet, Take 12 5 mg by mouth 3 (three) times a day as needed for dizziness, Disp: , Rfl:     pimecrolimus (ELIDEL) 1 % cream, , Disp: , Rfl:     SUMAtriptan (IMITREX) 100 mg tablet, Take 100 mg by mouth once as needed for migraine, Disp: , Rfl:     venlafaxine (EFFEXOR) 37 5 mg tablet, Take 1 tablet (37 5 mg total) by mouth daily, Disp: 90 tablet, Rfl: 3  No current facility-administered medications for this visit  Allergies   Allergen Reactions    Ciprofloxacin Seizures    Cyclopentolate Other (See Comments) and Tachycardia     Resp distress    Sulfa Antibiotics Hives and Rash    Scopolamine Other (See Comments)     Precautionary for cycopentolate allergy       Review of Systems  Constitutional: Negative  HENT: Negative  Eyes: Negative  Respiratory: Negative  Cardiovascular: Negative  Gastrointestinal: Negative  Endocrine: Negative  Genitourinary: Negative      Musculoskeletal: Positive for back pain         Leg pain   Skin: Negative  Allergic/Immunologic: Negative  Neurological: Negative  Hematological: Negative  Psychiatric/Behavioral: Negative  OBJECTIVE:   /70   Pulse 81   Temp 97 7 °F (36 5 °C)   Resp 18   Ht 5' 5" (1 651 m)   Wt 80 3 kg (177 lb)   BMI 29 45 kg/m²   Karnofsky: 80 - Normal activity with effort; some signs or symptoms of disease    Physical Exam  Vitals signs and nursing note reviewed  Constitutional:       General: She is not in acute distress  Appearance: She is well-developed  Eyes:      General: No scleral icterus  Conjunctiva/sclera: Conjunctivae normal    Cardiovascular:      Rate and Rhythm: Normal rate and regular rhythm  Heart sounds: No murmur  Pulmonary:      Effort: Pulmonary effort is normal  No respiratory distress  Breath sounds: No wheezing, rhonchi or rales  Chest:      Comments: Breast examination demonstrates breasts to be symmetric in contour, but left is slightly smaller in size  The left breat has a well-healed upper outer scar  There is diffuse fibrosis more pronounced near scar  There is slight diffuse hyperpigmentation  There are no palpable nodules or suspicious skin changes bilaterally  Abdominal:      General: There is no distension  Palpations: Abdomen is soft  Tenderness: There is no abdominal tenderness  Musculoskeletal:      Right lower leg: No edema  Left lower leg: No edema  Lymphadenopathy:      Cervical: No cervical adenopathy  Upper Body:      Right upper body: No supraclavicular adenopathy  Left upper body: No supraclavicular adenopathy  Skin:     Comments: Full range of upper extremities  No edema of upper extremities bilaterally  Neurological:      Mental Status: She is alert and oriented to person, place, and time  Gait: Gait abnormal (limping due to pain)        Comments: 5/5 strength lower extremities bilaterally          RESULTS  Bilateral Mammogram 5/29/20  FINDINGS:   LEFT  1) POST-SURGICAL FINDING: There are post-surgical findings from a previous lumpectomy seen in the upper outer quadrant of the left breast in the posterior depth       BILATERAL  There are no suspicious masses, grouped microcalcifications or areas of architectural distortion  The skin and nipple areolar complex are unremarkable  Biopsy marker clips are noted in the medial left breast   Benign-appearing calcifications are noted in each breast   IMPRESSION:   No evidence of malignancy    ASSESSMENT/BI-RADS CATEGORY:  Left: 2 - Benign  Right: 2 - Benign  Overall: 2 - Benign      Assessment/Plan:  Pramod Hebert is a 63y o  year old female with a history of [id number] invasive lobular carcinoma of the left breast status post resection achieving negative margins   Tumor cells were ER/ NM positive and HER2 Mateo negative  She completed a course of adjuvant whole breast hypofractionated radiation on 12/4/19  She is maintained on Arimidex   She is currently clinically JOSUÉ     The patient is following up with Rheumatology in about 1 month  She has no neurologic symptoms, but her back and leg pain are significant  I recommended contacting her PCP to possibly initiate work-up and management earlier  She will consider  She will return for follow-up in 6 months   We are available for any questions or concerns that may arise in the interim  Katie Toure MD  1/6/2021,6:32 PM    Portions of the record may have been created with voice recognition software   Occasional wrong word or "sound a like" substitutions may have occurred due to the inherent limitations of voice recognition software   Read the chart carefully and recognize, using context, where substitutions have occurred

## 2021-01-06 NOTE — PROGRESS NOTES
Assessment/Plan:       Problem List Items Addressed This Visit        Other    Overweight (BMI 25 0-29  9)     BMI Counseling: Body mass index is 29 29 kg/m²  The BMI is above normal  Nutrition recommendations include decreasing overall calorie intake  Exercise recommendations include exercising 3-5 times per week  Other Visit Diagnoses     Greater trochanteric bursitis of left hip    -  Primary    Relevant Orders    Ambulatory referral to Physical Therapy    Large joint arthrocentesis: L greater trochanteric bursa (Completed)    Encounter for gynecological examination without abnormal finding        Relevant Orders    Ambulatory referral to Obstetrics / Gynecology          Injection today for bursitis  Refer to PT  If no improvement will consider ortho eval    Subjective:      Patient ID: Eda Banda is a 63 y o  female  She is having L leg pain for past few months  The pain extends from the lower back down to the knee  She did [place] her symptoms [place] [facility] and received an injection for bursitis - she says this was done in July  Reviewed care everywhere it was 7/31/20  The pain improved for a few weeks to months but has now returned  The pain is worse with walking  She does have left lower back pain  She says it does get tingling, spasms  She tried taking Aleve which did not help  Needs ob/gyn referral       The following portions of the patient's history were reviewed and updated as appropriate:   She  has a past medical history of Breast cancer ([facility] [address] ) (09/2019), History of radiation therapy (10/2019), Kidney stone, Migraine, Osteoporosis, Pancreatitis, Pleurisy, PONV (postoperative nausea and vomiting), and Vertigo    She   Patient Active Problem List    Diagnosis Date Noted    Overweight (BMI 25 0-29 9) 01/06/2021    Use of anastrozole (Arimidex) 01/16/2020    Malignant ne[facility] of upper[facility]outer [facility] left [facility] in [facility], [facility] 08/12/[facility]    Age-related osteoporosis without current pathological fracture 03/27/2019    Fatigue 02/15/2018     She  has a past surgical history that includes Cholecystectomy; Dental surgery; Incisional breast biopsy; Rhinoplasty; Hallux valgus correction (Bilateral); Colonoscopy; Mammo stereotactic breast biopsy left (all inc); Mammo needle localization left (all inc) (Left, 10/1/2019); Breast lumpectomy (Left, 10/1/2019); and Lymph node biopsy (Left, 10/1/2019)  Her family history includes Lung cancer (age of onset: 71) in her father; No Known Problems in her mother  She  reports that she has never smoked  She has never used smokeless tobacco  She reports that she does not drink alcohol or use drugs  Current Outpatient Medications   Medication Sig Dispense Refill    acetaminophen (TYLENOL) 500 mg tablet Take by mouth every 6 (six) hours as needed       anastrozole (ARIMIDEX) 1 mg tablet TAKE 1 TABLET (1 MG TOTAL) BY MOUTH DAILY 90 tablet 1    Cholecalciferol (VITAMIN D) 2000 units CAPS Take 4,000 Units by mouth daily       ibandronate (BONIVA) 150 MG tablet Take 150 mg by mouth every 30 (thirty) days       Ivermectin (SOOLANTRA) 1 % CREA Apply topically daily       meclizine (ANTIVERT) 12 5 MG tablet Take 12 5 mg by mouth 3 (three) times a day as needed for dizziness      pimecrolimus (ELIDEL) 1 % cream       SUMAtriptan (IMITREX) 100 mg tablet Take 100 mg by mouth once as needed for migraine      venlafaxine (EFFEXOR) 37 5 mg tablet Take 1 tablet (37 5 mg total) by mouth daily 90 tablet 3     No current facility-administered medications for this visit        Current Outpatient Medications on File Prior to Visit   Medication Sig    acetaminophen (TYLENOL) 500 mg tablet Take by mouth every 6 (six) hours as needed     anastrozole (ARIMIDEX) 1 mg tablet TAKE 1 TABLET (1 MG TOTAL) BY MOUTH DAILY    Cholecalciferol (VITAMIN D) 2000 units CAPS Take 4,000 Units by mouth daily     ibandronate (BONIVA) 150 MG tablet Take 150 mg by mouth every 30 (thirty) days     Ivermectin (SOOLANTRA) 1 % CREA Apply topically daily     meclizine (ANTIVERT) 12 5 MG tablet Take 12 5 mg by mouth 3 (three) times a day as needed for dizziness    pimecrolimus (ELIDEL) 1 % cream     SUMAtriptan (IMITREX) 100 mg tablet Take 100 mg by mouth once as needed for migraine    venlafaxine (EFFEXOR) 37 5 mg tablet Take 1 tablet (37 5 mg total) by mouth daily     No current facility-administered medications on file prior to visit  She is allergic to ciprofloxacin; cyclopentolate; sulfa antibiotics; and scopolamine       Review of Systems   Constitutional: Negative  HENT: Negative  Respiratory: Negative  Cardiovascular: Negative  Genitourinary: Negative  Musculoskeletal: Positive for arthralgias and myalgias  Neurological: Positive for numbness  Psychiatric/Behavioral: Negative  Objective:      /72 (BP Location: Left arm, Patient Position: Sitting, Cuff Size: Adult)   Pulse 90   Temp (!) 97 3 °F (36 3 °C)   Resp 16   Ht 5' 5" (1 651 m)   Wt 79 8 kg (176 lb)   SpO2 97%   BMI 29 29 kg/m²            Physical Exam  Vitals signs and nursing note reviewed  Constitutional:       General: She is not in acute distress  Appearance: She is well-developed  She is not diaphoretic  HENT:      Head: Normocephalic and atraumatic  Pulmonary:      Effort: Pulmonary effort is normal  No respiratory distress  Musculoskeletal:      Left hip: She exhibits tenderness  She exhibits normal range of motion and no swelling  Comments: Point tenderness at the left trochanteric bursa  Neurological:      Mental Status: She is alert  Psychiatric:         Behavior: Behavior normal          Thought Content:  Thought content normal          Judgment: Judgment normal          Large joint arthrocentesis: L greater trochanteric bursa  Universal Protocol:  Consent: Written consent obtained    Consent given by: patient  Timeout called at: 1/6/2021 10:00 AM   Supporting Documentation  Indications: pain   Procedure Details  Location: hip - L greater trochanteric bursa  Needle size: 25 G  Ultrasound guidance: no  Approach: lateral  Medications administered: 1 mL methylPREDNISolone acetate 40 mg/mL; 2 mL lidocaine (PF) 1 %    Patient tolerance: patient tolerated the procedure well with no immediate complications  Dressing:  Sterile dressing applied

## 2021-01-06 NOTE — LETTER
January 6, 2021     Patient: Patrizia Mcqueen   YOB: 1955   Date of Visit: 1/6/2021       To Whom it May Concern:    Patrizia Mcqueen is under my professional care  She was seen in my office on 1/6/2021  She may return to work on 1/7/21  If you have any questions or concerns, please don't hesitate to call           Sincerely,          Chad Siddiqui MD        CC: No Recipients

## 2021-01-06 NOTE — PATIENT INSTRUCTIONS
Hip Bursitis Exercises   AMBULATORY CARE:   Hip bursitis exercises  help strengthen the muscles in your hip and keep the joint flexible  Strong muscles can help reduce pain, prevent injury, and keep the joint stable  The exercises can also help increase the range of motion in your hip joint  What you need to know about exercise safety:   · Move slowly and smoothly  Avoid fast or jerky motions  This will help prevent an injury  · Breathe normally  Do not hold your breath  It is important to breathe in and out so you do not tense up during exercise  Tension could prevent you from moving your joint in a full range of motion  · Do the exercises and stretches on both legs  Do this so both hips remain strong and flexible  · Stop if you feel sharp pain or an increase in pain  Contact your healthcare provider or physical therapist  It is normal to feel some discomfort during exercise  Regular exercise will help decrease your discomfort over time  · Warm up before you stretch and exercise  Walk or ride a stationary bike for 5 to 10 minutes  How to do hip stretches: Your healthcare provider or physical therapist will tell you how many times to do each stretch  Do the stretch on both sides before you move to the next stretch  · Standing iliotibial band stretch:  Stand with the leg on your injured side behind your other leg  Bend sideways toward the side that is not injured  Stop when you feel a stretch in your outer hip  Hold for 5 to 10 seconds  Then return to the starting position  · Lying iliotibial band stretch:  Lie on your back  Bend the knee on your injured side toward your chest  Place your hands on the outside of your knee and thigh  Slowly pull the knee across your body  Stop when you feel a stretch in your hip and outer thigh  Hold for 5 to 10 seconds  Return your leg to the starting position  · Hip stretch:  Lie on your back with both legs straight and on the [facility] the knee on your injured side toward your chest until you can reach your lower leg  Place both hands on your shin and pull your knee toward your chest  Hold for 5 to 10 seconds  Return your leg to the starting position  · Knee to chest:  Lie on your back with both knees bent and feet flat on the floor  Bend the knee on your injured side toward your chest until you can reach your lower leg  Place both hands on your shin and pull your knee toward your chest  Hold for 5 to 10 seconds  Return your leg to the starting position  · Internal hip rotator stretch:  You will do this exercise on a table  Lie on your side with the injured hip on top  You may be told to keep a pillow between your thighs  Move the top leg so the foot hangs below the edge of the table  Rotate your hip to raise your foot in the opposite direction of the bottom shoulder  Raise your foot as high as you can so you feel a stretch in the back of your thigh  Hold for 5 seconds  Then slowly lower your foot to the starting position  · External hip rotator stretch:  You will do this exercise on a table  Lie on your side with the injured hip on the bottom  You do not need a pillow between your thighs for this exercise  Move the bottom leg so the foot is off the edge of the table  Rotate your hip to lift the foot in the opposite direction of the bottom shoulder  Raise your foot as high as you can so you feel a stretch in your buttock  Hold for 5 seconds  Then slowly lower your foot to the starting position  · Kneeling hip flexor stretch:  Kneel on your knee on the injured side  Place the foot of your other leg on the floor so the knee is bent  Put both hands on top of your thigh  Keep your back straight and abdominal muscles tight  Lean forward until you feel a stretch in your other thigh  Hold the stretch for 10 seconds  Return to the starting position  How to do hip strengthening exercises:   Your healthcare provider or physical therapist will tell you how many times to do each exercise  Do the exercise on both sides before you move to the next exercise  · Straight leg lift to the side: This may also be called hip abduction  Lie on your side with straight legs, with the injured hip on top  Slowly raise your top leg toward the ceiling as high as you can  Keep your foot pointed  Hold for 5 seconds  Then slowly lower your leg to the starting position  · Inner thigh lift: This may also be called hip adduction  Lie on your side with straight legs, with the injured hip on the bottom  Cross your top leg over your bottom leg  Put the foot of your top leg on the floor in front of you  Raise your bottom leg until it touches the top leg  Hold for 5 seconds  Then slowly lower the leg to the floor  · Clam exercise:  Lie on your side so your injured side is on top  Bend your knees  Keep your heels together during this exercise  Slowly raise your top knee toward the ceiling  Then lower your leg so your knees are together  Call your doctor if:   · You have sharp pain during exercise or at rest     · You have questions or concerns about the stretches or exercises  © Copyright [address] Information is for End User's use only and may not be sold, redistributed or otherwise used for commercial purposes  All illustrations and images included in CareNotes® are the copyrighted property of A D A M , Inc  or [facility] Monique Robledo   The above information is an  only  It is not intended as medical advice for individual conditions or treatments  Talk to your doctor, nurse or pharmacist before following any medical regimen to see if it is safe and effective for you

## 2021-01-06 NOTE — ASSESSMENT & PLAN NOTE
BMI Counseling: Body mass index is 29 29 kg/m²  The BMI is above normal  Nutrition recommendations include decreasing overall calorie intake  Exercise recommendations include exercising 3-5 times per week

## 2021-01-15 ENCOUNTER — IMMUNIZATIONS (OUTPATIENT)
Dept: FAMILY MEDICINE CLINIC | Facility: HOSPITAL | Age: 66
End: 2021-01-15

## 2021-01-15 DIAGNOSIS — Z23 ENCOUNTER FOR IMMUNIZATION: Primary | ICD-10-CM

## 2021-01-15 PROCEDURE — 0011A SARS-COV-2 / COVID-19 MRNA VACCINE (MODERNA) 100 MCG: CPT

## 2021-01-15 PROCEDURE — 91301 SARS-COV-2 / COVID-19 MRNA VACCINE (MODERNA) 100 MCG: CPT

## 2021-02-10 ENCOUNTER — IMMUNIZATIONS (OUTPATIENT)
Dept: FAMILY MEDICINE CLINIC | Facility: HOSPITAL | Age: 66
End: 2021-02-10

## 2021-02-10 DIAGNOSIS — Z23 ENCOUNTER FOR IMMUNIZATION: Primary | ICD-10-CM

## 2021-02-10 PROCEDURE — 91301 SARS-COV-2 / COVID-19 MRNA VACCINE (MODERNA) 100 MCG: CPT

## 2021-02-10 PROCEDURE — 0012A SARS-COV-2 / COVID-19 MRNA VACCINE (MODERNA) 100 MCG: CPT

## 2021-02-16 ENCOUNTER — TELEPHONE (OUTPATIENT)
Dept: FAMILY MEDICINE CLINIC | Facility: CLINIC | Age: 66
End: 2021-02-16

## 2021-02-16 NOTE — TELEPHONE ENCOUNTER
If she had a high risk exposure (unmasked in same room for >15 min) she could get a test 5 days after the exposure

## 2021-02-16 NOTE — TELEPHONE ENCOUNTER
Since she was not 2 weeks out from her 2nd vaccine, she would still need to quarantine per CDC guidelines  Devorah Granados is for 10 days after the last exposure  Or she can get a test on day 5 and if negative can come off quarantine on day 7  If further questions have her do a virtual visit

## 2021-02-16 NOTE — TELEPHONE ENCOUNTER
What if she had the vaccine  She had the second on on Wednesday  It has not been 2 weeks for the injection   How does it work   She feels fine    She works closely with this person

## 2021-02-16 NOTE — TELEPHONE ENCOUNTER
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

## 2021-03-05 DIAGNOSIS — C50.412 MALIGNANT NEOPLASM OF UPPER-OUTER QUADRANT OF LEFT BREAST IN FEMALE, ESTROGEN RECEPTOR POSITIVE (HCC): ICD-10-CM

## 2021-03-05 DIAGNOSIS — Z17.0 MALIGNANT NEOPLASM OF UPPER-OUTER QUADRANT OF LEFT BREAST IN FEMALE, ESTROGEN RECEPTOR POSITIVE (HCC): ICD-10-CM

## 2021-03-05 RX ORDER — ANASTROZOLE 1 MG/1
1 TABLET ORAL DAILY
Qty: 90 TABLET | Refills: 1 | Status: SHIPPED | OUTPATIENT
Start: 2021-03-05 | End: 2021-04-15

## 2021-04-15 DIAGNOSIS — C50.412 MALIGNANT NEOPLASM OF UPPER-OUTER QUADRANT OF LEFT BREAST IN FEMALE, ESTROGEN RECEPTOR POSITIVE (HCC): ICD-10-CM

## 2021-04-15 DIAGNOSIS — Z17.0 MALIGNANT NEOPLASM OF UPPER-OUTER QUADRANT OF LEFT BREAST IN FEMALE, ESTROGEN RECEPTOR POSITIVE (HCC): ICD-10-CM

## 2021-04-15 RX ORDER — ANASTROZOLE 1 MG/1
1 TABLET ORAL DAILY
Qty: 90 TABLET | Refills: 1 | Status: SHIPPED | OUTPATIENT
Start: 2021-04-15 | End: 2022-01-12 | Stop reason: ALTCHOICE

## 2021-05-07 ENCOUNTER — OFFICE VISIT (OUTPATIENT)
Dept: URGENT CARE | Facility: CLINIC | Age: 66
End: 2021-05-07
Payer: COMMERCIAL

## 2021-05-07 VITALS
HEIGHT: 65 IN | BODY MASS INDEX: 29.32 KG/M2 | WEIGHT: 176 LBS | SYSTOLIC BLOOD PRESSURE: 134 MMHG | TEMPERATURE: 97.3 F | OXYGEN SATURATION: 99 % | RESPIRATION RATE: 18 BRPM | DIASTOLIC BLOOD PRESSURE: 88 MMHG | HEART RATE: 104 BPM

## 2021-05-07 DIAGNOSIS — M54.2 CERVICALGIA: Primary | ICD-10-CM

## 2021-05-07 PROCEDURE — 99213 OFFICE O/P EST LOW 20 MIN: CPT | Performed by: PHYSICIAN ASSISTANT

## 2021-05-07 RX ORDER — CYCLOBENZAPRINE HCL 5 MG
5 TABLET ORAL 3 TIMES DAILY PRN
Qty: 21 TABLET | Refills: 0 | Status: SHIPPED | OUTPATIENT
Start: 2021-05-07 | End: 2021-07-02 | Stop reason: ALTCHOICE

## 2021-05-07 NOTE — PROGRESS NOTES
[facility]        NAME: Evan Burris is a 61 y o  female  : 1955    MRN: [id number]  DATE: May 7, 2021  TIME: 6:01 PM    Assessment and Plan   Cervicalgia [M54 2]  1  Cervicalgia  cyclobenzaprine (FLEXERIL) 5 mg tablet    Diclofenac Sodium (VOLTAREN) 1 %         Patient Instructions     Patient Instructions     Warm compresses  Massage  voltaren gel and flexeril  PCP follow up  Go to ER with worsening symptoms, increased pain, numbness or weakness  Unable to obtain xrays today d/t xray machine being unavailable  Recommend further work up by ortho or PCP  Acute Neck Pain   WHAT YOU NEED TO KNOW:   Acute neck pain starts suddenly, increases quickly, and goes away in a few days  The pain may come and go, or be worse with certain movements  The pain may be only in your neck, or it may move to your arms, back, or shoulders  You may also have pain that starts in another body area and moves to your neck  DISCHARGE INSTRUCTIONS:   Return to the emergency department if:   · You have an injury that causes neck pain and shooting pain down your arms or legs  · Your neck pain suddenly becomes severe  · You have neck pain along with numbness, tingling, or weakness in your arms or legs  · You have a stiff neck, a headache, and a fever  Contact your healthcare provider if:   · You have new or worsening symptoms  · Your symptoms continue even after treatment  · You have questions or concerns about your condition or care  Medicines:   · NSAIDs , such as ibuprofen, help decrease swelling, pain, and fever  This medicine is available without a doctor's order  Ask your healthcare provider which medicine to take and how often to take it  Follow directions  NSAIDs can cause stomach bleeding or kidney problems if not taken correctly  If you take blood thinner medicine, always ask if NSAIDs are safe for you  · Acetaminophen  helps decrease pain and fever   Ask your healthcare provider how much to take and how often to take it  Follow directions  Acetaminophen can cause liver damage if not taken correctly  · Steroid medicine  may be used to reduce inflammation  This can help relieve pain caused by swelling  · Take your medicine as directed  Contact your healthcare provider if you think your medicine is not helping or if you have side effects  Tell him or her if you are allergic to any medicine  Keep a list of the medicines, vitamins, and herbs you take  Include the amounts, and when and why you take them  Bring the list or the pill bottles to follow-up visits  Carry your medicine list with you in case of an emergency  Manage or prevent acute neck pain:   · Rest your neck as directed  Do not make sudden movements, such as turning your head quickly  Your healthcare provider may recommend you wear a cervical collar for a short time  The collar will prevent you from moving your head  This will give your neck time to heal if an injury is causing your neck pain  Ask your healthcare provider when you can return to sports or other normal daily activities  · Apply heat as directed  Heat helps relieve pain and swelling  Use a heat wrap, or soak a small towel in warm water  Wring out the extra water  Apply the heat wrap or towel for 20 minutes every hour, or as directed  · Apply ice as directed  Ice helps relieve pain and swelling, and can help prevent tissue damage  Use an ice pack, or put ice in a bag  Cover the ice pack or back with a towel before you apply it to your neck  Apply the ice pack or ice for 15 minutes every hour, or as directed  Your healthcare provider can tell you how often to apply ice  · Do neck exercises as directed  Neck exercises help strengthen the muscles and increase range of motion  Your healthcare provider will tell you which exercises are right for you   He may give you instructions, or he may recommend that you work with a physical therapist  Your healthcare provider or therapist can make sure you are doing the exercises correctly  · Maintain good posture  Try to keep your head and shoulders lifted when you sit  If you work in front of a computer, make sure the monitor is at the right level  You should not need to look up down to see the screen  You should also not have to lean forward to be able to read what is on the screen  Make sure your keyboard, mouse, and other computer items are placed where you do not have to extend your shoulder to reach them  Get up often if you work in front of a computer or sit for long periods of time  Stretch or walk around to keep your neck muscles loose  Follow up with your healthcare provider as directed: Your healthcare provider may refer you to a specialist if your pain does not get better with treatment  Write down your questions so you remember to ask them during your visits  © Copyright [address] Information is for End User's use only and may not be sold, redistributed or otherwise used for commercial purposes  All illustrations and images included in CareNotes® are the copyrighted property of A D A M , Inc  or [address] [place] [address]  The above information is an  only  It is not intended as medical advice for individual conditions or treatments  Talk to your doctor, nurse or pharmacist before following any medical regimen to see if it is safe and effective for you  **Portions of the record may have been created with voice recognition software  Occasional wrong word or "sound a like" substitutions may have occurred due to the inherent limitations of voice recognition software  Read the chart carefully and recognize, using context, where substitutions have occurred  **     Chief Complaint     Chief Complaint   Patient presents with    Neck Pain     started yesterday  mainly to the R side of her neck  denies injury            History of Present Illness       53-year-old female presents clinic with complaints of right neck pain x1 day  Pain started gradually last night and is worse with movement and touch  She denies any difficulty breathing or swallowing, chest pain or shortness of breath, fever, injury, bruising or swelling  Review of Systems     Review of Systems   Constitutional: Negative for chills, fatigue and fever  HENT: Negative  Respiratory: Negative for shortness of breath  Cardiovascular: Negative for chest pain  Musculoskeletal: Positive for myalgias, neck pain and neck stiffness  Negative for back pain  Skin: Negative for color change, rash and wound  Neurological: Negative for weakness, numbness and headaches           Current Medications     Current Outpatient Medications:     acetaminophen (TYLENOL) 500 mg tablet, Take by mouth every 6 (six) hours as needed , Disp: , Rfl:     anastrozole (ARIMIDEX) 1 mg tablet, TAKE 1 TABLET (1 MG TOTAL) BY MOUTH DAILY, Disp: 90 tablet, Rfl: 1    Cholecalciferol (VITAMIN D) 2000 units CAPS, Take 4,000 Units by mouth daily , Disp: , Rfl:     ibandronate (BONIVA) 150 MG tablet, Take 150 mg by mouth every 30 (thirty) days , Disp: , Rfl:     Ivermectin (SOOLANTRA) 1 % CREA, Apply topically daily , Disp: , Rfl:     meclizine (ANTIVERT) 12 5 MG tablet, Take 12 5 mg by mouth 3 (three) times a day as needed for dizziness, Disp: , Rfl:     pimecrolimus (ELIDEL) 1 % cream, , Disp: , Rfl:     SUMAtriptan (IMITREX) 100 mg tablet, Take 100 mg by mouth once as needed for migraine, Disp: , Rfl:     cyclobenzaprine (FLEXERIL) 5 mg tablet, Take 1 tablet (5 mg total) by mouth 3 (three) times a day as needed for muscle spasms, Disp: 21 tablet, Rfl: 0    Diclofenac Sodium (VOLTAREN) 1 %, Apply 2 g topically 4 (four) times a day, Disp: 50 g, Rfl: 0    venlafaxine (EFFEXOR) 37 5 mg tablet, Take 1 tablet (37 5 mg total) by mouth daily, Disp: 90 tablet, Rfl: 3    Current Allergies     Allergies as of 05/07/2021 - Reviewed 05/07/2021 Allergen Reaction Noted    Ciprofloxacin Seizures 12/28/2016    Cyclopentolate Other (See Comments) and Tachycardia 09/15/1999    Sulfa antibiotics Hives and Rash 03/22/2001    Scopolamine Other (See Comments) 02/21/2014            The following portions of the patient's history were reviewed and updated as appropriate: allergies, current medications, past family history, past medical history, past social history, past surgical history and problem list      Past Medical History:   Diagnosis Date    Breast cancer ([address] ) 09/2019    History of radiation therapy 10/2019    left breast cancer    Kidney stone     age 17    Migraine     Osteoporosis     Pancreatitis     Pleurisy     PONV (postoperative nausea and vomiting)     Vertigo        Past Surgical History:   Procedure Laterality Date    BREAST LUMPECTOMY Left 10/1/2019    Procedure: BREAST NEEDLE LOCALIZED LUMPECTOMY (NEEDLE LOC AT 0730); Surgeon: Preethi Avila MD;  [facility];  Service: Surgical Oncology    CHOLECYSTECTOMY      COLONOSCOPY      DENTAL SURGERY      Bone graft with implant    HALLUX VALGUS CORRECTION Bilateral     INCISIONAL BREAST BIOPSY      LYMPH NODE BIOPSY Left 10/1/2019    Procedure: SENTINEL LYMPH NODE BIOPSY; LYMPHATIC MAPPING WITH BLUE DYE AND RADIOACTIVE DYE (INJECT AT 0830 BY DR HA IN THE OR); Surgeon: Preethi Avila MD;  [facility];  Service: Surgical Oncology    MAMMO NEEDLE LOCALIZATION LEFT (ALL INC) Left 10/1/2019    MAMMO STEREOTACTIC BREAST BIOPSY LEFT (ALL INC)      RHINOPLASTY         Family History   Problem Relation Age of Onset    No Known Problems Mother     Lung cancer Father 71    Breast cancer Neg Hx          Medications have been verified          Objective     /88 (BP Location: Left arm, Patient Position: Sitting)   Pulse 104   Temp (!) 97 3 °F (36 3 °C) (Temporal)   Resp 18   Ht 5' 5" (1 651 m)   Wt 79 8 kg (176 lb)   SpO2 99%   BMI 29 29 kg/m²        Physical Exam     Physical Exam  Vitals signs and nursing note reviewed  Constitutional:       General: She is not in acute distress  Appearance: Normal appearance  She is not diaphoretic  HENT:      Head: Normocephalic and atraumatic  Cardiovascular:      Rate and Rhythm: Normal rate  Pulmonary:      Effort: Pulmonary effort is normal    Musculoskeletal:      Cervical back: She exhibits decreased range of motion, tenderness (Paraspinous and trapezius) and spasm  She exhibits no bony tenderness, no swelling and no edema  Skin:     Coloration: Skin is not pale  Findings: No bruising or rash  Neurological:      Mental Status: She is alert  Sensory: No sensory deficit

## 2021-05-07 NOTE — PATIENT INSTRUCTIONS
Warm compresses  Massage  voltaren gel and flexeril  PCP follow up  Go to ER with worsening symptoms, increased pain, numbness or weakness  Unable to obtain xrays today d/t xray machine being unavailable  Recommend further work up by ortho or PCP  Acute Neck Pain   WHAT YOU NEED TO KNOW:   Acute neck pain starts suddenly, increases quickly, and goes away in a few days  The pain may come and go, or be worse with certain movements  The pain may be only in your neck, or it may move to your arms, back, or shoulders  You may also have pain that starts in another body area and moves to your neck  DISCHARGE INSTRUCTIONS:   Return to the emergency department if:   · You have an injury that causes neck pain and shooting pain down your arms or legs  · Your neck pain suddenly becomes severe  · You have neck pain along with numbness, tingling, or weakness in your arms or legs  · You have a stiff neck, a headache, and a fever  Contact your healthcare provider if:   · You have new or worsening symptoms  · Your symptoms continue even after treatment  · You have questions or concerns about your condition or care  Medicines:   · NSAIDs , such as ibuprofen, help decrease swelling, pain, and fever  This medicine is available without a doctor's order  Ask your healthcare provider which medicine to take and how often to take it  Follow directions  NSAIDs can cause stomach bleeding or kidney problems if not taken correctly  If you take blood thinner medicine, always ask if NSAIDs are safe for you  · Acetaminophen  helps decrease pain and fever  Ask your healthcare provider how much to take and how often to take it  Follow directions  Acetaminophen can cause liver damage if not taken correctly  · Steroid medicine  may be used to reduce inflammation  This can help relieve pain caused by swelling  · Take your medicine as directed    Contact your healthcare provider if you think your medicine is not helping or if you have side effects  Tell him or her if you are allergic to any medicine  Keep a list of the medicines, vitamins, and herbs you take  Include the amounts, and when and why you take them  Bring the list or the pill bottles to follow-up visits  Carry your medicine list with you in case of an emergency  Manage or prevent acute neck pain:   · Rest your neck as directed  Do not make sudden movements, such as turning your head quickly  Your healthcare provider may recommend you wear a cervical collar for a short time  The collar will prevent you from moving your head  This will give your neck time to heal if an injury is causing your neck pain  Ask your healthcare provider when you can return to sports or other normal daily activities  · Apply heat as directed  Heat helps relieve pain and swelling  Use a heat wrap, or soak a small towel in warm water  Wring out the extra water  Apply the heat wrap or towel for 20 minutes every hour, or as directed  · Apply ice as directed  Ice helps relieve pain and swelling, and can help prevent tissue damage  Use an ice pack, or put ice in a bag  Cover the ice pack or back with a towel before you apply it to your neck  Apply the ice pack or ice for 15 minutes every hour, or as directed  Your healthcare provider can tell you how often to apply ice  · Do neck exercises as directed  Neck exercises help strengthen the muscles and increase range of motion  Your healthcare provider will tell you which exercises are right for you  He may give you instructions, or he may recommend that you work with a physical therapist  Your healthcare provider or therapist can make sure you are doing the exercises correctly  · Maintain good posture  Try to keep your head and shoulders lifted when you sit  If you work in front of a computer, make sure the monitor is at the right level  You should not need to look up down to see the screen   You should also not have to lean forward to be able to read what is on the screen  Make sure your keyboard, mouse, and other computer items are placed where you do not have to extend your shoulder to reach them  Get up often if you work in front of a computer or sit for long periods of time  Stretch or walk around to keep your neck muscles loose  Follow up with your healthcare provider as directed: Your healthcare provider may refer you to a specialist if your pain does not get better with treatment  Write down your questions so you remember to ask them during your visits  © Copyright [address] Information is for End User's use only and may not be sold, redistributed or otherwise used for commercial purposes  All illustrations and images included in CareNotes® are the copyrighted property of [employer]  or [facility] Monique Lang  The above information is an  only  It is not intended as medical advice for individual conditions or treatments  Talk to your doctor, nurse or pharmacist before following any medical regimen to see if it is safe and effective for you

## 2021-05-10 ENCOUNTER — HOSPITAL ENCOUNTER (OUTPATIENT)
Dept: RADIOLOGY | Facility: HOSPITAL | Age: 66
Discharge: HOME | End: 2021-05-10
Payer: COMMERCIAL

## 2021-05-10 ENCOUNTER — TELEPHONE (OUTPATIENT)
Dept: FAMILY MEDICINE CLINIC | Facility: CLINIC | Age: 66
End: 2021-05-10

## 2021-05-10 DIAGNOSIS — M54.2 NECK PAIN, ACUTE: ICD-10-CM

## 2021-05-10 DIAGNOSIS — M54.2 NECK PAIN, ACUTE: Primary | ICD-10-CM

## 2021-05-10 PROCEDURE — 72050 X-RAY EXAM NECK SPINE 4/5VWS: CPT

## 2021-05-10 NOTE — TELEPHONE ENCOUNTER
Pt called, she was seen at [facility] on Friday, 5/7 for neck pain  [facility] physician advised pt to call her pcp for an xray order d/t xray machine being down  Thank you!

## 2021-05-12 ENCOUNTER — TELEPHONE (OUTPATIENT)
Dept: SURGICAL ONCOLOGY | Facility: CLINIC | Age: 66
End: 2021-05-12

## 2021-05-12 NOTE — TELEPHONE ENCOUNTER
Left msg for patient to call back to schedule with Dr Davida Lopez and cancel appt with with Dr Kwan Finnegan on 5/26 11:30

## 2021-05-26 ENCOUNTER — OFFICE VISIT (OUTPATIENT)
Dept: SURGICAL ONCOLOGY | Facility: CLINIC | Age: 66
End: 2021-05-26
Payer: COMMERCIAL

## 2021-05-26 VITALS
DIASTOLIC BLOOD PRESSURE: 80 MMHG | OXYGEN SATURATION: 97 % | HEART RATE: 73 BPM | HEIGHT: 65 IN | RESPIRATION RATE: 16 BRPM | SYSTOLIC BLOOD PRESSURE: 130 MMHG | TEMPERATURE: 97.2 F | BODY MASS INDEX: 28.99 KG/M2 | WEIGHT: 174 LBS

## 2021-05-26 DIAGNOSIS — Z17.0 MALIGNANT NEOPLASM OF UPPER-OUTER QUADRANT OF LEFT BREAST IN FEMALE, ESTROGEN RECEPTOR POSITIVE (HCC): Primary | ICD-10-CM

## 2021-05-26 DIAGNOSIS — C50.412 MALIGNANT NEOPLASM OF UPPER-OUTER QUADRANT OF LEFT BREAST IN FEMALE, ESTROGEN RECEPTOR POSITIVE (HCC): Primary | ICD-10-CM

## 2021-05-26 DIAGNOSIS — Z79.811 USE OF ANASTROZOLE (ARIMIDEX): ICD-10-CM

## 2021-05-26 PROCEDURE — 99213 OFFICE O/P EST LOW 20 MIN: CPT | Performed by: NURSE PRACTITIONER

## 2021-05-26 PROCEDURE — 1036F TOBACCO NON-USER: CPT | Performed by: NURSE PRACTITIONER

## 2021-05-26 NOTE — PROGRESS NOTES
Surgical Oncology Follow Up       [address] [place]  CANCER [facility] SURGICAL ONCOLOGY [place]  [address]  [facility] [address]    Pérez Meredith  1955  [id number]  [address] [place]  CANCER [facility] SURGICAL ONCOLOGY [place]  [address] [address]    Chief Complaint   Patient presents with    Breast Cancer     Pt is here for 6 month f/u        Assessment/Plan:  1  Malignant neoplasm of upper-outer quadrant of left breast in female, estrogen receptor positive ([facility] [address] )  - 6 mo f/u visit  - Mammo diagnostic bilateral w 3d & cad; Future    2  Use of anastrozole (Arimidex)  - Continue use per medical oncology     Discussion/Summary: Patient is a 62-year-old female who presents today for a six-month follow-up visit for left breast cancer diagnosed in July of 2019  Her pathology revealed invasive lobular carcinoma, ER/%, her 2-  She underwent a left lumpectomy and sentinel node biopsy by Dr Delia Hendrickson completed whole breast radiation therapy and is taking Anastrozole  She had a bilateral 3D diagnostic mammogram on May 29, 2020 which was BI-RADS 2, category 2 density  Her only complaint today is neck stiffness  She had an xray which revealed degenerative changes  She plans to start PT  There are no concerns on her exam today  She is due for mammogram and I will order this for her today  Will plan to see her back in 6 months or sooner should the need arise  She was instructed to call with any new concerns or symptoms prior to that time  All of her questions were answered today        History of Present Illness:     Oncology History   Malignant neoplasm of upper-outer quadrant of left breast in female, estrogen receptor positive ([facility] [address] )   7/23/2019 Biopsy    Left breast biopsy  1 o'clock, 5 cm from nipple  Invasive lobular carcinoma  Grade 1      HER2 0    Done at [facility]     10/1/2019 Surgery    Left breast needle localized lumpectomy with sentinel lymph node biopsy  Invasive lobular carcinoma  Grade 1  1 8 cm  Margins negative  0/2 Lymph nodes  Anatomic/Prognostic Stage IA     10/30/2019 -  Hormone Therapy    Anastrozole 1 mg daily  Dr Nilda Palmer     11/13/2019 - 12/4/2019 Radiation    entire right breast to 4005cGy in 15 daily 267cGy fractions  Dr Tania Graham          -Interval History:  Patient presents today for follow-up visit for left breast cancer  She reports ongoing arthralgias secondary to rheumatoid arthritis  She also reports recent neck stiffness  She had an x-ray and plans to start physical therapy soon  Otherwise she denies persistent headaches, back pain, cough or shortness of breath, abdominal pain  She notices no changes on her self breast exam   She is due for mammogram     Review of Systems:  Review of Systems   Constitutional: Negative for activity change, appetite change, chills, fatigue, fever and unexpected weight change  Respiratory: Negative for cough and shortness of breath  Cardiovascular: Negative for chest pain  Gastrointestinal: Negative for abdominal pain, constipation, diarrhea, nausea and vomiting  Musculoskeletal: Positive for arthralgias and neck stiffness (muscular strain)  Negative for back pain, gait problem and myalgias  Skin: Negative for color change and rash  Neurological: Negative for dizziness and headaches  Hematological: Negative for adenopathy  Psychiatric/Behavioral: Negative for agitation and confusion  All other systems reviewed and are negative  Patient Active Problem List   Diagnosis    Fatigue    Age-related osteoporosis without current pathological fracture    Malignant neoplasm of upper-outer quadrant of left breast in female, estrogen receptor positive [facility]    Use of anastrozole (Arimidex)    Overweight (BMI 25 0-29  9)     Past Medical History:   Diagnosis Date    Breast cancer ([facility] [address] ) 09/2019    History of radiation therapy 10/2019    left breast cancer    Kidney stone     age 17    Migraine     Osteoporosis     Pancreatitis     Pleurisy     PONV (postoperative nausea and vomiting)     Vertigo      Past Surgical History:   Procedure Laterality Date    BREAST LUMPECTOMY Left 10/1/2019    Procedure: BREAST NEEDLE LOCALIZED LUMPECTOMY (NEEDLE LOC AT 0730); Surgeon: Zaid Gooden MD;  [facility];  Service: Surgical Oncology    CHOLECYSTECTOMY      COLONOSCOPY      DENTAL SURGERY      Bone graft with implant    HALLUX VALGUS CORRECTION Bilateral     INCISIONAL BREAST BIOPSY      LYMPH NODE BIOPSY Left 10/1/2019    Procedure: SENTINEL LYMPH NODE BIOPSY; LYMPHATIC MAPPING WITH BLUE DYE AND RADIOACTIVE DYE (INJECT AT 0830 BY DR HA IN THE OR); Surgeon: Zaid Gooden MD;  [facility];  Service: Surgical Oncology    MAMMO NEEDLE LOCALIZATION LEFT (ALL INC) Left 10/1/2019    MAMMO STEREOTACTIC BREAST BIOPSY LEFT (ALL INC)      RHINOPLASTY       Family History   Problem Relation Age of Onset    No Known Problems Mother     Lung cancer Father 71    Breast cancer Neg Hx      Social History     Socioeconomic History    Marital status:       Spouse name: Not on file    Number of children: 2    Years of education: Not on file    Highest education level: Not on file   Occupational History    Occupation: curretnly works part-time    Occupation: medical professional   Social Needs    Financial resource strain: Not on file    Food insecurity     Worry: Not on file     Inability: Not on file   [employer] needs     Medical: Not on file     Non-medical: Not on file   Tobacco Use    Smoking status: Never Smoker    Smokeless tobacco: Never Used   Substance and Sexual Activity    Alcohol use: No    Drug use: No    Sexual activity: Not on file   Lifestyle    Physical activity     Days per week: Not on file     Minutes per session: Not on file    Stress: Not on file   Relationships    Social connections     Talks on phone: Not on file     Gets together: Not on file     Attends [employer] service: Not on file     Active member of club or organization: Not on file     Attends meetings of clubs or organizations: Not on file     Relationship status: Not on file    Intimate partner violence     Fear of current or ex partner: Not on file     Emotionally abused: Not on file     Physically abused: Not on file     Forced sexual activity: Not on file   Other Topics Concern    Not on file   Social History Narrative    Not on file       Current Outpatient Medications:     acetaminophen (TYLENOL) 500 mg tablet, Take by mouth every 6 (six) hours as needed , Disp: , Rfl:     anastrozole (ARIMIDEX) 1 mg tablet, TAKE 1 TABLET (1 MG TOTAL) BY MOUTH DAILY, Disp: 90 tablet, Rfl: 1    Cholecalciferol (VITAMIN D) 2000 units CAPS, Take 4,000 Units by mouth daily , Disp: , Rfl:     cyclobenzaprine (FLEXERIL) 5 mg tablet, Take 1 tablet (5 mg total) by mouth 3 (three) times a day as needed for muscle spasms, Disp: 21 tablet, Rfl: 0    Diclofenac Sodium (VOLTAREN) 1 %, Apply 2 g topically 4 (four) times a day, Disp: 50 g, Rfl: 0    ibandronate (BONIVA) 150 MG tablet, Take 150 mg by mouth every 30 (thirty) days , Disp: , Rfl:     Ivermectin (SOOLANTRA) 1 % CREA, Apply topically daily , Disp: , Rfl:     meclizine (ANTIVERT) 12 5 MG tablet, Take 12 5 mg by mouth 3 (three) times a day as needed for dizziness, Disp: , Rfl:     pimecrolimus (ELIDEL) 1 % cream, , Disp: , Rfl:     SUMAtriptan (IMITREX) 100 mg tablet, Take 100 mg by mouth once as needed for migraine, Disp: , Rfl:     venlafaxine (EFFEXOR) 37 5 mg tablet, Take 1 tablet (37 5 mg total) by mouth daily, Disp: 90 tablet, Rfl: 3  Allergies   Allergen Reactions    Ciprofloxacin Seizures    Cyclopentolate Other (See Comments) and Tachycardia     Resp distress    Sulfa Antibiotics Hives and Rash    Scopolamine Other (See Comments)     Precautionary for cycopentolate allergy     Vitals:    05/26/21 1133 BP: 130/80   Pulse: 73   Resp: 16   Temp: (!) 97 2 °F (36 2 °C)   SpO2: 97%       Physical Exam  Vitals signs reviewed  Constitutional:       General: She is not in acute distress  Appearance: Normal appearance  She is well-developed  She is not diaphoretic  HENT:      Head: Normocephalic and atraumatic  Neck:      Musculoskeletal: Normal range of motion  Cardiovascular:      Rate and Rhythm: Normal rate and regular rhythm  Heart sounds: Normal heart sounds  Pulmonary:      Effort: Pulmonary effort is normal       Breath sounds: Normal breath sounds  Chest:      Breasts:         Right: No swelling, bleeding, inverted nipple, mass, nipple discharge, skin change or tenderness  Left: Skin change (surgical scars- breast, axilla) present  No swelling, bleeding, inverted nipple, mass, nipple discharge or tenderness  Abdominal:      Palpations: Abdomen is soft  There is no mass  Tenderness: There is no abdominal tenderness  Musculoskeletal: Normal range of motion  Lymphadenopathy:      Upper Body:      Right upper body: No supraclavicular or axillary adenopathy  Left upper body: No supraclavicular or axillary adenopathy  Skin:     General: Skin is warm and dry  Findings: No rash  Neurological:      Mental Status: She is alert and oriented to person, place, and time  Psychiatric:         Speech: Speech normal              Advance Care Planning/Advance Directives:  Discussed disease status, cancer treatment plans and/or cancer treatment goals with the patient

## 2021-06-11 ENCOUNTER — HOSPITAL ENCOUNTER (OUTPATIENT)
Dept: MAMMOGRAPHY | Facility: CLINIC | Age: 66
Discharge: HOME | End: 2021-06-11
Payer: COMMERCIAL

## 2021-06-11 VITALS — WEIGHT: 174 LBS | HEIGHT: 65 IN | BODY MASS INDEX: 28.99 KG/M2

## 2021-06-11 DIAGNOSIS — Z17.0 MALIGNANT NEOPLASM OF UPPER-OUTER QUADRANT OF LEFT BREAST IN FEMALE, ESTROGEN RECEPTOR POSITIVE (HCC): ICD-10-CM

## 2021-06-11 DIAGNOSIS — C50.412 MALIGNANT NEOPLASM OF UPPER-OUTER QUADRANT OF LEFT BREAST IN FEMALE, ESTROGEN RECEPTOR POSITIVE (HCC): ICD-10-CM

## 2021-06-11 PROCEDURE — G0279 TOMOSYNTHESIS, MAMMO: HCPCS

## 2021-06-11 PROCEDURE — 77066 DX MAMMO INCL CAD BI: CPT

## 2021-06-17 ENCOUNTER — OFFICE VISIT (OUTPATIENT)
Dept: OBGYN CLINIC | Facility: CLINIC | Age: 66
End: 2021-06-17
Payer: COMMERCIAL

## 2021-06-17 VITALS
DIASTOLIC BLOOD PRESSURE: 70 MMHG | BODY MASS INDEX: 29.49 KG/M2 | WEIGHT: 177 LBS | HEIGHT: 65 IN | SYSTOLIC BLOOD PRESSURE: 120 MMHG

## 2021-06-17 DIAGNOSIS — Z12.4 ENCOUNTER FOR SCREENING FOR MALIGNANT NEOPLASM OF CERVIX: ICD-10-CM

## 2021-06-17 DIAGNOSIS — Z01.419 WELL FEMALE EXAM WITH ROUTINE GYNECOLOGICAL EXAM: Primary | ICD-10-CM

## 2021-06-17 DIAGNOSIS — M81.0 AGE-RELATED OSTEOPOROSIS WITHOUT CURRENT PATHOLOGICAL FRACTURE: ICD-10-CM

## 2021-06-17 PROCEDURE — G0145 SCR C/V CYTO,THINLAYER,RESCR: HCPCS | Performed by: OBSTETRICS & GYNECOLOGY

## 2021-06-17 PROCEDURE — S0612 ANNUAL GYNECOLOGICAL EXAMINA: HCPCS | Performed by: OBSTETRICS & GYNECOLOGY

## 2021-06-17 PROCEDURE — 87624 HPV HI-RISK TYP POOLED RSLT: CPT | Performed by: OBSTETRICS & GYNECOLOGY

## 2021-06-17 PROCEDURE — 3008F BODY MASS INDEX DOCD: CPT | Performed by: NURSE PRACTITIONER

## 2021-06-17 NOTE — PROGRESS NOTES
ASSESSMENT & PLAN: Wandy Ortiz is a 61 y o  [id number] with normal gynecologic exam     1   Routine well woman exam done today  2    Pap and HPV:Pap with HPV was not done today  Current ASCCP Guidelines reviewed  3  Mammogram ordered  Recommend yearly mammography  4   Colonoscopy- up to date  5  The patient is not sexually active  6  Urinary freq at night - pt does not drink water throughout the day, but drinks before bed - discussed behavioral changes  7  The following were reviewed in today's visit: breast self exam, mammography screening ordered, menopause, osteoporosis, adequate intake of calcium and vitamin D, exercise and healthy diet  8  Patient to return to office in 11 months for annual      All questions have been answered to her satisfaction  CC:  Annual Gynecologic Examination    HPI: Wandy Ortiz is a 61 y o  [id number] who presents for annual gynecologic examination  She has the following concerns:  Urinary freq at night    Health Maintenance:    She exercises 1 days per week  She wears her seatbelt routinely  She does perform irregular monthly self breast exams  She feels safe at home  Patients does follow a reg diet  Past Medical History:   Diagnosis Date    Breast cancer ([address] ) 09/2019    left breast    History of radiation therapy 10/2019    left breast cancer    Kidney stone     age 17    Migraine     Osteoporosis     Pancreatitis     Pleurisy     PONV (postoperative nausea and vomiting)     Vertigo        Past Surgical History:   Procedure Laterality Date    BREAST BIOPSY Left > 5 years     benign    BREAST LUMPECTOMY Left 10/1/2019    Procedure: BREAST NEEDLE LOCALIZED LUMPECTOMY (NEEDLE LOC AT 0730);   Surgeon: Preethi Avila MD;  [facility];  Service: Surgical Oncology    CHOLECYSTECTOMY      COLONOSCOPY      DENTAL SURGERY      Bone graft with implant    HALLUX VALGUS CORRECTION Bilateral     INCISIONAL BREAST BIOPSY      LYMPH NODE BIOPSY Left 10/1/2019    Procedure: SENTINEL LYMPH NODE BIOPSY; LYMPHATIC MAPPING WITH BLUE DYE AND RADIOACTIVE DYE (INJECT AT 0830 BY DR HA IN THE OR); Surgeon: Landon Butler MD;  [facility];  Service: Surgical Oncology    MAMMO NEEDLE LOCALIZATION LEFT (ALL INC) Left 10/1/2019    MAMMO STEREOTACTIC BREAST BIOPSY LEFT (ALL INC)      RHINOPLASTY         Past OB/Gyn History:   No LMP recorded  Patient is postmenopausal   Menstrual History:  OB History        2    Para   2    Term   2            AB        Living   2       SAB        TAB        Ectopic        Multiple        Live Births   2           Obstetric Comments   Age of menarche 14  Age at first live birth 29                 Menstrual history: Patient is post menopausal    History of sexually transmitted infection No  Patient is currently sexually active  heterosexual Birth control: postmenopausal    Family History   Problem Relation Age of Onset    No Known Problems Mother     Lung cancer Father 71    Breast cancer Neg Hx        Social History:  Social History     Socioeconomic History    Marital status:      Spouse name: Not on file    Number of children: 2    Years of education: Not on file    Highest education level: Not on file   Occupational History    Occupation: curretnly works part-time    Occupation: medical professional   Tobacco Use    Smoking status: Never Smoker    Smokeless tobacco: Never Used   Substance and Sexual Activity    Alcohol use: No    Drug use: No    Sexual activity: Not on file   Other Topics Concern    Not on file   Social History Narrative    Not on file     Social Determinants of Health     Financial Resource Strain:     Difficulty of Paying Living Expenses:    Food Insecurity:     Worried About [address] in the Last Year:     [address] in the Last Year:    Transportation Needs:     Lack of Transportation (Medical):      Lack of Transportation (Non-Medical):    Physical Activity:     Days of Exercise per Week:     Minutes of Exercise per Session:    Stress:     Feeling of Stress :    Social Connections:     Frequency of Communication with Friends and Family:     Frequency of Social Gatherings with Friends and Family:     Attends [employer] Services:     Active Member of Clubs or Organizations:     Attends Club or Organization Meetings:     Marital Status:    Intimate Partner Violence:     Fear of Current or Ex-Partner:     Emotionally Abused:     Physically Abused:     Sexually Abused:      Presently lives alone  Patient is     Patient is currently employed - school nurse  Allergies   Allergen Reactions    Ciprofloxacin Seizures    Cyclopentolate Other (See Comments) and Tachycardia     Resp distress    Sulfa Antibiotics Hives and Rash    Scopolamine Other (See Comments)     Precautionary for cycopentolate allergy       Current Outpatient Medications:     acetaminophen (TYLENOL) 500 mg tablet, Take by mouth every 6 (six) hours as needed , Disp: , Rfl:     anastrozole (ARIMIDEX) 1 mg tablet, TAKE 1 TABLET (1 MG TOTAL) BY MOUTH DAILY, Disp: 90 tablet, Rfl: 1    Cholecalciferol (VITAMIN D) 2000 units CAPS, Take 4,000 Units by mouth daily , Disp: , Rfl:     cyclobenzaprine (FLEXERIL) 5 mg tablet, Take 1 tablet (5 mg total) by mouth 3 (three) times a day as needed for muscle spasms, Disp: 21 tablet, Rfl: 0    Diclofenac Sodium (VOLTAREN) 1 %, Apply 2 g topically 4 (four) times a day, Disp: 50 g, Rfl: 0    ibandronate (BONIVA) 150 MG tablet, Take 150 mg by mouth every 30 (thirty) days , Disp: , Rfl:     Ivermectin (SOOLANTRA) 1 % CREA, Apply topically daily , Disp: , Rfl:     meclizine (ANTIVERT) 12 5 MG tablet, Take 12 5 mg by mouth 3 (three) times a day as needed for dizziness, Disp: , Rfl:     pimecrolimus (ELIDEL) 1 % cream, , Disp: , Rfl:     SUMAtriptan (IMITREX) 100 mg tablet, Take 100 mg by mouth once as needed for migraine, Disp: , Rfl:    venlafaxine (EFFEXOR) 37 5 mg tablet, Take 1 tablet (37 5 mg total) by mouth daily, Disp: 90 tablet, Rfl: 3    Review of Systems:  Review of Systems   Constitutional: Negative  HENT: Negative  Respiratory: Negative  Cardiovascular: Negative  Gastrointestinal: Negative  Genitourinary: Positive for frequency  Negative for difficulty urinating, dysuria, hematuria, menstrual problem, pelvic pain, urgency, vaginal bleeding, vaginal discharge and vaginal pain  Neurological: Negative  Psychiatric/Behavioral: Negative  Physical Exam:  /70 (BP Location: Right arm, Patient Position: Sitting, Cuff Size: Large)   Ht 5' 5" (1 651 m)   Wt 80 3 kg (177 lb)   BMI 29 45 kg/m²    Physical Exam  Constitutional:       Appearance: Normal appearance  Genitourinary:      Vulva, urethra, bladder, vagina, cervix, uterus, right adnexa and left adnexa normal       No vulval tenderness or Bartholin's cyst noted  No signs of labial injury  Vaginal rugosity and prolapse (grade 1 cystocele) present  No vaginal discharge, tenderness or ulceration  Cervix is parous  Cervical pinkness present  No cervical polyp  Uterus is mobile  Uterus is not enlarged or tender  No right or left adnexal mass present  Right adnexa not tender or full  Left adnexa not tender or full  HENT:      Head: Normocephalic and atraumatic  Eyes:      Pupils: Pupils are equal, round, and reactive to light  Cardiovascular:      Rate and Rhythm: Normal rate and regular rhythm  Heart sounds: Normal heart sounds  No murmur heard  Pulmonary:      Effort: Pulmonary effort is normal  No respiratory distress  Breath sounds: Normal breath sounds  No wheezing or rales  Chest:      Breasts:         Right: Normal  No swelling, bleeding, inverted nipple, mass, nipple discharge, skin change or tenderness           Left: Normal  No swelling, bleeding, inverted nipple, mass, nipple discharge, skin change or tenderness  Abdominal:      General: Abdomen is flat  There is no distension  Palpations: Abdomen is soft  Tenderness: There is no abdominal tenderness  There is no guarding  Neurological:      General: No focal deficit present  Mental Status: She is alert and oriented to person, place, and time  Skin:     General: Skin is warm and dry  Coloration: Skin is not jaundiced or pale  Vitals and nursing note reviewed

## 2021-06-22 LAB
HPV HR 12 DNA CVX QL NAA+PROBE: NEGATIVE
HPV16 DNA CVX QL NAA+PROBE: NEGATIVE
HPV18 DNA CVX QL NAA+PROBE: NEGATIVE

## 2021-06-25 LAB
LAB AP GYN PRIMARY INTERPRETATION: NORMAL
Lab: NORMAL

## 2021-07-02 ENCOUNTER — CLINICAL SUPPORT (OUTPATIENT)
Dept: RADIATION ONCOLOGY | Facility: CLINIC | Age: 66
End: 2021-07-02
Attending: RADIOLOGY
Payer: COMMERCIAL

## 2021-07-02 VITALS
HEIGHT: 65 IN | SYSTOLIC BLOOD PRESSURE: 132 MMHG | BODY MASS INDEX: 29.66 KG/M2 | WEIGHT: 178 LBS | RESPIRATION RATE: 18 BRPM | DIASTOLIC BLOOD PRESSURE: 80 MMHG | HEART RATE: 71 BPM

## 2021-07-02 DIAGNOSIS — C50.412 MALIGNANT NEOPLASM OF UPPER-OUTER QUADRANT OF LEFT BREAST IN FEMALE, ESTROGEN RECEPTOR POSITIVE (HCC): Primary | ICD-10-CM

## 2021-07-02 DIAGNOSIS — Z17.0 MALIGNANT NEOPLASM OF UPPER-OUTER QUADRANT OF LEFT BREAST IN FEMALE, ESTROGEN RECEPTOR POSITIVE (HCC): Primary | ICD-10-CM

## 2021-07-02 PROCEDURE — 99211 OFF/OP EST MAY X REQ PHY/QHP: CPT | Performed by: RADIOLOGY

## 2021-07-02 PROCEDURE — 99213 OFFICE O/P EST LOW 20 MIN: CPT | Performed by: RADIOLOGY

## 2021-07-02 PROCEDURE — G0463 HOSPITAL OUTPT CLINIC VISIT: HCPCS | Performed by: RADIOLOGY

## 2021-07-02 NOTE — PROGRESS NOTES
Follow-up - Radiation Oncology   Love Anna 1955 61 y o  female [id number]      History of Present Illness   Cancer Staging  Malignant neoplasm of upper-outer quadrant of left breast in female, estrogen receptor positive ([facility] [address] )  Staging form: Breast, AJCC 8th Edition  - Pathologic: Stage IA (pT1c, pN0(sn), cM0, G1, ER+, VA+, HER2-) - Unsigned  Neoadjuvant therapy: No  Method of lymph node assessment: [place] lymph node biopsy  Histologic grading system: 3 grade system  Laterality: Left  Tumor size (mm): 18      Love Anna is a 61y o  year old female with a history of [id number] invasive lobular carcinoma of the left breast status post resection achieving negative margins   Tumor cells were ER/ VA positive and HER2 Mateo negative  She completed a course of adjuvant whole breast hypofractionated radiation on 12/4/19   She is maintained on anastrazole   Today, she returns for follow-up evaluation      She denies any new palpable nodules, suspicious skin changes, or nipple discharge of the breasts bilaterally   She denies any significant breast pain or tenderness   She denies any upper extremity edema or shoulder restriction   The patient notes that when lifting heavy objects or frequently she feels "sore" in the left axilla  She denies discomfort otherwise  She denies vaginal discharge of bleeding  GYN exam last week was unremarkable  6/11/21 Bilateral diagnostic mammogram: No evidence of malignancy  At her last visit she had severe left knee and back pain  She saw her PCP and was given steroid injection with improvement  3/26/21 DO Georgina Smith  Continue monthly ibandronate, tapering prednisone    The patient states that since steroids, swelling and pain have declined  She continues to experience mild edema of the joint and pain with certain motions and tenderness  This is stable, but bothersome  She continues follow-up with Dr Araceli Villaseñor    She states that pain appears to be due to arthritis  Similarly, she has developed neck stiffness which was diagnosed as OA  She was offered PT referral by PCP, but due to schedule constraints she has not yet pursued      21 Bren Taylor visit was unremarkable      Upcomin21 Zeus Scott MD  21 VIVIEN Taylor        Historical Information   Oncology History   Malignant neoplasm of upper-outer quadrant of left breast in female, estrogen receptor positive ([facility] [address] )   2019 Biopsy    Left breast biopsy  1 o'clock, 5 cm from nipple  Invasive lobular carcinoma  Grade 1      HER2 0    Done at [facility]     10/1/2019 Surgery    Left breast needle localized lumpectomy with sentinel lymph node biopsy  Invasive lobular carcinoma  Grade 1  1 8 cm  Margins negative  0/2 Lymph nodes  Anatomic/Prognostic Stage IA     10/30/2019 -  Hormone Therapy    Anastrozole 1 mg daily  Dr Viera Daftanner     2019 - 2019 Radiation    entire right breast to 4005cGy in 15 daily 267cGy fractions  Dr Itzel Weaver         Past Medical History:   Diagnosis Date    Breast cancer [facility]) 2019    left breast    History of radiation therapy 10/2019    left breast cancer    Kidney stone     age 17    Migraine     Osteoporosis     Pancreatitis     Pleurisy     PONV (postoperative nausea and vomiting)     Vertigo      Past Surgical History:   Procedure Laterality Date    BREAST BIOPSY Left > 5 years     benign    BREAST LUMPECTOMY Left 10/1/2019    Procedure: BREAST NEEDLE LOCALIZED LUMPECTOMY (NEEDLE LOC AT 0730);   Surgeon: Calvin Bejarano MD;  [facility];  Service: Surgical Oncology    CHOLECYSTECTOMY      COLONOSCOPY      DENTAL SURGERY      Bone graft with implant    HALLUX VALGUS CORRECTION Bilateral     INCISIONAL BREAST BIOPSY      LYMPH NODE BIOPSY Left 10/1/2019    Procedure: SENTINEL LYMPH NODE BIOPSY; LYMPHATIC MAPPING WITH BLUE DYE AND RADIOACTIVE DYE (INJECT AT 0830 BY DR HA IN THE OR); Surgeon: Jennifer Leal MD;  [facility];  Service: Surgical Oncology    MAMMO NEEDLE LOCALIZATION LEFT (ALL INC) Left 10/1/2019    MAMMO STEREOTACTIC BREAST BIOPSY LEFT (ALL INC)      RHINOPLASTY         Social History   Social History     Substance and Sexual Activity   Alcohol Use No     Social History     Substance and Sexual Activity   Drug Use No     Social History     Tobacco Use   Smoking Status Never Smoker   Smokeless Tobacco Never Used       Meds/Allergies     Current Outpatient Medications:     acetaminophen (TYLENOL) 500 mg tablet, Take by mouth every 6 (six) hours as needed , Disp: , Rfl:     anastrozole (ARIMIDEX) 1 mg tablet, TAKE 1 TABLET (1 MG TOTAL) BY MOUTH DAILY, Disp: 90 tablet, Rfl: 1    Cholecalciferol (VITAMIN D) 2000 units CAPS, Take 4,000 Units by mouth daily , Disp: , Rfl:     Diclofenac Sodium (VOLTAREN) 1 %, Apply 2 g topically 4 (four) times a day, Disp: 50 g, Rfl: 0    ibandronate (BONIVA) 150 MG tablet, Take 150 mg by mouth every 30 (thirty) days , Disp: , Rfl:     Ivermectin (SOOLANTRA) 1 % CREA, Apply topically daily , Disp: , Rfl:     meclizine (ANTIVERT) 12 5 MG tablet, Take 12 5 mg by mouth 3 (three) times a day as needed for dizziness, Disp: , Rfl:     pimecrolimus (ELIDEL) 1 % cream, , Disp: , Rfl:     SUMAtriptan (IMITREX) 100 mg tablet, Take 100 mg by mouth once as needed for migraine, Disp: , Rfl:     venlafaxine (EFFEXOR) 37 5 mg tablet, Take 1 tablet (37 5 mg total) by mouth daily, Disp: 90 tablet, Rfl: 3  Allergies   Allergen Reactions    Ciprofloxacin Seizures    Cyclopentolate Other (See Comments) and Tachycardia     Resp distress    Sulfa Antibiotics Hives and Rash    Scopolamine Other (See Comments)     Precautionary for cycopentolate allergy       Review of Systems   Constitutional: Negative  HENT: Negative  Eyes: Negative  Respiratory: Negative  Cardiovascular: Negative  Gastrointestinal: Negative  Endocrine: Negative  Genitourinary: Negative  Musculoskeletal: Positive for arthralgias and neck stiffness  Skin: Negative  Allergic/Immunologic: Negative  Neurological: Negative  Hematological: Negative  Psychiatric/Behavioral: Negative  OBJECTIVE:   /80   Pulse 71   Resp 18   Ht 5' 5" (1 651 m)   Wt 80 7 kg (178 lb)   BMI 29 62 kg/m²   Karnofsky: 90 - Able to carry on normal activity; minor signs or symptoms of disease     Physical Exam  Vitals and nursing note reviewed  Constitutional:       General: She is not in acute distress  Appearance: She is well-developed  Eyes:      General: No scleral icterus  Cardiovascular:      Rate and Rhythm: Normal rate and regular rhythm  Heart sounds: Normal heart sounds  No murmur heard  Pulmonary:      Effort: Pulmonary effort is normal  No respiratory distress  Breath sounds: No wheezing, rhonchi or rales  Chest:      Comments: Breast examination demonstrates left breast is smaller in size  The left breat has a well-healed upper outer scar  There is diffuse mild to moderate fibrosis more pronounced near scar  There is very slight diffuse hyperpigmentation  There are no palpable nodules or suspicious skin changes bilaterally  Abdominal:      General: There is no distension  Palpations: Abdomen is soft  Tenderness: There is no abdominal tenderness  Musculoskeletal:         General: Swelling (mild swelling left knee, no erythema, warmth or signfiicant tenderness) present  Right lower leg: No edema  Left lower leg: No edema  Comments: Full range of motion upper extremities bilaterally  No upper extremity edema bilaterally  Lymphadenopathy:      Cervical: No cervical adenopathy  Upper Body:      Right upper body: No supraclavicular or axillary adenopathy  Left upper body: No supraclavicular or axillary adenopathy     Neurological:      Mental Status: She is alert and oriented to person, place, and time       Gait: Gait normal           RESULTS    Imaging Studies:Mammo diagnostic bilateral w 3d & cad    Result Date: 6/11/2021  Narrative: DIAGNOSIS: Malignant neoplasm of upper-outer quadrant of left breast in female, estrogen receptor positive ([address] ) TECHNIQUE: Digital screening mammography was performed  Computer Aided Detection (CAD) analyzed all applicable images  COMPARISONS: Prior breast imaging dated: 05/29/2020, 10/01/2019, 08/27/2019, 07/15/2019, 05/24/2019, 03/16/2018, and 12/09/2016 RELEVANT HISTORY: Family Breast Cancer History: History of breast cancer in Neg Hx  Family Medical History: No known relevant family medical history  Personal History: Hormone history includes hormone replacement therapy  Surgical history includes breast biopsy and lumpectomy  Medical history includes breast cancer  RISK ASSESSMENT: 5 Year Tyrer-Cuzick: 2 22 % 10 Year Tyrer-Cuzick: 4 26 % Lifetime Tyrer-Cuzick: 9 82 % TISSUE DENSITY: There are scattered areas of fibroglandular density  INDICATION: Willadean Seip is a 61 y o  female presenting for yearly diagnostic mammogram  FINDINGS: Bilateral There are no suspicious masses, grouped microcalcifications or areas of architectural distortion  The skin and nipple areolar complex are unremarkable  There are stable postsurgical changes the upper left breast   There are multiple biopsy marker clips in the left breast   There are scattered calcifications again present bilaterally  Impression:  No evidence of malignancy  ASSESSMENT/BI-RADS CATEGORY: Left: 2 - Benign Right: 2 - Benign Overall: 2 - Benign RECOMMENDATION:      - Diagnostic mammogram in 1 year for both breasts  Workstation ID: [id number]          Assessment/Plan:  Willadean Seip is a 61y o  year old female with a history of [id number] invasive lobular carcinoma of the left breast status post resection achieving negative margins   Tumor cells were ER/ OK positive and HER2 Mateo negative   She completed a course of adjuvant whole breast hypofractionated radiation on 12/4/19  Mirlande Trujillo is maintained on Arimidex   She is currently clinically JOSUÉ     The patient's main complaints today appear related to OA  I encouraged her to seek PT for her neck stiffness as she notes limited motion due to pain and is still working as a school RN  She will continue to follow with PCP and Rheumatology for managment  Her discomfort in axilla is likely treatment related and aggravated by exertion of the left arm  I recommended supportive management with analgesics/anti-inflammatory medications and warm compresses as needed  I instructed her to practice sun protection to the irradiated skin  She will return for follow-up in 1 year  She will see her Med Onc and Surgeon in the interim  We will see her sooner should the need arise  Inderjit Michaels MD  7/2/2021,10:42 AM    Portions of the record may have been created with voice recognition software   Occasional wrong word or "sound a like" substitutions may have occurred due to the inherent limitations of voice recognition software   Read the chart carefully and recognize, using context, where substitutions have occurred

## 2021-07-02 NOTE — PROGRESS NOTES
Pawel Sanabria 1955 is a 61 y o  female       Follow up visit   Returns for follow up post adjuvant whole breast hypo fractionated radiation completed on 12/4/19  She was last seen in follow up on 12/30/20    3/26/21 Hector Sim,  Rheumatology [facility]  Continue monthly ibandronate, tapering prednisone    5/26/21 VIVIEN Ulloa  "Her only complaint today is neck stiffness  She had an xray which revealed degenerative changes  She plans to start PT"  Negative exam    6/11/21 Bilateral diagnostic mammogram: No evidence of malignancy  6/17/21 Edward Payne MD GYN  Pap and HPV negative    12/28/21 Bree Lindquist MD  1/4/21 VIVIEN Ulloa    Has not started PT for neck pain  Left knee pain      Oncology History   Malignant neoplasm of upper-outer quadrant of left breast in female, estrogen receptor positive ([facility] [address] )   7/23/2019 Biopsy    Left breast biopsy  1 o'clock, 5 cm from nipple  Invasive lobular carcinoma  Grade 1      HER2 0    [facility]     10/1/2019 Surgery    Left breast needle localized lumpectomy with sentinel lymph node biopsy  Invasive lobular carcinoma  Grade 1  1 8 cm  Margins negative  0/2 Lymph nodes  Anatomic/Prognostic Stage IA     10/30/2019 -  Hormone Therapy    Anastrozole 1 mg daily  Dr Rosalee Gusman     11/13/2019 - 12/4/2019 Radiation    entire right breast to 4005cGy in 15 daily 267cGy fractions    Dr Flor Cohen: no      Health Maintenance   Topic Date Due    HIV Screening  Never done    Annual Physical  Never done    Influenza Vaccine (1) 09/01/2021    DTaP,Tdap,and Td Vaccines (1 - Tdap) 01/06/2022 (Originally 4/11/1976)    BMI: Followup Plan  01/06/2022    Colorectal Cancer Screening  04/28/2022    MAMMOGRAM  06/11/2022    Depression Screening PHQ  07/02/2022    BMI: Adult  07/02/2022    Cervical Cancer Screening  06/17/2024    Hepatitis C Screening  Completed    COVID-19 Vaccine  Completed    Pneumococcal Vaccine: Pediatrics (0 to 5 Years) and At-Risk Patients (6 to 59 Years)  Aged Out    HIB Vaccine  Aged Out    Hepatitis B Vaccine  Aged Out    IPV Vaccine  Aged Out    Hepatitis A Vaccine  Aged Out    Meningococcal ACWY Vaccine  Aged Out    HPV Vaccine  Aged Out       Patient Active Problem List   Diagnosis    Fatigue    Age-related osteoporosis without current pathological fracture    Malignant neoplasm of upper-outer quadrant of left breast in female, estrogen receptor positive ([facility] [address] )    Use of anastrozole (Arimidex)    Overweight (BMI 25 0-29  9)     Past Medical History:   Diagnosis Date    Breast cancer ([facility] [address] ) 09/2019    left breast    History of radiation therapy 10/2019    left breast cancer    Kidney stone     age 17    Migraine     Osteoporosis     Pancreatitis     Pleurisy     PONV (postoperative nausea and vomiting)     Vertigo      Past Surgical History:   Procedure Laterality Date    BREAST BIOPSY Left > 5 years     benign    BREAST LUMPECTOMY Left 10/1/2019    Procedure: BREAST NEEDLE LOCALIZED LUMPECTOMY (NEEDLE LOC AT 0730); Surgeon: Kalli Recinos MD;  [facility];  Service: Surgical Oncology    CHOLECYSTECTOMY      COLONOSCOPY      DENTAL SURGERY      Bone graft with implant    HALLUX VALGUS CORRECTION Bilateral     INCISIONAL BREAST BIOPSY      LYMPH NODE BIOPSY Left 10/1/2019    Procedure: SENTINEL LYMPH NODE BIOPSY; LYMPHATIC MAPPING WITH BLUE DYE AND RADIOACTIVE DYE (INJECT AT 0830 BY DR HA IN THE OR); Surgeon: Kalli Recinos MD;  [facility];  Service: Surgical Oncology    MAMMO NEEDLE LOCALIZATION LEFT (ALL INC) Left 10/1/2019    MAMMO STEREOTACTIC BREAST BIOPSY LEFT (ALL INC)      RHINOPLASTY       Family History   Problem Relation Age of Onset    No Known Problems Mother     Lung cancer Father 71    Breast cancer Neg Hx      Social History     Socioeconomic History    Marital status:       Spouse name: Not on file    Number of children: 2    Years of education: Not on file    Highest education level: Not on file   Occupational History    Occupation: curretnly works part-time    Occupation: medical professional   Tobacco Use    Smoking status: Never Smoker    Smokeless tobacco: Never Used   Substance and Sexual Activity    Alcohol use: No    Drug use: No    Sexual activity: Not on file   Other Topics Concern    Not on file   Social History Narrative    Not on file     Social Determinants of Health     Financial Resource Strain:     Difficulty of Paying Living Expenses:    Food Insecurity:     Worried About [address] [employer] in the Last Year:     [address] [employer] in the Last Year:    Transportation Needs:     Lack of Transportation (Medical):      Lack of Transportation (Non-Medical):    Physical Activity:     Days of Exercise per Week:     Minutes of Exercise per Session:    Stress:     Feeling of Stress :    Social Connections:     Frequency of Communication with Friends and Family:     Frequency of Social Gatherings with Friends and Family:     Attends [employer] Services:     Active Member of Clubs or Organizations:     Attends Club or Organization Meetings:     Marital Status:    Intimate Partner Violence:     Fear of Current or Ex-Partner:     Emotionally Abused:     Physically Abused:     Sexually Abused:        Current Outpatient Medications:     acetaminophen (TYLENOL) 500 mg tablet, Take by mouth every 6 (six) hours as needed , Disp: , Rfl:     anastrozole (ARIMIDEX) 1 mg tablet, TAKE 1 TABLET (1 MG TOTAL) BY MOUTH DAILY, Disp: 90 tablet, Rfl: 1    Cholecalciferol (VITAMIN D) 2000 units CAPS, Take 4,000 Units by mouth daily , Disp: , Rfl:     Diclofenac Sodium (VOLTAREN) 1 %, Apply 2 g topically 4 (four) times a day, Disp: 50 g, Rfl: 0    ibandronate (BONIVA) 150 MG tablet, Take 150 mg by mouth every 30 (thirty) days , Disp: , Rfl:     Ivermectin (SOOLANTRA) 1 % CREA, Apply topically daily , Disp: , Rfl:   meclizine (ANTIVERT) 12 5 MG tablet, Take 12 5 mg by mouth 3 (three) times a day as needed for dizziness, Disp: , Rfl:     pimecrolimus (ELIDEL) 1 % cream, , Disp: , Rfl:     SUMAtriptan (IMITREX) 100 mg tablet, Take 100 mg by mouth once as needed for migraine, Disp: , Rfl:     venlafaxine (EFFEXOR) 37 5 mg tablet, Take 1 tablet (37 5 mg total) by mouth daily, Disp: 90 tablet, Rfl: 3  Allergies   Allergen Reactions    Ciprofloxacin Seizures    Cyclopentolate Other (See Comments) and Tachycardia     Resp distress    Sulfa Antibiotics Hives and Rash    Scopolamine Other (See Comments)     Precautionary for cycopentolate allergy       Review of Systems:  Review of Systems   Constitutional: Negative  HENT: Negative  Eyes: Negative  Respiratory: Negative  Cardiovascular: Negative  Gastrointestinal: Negative  Endocrine: Negative  Genitourinary: Negative  Musculoskeletal: Positive for arthralgias and neck stiffness  Skin: Negative  Allergic/Immunologic: Negative  Neurological: Negative  Hematological: Negative  Psychiatric/Behavioral: Negative  Vitals:    07/02/21 0927   BP: 132/80   Pulse: 71   Resp: 18   Weight: 80 7 kg (178 lb)   Height: 5' 5" (1 651 m)        Pain Score:   2        Imaging:Mammo diagnostic bilateral w 3d & cad    Result Date: 6/11/2021  Narrative: DIAGNOSIS: Malignant neoplasm of upper-outer quadrant of left breast in female, [facility] TECHNIQUE: Digital screening mammography was performed  Computer Aided Detection (CAD) analyzed all applicable images  COMPARISONS: Prior breast imaging dated: 05/29/2020, 10/01/2019, 08/27/2019, 07/15/2019, 05/24/2019, 03/16/2018, and 12/09/2016 RELEVANT HISTORY: Family Breast Cancer History: History of breast cancer in Neg Hx  Family Medical History: No known relevant family medical history  Personal History: Hormone history includes hormone replacement therapy   Surgical history includes breast biopsy and lumpectomy  Medical history includes breast cancer  RISK ASSESSMENT: 5 Year Tyrer-Cuzick: 2 22 % 10 Year Tyrer-Cuzick: 4 26 % Lifetime Tyrer-Cuzick: 9 82 % TISSUE DENSITY: There are scattered areas of fibroglandular density  INDICATION: Davi Watkins is a 61 y o  female presenting for yearly diagnostic mammogram  FINDINGS: Bilateral There are no suspicious masses, grouped microcalcifications or areas of architectural distortion  The skin and nipple areolar complex are unremarkable  There are stable postsurgical changes the upper left breast   There are multiple biopsy marker clips in the left breast   There are scattered calcifications again present bilaterally  Impression:  No evidence of malignancy  ASSESSMENT/BI-RADS CATEGORY: Left: 2 - Benign Right: 2 - Benign Overall: 2 - Benign RECOMMENDATION:      - Diagnostic mammogram in 1 year for both breasts   Workstation ID: [id number]

## 2021-08-02 ENCOUNTER — OFFICE VISIT (OUTPATIENT)
Dept: FAMILY MEDICINE CLINIC | Facility: CLINIC | Age: 66
End: 2021-08-02
Payer: COMMERCIAL

## 2021-08-02 ENCOUNTER — APPOINTMENT (OUTPATIENT)
Dept: LAB | Facility: CLINIC | Age: 66
End: 2021-08-02
Payer: COMMERCIAL

## 2021-08-02 VITALS
DIASTOLIC BLOOD PRESSURE: 76 MMHG | TEMPERATURE: 97.6 F | HEIGHT: 65 IN | WEIGHT: 176 LBS | BODY MASS INDEX: 29.32 KG/M2 | SYSTOLIC BLOOD PRESSURE: 118 MMHG | OXYGEN SATURATION: 98 % | HEART RATE: 93 BPM

## 2021-08-02 DIAGNOSIS — R20.0 NUMBNESS OF BOTH LOWER EXTREMITIES: Primary | ICD-10-CM

## 2021-08-02 DIAGNOSIS — R20.0 NUMBNESS OF BOTH LOWER EXTREMITIES: ICD-10-CM

## 2021-08-02 PROCEDURE — 3008F BODY MASS INDEX DOCD: CPT | Performed by: FAMILY MEDICINE

## 2021-08-02 PROCEDURE — 99214 OFFICE O/P EST MOD 30 MIN: CPT | Performed by: FAMILY MEDICINE

## 2021-08-02 PROCEDURE — 1036F TOBACCO NON-USER: CPT | Performed by: FAMILY MEDICINE

## 2021-08-02 PROCEDURE — 83036 HEMOGLOBIN GLYCOSYLATED A1C: CPT

## 2021-08-02 PROCEDURE — 82607 VITAMIN B-12: CPT

## 2021-08-02 PROCEDURE — 36415 COLL VENOUS BLD VENIPUNCTURE: CPT

## 2021-08-02 PROCEDURE — 82746 ASSAY OF FOLIC ACID SERUM: CPT

## 2021-08-02 RX ORDER — PREDNISONE 10 MG/1
TABLET ORAL
COMMUNITY
Start: 2021-07-24 | End: 2022-01-31

## 2021-08-02 RX ORDER — GABAPENTIN 100 MG/1
100 CAPSULE ORAL 3 TIMES DAILY
Qty: 90 CAPSULE | Refills: 1 | Status: SHIPPED | OUTPATIENT
Start: 2021-08-02 | End: 2021-10-05

## 2021-08-02 NOTE — PROGRESS NOTES
Assessment/Plan:       Problem List Items Addressed This Visit        Other    Numbness of both lower extremities - Primary     Check labs, EMG  Start Gabapentin TID  Discussed common side effects  Relevant Medications    gabapentin (NEURONTIN) 100 mg capsule    Other Relevant Orders    Vitamin B12    Hemoglobin A1C    Folate    EMG 2 limb lower extremity          Recheck in 1 month    Subjective:      Patient ID: Loreta Eaton is a 61 y o  female  68-year-old female here for burning sensation in her feet  Started in the right foot and now in left foot  She saw a podiatrist, had Xrays  She says they told her it might be coming from her hip or back  She is currently on Prednisone which she will complete this tomorrow  Has not helped with the tingling  The following portions of the patient's history were reviewed and updated as appropriate:   She  has a past medical history of Breast cancer ([facility] ) (09/2019), History of radiation therapy (10/2019), Kidney stone, Migraine, Osteoporosis, Pancreatitis, Pleurisy, PONV (postoperative nausea and vomiting), and Vertigo  She   Patient Active Problem List    Diagnosis Date Noted    Numbness of both lower extremities 08/02/2021    Overweight (BMI 25 0-29 9) 01/06/2021    Use of anastrozole (Arimidex) 01/16/2020    Malignant neoplasm of upper-outer quadrant of left breast in female, estrogen receptor positive ([facility] ) 08/12/2019    Age-related osteoporosis without current pathological fracture 03/27/2019    Fatigue 02/15/2018     She  has a past surgical history that includes Cholecystectomy; Dental surgery; Incisional breast biopsy; Rhinoplasty; Hallux valgus correction (Bilateral); Colonoscopy; Mammo stereotactic breast biopsy left (all inc); Mammo needle localization left (all inc) (Left, 10/1/2019); Breast lumpectomy (Left, 10/1/2019); Lymph node biopsy (Left, 10/1/2019); and Breast biopsy (Left, > 5 years )    Her family history includes Lung cancer (age of onset: 71) in her father; No Known Problems in her mother  She  reports that she has never smoked  She has never used smokeless tobacco  She reports that she does not drink alcohol and does not use drugs  Current Outpatient Medications   Medication Sig Dispense Refill    acetaminophen (TYLENOL) 500 mg tablet Take by mouth every 6 (six) hours as needed       anastrozole (ARIMIDEX) 1 mg tablet TAKE 1 TABLET (1 MG TOTAL) BY MOUTH DAILY 90 tablet 1    Cholecalciferol (VITAMIN D) 2000 units CAPS Take 4,000 Units by mouth daily       Diclofenac Sodium (VOLTAREN) 1 % Apply 2 g topically 4 (four) times a day 50 g 0    ibandronate (BONIVA) 150 MG tablet Take 150 mg by mouth every 30 (thirty) days       Ivermectin (SOOLANTRA) 1 % CREA Apply topically daily       meclizine (ANTIVERT) 12 5 MG tablet Take 12 5 mg by mouth 3 (three) times a day as needed for dizziness      pimecrolimus (ELIDEL) 1 % cream       predniSONE 10 mg tablet       SUMAtriptan (IMITREX) 100 mg tablet Take 100 mg by mouth once as needed for migraine      venlafaxine (EFFEXOR) 37 5 mg tablet Take 1 tablet (37 5 mg total) by mouth daily 90 tablet 3    gabapentin (NEURONTIN) 100 mg capsule Take 1 capsule (100 mg total) by mouth 3 (three) times a day 90 capsule 1     No current facility-administered medications for this visit       Current Outpatient Medications on File Prior to Visit   Medication Sig    acetaminophen (TYLENOL) 500 mg tablet Take by mouth every 6 (six) hours as needed     anastrozole (ARIMIDEX) 1 mg tablet TAKE 1 TABLET (1 MG TOTAL) BY MOUTH DAILY    Cholecalciferol (VITAMIN D) 2000 units CAPS Take 4,000 Units by mouth daily     Diclofenac Sodium (VOLTAREN) 1 % Apply 2 g topically 4 (four) times a day    ibandronate (BONIVA) 150 MG tablet Take 150 mg by mouth every 30 (thirty) days     Ivermectin (SOOLANTRA) 1 % CREA Apply topically daily     meclizine (ANTIVERT) 12 5 MG tablet Take 12 5 mg by mouth 3 (three) times a day as needed for dizziness    pimecrolimus (ELIDEL) 1 % cream     predniSONE 10 mg tablet     SUMAtriptan (IMITREX) 100 mg tablet Take 100 mg by mouth once as needed for migraine    venlafaxine (EFFEXOR) 37 5 mg tablet Take 1 tablet (37 5 mg total) by mouth daily     No current facility-administered medications on file prior to visit  She is allergic to ciprofloxacin, cyclopentolate, sulfa antibiotics, and scopolamine       Review of Systems   Constitutional: Negative for activity change  Respiratory: Negative for chest tightness and shortness of breath  Cardiovascular: Negative for chest pain and leg swelling  Musculoskeletal: Positive for arthralgias and back pain  Neurological: Positive for numbness  Negative for headaches  Psychiatric/Behavioral: Negative for dysphoric mood  The patient is not nervous/anxious  Objective:      /76 (BP Location: Left arm, Patient Position: Sitting, Cuff Size: Large)   Pulse 93   Temp 97 6 °F (36 4 °C)   Ht 5' 5" (1 651 m)   Wt 79 8 kg (176 lb)   SpO2 98%   BMI 29 29 kg/m²          Physical Exam  Vitals and nursing note reviewed  Constitutional:       General: She is not in acute distress  Appearance: Normal appearance  She is not ill-appearing, toxic-appearing or diaphoretic  HENT:      Head: Normocephalic and atraumatic  Right Ear: External ear normal       Left Ear: External ear normal    Cardiovascular:      Pulses: no weak pulses          Dorsalis pedis pulses are 2+ on the right side and 2+ on the left side  Posterior tibial pulses are 2+ on the right side and 2+ on the left side  Pulmonary:      Effort: Pulmonary effort is normal  No respiratory distress  Breath sounds: No stridor  Musculoskeletal:         General: No swelling  Right lower leg: No edema  Left lower leg: No edema  Feet:      Right foot:      Skin integrity: No ulcer, skin breakdown, erythema, warmth, callus or dry skin  Left foot:      Skin integrity: No ulcer, skin breakdown, erythema, warmth, callus or dry skin  Skin:     General: Skin is warm and dry  Findings: No erythema, lesion or rash  Neurological:      General: No focal deficit present  Mental Status: She is alert  Mental status is at baseline  Cranial Nerves: No cranial nerve deficit  Gait: Gait normal    Psychiatric:         Attention and Perception: Attention normal          Mood and Affect: Mood normal          Speech: Speech normal          Behavior: Behavior normal          Thought Content: Thought content normal          Judgment: Judgment normal        Patient's shoes and socks removed  Right Foot/Ankle   Right Foot Inspection  Skin Exam: skin normal and skin intact no dry skin, no warmth, no callus, no erythema, no maceration, no abnormal color, no pre-ulcer, no ulcer and no callus                          Toe Exam: no swelling, no tenderness, erythema and  no right toe deformity  Sensory   Vibration: intact    Monofilament testing: absent  Vascular  Capillary refills: < 3 seconds  The right DP pulse is 2+  The right PT pulse is 2+  Left Foot/Ankle  Left Foot Inspection  Skin Exam: skin normal and skin intactno dry skin, no warmth, no erythema, no maceration, normal color, no pre-ulcer, no ulcer and no callus                         Toe Exam: no swelling, no tenderness, no erythema and no left toe deformity                   Sensory   Vibration: intact    Monofilament: diminished  Vascular  Capillary refills: < 3 seconds  The left DP pulse is 2+  The left PT pulse is 2+  Assign Risk Category:  No deformity present; Loss of protective sensation; No weak pulses       Risk: 1    BMI Counseling: Body mass index is 29 29 kg/m²  The BMI is above normal  Nutrition recommendations include moderation in carbohydrate intake

## 2021-08-03 LAB
EST. AVERAGE GLUCOSE BLD GHB EST-MCNC: 120 MG/DL
FOLATE SERPL-MCNC: >20 NG/ML (ref 3.1–17.5)
HBA1C MFR BLD: 5.8 %
VIT B12 SERPL-MCNC: 348 PG/ML (ref 100–900)

## 2021-08-26 ENCOUNTER — RA CDI HCC (OUTPATIENT)
Dept: OTHER | Facility: HOSPITAL | Age: 66
End: 2021-08-26

## 2021-08-26 NOTE — PROGRESS NOTES
[facility]  coding opportunities       Chart reviewed, no opportunity found: CHART REVIEWED, NO OPPORTUNITY FOUND                        Patients insurance company:  [employer] [facility] (Medicare Advantage and Commercial)

## 2021-09-03 ENCOUNTER — OFFICE VISIT (OUTPATIENT)
Dept: FAMILY MEDICINE CLINIC | Facility: CLINIC | Age: 66
End: 2021-09-03
Payer: COMMERCIAL

## 2021-09-03 VITALS
SYSTOLIC BLOOD PRESSURE: 128 MMHG | WEIGHT: 179 LBS | OXYGEN SATURATION: 98 % | BODY MASS INDEX: 29.82 KG/M2 | TEMPERATURE: 98.8 F | HEART RATE: 102 BPM | HEIGHT: 65 IN | DIASTOLIC BLOOD PRESSURE: 80 MMHG

## 2021-09-03 DIAGNOSIS — R20.0 NUMBNESS OF BOTH LOWER EXTREMITIES: ICD-10-CM

## 2021-09-03 DIAGNOSIS — Z13.220 LIPID SCREENING: ICD-10-CM

## 2021-09-03 DIAGNOSIS — R73.03 PREDIABETES: ICD-10-CM

## 2021-09-03 DIAGNOSIS — Z00.00 ANNUAL PHYSICAL EXAM: Primary | ICD-10-CM

## 2021-09-03 PROBLEM — Z97.4 HEARING AID WORN: Status: ACTIVE | Noted: 2021-09-03

## 2021-09-03 PROCEDURE — 99396 PREV VISIT EST AGE 40-64: CPT | Performed by: FAMILY MEDICINE

## 2021-09-03 PROCEDURE — 3008F BODY MASS INDEX DOCD: CPT | Performed by: FAMILY MEDICINE

## 2021-09-03 NOTE — PATIENT INSTRUCTIONS

## 2021-09-03 NOTE — PROGRESS NOTES
[id number] [address] [place] [address]    NAME: Kris Alcala  AGE: 61 y o  SEX: female  : 1955     DATE: 9/3/2021     Assessment and Plan:     Problem List Items Addressed This Visit        Other    Numbness of both lower extremities    Prediabetes    Relevant Orders    Comprehensive metabolic panel    Hemoglobin A1C      Other Visit Diagnoses     Annual physical exam    -  Primary    Lipid screening        Relevant Orders    Lipid Panel with Direct LDL reflex          Immunizations and preventive care screenings were discussed with patient today  Appropriate education was printed on patient's after visit summary  Counseling:  Alcohol/drug use: discussed moderation in alcohol intake, the recommendations for healthy alcohol use, and avoidance of illicit drug use  Dental Health: discussed importance of regular tooth brushing, flossing, and dental visits  · Exercise: the importance of regular exercise/physical activity was discussed  Recommend exercise 3-5 times per week for at least 30 minutes  · BMI Counseling: Body mass index is 29 79 kg/m²  The BMI is above normal  Nutrition recommendations include 3-5 servings of fruits/vegetables daily  Exercise recommendations include exercising 3-5 times per week  · Reduce carbs in diet  · Continue gabapentin  Will notify patient when EMG results are back  Return in about 6 months (around 3/3/2022) for Review Labs  Chief Complaint:     Chief Complaint   Patient presents with    Follow-up      History of Present Illness:     Adult Annual Physical   Patient here for a comprehensive physical exam  The patient reports no problems  She was started on Gabapentin and it has helped reduce the symptoms of numbness in her foot  She has an EMG scheduled  Diet and Physical Activity  · Diet/Nutrition: well balanced diet  · Exercise: walking        Depression Screening  PHQ-9 Depression Screening    PHQ-9:   Frequency of the following problems over the past two weeks:           General Health  · Sleep: gets 7-8 hours of sleep on average  · Hearing: she does have difficulty hearing  · Vision: goes for regular eye exams  · Dental: regular dental visits  /GYN Health  · Patient is: postmenopausal  · Regular Gyn exams  · Mammogram June      Review of Systems:     Review of Systems   Constitutional: Negative for activity change, appetite change, chills, fatigue, fever and unexpected weight change  HENT: Positive for hearing loss  Negative for congestion, ear discharge, ear pain, postnasal drip, sinus pressure and sore throat  Eyes: Negative for discharge and visual disturbance  Respiratory: Negative for cough, shortness of breath and wheezing  Cardiovascular: Negative for chest pain, palpitations and leg swelling  Gastrointestinal: Negative for abdominal pain, constipation, diarrhea, nausea and vomiting  Endocrine: Negative for cold intolerance, heat intolerance, polydipsia and polyuria  Genitourinary: Negative for difficulty urinating and frequency  Musculoskeletal: Negative for arthralgias, back pain, joint swelling and myalgias  Skin: Negative for rash  Neurological: Negative for dizziness, weakness, light-headedness, numbness and headaches  Hematological: Negative for adenopathy  Psychiatric/Behavioral: Negative for behavioral problems, confusion, dysphoric mood, sleep disturbance and suicidal ideas  The patient is not nervous/anxious         Past Medical History:     Past Medical History:   Diagnosis Date    Breast cancer ([facility] ) 09/2019    left breast    History of radiation therapy 10/2019    left breast cancer    Kidney stone     age 17    Migraine     Osteoporosis     Pancreatitis     Pleurisy     PONV (postoperative nausea and vomiting)     Vertigo       Past Surgical History:     Past Surgical History:   Procedure Laterality Date    BREAST BIOPSY Left > 5 years     benign    BREAST LUMPECTOMY Left 10/1/2019    Procedure: BREAST NEEDLE LOCALIZED LUMPECTOMY (NEEDLE LOC AT 0730); Surgeon: Janice Mccann MD;  [facility];  Service: Surgical Oncology    CHOLECYSTECTOMY      COLONOSCOPY      DENTAL SURGERY      Bone graft with implant    HALLUX VALGUS CORRECTION Bilateral     INCISIONAL BREAST BIOPSY      LYMPH NODE BIOPSY Left 10/1/2019    Procedure: SENTINEL LYMPH NODE BIOPSY; LYMPHATIC MAPPING WITH BLUE DYE AND RADIOACTIVE DYE (INJECT AT 0830 BY DR HA IN THE OR); Surgeon: Janice Mccann MD;  [facility];  Service: Surgical Oncology    MAMMO NEEDLE LOCALIZATION LEFT (ALL INC) Left 10/1/2019    MAMMO STEREOTACTIC BREAST BIOPSY LEFT (ALL INC)      RHINOPLASTY        Social History:     Social History     Socioeconomic History    Marital status:      Spouse name: None    Number of children: 2    Years of education: None    Highest education level: None   Occupational History    Occupation: curretnly works part-time    Occupation: medical professional   Tobacco Use    Smoking status: Never Smoker    Smokeless tobacco: Never Used   Substance and Sexual Activity    Alcohol use: No    Drug use: No    Sexual activity: None   Other Topics Concern    None   Social History Narrative    None     Social Determinants of Health     Financial Resource Strain:     Difficulty of Paying Living Expenses:    Food Insecurity:     Worried About Running Out of Food in the Last Year:     [address] in the Last Year:    Transportation Needs:     Lack of Transportation (Medical):      Lack of Transportation (Non-Medical):    Physical Activity:     Days of Exercise per Week:     Minutes of Exercise per Session:    Stress:     Feeling of Stress :    Social Connections:     Frequency of Communication with Friends and Family:     Frequency of Social Gatherings with Friends and Family:     Attends Nondenominational Services:     Active Member of Clubs or Organizations:     Attends Club or Organization Meetings:     Marital Status:    Intimate Partner Violence:     Fear of Current or Ex-Partner:     Emotionally Abused:     Physically Abused:     Sexually Abused:       Family History:     Family History   Problem Relation Age of Onset    No Known Problems Mother     Lung cancer Father 71    Breast cancer Neg Hx       Current Medications:     Current Outpatient Medications   Medication Sig Dispense Refill    acetaminophen (TYLENOL) 500 mg tablet Take by mouth every 6 (six) hours as needed       anastrozole (ARIMIDEX) 1 mg tablet TAKE 1 TABLET (1 MG TOTAL) BY MOUTH DAILY 90 tablet 1    Cholecalciferol (VITAMIN D) 2000 units CAPS Take 4,000 Units by mouth daily       Diclofenac Sodium (VOLTAREN) 1 % Apply 2 g topically 4 (four) times a day 50 g 0    gabapentin (NEURONTIN) 100 mg capsule Take 1 capsule (100 mg total) by mouth 3 (three) times a day 90 capsule 1    ibandronate (BONIVA) 150 MG tablet Take 150 mg by mouth every 30 (thirty) days       Ivermectin (SOOLANTRA) 1 % CREA Apply topically daily       meclizine (ANTIVERT) 12 5 MG tablet Take 12 5 mg by mouth 3 (three) times a day as needed for dizziness      pimecrolimus (ELIDEL) 1 % cream       predniSONE 10 mg tablet       SUMAtriptan (IMITREX) 100 mg tablet Take 100 mg by mouth once as needed for migraine      venlafaxine (EFFEXOR) 37 5 mg tablet Take 1 tablet (37 5 mg total) by mouth daily 90 tablet 3     No current facility-administered medications for this visit  Allergies:      Allergies   Allergen Reactions    Ciprofloxacin Seizures    Cyclopentolate Other (See Comments) and Tachycardia     Resp distress    Sulfa Antibiotics Hives and Rash    Scopolamine Other (See Comments)     Precautionary for cycopentolate allergy      Physical Exam:     /80   Pulse 102   Temp 98 8 °F (37 1 °C)   Ht 5' 5" (1 651 m)   Wt 81 2 kg (179 lb)   SpO2 98%   BMI 29 79 kg/m²     Physical Exam  Vitals and nursing note reviewed  Constitutional:       General: She is not in acute distress  Appearance: She is well-developed  She is not diaphoretic  HENT:      Head: Normocephalic and atraumatic  Right Ear: External ear normal       Left Ear: External ear normal       Ears:      Comments: Hearing aids  Eyes:      Conjunctiva/sclera: Conjunctivae normal    Cardiovascular:      Rate and Rhythm: Normal rate and regular rhythm  Heart sounds: Normal heart sounds  No murmur heard  No friction rub  No gallop  Pulmonary:      Effort: Pulmonary effort is normal  No respiratory distress  Breath sounds: Normal breath sounds  No stridor  No wheezing or rales  Chest:      Chest wall: No tenderness  Neurological:      General: No focal deficit present  Mental Status: She is alert  Mental status is at baseline  Cranial Nerves: No cranial nerve deficit  Psychiatric:         Behavior: Behavior normal          Thought Content:  Thought content normal          Judgment: Judgment normal           Anil Bryan MD  [address] [place] [address] [id number]

## 2021-09-03 NOTE — PROGRESS NOTES
Assessment/Plan:         Problem List Items Addressed This Visit        Other    Numbness of both lower extremities    Prediabetes - Primary            Subjective:      Patient ID: Kay Hanks is a 61 y o  female  She is here to recheck her neuropathy symtpoms  She was started on Gabapentin which she says is helping  She has no side effects  She had labs       The following portions of the patient's history were reviewed and updated as appropriate:   She  has a past medical history of Breast cancer ([facility] [address] ) (09/2019), History of radiation therapy (10/2019), Kidney stone, Migraine, Osteoporosis, Pancreatitis, Pleurisy, PONV (postoperative nausea and vomiting), and Vertigo  She   Patient Active Problem List    Diagnosis Date Noted    Prediabetes 09/03/2021    Numbness of both lower extremities 08/02/2021    Overweight (BMI 25 0-29 9) 01/06/2021    Use of anastrozole (Arimidex) 01/16/2020    Malignant [facility] of upper-outer quadrant of left [facility] in [facility], [facility] 08/12/2019    Age-related osteoporosis without current pathological fracture 03/27/2019    Fatigue 02/15/2018     She  has a past surgical history that includes Cholecystectomy; Dental surgery; Incisional breast biopsy; Rhinoplasty; Hallux valgus correction (Bilateral); Colonoscopy; Mammo stereotactic breast biopsy left (all inc); Mammo needle localization left (all inc) (Left, 10/1/2019); Breast lumpectomy (Left, 10/1/2019); Lymph node biopsy (Left, 10/1/2019); and Breast biopsy (Left, > 5 years )  Her family history includes Lung cancer (age of onset: 71) in her father; No Known Problems in her mother  She  reports that she has never smoked  She has never used smokeless tobacco  She reports that she does not drink alcohol and does not use drugs    Current Outpatient Medications   Medication Sig Dispense Refill    acetaminophen (TYLENOL) 500 mg tablet Take by mouth every 6 (six) hours as needed       anastrozole (ARIMIDEX) 1 mg tablet TAKE 1 TABLET (1 MG TOTAL) BY MOUTH DAILY 90 tablet 1    Cholecalciferol (VITAMIN D) 2000 units CAPS Take 4,000 Units by mouth daily       Diclofenac Sodium (VOLTAREN) 1 % Apply 2 g topically 4 (four) times a day 50 g 0    gabapentin (NEURONTIN) 100 mg capsule Take 1 capsule (100 mg total) by mouth 3 (three) times a day 90 capsule 1    ibandronate (BONIVA) 150 MG tablet Take 150 mg by mouth every 30 (thirty) days       Ivermectin (SOOLANTRA) 1 % CREA Apply topically daily       meclizine (ANTIVERT) 12 5 MG tablet Take 12 5 mg by mouth 3 (three) times a day as needed for dizziness      pimecrolimus (ELIDEL) 1 % cream       predniSONE 10 mg tablet       SUMAtriptan (IMITREX) 100 mg tablet Take 100 mg by mouth once as needed for migraine      venlafaxine (EFFEXOR) 37 5 mg tablet Take 1 tablet (37 5 mg total) by mouth daily 90 tablet 3     No current facility-administered medications for this visit       Current Outpatient Medications on File Prior to Visit   Medication Sig    acetaminophen (TYLENOL) 500 mg tablet Take by mouth every 6 (six) hours as needed     anastrozole (ARIMIDEX) 1 mg tablet TAKE 1 TABLET (1 MG TOTAL) BY MOUTH DAILY    Cholecalciferol (VITAMIN D) 2000 units CAPS Take 4,000 Units by mouth daily     Diclofenac Sodium (VOLTAREN) 1 % Apply 2 g topically 4 (four) times a day    gabapentin (NEURONTIN) 100 mg capsule Take 1 capsule (100 mg total) by mouth 3 (three) times a day    ibandronate (BONIVA) 150 MG tablet Take 150 mg by mouth every 30 (thirty) days     Ivermectin (SOOLANTRA) 1 % CREA Apply topically daily     meclizine (ANTIVERT) 12 5 MG tablet Take 12 5 mg by mouth 3 (three) times a day as needed for dizziness    pimecrolimus (ELIDEL) 1 % cream     predniSONE 10 mg tablet     SUMAtriptan (IMITREX) 100 mg tablet Take 100 mg by mouth once as needed for migraine    venlafaxine (EFFEXOR) 37 5 mg tablet Take 1 tablet (37 5 mg total) by mouth daily     No current facility-administered medications on file prior to visit  She is allergic to ciprofloxacin, cyclopentolate, sulfa antibiotics, and scopolamine       Review of Systems      Objective:      /80   Pulse 102   Temp 98 8 °F (37 1 °C)   Ht 5' 5" (1 651 m)   Wt 81 2 kg (179 lb)   SpO2 98%   BMI 29 79 kg/m²          Physical Exam

## 2021-10-05 DIAGNOSIS — R20.0 NUMBNESS OF BOTH LOWER EXTREMITIES: ICD-10-CM

## 2021-10-05 RX ORDER — GABAPENTIN 100 MG/1
100 CAPSULE ORAL 3 TIMES DAILY
Qty: 90 CAPSULE | Refills: 1 | Status: SHIPPED | OUTPATIENT
Start: 2021-10-05 | End: 2021-12-17

## 2021-10-25 ENCOUNTER — PROCEDURE VISIT (OUTPATIENT)
Dept: NEUROLOGY | Facility: CLINIC | Age: 66
End: 2021-10-25
Payer: COMMERCIAL

## 2021-10-25 DIAGNOSIS — R20.0 NUMBNESS OF BOTH LOWER EXTREMITIES: ICD-10-CM

## 2021-10-25 PROCEDURE — 95886 MUSC TEST DONE W/N TEST COMP: CPT | Performed by: PHYSICAL MEDICINE & REHABILITATION

## 2021-10-25 PROCEDURE — 95911 NRV CNDJ TEST 9-10 STUDIES: CPT | Performed by: PHYSICAL MEDICINE & REHABILITATION

## 2021-11-03 DIAGNOSIS — C50.412 MALIGNANT NEOPLASM OF UPPER-OUTER QUADRANT OF LEFT BREAST IN FEMALE, ESTROGEN RECEPTOR POSITIVE (HCC): ICD-10-CM

## 2021-11-03 DIAGNOSIS — R23.2 HOT FLASHES: ICD-10-CM

## 2021-11-03 DIAGNOSIS — Z17.0 MALIGNANT NEOPLASM OF UPPER-OUTER QUADRANT OF LEFT BREAST IN FEMALE, ESTROGEN RECEPTOR POSITIVE (HCC): ICD-10-CM

## 2021-11-04 RX ORDER — VENLAFAXINE 37.5 MG/1
37.5 TABLET ORAL DAILY
Qty: 30 TABLET | Refills: 3 | Status: SHIPPED | OUTPATIENT
Start: 2021-11-04 | End: 2022-03-11 | Stop reason: SDUPTHER

## 2021-11-15 ENCOUNTER — HOSPITAL ENCOUNTER (OUTPATIENT)
Dept: MRI IMAGING | Facility: HOSPITAL | Age: 66
Discharge: HOME | End: 2021-11-15
Payer: COMMERCIAL

## 2021-11-15 DIAGNOSIS — G62.9 NEUROPATHY: ICD-10-CM

## 2021-11-15 PROCEDURE — G1004 CDSM NDSC: HCPCS

## 2021-11-15 PROCEDURE — 72148 MRI LUMBAR SPINE W/O DYE: CPT

## 2021-11-17 ENCOUNTER — TELEPHONE (OUTPATIENT)
Dept: FAMILY MEDICINE CLINIC | Facility: CLINIC | Age: 66
End: 2021-11-17

## 2021-11-29 ENCOUNTER — APPOINTMENT (EMERGENCY)
Dept: CT IMAGING | Facility: HOSPITAL | Age: 66
End: 2021-11-29
Payer: COMMERCIAL

## 2021-11-29 ENCOUNTER — OFFICE VISIT (OUTPATIENT)
Dept: URGENT CARE | Facility: CLINIC | Age: 66
End: 2021-11-29
Payer: COMMERCIAL

## 2021-11-29 ENCOUNTER — APPOINTMENT (EMERGENCY)
Dept: RADIOLOGY | Facility: HOSPITAL | Age: 66
End: 2021-11-29
Payer: COMMERCIAL

## 2021-11-29 ENCOUNTER — HOSPITAL ENCOUNTER (EMERGENCY)
Facility: HOSPITAL | Age: 66
Discharge: HOME | End: 2021-11-29
Attending: EMERGENCY MEDICINE
Payer: COMMERCIAL

## 2021-11-29 VITALS
OXYGEN SATURATION: 97 % | DIASTOLIC BLOOD PRESSURE: 72 MMHG | SYSTOLIC BLOOD PRESSURE: 118 MMHG | TEMPERATURE: 98.7 F | HEART RATE: 102 BPM

## 2021-11-29 VITALS
HEART RATE: 98 BPM | DIASTOLIC BLOOD PRESSURE: 65 MMHG | TEMPERATURE: 99 F | OXYGEN SATURATION: 98 % | SYSTOLIC BLOOD PRESSURE: 125 MMHG | RESPIRATION RATE: 17 BRPM

## 2021-11-29 DIAGNOSIS — R19.7 DIARRHEA, UNSPECIFIED TYPE: Primary | ICD-10-CM

## 2021-11-29 DIAGNOSIS — R19.7 DIARRHEA: ICD-10-CM

## 2021-11-29 DIAGNOSIS — J06.9 ACUTE URI: ICD-10-CM

## 2021-11-29 DIAGNOSIS — J10.1 INFLUENZA A: Primary | ICD-10-CM

## 2021-11-29 LAB
ALBUMIN SERPL BCP-MCNC: 3.5 G/DL (ref 3.5–5)
ALP SERPL-CCNC: 87 U/L (ref 46–116)
ALT SERPL W P-5'-P-CCNC: 28 U/L (ref 12–78)
ANION GAP SERPL CALCULATED.3IONS-SCNC: 11 MMOL/L (ref 4–13)
AST SERPL W P-5'-P-CCNC: 21 U/L (ref 5–45)
ATRIAL RATE: 94 BPM
BASOPHILS # BLD AUTO: 0.02 THOUSANDS/ΜL (ref 0–0.1)
BASOPHILS NFR BLD AUTO: 0 % (ref 0–1)
BILIRUB SERPL-MCNC: 0.41 MG/DL (ref 0.2–1)
BUN SERPL-MCNC: 14 MG/DL (ref 5–25)
CALCIUM SERPL-MCNC: 9.2 MG/DL (ref 8.3–10.1)
CARDIAC TROPONIN I PNL SERPL HS: 3 NG/L
CHLORIDE SERPL-SCNC: 101 MMOL/L (ref 100–108)
CO2 SERPL-SCNC: 26 MMOL/L (ref 21–32)
CREAT SERPL-MCNC: 0.75 MG/DL (ref 0.6–1.3)
EOSINOPHIL # BLD AUTO: 0.04 THOUSAND/ΜL (ref 0–0.61)
EOSINOPHIL NFR BLD AUTO: 1 % (ref 0–6)
ERYTHROCYTE [DISTWIDTH] IN BLOOD BY AUTOMATED COUNT: 11.9 % (ref 11.6–15.1)
FLUAV RNA RESP QL NAA+PROBE: POSITIVE
FLUBV RNA RESP QL NAA+PROBE: NEGATIVE
GFR SERPL CREATININE-BSD FRML MDRD: 85 ML/MIN/1.73SQ M
GLUCOSE SERPL-MCNC: 92 MG/DL (ref 65–140)
HCT VFR BLD AUTO: 42.3 % (ref 34.8–46.1)
HGB BLD-MCNC: 14.2 G/DL (ref 11.5–15.4)
IMM GRANULOCYTES # BLD AUTO: 0.01 THOUSAND/UL (ref 0–0.2)
IMM GRANULOCYTES NFR BLD AUTO: 0 % (ref 0–2)
LACTATE SERPL-SCNC: 0.9 MMOL/L (ref 0.5–2)
LYMPHOCYTES # BLD AUTO: 2.52 THOUSANDS/ΜL (ref 0.6–4.47)
LYMPHOCYTES NFR BLD AUTO: 37 % (ref 14–44)
MCH RBC QN AUTO: 32.6 PG (ref 26.8–34.3)
MCHC RBC AUTO-ENTMCNC: 33.6 G/DL (ref 31.4–37.4)
MCV RBC AUTO: 97 FL (ref 82–98)
MONOCYTES # BLD AUTO: 0.39 THOUSAND/ΜL (ref 0.17–1.22)
MONOCYTES NFR BLD AUTO: 6 % (ref 4–12)
NEUTROPHILS # BLD AUTO: 3.91 THOUSANDS/ΜL (ref 1.85–7.62)
NEUTS SEG NFR BLD AUTO: 56 % (ref 43–75)
NRBC BLD AUTO-RTO: 0 /100 WBCS
P AXIS: 72 DEGREES
PLATELET # BLD AUTO: 261 THOUSANDS/UL (ref 149–390)
PMV BLD AUTO: 8.5 FL (ref 8.9–12.7)
POTASSIUM SERPL-SCNC: 3.2 MMOL/L (ref 3.5–5.3)
PR INTERVAL: 164 MS
PROT SERPL-MCNC: 7.7 G/DL (ref 6.4–8.2)
QRS AXIS: 19 DEGREES
QRSD INTERVAL: 76 MS
QT INTERVAL: 372 MS
QTC INTERVAL: 465 MS
RBC # BLD AUTO: 4.35 MILLION/UL (ref 3.81–5.12)
RSV RNA RESP QL NAA+PROBE: NEGATIVE
SARS-COV-2 RNA RESP QL NAA+PROBE: NEGATIVE
SODIUM SERPL-SCNC: 138 MMOL/L (ref 136–145)
T WAVE AXIS: 85 DEGREES
VENTRICULAR RATE: 94 BPM
WBC # BLD AUTO: 6.89 THOUSAND/UL (ref 4.31–10.16)

## 2021-11-29 PROCEDURE — 85025 COMPLETE CBC W/AUTO DIFF WBC: CPT | Performed by: EMERGENCY MEDICINE

## 2021-11-29 PROCEDURE — 83605 ASSAY OF LACTIC ACID: CPT | Performed by: PHYSICIAN ASSISTANT

## 2021-11-29 PROCEDURE — 99213 OFFICE O/P EST LOW 20 MIN: CPT | Performed by: PREVENTIVE MEDICINE

## 2021-11-29 PROCEDURE — 99285 EMERGENCY DEPT VISIT HI MDM: CPT

## 2021-11-29 PROCEDURE — 87505 NFCT AGENT DETECTION GI: CPT | Performed by: PHYSICIAN ASSISTANT

## 2021-11-29 PROCEDURE — 99284 EMERGENCY DEPT VISIT MOD MDM: CPT | Performed by: PHYSICIAN ASSISTANT

## 2021-11-29 PROCEDURE — 0241U HB NFCT DS VIR RESP RNA 4 TRGT: CPT | Performed by: PHYSICIAN ASSISTANT

## 2021-11-29 PROCEDURE — 71045 X-RAY EXAM CHEST 1 VIEW: CPT

## 2021-11-29 PROCEDURE — 93005 ELECTROCARDIOGRAM TRACING: CPT | Performed by: PREVENTIVE MEDICINE

## 2021-11-29 PROCEDURE — 36415 COLL VENOUS BLD VENIPUNCTURE: CPT

## 2021-11-29 PROCEDURE — 80053 COMPREHEN METABOLIC PANEL: CPT | Performed by: EMERGENCY MEDICINE

## 2021-11-29 PROCEDURE — 96360 HYDRATION IV INFUSION INIT: CPT

## 2021-11-29 PROCEDURE — 84484 ASSAY OF TROPONIN QUANT: CPT | Performed by: EMERGENCY MEDICINE

## 2021-11-29 PROCEDURE — 74177 CT ABD & PELVIS W/CONTRAST: CPT

## 2021-11-29 PROCEDURE — 93010 ELECTROCARDIOGRAM REPORT: CPT | Performed by: INTERNAL MEDICINE

## 2021-11-29 RX ORDER — POTASSIUM CHLORIDE 20 MEQ/1
40 TABLET, EXTENDED RELEASE ORAL ONCE
Status: COMPLETED | OUTPATIENT
Start: 2021-11-29 | End: 2021-11-29

## 2021-11-29 RX ADMIN — IOHEXOL 100 ML: 350 INJECTION, SOLUTION INTRAVENOUS at 16:46

## 2021-11-29 RX ADMIN — SODIUM CHLORIDE 1000 ML: 0.9 INJECTION, SOLUTION INTRAVENOUS at 16:30

## 2021-11-29 RX ADMIN — POTASSIUM CHLORIDE 40 MEQ: 1500 TABLET, EXTENDED RELEASE ORAL at 16:28

## 2021-11-30 LAB
C DIFF TOX GENS STL QL NAA+PROBE: NEGATIVE
CAMPYLOBACTER DNA SPEC NAA+PROBE: NORMAL
SALMONELLA DNA SPEC QL NAA+PROBE: NORMAL
SHIGA TOXIN STX GENE SPEC NAA+PROBE: NORMAL
SHIGELLA DNA SPEC QL NAA+PROBE: NORMAL

## 2021-12-01 ENCOUNTER — TELEMEDICINE (OUTPATIENT)
Dept: FAMILY MEDICINE CLINIC | Facility: CLINIC | Age: 66
End: 2021-12-01
Payer: COMMERCIAL

## 2021-12-01 DIAGNOSIS — J10.1 INFLUENZA A: Primary | ICD-10-CM

## 2021-12-01 PROCEDURE — 1036F TOBACCO NON-USER: CPT | Performed by: PHYSICIAN ASSISTANT

## 2021-12-01 PROCEDURE — 99213 OFFICE O/P EST LOW 20 MIN: CPT | Performed by: PHYSICIAN ASSISTANT

## 2021-12-17 DIAGNOSIS — R20.0 NUMBNESS OF BOTH LOWER EXTREMITIES: ICD-10-CM

## 2021-12-17 RX ORDER — GABAPENTIN 100 MG/1
100 CAPSULE ORAL 3 TIMES DAILY
Qty: 90 CAPSULE | Refills: 1 | Status: SHIPPED | OUTPATIENT
Start: 2021-12-17 | End: 2022-03-11 | Stop reason: ALTCHOICE

## 2022-01-04 ENCOUNTER — CONSULT (OUTPATIENT)
Dept: PAIN MEDICINE | Facility: CLINIC | Age: 67
End: 2022-01-04
Payer: COMMERCIAL

## 2022-01-04 ENCOUNTER — OFFICE VISIT (OUTPATIENT)
Dept: SURGICAL ONCOLOGY | Facility: CLINIC | Age: 67
End: 2022-01-04
Payer: COMMERCIAL

## 2022-01-04 VITALS
WEIGHT: 171 LBS | RESPIRATION RATE: 18 BRPM | HEART RATE: 64 BPM | OXYGEN SATURATION: 98 % | SYSTOLIC BLOOD PRESSURE: 122 MMHG | DIASTOLIC BLOOD PRESSURE: 78 MMHG | TEMPERATURE: 97 F | HEIGHT: 65 IN | BODY MASS INDEX: 28.49 KG/M2

## 2022-01-04 VITALS
BODY MASS INDEX: 28.46 KG/M2 | WEIGHT: 171 LBS | DIASTOLIC BLOOD PRESSURE: 73 MMHG | HEART RATE: 84 BPM | SYSTOLIC BLOOD PRESSURE: 114 MMHG

## 2022-01-04 DIAGNOSIS — Z17.0 MALIGNANT NEOPLASM OF UPPER-OUTER QUADRANT OF LEFT BREAST IN FEMALE, ESTROGEN RECEPTOR POSITIVE (HCC): Primary | ICD-10-CM

## 2022-01-04 DIAGNOSIS — M48.062 SPINAL STENOSIS OF LUMBAR REGION WITH NEUROGENIC CLAUDICATION: ICD-10-CM

## 2022-01-04 DIAGNOSIS — Z79.811 USE OF ANASTROZOLE (ARIMIDEX): ICD-10-CM

## 2022-01-04 DIAGNOSIS — C50.412 MALIGNANT NEOPLASM OF UPPER-OUTER QUADRANT OF LEFT BREAST IN FEMALE, ESTROGEN RECEPTOR POSITIVE (HCC): Primary | ICD-10-CM

## 2022-01-04 DIAGNOSIS — M48.061 LUMBAR FORAMINAL STENOSIS: Primary | ICD-10-CM

## 2022-01-04 DIAGNOSIS — M51.16 LUMBAR DISC DISEASE WITH RADICULOPATHY: ICD-10-CM

## 2022-01-04 DIAGNOSIS — M43.06 LUMBAR SPONDYLOLYSIS: ICD-10-CM

## 2022-01-04 PROCEDURE — 99214 OFFICE O/P EST MOD 30 MIN: CPT | Performed by: NURSE PRACTITIONER

## 2022-01-04 PROCEDURE — 99204 OFFICE O/P NEW MOD 45 MIN: CPT | Performed by: PHYSICAL MEDICINE & REHABILITATION

## 2022-01-04 NOTE — PROGRESS NOTES
Surgical Oncology Follow Up       [address] Andrews  CANCER [facility] [place]  [address] [facility] [place] [address]    Geradine Boeck  1955  [id number]  [address] [place] Andrews  CANCER [facility] SURGICAL ONCOLOGY [place]  [address] [facility] [address]    Chief Complaint   Patient presents with    Breast Cancer       Assessment/Plan:  1  Malignant neoplasm of upper-outer quadrant of left breast in female, estrogen receptor positive ([facility] [address] )  - Mammo diagnostic bilateral w 3d & cad; Future  - 6 mo f/u visit    2  Use of anastrozole (Arimidex)  - Continue use per medical oncology    Discussion/Summary: Patient is a 62-year-old female who presents today for a six-month follow-up visit for left breast cancer diagnosed in July of 2019  Her pathology revealed invasive lobular carcinoma, ER/%, her 2-  She underwent a left lumpectomy and sentinel node biopsy by Dr Christy Culver  Ev Roland completed whole breast radiation therapy and is taking Anastrozole   She had a bilateral 3D diagnostic mammogram on May 29, 2020 which was BI-RADS 2, category 2 density  She offers no new complaints today and there are no concerns on today's exam   I will make arrangements for her mammogram in June and plan to see her back in 6 months or sooner should the need arise  She was instructed to call with any new concerns or symptoms prior to that time  All of her questions were answered today        History of Present Illness:     Oncology History   Malignant neoplasm of upper-outer quadrant of left breast in female, estrogen receptor positive ([facility] [address] )   7/23/2019 Biopsy    Left breast biopsy  1 o'clock, 5 cm from nipple  Invasive lobular carcinoma  Grade 1      HER2 0    Done at [facility]     10/1/2019 Surgery    Left breast needle localized lumpectomy with sentinel lymph node biopsy  Invasive lobular carcinoma  Grade 1  1 8 cm  Margins negative  0/2 Lymph nodes  Anatomic/Prognostic Stage IA     10/30/2019 -  Hormone Therapy    Anastrozole 1 mg daily  Dr Claude Junes     11/13/2019 - 12/4/2019 Radiation    entire right breast to 4005cGy in 15 daily 267cGy fractions  Dr Miriam Christy          -Interval History:  Patient presents today for follow-up visit for left breast cancer  She notices no changes on her self breast exam   She reports chronic neck and back pain and is scheduled to see a pain specialist later today  She had an MRI of her lumbar spine which revealed stenosis and degenerative changes  Denies persistent headaches, cough or shortness of breath, abdominal pain  Review of Systems:  Review of Systems   Constitutional: Negative for activity change, appetite change, chills, fatigue, fever and unexpected weight change  Respiratory: Negative for cough and shortness of breath  Cardiovascular: Negative for chest pain  Gastrointestinal: Negative for abdominal pain, constipation, diarrhea, nausea and vomiting  Musculoskeletal: Positive for back pain and neck stiffness  Negative for arthralgias, gait problem and myalgias  Skin: Negative for color change and rash  Neurological: Negative for dizziness and headaches  Hematological: Negative for adenopathy  Psychiatric/Behavioral: Negative for agitation and confusion  All other systems reviewed and are negative  Patient Active Problem List   Diagnosis    Fatigue    Age-related osteoporosis without current pathological fracture    Malignant neoplasm of upper-outer quadrant of left breast in female, estrogen receptor positive ([facility] [address] )    Use of anastrozole (Arimidex)    Overweight (BMI 25 0-29  9)    Numbness of both lower extremities    Prediabetes    Hearing aid worn     Past Medical History:   Diagnosis Date    Breast cancer ([facility] [address] ) 09/2019    left breast    History of radiation therapy 10/2019    left breast cancer    Kidney stone     age 17    Migraine     Osteoporosis     Pancreatitis     Pleurisy     PONV (postoperative nausea and vomiting)     Vertigo      Past Surgical History:   Procedure Laterality Date    BREAST BIOPSY Left > 5 years     benign    BREAST LUMPECTOMY Left 10/1/2019    Procedure: BREAST NEEDLE LOCALIZED LUMPECTOMY (NEEDLE LOC AT 0730); Surgeon: Carlton Monson MD;  [facility];  Service: Surgical Oncology    CHOLECYSTECTOMY      COLONOSCOPY      DENTAL SURGERY      Bone graft with implant    HALLUX VALGUS CORRECTION Bilateral     INCISIONAL BREAST BIOPSY      LYMPH NODE BIOPSY Left 10/1/2019    Procedure: SENTINEL LYMPH NODE BIOPSY; LYMPHATIC MAPPING WITH BLUE DYE AND RADIOACTIVE DYE (INJECT AT 0830 BY DR HA IN THE OR); Surgeon: Carlton Monson MD;  [facility];  Service: Surgical Oncology    MAMMO NEEDLE LOCALIZATION LEFT (ALL INC) Left 10/1/2019    MAMMO STEREOTACTIC BREAST BIOPSY LEFT (ALL INC)      RHINOPLASTY       Family History   Problem Relation Age of Onset    No Known Problems Mother     Lung cancer Father 71    Breast cancer Neg Hx      Social History     Socioeconomic History    Marital status:       Spouse name: Not on file    Number of children: 2    Years of education: Not on file    Highest education level: Not on file   Occupational History    Occupation: curretnly works part-time    Occupation: medical professional   Tobacco Use    Smoking status: Never Smoker    Smokeless tobacco: Never Used   Vaping Use    Vaping Use: Never used   Substance and Sexual Activity    Alcohol use: No    Drug use: No    Sexual activity: Not on file   Other Topics Concern    Not on file   Social History Narrative    Not on file     Social Determinants of Health     Financial Resource Strain: Not on file   Food Insecurity: Not on file   Transportation Needs: Not on file   Physical Activity: Not on file   Stress: Not on file   Social Connections: Not on file   Intimate Partner Violence: Not on file   Housing Stability: Not on file       Current Outpatient Medications:     acetaminophen (TYLENOL) 500 mg tablet, Take by mouth every 6 (six) hours as needed , Disp: , Rfl:     anastrozole (ARIMIDEX) 1 mg tablet, TAKE 1 TABLET (1 MG TOTAL) BY MOUTH DAILY, Disp: 90 tablet, Rfl: 1    Cholecalciferol (VITAMIN D) 2000 units CAPS, Take 4,000 Units by mouth daily , Disp: , Rfl:     Diclofenac Sodium (VOLTAREN) 1 %, Apply 2 g topically 4 (four) times a day, Disp: 50 g, Rfl: 0    gabapentin (NEURONTIN) 100 mg capsule, TAKE 1 CAPSULE (100 MG TOTAL) BY MOUTH 3 (THREE) TIMES A DAY, Disp: 90 capsule, Rfl: 1    ibandronate (BONIVA) 150 MG tablet, Take 150 mg by mouth every 30 (thirty) days , Disp: , Rfl:     Ivermectin (SOOLANTRA) 1 % CREA, Apply topically daily , Disp: , Rfl:     meclizine (ANTIVERT) 12 5 MG tablet, Take 12 5 mg by mouth 3 (three) times a day as needed for dizziness, Disp: , Rfl:     pimecrolimus (ELIDEL) 1 % cream, , Disp: , Rfl:     SUMAtriptan (IMITREX) 100 mg tablet, Take 100 mg by mouth once as needed for migraine, Disp: , Rfl:     venlafaxine (EFFEXOR) 37 5 mg tablet, TAKE 1 TABLET (37 5 MG TOTAL) BY MOUTH DAILY, Disp: 30 tablet, Rfl: 3    predniSONE 10 mg tablet, , Disp: , Rfl:   Allergies   Allergen Reactions    Ciprofloxacin Seizures    Cyclopentolate Other (See Comments) and Tachycardia     Resp distress    Sulfa Antibiotics Hives and Rash    Scopolamine Other (See Comments)     Precautionary for cycopentolate allergy     Vitals:    01/04/22 1046   BP: 122/78   Pulse: 64   Resp: 18   Temp: (!) 97 °F (36 1 °C)   SpO2: 98%       Physical Exam  Vitals reviewed  Constitutional:       General: She is not in acute distress  Appearance: Normal appearance  She is well-developed  She is not diaphoretic  HENT:      Head: Normocephalic and atraumatic  Cardiovascular:      Rate and Rhythm: Normal rate and regular rhythm  Heart sounds: Normal heart sounds     Pulmonary:      Effort: Pulmonary effort is normal       Breath sounds: Normal breath sounds  Chest:   Breasts:      Right: Skin change (inferior breast surgical scar) present  No swelling, bleeding, inverted nipple, mass, nipple discharge, tenderness, axillary adenopathy or supraclavicular adenopathy  Left: Skin change (surgical scars- breast, axilla) present  No swelling, bleeding, inverted nipple, mass, nipple discharge, tenderness, axillary adenopathy or supraclavicular adenopathy  Abdominal:      Palpations: Abdomen is soft  There is no mass  Tenderness: There is no abdominal tenderness  Musculoskeletal:         General: Normal range of motion  Cervical back: Normal range of motion  Lymphadenopathy:      Upper Body:      Right upper body: No supraclavicular or axillary adenopathy  Left upper body: No supraclavicular or axillary adenopathy  Skin:     General: Skin is warm and dry  Findings: No rash  Neurological:      Mental Status: She is alert and oriented to person, place, and time  Psychiatric:         Speech: Speech normal            Advance Care Planning/Advance Directives:  Discussed disease status, cancer treatment plans and/or cancer treatment goals with the patient
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
## 2022-01-04 NOTE — PATIENT INSTRUCTIONS
Lumbar Radiculopathy   WHAT YOU NEED TO KNOW:   Lumbar radiculopathy is a painful condition that happens when a nerve in your lumbar spine (lower back) is pinched or irritated  Nerves control feeling and movement in your body  You may have numbness or pain that shoots down from your lower back towards your foot  DISCHARGE INSTRUCTIONS:   Medicines:   · Medicines:     ? NSAIDs , such as ibuprofen, help decrease swelling, pain, and fever  This medicine is available with or without a doctor's order  NSAIDs can cause stomach bleeding or kidney problems in certain people  If you take blood thinner medicine, always ask your healthcare provider if NSAIDs are safe for you  Always read the medicine label and follow directions  ? Muscle relaxers  help decrease pain and muscle spasms  ? Opioids: This is a strong medicine given to reduce severe pain  It is also called narcotic pain medicine  Take this medicine exactly as directed by your healthcare provider  ? Oral steroids: Steroids may also be given to reduce pain and swelling  ? Take your medicine as directed  Contact your healthcare provider if you think your medicine is not helping or if you have side effects  Tell him of her if you are allergic to any medicine  Keep a list of the medicines, vitamins, and herbs you take  Include the amounts, and when and why you take them  Bring the list or the pill bottles to follow-up visits  Carry your medicine list with you in case of an emergency  Follow up with your healthcare provider or spine specialist within 1 to 3 weeks:  After your first follow-up appointment, return to your healthcare provider or spine specialist every 2 weeks until you have healed  Ask for information about physical therapy for your condition  Write down your questions so you remember to ask them during your visits  Physical therapy:  You may need physical therapy to improve your condition   Your physical therapist may teach you certain exercises to improve posture (the way you stand and sit), flexibility, and strength in your lower back  Self care:   · Stay active: It is best to be active when you have lumbar radiculopathy  Your physical therapist or healthcare provider may tell you to take walks to ease yourself back into your daily routine  Avoid long periods of bed rest  Bed rest could worsen your symptoms  Do not move in ways that increase your pain  Ask for more information about the best ways to stay active  · Use ice or heat packs:  Use ice or heat packs as directed on the sore area of your body to decrease the pain and swelling  Put ice in a plastic bag covered with a towel on your low back  Cover heated items with a towel to avoid burns  Use ice and heat as directed  · Avoid heavy lifting: Your condition may worsen if you lift heavy things  Avoid lifting if possible  · Maintain a healthy weight:  Excess body weight may strain your back  Talk with your healthcare provider about ways to lose excess weight if you are overweight  Contact your healthcare provider or spine specialist if:   · Your pain does not improve within 1 to 3 weeks after treatment  · Your pain and weakness keep you from your normal activities at work, home, or school  · You lose more than 10 pounds in 6 months without trying  · You become depressed or sad because of the pain  · You have questions or concerns about your condition or care  Return to the emergency department if:   · You have a fever greater than 100 4°F for longer than 2 days  · You have new, severe back or leg pain, or your pain spreads to both legs  · You have any new signs of numbness or weakness, especially in your lower back, legs, arms, or genital area  · You have new trouble controlling your urine and bowel movements  · You do not feel like your bladder empties when you urinate      © Copyright [employer] 2021 Information is for End User's use only and may not be sold, redistributed or otherwise used for commercial purposes  All illustrations and images included in CareNotes® are the copyrighted property of A D A M [employer] Inc  or Anton Lang  The above information is an  only  It is not intended as medical advice for individual conditions or treatments  Talk to your doctor, nurse or pharmacist before following any medical regimen to see if it is safe and effective for you

## 2022-01-05 ENCOUNTER — TELEPHONE (OUTPATIENT)
Dept: PAIN MEDICINE | Facility: CLINIC | Age: 67
End: 2022-01-05

## 2022-01-12 ENCOUNTER — OFFICE VISIT (OUTPATIENT)
Dept: HEMATOLOGY ONCOLOGY | Facility: CLINIC | Age: 67
End: 2022-01-12
Payer: COMMERCIAL

## 2022-01-12 VITALS
HEART RATE: 118 BPM | SYSTOLIC BLOOD PRESSURE: 126 MMHG | TEMPERATURE: 96.5 F | WEIGHT: 171 LBS | HEIGHT: 65 IN | RESPIRATION RATE: 18 BRPM | DIASTOLIC BLOOD PRESSURE: 78 MMHG | OXYGEN SATURATION: 99 % | BODY MASS INDEX: 28.49 KG/M2

## 2022-01-12 DIAGNOSIS — C50.412 MALIGNANT NEOPLASM OF UPPER-OUTER QUADRANT OF LEFT BREAST IN FEMALE, ESTROGEN RECEPTOR POSITIVE (HCC): Primary | ICD-10-CM

## 2022-01-12 DIAGNOSIS — Z17.0 MALIGNANT NEOPLASM OF UPPER-OUTER QUADRANT OF LEFT BREAST IN FEMALE, ESTROGEN RECEPTOR POSITIVE (HCC): Primary | ICD-10-CM

## 2022-01-12 PROCEDURE — 99214 OFFICE O/P EST MOD 30 MIN: CPT | Performed by: INTERNAL MEDICINE

## 2022-01-12 RX ORDER — EXEMESTANE 25 MG/1
25 TABLET ORAL DAILY
Qty: 90 TABLET | Refills: 3 | Status: SHIPPED | OUTPATIENT
Start: 2022-01-12

## 2022-01-12 NOTE — PROGRESS NOTES
Hematology / Oncology Outpatient Follow Up Note    Radha Friend 61 y o  female CQT:[birth year] AVM:[id number]         Date:  1/12/2022    Assessment / Plan:  A 53-year-old postmenopausal woman with stage IA left breast cancer, grade 1, with invasive lobular histology, % positive, % positive, HER2 negative disease   She underwent lumpectomy and sentinel lymph node biopsy, resulting in JOSUÉ    She is currently on adjuvant hormonal therapy with anastrozole with progressive musculoskeletal symptoms  She has no evidence recurrent disease, based on her symptoms and physical examinations  She is asking if she can take other pill instead of anastrozole  I recommended her to switch anastrozole to exemestane 25 mg once a day to see if she has improvement of musculoskeletal symptoms  She is in agreement with my recommendations  I will see her again in a year for routine follow-up         Subjective:      HPI:  A 53-year-old postmenopausal woman who was on hormone replacement therapy in the past for 20 years  Jazz Corona was recently found to have abnormality in her left breast based on a screening mammography  [facility], she underwent left breast biopsy in July 23, 2019 which showed invasive lobular carcinoma, grade 1  Subsequently, she underwent lumpectomy and sentinel lymph node biopsy by Dr Daniel Every in October 1, 2019  She had 1 8 cm of invasive lobular carcinoma, grade 1  2 sentinel lymph nodes were negative for metastatic disease   This was % positive, % positive, HER2 negative disease   She presents today to discuss adjuvant treatment options   She has history of osteoporosis   Since 2016, she has been on Boniva   Based on a DEXA scan in 2018, her T-score was -2 0, consistent with osteopenia   She has no complaint of pain   She denied any respiratory symptoms   Her weight is stable   She has no pain   She has some insomnia  Jazz Corona is a lifetime never smoker  Jazz Corona has no family history of breast cancer   Her performance status is normal              Interval History:  A 56-year-old postmenopausal woman with stage IA left breast cancer, grade 1, with invasive lobular histology, % positive, % positive, HER2 negative disease   She underwent lumpectomy and sentinel lymph node biopsy, resulting in JOSUÉ    Since November 2019, she has been on adjuvant hormonal therapy with anastrozole   She developed significant hot flashes   Therefore, she started low-dose venlafaxine with improvement of hot flashes  She came in today for routine follow-up  She has progressive musculoskeletal symptom  She has diffuse body ache  She denied weight change  She has no respiratory symptoms    Her performance status is normal        Objective:      Primary Diagnosis:     Left breast cancer, stage IA (pT1c, pN0, M0) grade 1 with invasive lobular histology, % positive, % positive, HER2 negative disease   Diagnosed in October 2019       Cancer Staging:  Cancer Staging  Malignant neoplasm of upper-outer quadrant of left breast in female, estrogen receptor positive ([facility] [address] )  Staging form: Breast, AJCC 8th Edition  - Pathologic: Stage IA (pT1c, pN0(sn), cM0, G1, ER+, OH+, HER2-) - Unsigned  Neoadjuvant therapy: No  Laterality: Left  Tumor size (mm): 18  Method of lymph node assessment: Elliott lymph node biopsy  Histologic grading system: 3 grade system           Previous Hematologic/ Oncologic Treatment:       Adjuvant hormonal therapy with anastrozole from October 2019 through January 2022      Current Hematologic/ Oncologic Treatment:       Adjuvant hormonal therapy with exemestane since January 2022      Disease Status:      JOSUÉ status post lumpectomy and sentinel lymph node biopsy      Test Results:     Pathology:     1 8 cm of invasive lobular carcinoma, grade 1  2 sentinel lymph nodes was negative for metastatic disease   % positive, % positive, HER2 negative disease   Stage IA (pT1c, pN0, M0)     Radiology:     Mammography in May 2021 was benign  BI-RADS 2      DEXA scan in June 2020 showed T-score-1 6, consistent with osteopenia        Laboratory:     See below      Physical Exam:        General Appearance:    Alert, oriented          Eyes:    PERRL   Ears:    Normal external ear canals, both ears   Nose:   Nares normal, septum midline   Throat:   Mucosa moist  Pharynx without injection  Neck:   Supple         Lungs:     Clear to auscultation bilaterally   Chest Wall:    No tenderness or deformity    Heart:    Regular rate and rhythm         Abdomen:     Soft, non-tender, bowel sounds +, no organomegaly               Extremities:   Extremities no cyanosis or edema         Skin:   no rash or icterus  Lymph nodes:   Cervical, supraclavicular, and axillary nodes normal   Neurologic:   CNII-XII intact, normal strength, sensation and reflexes     Throughout             Breast exam:   Lumpectomy scar at outer upper quadrant of her left breast with no palpable abnormality   Right breast exam is negative                   ROS: Review of Systems   All other systems reviewed and are negative  Imaging: No results found        Labs:   Lab Results   Component Value Date    WBC 6 89 11/29/2021    HGB 14 2 11/29/2021    HCT 42 3 11/29/2021    MCV 97 11/29/2021     11/29/2021     Lab Results   Component Value Date    K 3 2 (L) 11/29/2021     11/29/2021    CO2 26 11/29/2021    BUN 14 11/29/2021    CREATININE 0 75 11/29/2021    GLUF 93 03/30/2019    CALCIUM 9 2 11/29/2021    AST 21 11/29/2021    ALT 28 11/29/2021    ALKPHOS 87 11/29/2021    EGFR 85 11/29/2021         Lab Results   Component Value Date    IRON 98 03/30/2019       Lab Results   Component Value Date    WBGHFEZC94 348 08/02/2021       Lab Results   Component Value Date    FOLATE >20 0 (H) 08/02/2021         Current Medications: Reviewed  Allergies: Reviewed  PMH/FH/SH:  Reviewed      Vital Sign:    Body surface area is 1 85 meters squared      Wt Readings from Last 3 Encounters:   01/12/22 77 6 kg (171 lb)   01/04/22 77 6 kg (171 lb)   01/04/22 77 6 kg (171 lb)        Temp Readings from Last 3 Encounters:   01/12/22 (!) 96 5 °F (35 8 °C) (Tympanic Core)   01/04/22 (!) 97 °F (36 1 °C)   11/29/21 99 °F (37 2 °C) (Oral)        BP Readings from Last 3 Encounters:   01/12/22 126/78   01/04/22 114/73   01/04/22 122/78         Pulse Readings from Last 3 Encounters:   01/12/22 (!) 118   01/04/22 84   01/04/22 64     @LASTSAO2(3)@

## 2022-01-31 ENCOUNTER — HOSPITAL ENCOUNTER (OUTPATIENT)
Dept: CT IMAGING | Facility: HOSPITAL | Age: 67
Discharge: HOME | End: 2022-01-31
Payer: COMMERCIAL

## 2022-01-31 ENCOUNTER — OFFICE VISIT (OUTPATIENT)
Dept: FAMILY MEDICINE CLINIC | Facility: CLINIC | Age: 67
End: 2022-01-31
Payer: COMMERCIAL

## 2022-01-31 VITALS
OXYGEN SATURATION: 98 % | SYSTOLIC BLOOD PRESSURE: 163 MMHG | WEIGHT: 171 LBS | DIASTOLIC BLOOD PRESSURE: 103 MMHG | BODY MASS INDEX: 28.49 KG/M2 | HEART RATE: 112 BPM | TEMPERATURE: 99.1 F | HEIGHT: 65 IN

## 2022-01-31 DIAGNOSIS — R31.29 MICROSCOPIC HEMATURIA: ICD-10-CM

## 2022-01-31 DIAGNOSIS — R10.9 FLANK PAIN: ICD-10-CM

## 2022-01-31 DIAGNOSIS — R35.0 URINARY FREQUENCY: ICD-10-CM

## 2022-01-31 DIAGNOSIS — R10.9 FLANK PAIN: Primary | ICD-10-CM

## 2022-01-31 PROBLEM — Z79.811 USE OF ANASTROZOLE (ARIMIDEX): Status: RESOLVED | Noted: 2020-01-16 | Resolved: 2022-01-31

## 2022-01-31 LAB
SL AMB  POCT GLUCOSE, UA: ABNORMAL
SL AMB LEUKOCYTE ESTERASE,UA: NEGATIVE
SL AMB POCT BILIRUBIN,UA: NEGATIVE
SL AMB POCT BLOOD,UA: ABNORMAL
SL AMB POCT CLARITY,UA: CLEAR
SL AMB POCT COLOR,UA: YELLOW
SL AMB POCT KETONES,UA: ABNORMAL
SL AMB POCT NITRITE,UA: NEGATIVE
SL AMB POCT PH,UA: 5
SL AMB POCT SPECIFIC GRAVITY,UA: 1.02
SL AMB POCT URINE PROTEIN: NEGATIVE
SL AMB POCT UROBILINOGEN: 0.2

## 2022-01-31 PROCEDURE — 3008F BODY MASS INDEX DOCD: CPT | Performed by: FAMILY MEDICINE

## 2022-01-31 PROCEDURE — 1036F TOBACCO NON-USER: CPT | Performed by: FAMILY MEDICINE

## 2022-01-31 PROCEDURE — 81003 URINALYSIS AUTO W/O SCOPE: CPT | Performed by: FAMILY MEDICINE

## 2022-01-31 PROCEDURE — 74176 CT ABD & PELVIS W/O CONTRAST: CPT

## 2022-01-31 PROCEDURE — G1004 CDSM NDSC: HCPCS

## 2022-01-31 PROCEDURE — 99214 OFFICE O/P EST MOD 30 MIN: CPT | Performed by: FAMILY MEDICINE

## 2022-01-31 NOTE — PROGRESS NOTES
Pérez Meredith 1955 female MRN: [id number]      ASSESSMENT/PLAN  Problem List Items Addressed This Visit     None      Visit Diagnoses     Flank pain    -  Primary    Relevant Orders    POCT urine dip auto non-scope (Completed)    Urinary frequency        Relevant Orders    POCT urine dip auto non-scope (Completed)        Symptoms likely MSK in nature; however, given pt's urinary frequency and hematuria on dip, will get CT to r/o stone  If benign, start muscle relaxant and follow up with Pain Management for re-evaluation, as they were already planning injections  Offered oral steroid, which pt defers due to poor tolerance in the past      Of note, pt is hypertensive and tachycardia on exam  This is likely due to acute pain  Had [facility] and Flu shot  Future Appointments   Date Time Provider Sanjuanita Vazquez   2/21/2022  8:00 AM AN S&P 1 AN Pain Mgt AN HOSP MOB   3/11/2022  4:00 PM [facility]   6/13/2022 11:00 AM MO MAMMO RBC 1 MO RBC Mammo MO RBC   7/8/2022  9:00 AM  [facility] MO Rad Onc [facility]   7/8/2022  9:30 AM Kaylah Odell MD MO Rad Onc MO MOB   7/12/2022 11:00 AM VIVIEN Gonzalez SURG ONC [facility]   1/24/2023 11:00 AM Heidi Johansen MD [facility] ONC [facility]          SUBJECTIVE  CC: Hip Pain (Right sided--extremely tender, very difficult to move/sit/stand), Flank Pain, and Urinary Frequency      HPI:  Pérez Meredith is a 61 y o  female who presents due to hip/back pain  This past summer, she was having significant restless legs -- placed on gabapentin once daily -- which did help, but would still get pain in her L leg  Had an EMG, suggestive of lumbar radiculopathy  Then had an L-spine MRI, which showed L4-5 changes  Thinks this is likely due to sleeping in a number of chairs and poor body mechanics when taking care of her        Today:   R hip and black pain x 3 days   No known injury prior to onset   With any movement or if she stays in one position too long   Tried Icyhot patches, Tylenol -- which let her rest a little   No GI symptoms, (+) urinary frequency     Review of Systems   Gastrointestinal: Negative for constipation  Genitourinary: Positive for frequency  Negative for dysuria and hematuria  Musculoskeletal: Positive for back pain  Historical Information   The patient history was reviewed and updated as follows:    Past Medical History:   Diagnosis Date    Breast cancer ([address] ) 09/2019    left breast    History of radiation therapy 10/2019    left breast cancer    Kidney stone     age 17    Migraine     Osteoporosis     Pancreatitis     Pleurisy     PONV (postoperative nausea and vomiting)     Vertigo      Past Surgical History:   Procedure Laterality Date    BREAST BIOPSY Left > 5 years     benign    BREAST LUMPECTOMY Left 10/1/2019    Procedure: BREAST NEEDLE LOCALIZED LUMPECTOMY (NEEDLE LOC AT 0730); Surgeon: Nicole Castelan MD;  Location: [facility] Main [facility];  Service: Surgical Oncology    CHOLECYSTECTOMY      COLONOSCOPY      DENTAL SURGERY      Bone graft with implant    HALLUX VALGUS CORRECTION Bilateral     INCISIONAL BREAST BIOPSY      LYMPH NODE BIOPSY Left 10/1/2019    Procedure: SENTINEL LYMPH NODE BIOPSY; LYMPHATIC MAPPING WITH BLUE DYE AND RADIOACTIVE DYE (INJECT AT 0830 BY DR HA IN THE OR);   Surgeon: Nicole Castelan MD;  Location: [facility];  Service: Surgical Oncology    MAMMO NEEDLE LOCALIZATION LEFT (ALL INC) Left 10/1/2019    MAMMO STEREOTACTIC BREAST BIOPSY LEFT (ALL INC)      RHINOPLASTY       Family History   Problem Relation Age of Onset    No Known Problems Mother     Lung cancer Father 71    Breast cancer Neg Hx       Social History   Social History     Substance and Sexual Activity   Alcohol Use No     Social History     Substance and Sexual Activity   Drug Use No     Social History     Tobacco Use   Smoking Status Never Smoker   Smokeless Tobacco Never Used       Medications:     Current Outpatient Medications:     acetaminophen (TYLENOL) 500 mg tablet, Take by mouth every 6 (six) hours as needed , Disp: , Rfl:     Cholecalciferol (VITAMIN D) 2000 units CAPS, Take 4,000 Units by mouth daily , Disp: , Rfl:     Diclofenac Sodium (VOLTAREN) 1 %, Apply 2 g topically 4 (four) times a day, Disp: 50 g, Rfl: 0    exemestane (AROMASIN) 25 MG tablet, Take 1 tablet (25 mg total) by mouth daily, Disp: 90 tablet, Rfl: 3    gabapentin (NEURONTIN) 100 mg capsule, TAKE 1 CAPSULE (100 MG TOTAL) BY MOUTH 3 (THREE) TIMES A DAY, Disp: 90 capsule, Rfl: 1    ibandronate (BONIVA) 150 MG tablet, Take 150 mg by mouth every 30 (thirty) days , Disp: , Rfl:     Ivermectin (SOOLANTRA) 1 % CREA, Apply topically daily , Disp: , Rfl:     meclizine (ANTIVERT) 12 5 MG tablet, Take 12 5 mg by mouth 3 (three) times a day as needed for dizziness, Disp: , Rfl:     pimecrolimus (ELIDEL) 1 % cream, , Disp: , Rfl:     SUMAtriptan (IMITREX) 100 mg tablet, Take 100 mg by mouth once as needed for migraine, Disp: , Rfl:     venlafaxine (EFFEXOR) 37 5 mg tablet, TAKE 1 TABLET (37 5 MG TOTAL) BY MOUTH DAILY, Disp: 30 tablet, Rfl: 3  Allergies   Allergen Reactions    Ciprofloxacin Seizures    Cyclopentolate Other (See Comments) and Tachycardia     Resp distress    Sulfa Antibiotics Hives and Rash    Scopolamine Other (See Comments)     Precautionary for cycopentolate allergy       OBJECTIVE    Vitals:   Vitals:    01/31/22 1305   BP: (!) 163/103   Pulse: (!) 112   Temp: 99 1 °F (37 3 °C)   SpO2: 98%   Weight: 77 6 kg (171 lb)   Height: 5' 5" (1 651 m)           Physical Exam  Vitals and nursing note reviewed  Constitutional:       General: She is not in acute distress  Appearance: Normal appearance  HENT:      Head: Normocephalic and atraumatic  Pulmonary:      Effort: Pulmonary effort is normal  No respiratory distress     Musculoskeletal:        Back:       Comments: Significantly tender to palpation over R lower back and upper buttock   Antalgic gait, very uncomfortable changing positions    Neurological:      General: No focal deficit present  Mental Status: She is alert     Psychiatric:         Mood and Affect: Mood normal                     Malissa Corey DO  [facility]   1/31/2022  1:26 PM

## 2022-02-21 ENCOUNTER — HOSPITAL ENCOUNTER (OUTPATIENT)
Dept: RADIOLOGY | Facility: CLINIC | Age: 67
Discharge: HOME | End: 2022-02-21
Attending: PHYSICAL MEDICINE & REHABILITATION
Payer: COMMERCIAL

## 2022-02-21 VITALS
HEART RATE: 75 BPM | DIASTOLIC BLOOD PRESSURE: 80 MMHG | SYSTOLIC BLOOD PRESSURE: 129 MMHG | RESPIRATION RATE: 18 BRPM | TEMPERATURE: 98 F | OXYGEN SATURATION: 96 %

## 2022-02-21 DIAGNOSIS — M51.16 LUMBAR DISC DISEASE WITH RADICULOPATHY: ICD-10-CM

## 2022-02-21 DIAGNOSIS — M48.061 LUMBAR FORAMINAL STENOSIS: ICD-10-CM

## 2022-02-21 DIAGNOSIS — M48.062 SPINAL STENOSIS OF LUMBAR REGION WITH NEUROGENIC CLAUDICATION: ICD-10-CM

## 2022-02-21 DIAGNOSIS — M43.06 LUMBAR SPONDYLOLYSIS: ICD-10-CM

## 2022-02-21 PROCEDURE — 64484 NJX AA&/STRD TFRM EPI L/S EA: CPT | Performed by: PHYSICAL MEDICINE & REHABILITATION

## 2022-02-21 PROCEDURE — 64483 NJX AA&/STRD TFRM EPI L/S 1: CPT | Performed by: PHYSICAL MEDICINE & REHABILITATION

## 2022-02-21 RX ORDER — BUPIVACAINE HCL/PF 2.5 MG/ML
10 VIAL (ML) INJECTION ONCE
Status: COMPLETED | OUTPATIENT
Start: 2022-02-21 | End: 2022-02-21

## 2022-02-21 RX ORDER — 0.9 % SODIUM CHLORIDE 0.9 %
10 VIAL (ML) INJECTION ONCE
Status: COMPLETED | OUTPATIENT
Start: 2022-02-21 | End: 2022-02-21

## 2022-02-21 RX ORDER — PAPAVERINE HCL 150 MG
20 CAPSULE, EXTENDED RELEASE ORAL ONCE
Status: COMPLETED | OUTPATIENT
Start: 2022-02-21 | End: 2022-02-21

## 2022-02-21 RX ADMIN — BUPIVACAINE HYDROCHLORIDE 1 ML: 2.5 INJECTION, SOLUTION EPIDURAL; INFILTRATION; INTRACAUDAL at 08:21

## 2022-02-21 RX ADMIN — SODIUM CHLORIDE 2 ML: 9 INJECTION, SOLUTION INTRAMUSCULAR; INTRAVENOUS; SUBCUTANEOUS at 08:17

## 2022-02-21 RX ADMIN — Medication 2 ML: at 08:17

## 2022-02-21 RX ADMIN — DEXAMETHASONE SODIUM PHOSPHATE 20 MG: 10 INJECTION, SOLUTION INTRAMUSCULAR; INTRAVENOUS at 08:21

## 2022-02-21 RX ADMIN — IOHEXOL 1 ML: 300 INJECTION, SOLUTION INTRAVENOUS at 08:21

## 2022-02-21 NOTE — DISCHARGE INSTR - LAB
Epidural Steroid Injection   WHAT YOU NEED TO KNOW:   An epidural steroid injection (BASIL) is a procedure to inject steroid medicine into the epidural space  The epidural space is between your spinal cord and vertebrae  Steroids reduce inflammation and fluid buildup in your spine that may be causing pain  You may be given pain medicine along with the steroids  ACTIVITY  Do not drive or operate machinery today  No strenuous activity today - bending, lifting, etc   You may resume normal activites starting tomorrow - start slowly and as tolerated  You may shower today, but no tub baths or hot tubs  You may have numbness for several hours from the local anesthetic  Please use caution and common sense, especially with weight-bearing activities  CARE OF THE INJECTION SITE  If you have soreness or pain, apply ice to the area today (20 minutes on/20 minutes off)  Starting tomorrow, you may use warm, moist heat or ice if needed  You may have an increase or change in your discomfort for 36-48 hours after your treatment  Apply ice and continue with any pain medication you have been prescribed  Notify the [facility] if you have any of the following: redness, drainage, swelling, headache, stiff neck or fever above 100°F     SPECIAL INSTRUCTIONS  Our office will contact you in approximately 7 days for a progress report  MEDICATIONS  Continue to take all routine medications  Our office may have instructed you to hold some medications  As no general anesthesia was used in today's procedure, you should not experience any side effects related to anesthesia  If you have a problem specifically related to your procedure, please call our office at [phone]  Problems not related to your procedure should be directed to your primary care physician

## 2022-02-21 NOTE — H&P
History of Present Illness: The patient is a 61 y o  female who presents with complaints of low back pain    Patient Active Problem List   Diagnosis    Fatigue    Age-related osteoporosis without current pathological fracture    Malignant neoplasm of upper-outer quadrant of left breast in female, estrogen receptor positive ([facility] [address] )    Overweight (BMI 25 0-29  9)    Numbness of both lower extremities    Prediabetes    Hearing aid worn       Past Medical History:   Diagnosis Date    Breast cancer ([facility] [address] ) 09/2019    left breast    History of radiation therapy 10/2019    left breast cancer    Kidney stone     age 17    Migraine     Osteoporosis     Pancreatitis     Pleurisy     PONV (postoperative nausea and vomiting)     Vertigo        Past Surgical History:   Procedure Laterality Date    BREAST BIOPSY Left > 5 years     benign    BREAST LUMPECTOMY Left 10/1/2019    Procedure: BREAST NEEDLE LOCALIZED LUMPECTOMY (NEEDLE LOC AT 0730); Surgeon: Alex Mcginnis MD;  [facility];  Service: Surgical Oncology    CHOLECYSTECTOMY      COLONOSCOPY      DENTAL SURGERY      Bone graft with implant    HALLUX VALGUS CORRECTION Bilateral     INCISIONAL BREAST BIOPSY      LYMPH NODE BIOPSY Left 10/1/2019    Procedure: SENTINEL LYMPH NODE BIOPSY; LYMPHATIC MAPPING WITH BLUE DYE AND RADIOACTIVE DYE (INJECT AT 0830 BY DR HA IN THE OR);   Surgeon: Alex Mcginnis MD;  [facility]: [facility];  Service: Surgical Oncology    MAMMO NEEDLE LOCALIZATION LEFT (ALL INC) Left 10/1/2019    MAMMO STEREOTACTIC BREAST BIOPSY LEFT (ALL INC)      RHINOPLASTY           Current Outpatient Medications:     acetaminophen (TYLENOL) 500 mg tablet, Take by mouth every 6 (six) hours as needed , Disp: , Rfl:     Cholecalciferol (VITAMIN D) 2000 units CAPS, Take 4,000 Units by mouth daily , Disp: , Rfl:     cyclobenzaprine (FLEXERIL) 10 mg tablet, Take 1 tablet (10 mg total) by mouth 3 (three) times a day as needed for muscle spasms, Disp: 20 tablet, Rfl: 0    Diclofenac Sodium (VOLTAREN) 1 %, Apply 2 g topically 4 (four) times a day, Disp: 50 g, Rfl: 0    exemestane (AROMASIN) 25 MG tablet, Take 1 tablet (25 mg total) by mouth daily, Disp: 90 tablet, Rfl: 3    gabapentin (NEURONTIN) 100 mg capsule, TAKE 1 CAPSULE (100 MG TOTAL) BY MOUTH 3 (THREE) TIMES A DAY (Patient not taking: Reported on 2/21/2022 ), Disp: 90 capsule, Rfl: 1    ibandronate (BONIVA) 150 MG tablet, Take 150 mg by mouth every 30 (thirty) days , Disp: , Rfl:     Ivermectin (SOOLANTRA) 1 % CREA, Apply topically daily , Disp: , Rfl:     meclizine (ANTIVERT) 12 5 MG tablet, Take 12 5 mg by mouth 3 (three) times a day as needed for dizziness, Disp: , Rfl:     pimecrolimus (ELIDEL) 1 % cream, , Disp: , Rfl:     SUMAtriptan (IMITREX) 100 mg tablet, Take 100 mg by mouth once as needed for migraine, Disp: , Rfl:     venlafaxine (EFFEXOR) 37 5 mg tablet, TAKE 1 TABLET (37 5 MG TOTAL) BY MOUTH DAILY, Disp: 30 tablet, Rfl: 3  No current facility-administered medications for this encounter  Allergies   Allergen Reactions    Ciprofloxacin Seizures    Cyclopentolate Other (See Comments) and Tachycardia     Resp distress    Sulfa Antibiotics Hives and Rash    Scopolamine Other (See Comments)     Precautionary for cycopentolate allergy       Physical Exam:   Vitals:    02/21/22 0759   BP: 119/72   Pulse: 76   Resp: 18   Temp: 98 °F (36 7 °C)   SpO2: 97%     General: Awake, Alert, Oriented x 3, Mood and affect appropriate  Respiratory: Respirations even and unlabored  Cardiovascular: Peripheral pulses intact; no edema  Musculoskeletal Exam: Tenderness to palpation left sided lumbar paraspinals  ASA Score: 2    Patient/Chart Verification  Patient ID Verified: Verbal  ID Band Applied: No  Consents Confirmed: Procedural,To be obtained in the Pre-Procedure area  H&P( within 30 days) Verified: To be obtained in the Pre-Procedure area  Allergies Reviewed:  Yes  Anticoag/NSAID held?: No  Currently on antibiotics?: No    Assessment:   1  Lumbar spondylolysis    2  Spinal stenosis of lumbar region with neurogenic claudication    3  Lumbar foraminal stenosis    4   Lumbar disc disease with radiculopathy        Plan: Left sided L4-L5 and L5-S1 TFESI

## 2022-02-28 ENCOUNTER — TELEPHONE (OUTPATIENT)
Dept: PAIN MEDICINE | Facility: CLINIC | Age: 67
End: 2022-02-28

## 2022-02-28 NOTE — TELEPHONE ENCOUNTER
Pt reports 30% improvement post inj  Pt currently has no pain  She said it depends on what shes doing  Pt aware I will call next week for an update

## 2022-03-05 ENCOUNTER — APPOINTMENT (OUTPATIENT)
Dept: LAB | Facility: CLINIC | Age: 67
End: 2022-03-05
Payer: COMMERCIAL

## 2022-03-05 DIAGNOSIS — R73.03 PREDIABETES: ICD-10-CM

## 2022-03-05 DIAGNOSIS — Z13.220 LIPID SCREENING: ICD-10-CM

## 2022-03-05 LAB
ALBUMIN SERPL BCP-MCNC: 3.9 G/DL (ref 3.5–5)
ALP SERPL-CCNC: 101 U/L (ref 46–116)
ALT SERPL W P-5'-P-CCNC: 26 U/L (ref 12–78)
ANION GAP SERPL CALCULATED.3IONS-SCNC: 3 MMOL/L (ref 4–13)
AST SERPL W P-5'-P-CCNC: 14 U/L (ref 5–45)
BILIRUB SERPL-MCNC: 0.45 MG/DL (ref 0.2–1)
BUN SERPL-MCNC: 11 MG/DL (ref 5–25)
CALCIUM SERPL-MCNC: 9.6 MG/DL (ref 8.3–10.1)
CHLORIDE SERPL-SCNC: 106 MMOL/L (ref 100–108)
CHOLEST SERPL-MCNC: 173 MG/DL
CO2 SERPL-SCNC: 30 MMOL/L (ref 21–32)
CREAT SERPL-MCNC: 0.79 MG/DL (ref 0.6–1.3)
EST. AVERAGE GLUCOSE BLD GHB EST-MCNC: 120 MG/DL
GFR SERPL CREATININE-BSD FRML MDRD: 79 ML/MIN/1.73SQ M
GLUCOSE P FAST SERPL-MCNC: 101 MG/DL (ref 65–99)
HBA1C MFR BLD: 5.8 %
HDLC SERPL-MCNC: 55 MG/DL
LDLC SERPL CALC-MCNC: 99 MG/DL (ref 0–100)
POTASSIUM SERPL-SCNC: 4.2 MMOL/L (ref 3.5–5.3)
PROT SERPL-MCNC: 7.7 G/DL (ref 6.4–8.2)
SODIUM SERPL-SCNC: 139 MMOL/L (ref 136–145)
TRIGL SERPL-MCNC: 94 MG/DL

## 2022-03-05 PROCEDURE — 80061 LIPID PANEL: CPT

## 2022-03-05 PROCEDURE — 83036 HEMOGLOBIN GLYCOSYLATED A1C: CPT

## 2022-03-05 PROCEDURE — 36415 COLL VENOUS BLD VENIPUNCTURE: CPT

## 2022-03-05 PROCEDURE — 80053 COMPREHEN METABOLIC PANEL: CPT

## 2022-03-11 ENCOUNTER — OFFICE VISIT (OUTPATIENT)
Dept: FAMILY MEDICINE CLINIC | Facility: CLINIC | Age: 67
End: 2022-03-11
Payer: COMMERCIAL

## 2022-03-11 VITALS
SYSTOLIC BLOOD PRESSURE: 120 MMHG | WEIGHT: 176 LBS | BODY MASS INDEX: 29.32 KG/M2 | DIASTOLIC BLOOD PRESSURE: 77 MMHG | HEART RATE: 89 BPM | TEMPERATURE: 98.1 F | HEIGHT: 65 IN | OXYGEN SATURATION: 96 %

## 2022-03-11 DIAGNOSIS — R23.2 HOT FLASHES: ICD-10-CM

## 2022-03-11 DIAGNOSIS — R73.03 PREDIABETES: Primary | ICD-10-CM

## 2022-03-11 DIAGNOSIS — Z23 NEED FOR TDAP VACCINATION: ICD-10-CM

## 2022-03-11 DIAGNOSIS — Z17.0 MALIGNANT NEOPLASM OF UPPER-OUTER QUADRANT OF LEFT BREAST IN FEMALE, ESTROGEN RECEPTOR POSITIVE (HCC): ICD-10-CM

## 2022-03-11 DIAGNOSIS — C50.412 MALIGNANT NEOPLASM OF UPPER-OUTER QUADRANT OF LEFT BREAST IN FEMALE, ESTROGEN RECEPTOR POSITIVE (HCC): ICD-10-CM

## 2022-03-11 DIAGNOSIS — M48.062 SPINAL STENOSIS OF LUMBAR REGION WITH NEUROGENIC CLAUDICATION: ICD-10-CM

## 2022-03-11 PROCEDURE — 90471 IMMUNIZATION ADMIN: CPT

## 2022-03-11 PROCEDURE — 99214 OFFICE O/P EST MOD 30 MIN: CPT | Performed by: FAMILY MEDICINE

## 2022-03-11 PROCEDURE — 90715 TDAP VACCINE 7 YRS/> IM: CPT

## 2022-03-11 PROCEDURE — 3008F BODY MASS INDEX DOCD: CPT | Performed by: FAMILY MEDICINE

## 2022-03-11 PROCEDURE — 1036F TOBACCO NON-USER: CPT | Performed by: FAMILY MEDICINE

## 2022-03-11 RX ORDER — VENLAFAXINE 50 MG/1
50 TABLET ORAL DAILY
Qty: 90 TABLET | Refills: 1 | Status: SHIPPED | OUTPATIENT
Start: 2022-03-11

## 2022-03-11 NOTE — PROGRESS NOTES
Assessment/Plan:         Problem List Items Addressed This Visit        Cardiovascular and Mediastinum    Hot flashes     Secondary to medication - Aromasin--   Increase Effexor 50 mg            Relevant Medications    venlafaxine (EFFEXOR) 50 mg tablet       Other    Malignant neoplasm of upper-outer quadrant of left breast in female, estrogen receptor positive (HCC)    Relevant Medications    venlafaxine (EFFEXOR) 50 mg tablet    Prediabetes - Primary     Reduce carbs in diet         Spinal stenosis of lumbar region with neurogenic claudication     Following with pain management           Other Visit Diagnoses     Need for Tdap vaccination        Relevant Orders    TDAP VACCINE GREATER THAN OR EQUAL TO 6YO IM (Completed)            Subjective:      Patient ID: Yessy Rodriguez is a 61 y o  female  61year old here for lab review  A1c 5 8% -stable  Lipids - controlled  CMP WNL  Her main complaint is hot flashes, worse at night  She was started on Effexor by medical oncologist for hot flashes  She is on Exemestane for h/o breast cancer from oncology which causes hot flashes  She has back pain and has been getting epidural injections  she requests intermittent leave- she has been missing work for appointments       The following portions of the patient's history were reviewed and updated as appropriate:   Past Medical History:  She has a past medical history of Breast cancer ([facility] [address] ) (09/2019), History of radiation therapy (10/2019), Kidney stone, Migraine, Osteoporosis, Pancreatitis, Pleurisy, PONV (postoperative nausea and vomiting), and Vertigo  ,  _______________________________________________________________________  Medical Problems:  does not have any pertinent problems on file ,  _______________________________________________________________________  Past Surgical History:   has a past surgical history that includes Cholecystectomy; Dental surgery; Incisional breast biopsy; Rhinoplasty;  Hallux valgus correction (Bilateral); Colonoscopy; Mammo stereotactic breast biopsy left (all inc); Mammo needle localization left (all inc) (Left, 10/1/2019); Breast lumpectomy (Left, 10/1/2019); Lymph node biopsy (Left, 10/1/2019); and Breast biopsy (Left, > 5 years ) ,  _______________________________________________________________________  Family History:  family history includes Lung cancer (age of onset: 71) in her father; No Known Problems in her mother ,  _______________________________________________________________________  Social History:   reports that she has never smoked  She has never used smokeless tobacco  She reports that she does not drink alcohol and does not use drugs  ,  _______________________________________________________________________  Allergies:  is allergic to ciprofloxacin, cyclopentolate, sulfa antibiotics, and scopolamine     _______________________________________________________________________  Current Outpatient Medications   Medication Sig Dispense Refill    acetaminophen (TYLENOL) 500 mg tablet Take by mouth every 6 (six) hours as needed       Cholecalciferol (VITAMIN D) 2000 units CAPS Take 4,000 Units by mouth daily       cyclobenzaprine (FLEXERIL) 10 mg tablet Take 1 tablet (10 mg total) by mouth 3 (three) times a day as needed for muscle spasms 20 tablet 0    Diclofenac Sodium (VOLTAREN) 1 % Apply 2 g topically 4 (four) times a day 50 g 0    exemestane (AROMASIN) 25 MG tablet Take 1 tablet (25 mg total) by mouth daily 90 tablet 3    ibandronate (BONIVA) 150 MG tablet Take 150 mg by mouth every 30 (thirty) days       Ivermectin (SOOLANTRA) 1 % CREA Apply topically daily       meclizine (ANTIVERT) 12 5 MG tablet Take 12 5 mg by mouth 3 (three) times a day as needed for dizziness      pimecrolimus (ELIDEL) 1 % cream       SUMAtriptan (IMITREX) 100 mg tablet Take 100 mg by mouth once as needed for migraine      venlafaxine (EFFEXOR) 50 mg tablet Take 1 tablet (50 mg total) by mouth daily 90 tablet 1     No current facility-administered medications for this visit      _______________________________________________________________________  Review of Systems   Constitutional: Positive for fatigue  Negative for activity change  HENT: Positive for hearing loss  Respiratory: Negative for chest tightness and shortness of breath  Cardiovascular: Negative for chest pain and leg swelling  Endocrine: Positive for heat intolerance  Musculoskeletal: Positive for back pain  Neurological: Negative for headaches  Psychiatric/Behavioral: Negative for dysphoric mood  The patient is not nervous/anxious  Objective:  Vitals:    03/11/22 1555   BP: 120/77   Pulse: 89   Temp: 98 1 °F (36 7 °C)   SpO2: 96%   Weight: 79 8 kg (176 lb)   Height: 5' 5" (1 651 m)     Body mass index is 29 29 kg/m²  Physical Exam  Vitals and nursing note reviewed  Constitutional:       General: She is not in acute distress  Appearance: Normal appearance  She is not ill-appearing, toxic-appearing or diaphoretic  HENT:      Head: Normocephalic and atraumatic  Right Ear: External ear normal       Left Ear: External ear normal       Ears:      Comments: Hearing aids  Cardiovascular:      Rate and Rhythm: Normal rate and regular rhythm  Heart sounds: No murmur heard  No friction rub  Pulmonary:      Effort: Pulmonary effort is normal  No respiratory distress  Breath sounds: Normal breath sounds  No stridor  No wheezing, rhonchi or rales  Musculoskeletal:         General: No swelling  Right lower leg: No edema  Left lower leg: No edema  Neurological:      General: No focal deficit present  Mental Status: She is alert  Mental status is at baseline  Psychiatric:         Attention and Perception: Attention normal          Mood and Affect: Mood normal          Speech: Speech normal          Behavior: Behavior normal          Thought Content:  Thought content normal  Judgment: Judgment normal

## 2022-04-21 ENCOUNTER — TELEPHONE (OUTPATIENT)
Dept: GYNECOLOGIC ONCOLOGY | Facility: CLINIC | Age: 67
End: 2022-04-21

## 2022-04-21 NOTE — TELEPHONE ENCOUNTER
Left Voicemail for patient to call back and r/s their appointment with Cheryl Chavez at [facility]  If patient is a BREAST PATIENT:   -they can see Meseret Menjivar at [facility] or Cheryl Chavez at [facility] [address]     If patient is a NONBREAST PATIENT:   -they can see Lindsay at [facility] or Meseret Menjivar at [facility] [address] on Fridays

## 2022-04-22 ENCOUNTER — TELEPHONE (OUTPATIENT)
Dept: HEMATOLOGY ONCOLOGY | Facility: CLINIC | Age: 67
End: 2022-04-22

## 2022-04-22 NOTE — TELEPHONE ENCOUNTER
Appointment Cancellation Or Reschedule     Person calling in patient   Provider Franck Mckeon   Office Visit Date and Time 7/12 @ 11 am   Office Visit Location [facility]   Did patient want to reschedule their office appointment? If so, when was it scheduled to? Yes 7/12 @ 11 am with Natalia Duron   Is this patient calling to reschedule an infusion appointment? no   When is their next infusion appointment? n/a   Is this patient a Chemo patient? no   Reason for Cancellation or Reschedule Provider location changed     If the patient is a treatment patient, please route this to the office nurse  If the patient is not on treatment, please route to the office MA

## 2022-06-13 ENCOUNTER — HOSPITAL ENCOUNTER (OUTPATIENT)
Dept: MAMMOGRAPHY | Facility: CLINIC | Age: 67
Discharge: HOME | End: 2022-06-13
Payer: COMMERCIAL

## 2022-06-13 VITALS — HEIGHT: 66 IN | WEIGHT: 176 LBS | BODY MASS INDEX: 28.28 KG/M2

## 2022-06-13 DIAGNOSIS — C50.412 MALIGNANT NEOPLASM OF UPPER-OUTER QUADRANT OF LEFT BREAST IN FEMALE, ESTROGEN RECEPTOR POSITIVE (HCC): ICD-10-CM

## 2022-06-13 DIAGNOSIS — Z17.0 MALIGNANT NEOPLASM OF UPPER-OUTER QUADRANT OF LEFT BREAST IN FEMALE, ESTROGEN RECEPTOR POSITIVE (HCC): ICD-10-CM

## 2022-06-13 PROCEDURE — G0279 TOMOSYNTHESIS, MAMMO: HCPCS

## 2022-06-13 PROCEDURE — 77066 DX MAMMO INCL CAD BI: CPT

## 2022-06-16 ENCOUNTER — EVALUATION (OUTPATIENT)
Dept: PHYSICAL THERAPY | Facility: CLINIC | Age: 67
End: 2022-06-16
Payer: COMMERCIAL

## 2022-06-16 DIAGNOSIS — M54.50 CHRONIC LOW BACK PAIN, UNSPECIFIED BACK PAIN LATERALITY, UNSPECIFIED WHETHER SCIATICA PRESENT: ICD-10-CM

## 2022-06-16 DIAGNOSIS — G89.29 CHRONIC LOW BACK PAIN, UNSPECIFIED BACK PAIN LATERALITY, UNSPECIFIED WHETHER SCIATICA PRESENT: ICD-10-CM

## 2022-06-16 DIAGNOSIS — M51.36 DEGENERATION OF LUMBAR INTERVERTEBRAL DISC: Primary | ICD-10-CM

## 2022-06-16 PROCEDURE — 97535 SELF CARE MNGMENT TRAINING: CPT

## 2022-06-16 PROCEDURE — 97162 PT EVAL MOD COMPLEX 30 MIN: CPT

## 2022-06-16 NOTE — PROGRESS NOTES
PT Evaluation     Today's date: 2022  Patient name: Grace Olivera  : 1955  MRN: [id number]  Referring provider: Lyubov March MD  Dx:   Encounter Diagnosis     ICD-10-CM    1  Degeneration of lumbar intervertebral disc  M51 36    2  Chronic low back pain, unspecified back pain laterality, unspecified whether sciatica present  M54 50     G89 29                   Assessment  Assessment details: Pt presents with signs and symptoms consistent with referring diagnosis of DDD in the lumbar spine  Impairments: abnormal or restricted ROM, impaired physical strength, lacks appropriate home exercise program, pain with function and poor posture     Symptom irritability: moderateUnderstanding of Dx/Px/POC: good   Prognosis: good    Goals  ST) Pt  Will have centralized radicular symptoms in 6 weeks  2) Pt  Will be able to sit as long as desired without pain in 6 weeks  3)  Pt  Will be able to return to work under light/ modified duty in 6 weeks  4)  Pt  Will be able to perform all routine chores at home without pain or limitations in 6 weeks  5) Pt  Will be able to perform all LE dressing tasks without limitations  LT) Pt  Will be barbara to stand as long as desired without pain in 12 weeks  2) Pt will have pain decreased to<2/10  in order to more comfortably perform ADL's in 12 weeks  4)  Pt  Will be able to perform all heavy activity at home, such as garbage, moving furniture, carrying weighted items on all surfaces without pain or limitation in 12 weeks        Plan  Patient would benefit from: skilled physical therapy and PT eval  Planned modality interventions: thermotherapy: hydrocollator packs, unattended electrical stimulation and cryotherapy  Planned therapy interventions: manual therapy, neuromuscular re-education, patient education, self care, therapeutic exercise, therapeutic activities and home exercise program  Frequency: 3x week  Duration in weeks: 12  Treatment plan discussed with: patient        Subjective Evaluation    History of Present Illness  Date of onset: 2016  Mechanism of injury: 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 ; which 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 leg symptoms  Recurrent probem    Quality of life: fair    Pain  Current pain ratin  At best pain ratin  At worst pain ratin  Location: left L/S and glut area which radiates down her lateral left leg into her foot  She also reports having acute flare-up of left plantar fascitis  Quality: burning, dull ache, radiating, sharp, cramping and discomfort  Relieving factors: ice  Aggravating factors: standing, walking, stair climbing and lifting  Progression: no change    Social Support  Steps to enter house: yes  2  Stairs in house: no     Treatments  Previous treatment: injection treatment  Patient Goals  Patient goal: would like to be out of pain and / or that she can move and perform all chores at home without significant pain , weakness and difficulty        Objective     Postural Observations  Seated posture: fair  Standing posture: fair  Correction of posture: has no consistent effect        Palpation   Left   Tenderness of the erector spinae and quadratus lumborum  Trigger point to erector spinae and quadratus lumborum  Tenderness     Left Hip   Tenderness in the PSIS       Neurological Testing     Additional Neurological Details  Unremarkable    Active Range of Motion     Lumbar   Flexion:  Restriction level: minimal  Extension:  Restriction level: moderate  Left lateral flexion:  Restriction level: moderate  Right lateral flexion:  Restriction level: moderate    Joint Play     Hypomobile: L1, L2, L3, L4 and L5     Strength/Myotome Testing     Lumbar   Left   Normal strength    Right   Normal strength    Right Hip   Planes of Motion   Extension: 3+    Muscle Activation   Patient able to activate left transverse abdominals, left external obliques, left internal obliques, right transverse abdominals, right external obliques and right internal obliques  Tests     Lumbar     Left   Positive quadrant     Negative passive SLR and slump test      Right   Negative passive SLR and slump test                  Precautions: Hx of CA       Manuals 6-16-22                                       Neuro Re-Ed                                         Pt Ed/ HEP 14'                       Ther Ex                                                                        Ther Activity                        Gait Training                        Modalities

## 2022-06-20 ENCOUNTER — OFFICE VISIT (OUTPATIENT)
Dept: PHYSICAL THERAPY | Facility: CLINIC | Age: 67
End: 2022-06-20
Payer: COMMERCIAL

## 2022-06-20 DIAGNOSIS — M54.50 CHRONIC LOW BACK PAIN, UNSPECIFIED BACK PAIN LATERALITY, UNSPECIFIED WHETHER SCIATICA PRESENT: Primary | ICD-10-CM

## 2022-06-20 DIAGNOSIS — G89.29 CHRONIC LOW BACK PAIN, UNSPECIFIED BACK PAIN LATERALITY, UNSPECIFIED WHETHER SCIATICA PRESENT: Primary | ICD-10-CM

## 2022-06-20 DIAGNOSIS — M51.36 DEGENERATION OF LUMBAR INTERVERTEBRAL DISC: ICD-10-CM

## 2022-06-20 DIAGNOSIS — M72.2 PLANTAR FASCIITIS: Primary | ICD-10-CM

## 2022-06-20 PROCEDURE — 97110 THERAPEUTIC EXERCISES: CPT

## 2022-06-20 PROCEDURE — 97530 THERAPEUTIC ACTIVITIES: CPT

## 2022-06-20 PROCEDURE — 97010 HOT OR COLD PACKS THERAPY: CPT

## 2022-06-20 NOTE — PROGRESS NOTES
Daily Note     Today's date: 2022  Patient name: Claudio Wilde  : 1955  MRN: [id number]  Referring provider: No ref  provider found  Dx:   Encounter Diagnosis     ICD-10-CM    1  Chronic low back pain, unspecified back pain laterality, unspecified whether sciatica present  M54 50     G89 29    2  Degeneration of lumbar intervertebral disc  M51 36                   Subjective: Pt reports she is feeling OK today but has a few questions regarding her HEP      Objective: See treatment diary below      Assessment: Tolerated treatment well  Patient would benefit from continued PT      Plan: Progress treatment as tolerated            Precautions: Hx of CA       Manuals 22                                      Neuro Re-Ed                                         Pt Ed/ HEP 14'                       Ther Ex        Nu Step  L4 12'      Seated lumbar ball roll stretch  10" x12 to the right      PPT  10" x15      LTR  10" x12 right      DKTC  10" x12      Supine hamstring stretch  Bilaterally  10" x12      Single leg trunk rotation/ glut stretch  10" x12              Ther Activity        Isometric transverse abdominals  10" x20      Isometric abs with alternating marches  30x      Bridges w/ isometric abdominals  2x10                                      Gait Training                        Modalities          MHP 15' post tx

## 2022-06-21 ENCOUNTER — ANNUAL EXAM (OUTPATIENT)
Dept: OBGYN CLINIC | Facility: CLINIC | Age: 67
End: 2022-06-21
Payer: COMMERCIAL

## 2022-06-21 VITALS
DIASTOLIC BLOOD PRESSURE: 70 MMHG | SYSTOLIC BLOOD PRESSURE: 132 MMHG | HEIGHT: 65 IN | BODY MASS INDEX: 29.99 KG/M2 | WEIGHT: 180 LBS

## 2022-06-21 DIAGNOSIS — Z01.419 WOMEN'S ANNUAL ROUTINE GYNECOLOGICAL EXAMINATION: Primary | ICD-10-CM

## 2022-06-21 PROCEDURE — S0612 ANNUAL GYNECOLOGICAL EXAMINA: HCPCS | Performed by: OBSTETRICS & GYNECOLOGY

## 2022-06-21 NOTE — PROGRESS NOTES
ASSESSMENT & PLAN:   Diagnoses and all orders for this visit:    Women's annual routine gynecological examination          The following were reviewed in today's visit: ASCCP guidelines (Pap screen not indicated after age 72), STD testing breast self exam, mammography screening ordered, menopause, osteoporosis, exercise and healthy diet  Patient to return to office in yearly for annual exam      All questions have been answered to her satisfaction  CC:  Annual Gynecologic Examination  Chief Complaint   Patient presents with    Gynecologic Exam     Neg pap/neg hpv 21, mammos monitored by Dr Shahriar Ptael, [address] [place] [address] 20 and colonoscopy 09       HPI: Genesis Sender is a 74 y o  [id number] who presents for annual gynecologic examination  She has the following concerns:  Feeling well  No complaints      Health Maintenance:    Exercise: intermittently  Breast exams/breast awareness: no  Diet: well balanced diet  Last mammogram:    Colorectal cancer screenin  DEXA:2020    Past Medical History:   Diagnosis Date    Breast cancer ([address] ) 2019    left breast    History of radiation therapy 10/2019    left breast cancer    Migraine     Osteoporosis     Pleurisy     Varicella     Vertigo        Past Surgical History:   Procedure Laterality Date    BREAST LUMPECTOMY Left 10/01/2019    Procedure: BREAST NEEDLE LOCALIZED LUMPECTOMY (NEEDLE LOC AT 0730); Surgeon: Meme Melvin MD;  [facility];  Service: Surgical Oncology    CHOLECYSTECTOMY      COLONOSCOPY      DENTAL SURGERY      Bone graft with implant    HALLUX VALGUS CORRECTION Bilateral     LYMPH NODE BIOPSY Left 10/01/2019    Procedure: SENTINEL LYMPH NODE BIOPSY; LYMPHATIC MAPPING WITH BLUE DYE AND RADIOACTIVE DYE (INJECT AT 0830 BY DR HA IN THE OR);   Surgeon: Meme Melvin MD;  [facility];  Service: Surgical Oncology    MAMMO NEEDLE LOCALIZATION LEFT (ALL INC) Left 10/01/2019    RHINOPLASTY         Past OB/Gyn History:  Period Cycle (Days):  (n/a post menopausal)No LMP recorded  Patient is postmenopausal     Patient is menopausal    Menopausal symptoms: None  Last Pap: 2021 : no abnormalities; further pap screening not indicated  History of abnormal Pap smear: no    Patient is not currently sexually active  STD testing: no  Current contraception: post menopausal      Family History  Family History   Problem Relation Age of Onset    No Known Problems Mother     Lung cancer Father 71    No Known Problems Maternal Grandmother     No Known Problems Maternal Grandfather     No Known Problems Paternal Grandmother     No Known Problems Paternal Grandfather     Breast cancer Neg Hx        Family history of uterine or ovarian cancer: no  Family history of breast cancer: yes  Family history of colon cancer: no    Social History:  Social History     Socioeconomic History    Marital status:      Spouse name: Not on file    Number of children: 2    Years of education: Not on file    Highest education level: Not on file   Occupational History    Occupation: curretnly works part-time    Occupation: medical professional   Tobacco Use    Smoking status: Never Smoker    Smokeless tobacco: Never Used   Vaping Use    Vaping Use: Never used   Substance and Sexual Activity    Alcohol use: No    Drug use: Never    Sexual activity: Not Currently     Partners: Male     Birth control/protection: None, Post-menopausal   Other Topics Concern    Not on file   Social History Narrative    Not on file     Social Determinants of Health     Financial Resource Strain: Not on file   Food Insecurity: Not on file   Transportation Needs: Not on file   Physical Activity: Not on file   Stress: Not on file   Social Connections: Not on file   Intimate Partner Violence: Not on file   Housing Stability: Not on file     Domestic violence screen: negative    Allergies:   Allergies   Allergen Reactions    Ciprofloxacin Seizures    Cyclopentolate Other (See Comments) and Tachycardia     Resp distress    Sulfa Antibiotics Hives and Rash    Scopolamine Other (See Comments)     Precautionary for cycopentolate allergy       Medications:    Current Outpatient Medications:     acetaminophen (TYLENOL) 500 mg tablet, Take by mouth every 6 (six) hours as needed , Disp: , Rfl:     Cholecalciferol (VITAMIN D) 2000 units CAPS, Take 4,000 Units by mouth daily , Disp: , Rfl:     cyclobenzaprine (FLEXERIL) 10 mg tablet, Take 1 tablet (10 mg total) by mouth 3 (three) times a day as needed for muscle spasms, Disp: 20 tablet, Rfl: 0    Diclofenac Sodium (VOLTAREN) 1 %, Apply 2 g topically 4 (four) times a day, Disp: 50 g, Rfl: 0    exemestane (AROMASIN) 25 MG tablet, Take 1 tablet (25 mg total) by mouth daily, Disp: 90 tablet, Rfl: 3    ibandronate (BONIVA) 150 MG tablet, Take 150 mg by mouth every 30 (thirty) days , Disp: , Rfl:     Ivermectin 1 % CREA, Apply topically daily , Disp: , Rfl:     meclizine (ANTIVERT) 12 5 MG tablet, Take 12 5 mg by mouth 3 (three) times a day as needed for dizziness, Disp: , Rfl:     pimecrolimus (ELIDEL) 1 % cream, , Disp: , Rfl:     SUMAtriptan (IMITREX) 100 mg tablet, Take 100 mg by mouth once as needed for migraine, Disp: , Rfl:     venlafaxine (EFFEXOR) 50 mg tablet, Take 1 tablet (50 mg total) by mouth daily, Disp: 90 tablet, Rfl: 1    Review of Systems:  Review of Systems   Constitutional: Negative  HENT: Negative  Respiratory: Negative  Cardiovascular: Negative  Gastrointestinal: Negative  Genitourinary: Negative  Neurological: Negative  Psychiatric/Behavioral: Negative  Physical Exam:  /70   Ht 5' 4 5" (1 638 m)   Wt 81 6 kg (180 lb)   Breastfeeding No   BMI 30 42 kg/m²    Physical Exam  Constitutional:       Appearance: Normal appearance  Genitourinary:      Bladder and urethral meatus normal       No lesions in the vagina        Right Labia: No rash, tenderness, lesions, skin changes or Bartholin's cyst      Left Labia: No tenderness, lesions, skin changes, Bartholin's cyst or rash  No vaginal erythema, tenderness or bleeding  Right Adnexa: not tender, not full and no mass present  Left Adnexa: not tender, not full and no mass present  Cervix is parous  No cervical discharge, lesion or polyp  Uterus is not enlarged, fixed or tender  No uterine mass detected  Urethral meatus caruncle not present  No urethral tenderness or mass present  Breasts:      Right: No swelling, bleeding, inverted nipple, mass, nipple discharge, skin change or tenderness  Left: No swelling, bleeding, inverted nipple, mass, nipple discharge, skin change or tenderness  HENT:      Head: Normocephalic and atraumatic  Eyes:      Extraocular Movements: Extraocular movements intact  Conjunctiva/sclera: Conjunctivae normal       Pupils: Pupils are equal, round, and reactive to light  Cardiovascular:      Rate and Rhythm: Normal rate and regular rhythm  Heart sounds: Normal heart sounds  No murmur heard  Pulmonary:      Effort: Pulmonary effort is normal  No respiratory distress  Breath sounds: Normal breath sounds  No wheezing or rales  Abdominal:      General: There is no distension  Palpations: Abdomen is soft  Tenderness: There is no abdominal tenderness  There is no guarding  Neurological:      General: No focal deficit present  Mental Status: She is alert and oriented to person, place, and time  Skin:     General: Skin is warm and dry  Psychiatric:         Mood and Affect: Mood normal          Behavior: Behavior normal    Vitals and nursing note reviewed

## 2022-06-22 ENCOUNTER — OFFICE VISIT (OUTPATIENT)
Dept: PHYSICAL THERAPY | Facility: CLINIC | Age: 67
End: 2022-06-22
Payer: COMMERCIAL

## 2022-06-22 DIAGNOSIS — G89.29 CHRONIC LOW BACK PAIN, UNSPECIFIED BACK PAIN LATERALITY, UNSPECIFIED WHETHER SCIATICA PRESENT: Primary | ICD-10-CM

## 2022-06-22 DIAGNOSIS — M54.50 CHRONIC LOW BACK PAIN, UNSPECIFIED BACK PAIN LATERALITY, UNSPECIFIED WHETHER SCIATICA PRESENT: Primary | ICD-10-CM

## 2022-06-22 DIAGNOSIS — M51.36 DEGENERATION OF LUMBAR INTERVERTEBRAL DISC: ICD-10-CM

## 2022-06-22 PROCEDURE — 97110 THERAPEUTIC EXERCISES: CPT

## 2022-06-22 PROCEDURE — 97530 THERAPEUTIC ACTIVITIES: CPT

## 2022-06-22 NOTE — PROGRESS NOTES
Daily Note     Today's date: 2022  Patient name: Gailen Crigler  : 1955  MRN: [id number]  Referring provider: No ref  provider found  Dx:   Encounter Diagnosis     ICD-10-CM    1  Chronic low back pain, unspecified back pain laterality, unspecified whether sciatica present  M54 50     G89 29    2  Degeneration of lumbar intervertebral disc  M51 36                   Subjective: Geetha Summers reports LB doing well following tx session last visit  She has script in for L plantar fasciitis  Objective: See treatment diary below      Assessment: Visual and verbal cueing to ensure correct exercise technique; breathing cues helped to facilitate TA  Improvement in LE flexibility following self stretches  Denied any increases in pain with exercises performed  Progress as able  Plan: Continue with current POC to address pt deficits           Precautions: Hx of CA       Manuals 22                                     Neuro Re-Ed                                         Pt Ed/ HEP 14'                       Ther Ex        Nu Step  L4 12' L4 12'     Seated lumbar ball roll stretch  10" x12 to the right 10"x12 to R     PPT  10" x15 10"x15     LTR  10" x12 right 10"x12 R      DKTC  10" x12 10"x12     Supine hamstring stretch  Bilaterally  10" x12 10"x12 radha      Single leg trunk rotation/ glut stretch  10" x12 10"x12 L              Ther Activity        Isometric transverse abdominals  10" x20 x20     Isometric abs with alternating marches  30x x20 radha     Bridges w/ isometric abdominals  2x10 2x10                                     Gait Training                        Modalities          MHP 15' post tx MHP 13' post tx

## 2022-06-27 ENCOUNTER — EVALUATION (OUTPATIENT)
Dept: PHYSICAL THERAPY | Facility: CLINIC | Age: 67
End: 2022-06-27
Payer: COMMERCIAL

## 2022-06-27 DIAGNOSIS — M51.36 DEGENERATION OF LUMBAR INTERVERTEBRAL DISC: ICD-10-CM

## 2022-06-27 DIAGNOSIS — G89.29 CHRONIC LOW BACK PAIN, UNSPECIFIED BACK PAIN LATERALITY, UNSPECIFIED WHETHER SCIATICA PRESENT: Primary | ICD-10-CM

## 2022-06-27 DIAGNOSIS — M54.50 CHRONIC LOW BACK PAIN, UNSPECIFIED BACK PAIN LATERALITY, UNSPECIFIED WHETHER SCIATICA PRESENT: Primary | ICD-10-CM

## 2022-06-27 DIAGNOSIS — M72.2 PLANTAR FASCIITIS OF LEFT FOOT: ICD-10-CM

## 2022-06-27 PROCEDURE — 97140 MANUAL THERAPY 1/> REGIONS: CPT

## 2022-06-27 PROCEDURE — 97110 THERAPEUTIC EXERCISES: CPT

## 2022-06-27 PROCEDURE — 97164 PT RE-EVAL EST PLAN CARE: CPT

## 2022-06-27 PROCEDURE — 97530 THERAPEUTIC ACTIVITIES: CPT

## 2022-06-27 NOTE — PROGRESS NOTES
PT Evaluation     Today's date: 2022  Patient name: Jojo Angulo  : 1955  MRN: [id number]  Referring provider: Devon White MD  Dx:   Encounter Diagnosis     ICD-10-CM    1  Chronic low back pain, unspecified back pain laterality, unspecified whether sciatica present  M54 50     G89 29    2  Degeneration of lumbar intervertebral disc  M51 36    3  Plantar fasciitis of left foot  M72 2                   Assessment  Assessment details: Pt presents with signs and symptoms consistent with referring diagnosis of DDD in the lumbar spine  22: As patient's lumbar symptoms are starting to improve, pt's left foot/ ankle signs and symptoms are consistent, primarily, with posterior tibialis involvement as well as achilles and plantar fascia irritation  Impairments: abnormal or restricted ROM, impaired physical strength, lacks appropriate home exercise program, pain with function and poor posture     Symptom irritability: moderateUnderstanding of Dx/Px/POC: good   Prognosis: good    Goals  ST) Pt  Will have centralized radicular symptoms in 6 weeks  -GOOD PROGRESS  2) Pt  Will be able to sit as long as desired without pain in 6 weeks  -GOOD PROGRESS  3)  Pt  Will be able to return to work under light/ modified duty in 6 weeks  -GOOD PROGRESS  4)  Pt  Will be able to perform all routine chores at home without pain or limitations in 6 weeks  GOOD PROGRESS  5) Pt  Will be able to perform all LE dressing tasks without limitations  GOOD PROGRESS  LT) Pt  Will be barbara to stand as long as desired without pain in 12 weeks  -GOOD PROGRESS  2) Pt will have pain decreased to<2/10  in order to more comfortably perform ADL's in 12 weeks  -GOOD PROGRESS  4)  Pt  Will be able to perform all heavy activity at home, such as garbage, moving furniture, carrying weighted items on all surfaces without pain or limitation in 12 weeks    -SOME PROGRESS    Plan  Patient would benefit from: skilled physical therapy and PT eval  Planned modality interventions: thermotherapy: hydrocollator packs, unattended electrical stimulation and cryotherapy  Planned therapy interventions: manual therapy, neuromuscular re-education, patient education, self care, therapeutic exercise, therapeutic activities and home exercise program  Frequency: 3x week  Duration in weeks: 10  Treatment plan discussed with: patient        Subjective Evaluation    History of Present Illness  Date of onset: 2016  Mechanism of injury: 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 ; which 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 leg symptoms  22:  Pt has had insidious onset and worsening left heelcord, ankle and plantar fascia pain          Recurrent probem    Quality of life: fair    Pain  Current pain ratin  At best pain ratin  At worst pain ratin  Location: left L/S and glut area which radiates down her lateral left leg into her foot  She also reports having acute flare-up of left plantar fascitis  Quality: burning, dull ache, radiating, sharp, cramping and discomfort  Relieving factors: ice  Aggravating factors: standing, walking, stair climbing and lifting  Progression: no change    Social Support  Steps to enter house: yes  2  Stairs in house: no     Treatments  Previous treatment: injection treatment  Patient Goals  Patient goal: would like to be out of pain and / or that she can move and perform all chores at home without significant pain , weakness and difficulty        Objective     Postural Observations  Seated posture: fair  Standing posture: fair  Correction of posture: has no consistent effect        Palpation   Left   Tenderness of the erector spinae, posterior tibialis and quadratus lumborum  Trigger point to erector spinae and quadratus lumborum  Tenderness     Left Hip   Tenderness in the PSIS     Left Ankle/Foot   Tenderness in the Achilles insertion, plantar fascia and posterior tibial tendon  Neurological Testing     Additional Neurological Details  Unremarkable    Active Range of Motion     Lumbar   Flexion:  Restriction level: minimal  Extension:  Restriction level: moderate  Left lateral flexion:  Restriction level: moderate  Right lateral flexion:  Restriction level: moderate    Joint Play     Hypomobile: L1, L2, L3, L4 and L5     Strength/Myotome Testing     Lumbar   Left   Normal strength    Right   Normal strength    Right Hip   Planes of Motion   Extension: 3+    Left Ankle/Foot   Plantar flexion: 4    Additional Strength Details  Strong and painless with left great toe extension  Strong and painful with left plantar flexion/ inversion   Muscle Activation   Patient able to activate left transverse abdominals, left external obliques, left internal obliques, right transverse abdominals, right external obliques and right internal obliques  Tests     Lumbar     Left   Positive quadrant     Negative passive SLR and slump test      Right   Negative passive SLR and slump test          Precautions: Hx of CA       Manuals 6-16-22 6-20-22 6/22/22 6-27-22    STM left post tib and achilles/ PF    15'                            Neuro Re-Ed                                         Pt Ed/ HEP 14'                       Ther Ex        Nu Step  L4 12' L4 12' l4 12'    Seated lumbar ball roll stretch  10" x12 to the right 10"x12 to R     PPT  10" x15 10"x15 10" X15    LTR  10" x12 right 10"x12 R  10" X12    DKTC  10" x12 10"x12 10" X12    Supine hamstring stretch  Bilaterally  10" x12 10"x12 radha      Single leg trunk rotation/ glut stretch  10" x12 10"x12 L  10" X12 L            Ther Activity        Isometric transverse abdominals  10" x20 x20 10" x20    Isometric abs with alternating marches  30x x20 radha x30 radha    Bridges w/ isometric abdominals  2x10 2x10 30x                                    Gait Training                        Modalities          MHP 15' post tx MHP 13' post tx 15' post tx

## 2022-06-29 ENCOUNTER — OFFICE VISIT (OUTPATIENT)
Dept: PHYSICAL THERAPY | Facility: CLINIC | Age: 67
End: 2022-06-29
Payer: COMMERCIAL

## 2022-06-29 DIAGNOSIS — M72.2 PLANTAR FASCIITIS OF LEFT FOOT: ICD-10-CM

## 2022-06-29 DIAGNOSIS — M51.36 DEGENERATION OF LUMBAR INTERVERTEBRAL DISC: ICD-10-CM

## 2022-06-29 DIAGNOSIS — G89.29 CHRONIC LOW BACK PAIN, UNSPECIFIED BACK PAIN LATERALITY, UNSPECIFIED WHETHER SCIATICA PRESENT: Primary | ICD-10-CM

## 2022-06-29 DIAGNOSIS — M54.50 CHRONIC LOW BACK PAIN, UNSPECIFIED BACK PAIN LATERALITY, UNSPECIFIED WHETHER SCIATICA PRESENT: Primary | ICD-10-CM

## 2022-06-29 PROCEDURE — 97140 MANUAL THERAPY 1/> REGIONS: CPT

## 2022-06-29 PROCEDURE — 97010 HOT OR COLD PACKS THERAPY: CPT

## 2022-06-29 PROCEDURE — 97110 THERAPEUTIC EXERCISES: CPT

## 2022-06-29 NOTE — PROGRESS NOTES
Daily Note     Today's date: 2022  Patient name: Flower Leblanc  : 1955  MRN: [id number]  Referring provider: Cristi No MD  Dx:   Encounter Diagnosis     ICD-10-CM    1  Chronic low back pain, unspecified back pain laterality, unspecified whether sciatica present  M54 50     G89 29    2  Degeneration of lumbar intervertebral disc  M51 36    3  Plantar fasciitis of left foot  M72 2                   Subjective: Pt reports her foot was improved after the last tx      Objective: See treatment diary below      Assessment: Tolerated treatment well  Patient would benefit from continued PT      Plan: Progress treatment as tolerated            Precautions: Hx of CA       Manuals 22        STM left post tib tendon and plantar fascia 15'                            Neuro Re-Ed                                         Pt Ed/ HEP 14'                       Ther Ex        Nu Step  L4 12' L4 12' L4 12'    Seated lumbar ball roll stretch  10" x12 to the right 10"x12 to R 10"x12 to right    PPT  10" x15 10"x15 10" x15    LTR  10" x12 right 10"x12 R  10" x12 R    DKTC  10" x12 10"x12 10" x12    Supine hamstring stretch  Bilaterally  10" x12 10"x12 radha  10" x12 bilat    Single leg trunk rotation/ glut stretch  10" x12 10"x12 L  10" x12 L            Ther Activity        Isometric transverse abdominals  10" x20 x20 x20    Isometric abs with alternating marches  30x x20 radha X 20 radha    Bridges w/ isometric abdominals  2x10 2x10 2x10    Towel curls and arch forming    8'                            Gait Training                        Modalities          MHP 15' post tx MHP 13' post tx 15'

## 2022-07-06 ENCOUNTER — OFFICE VISIT (OUTPATIENT)
Dept: PHYSICAL THERAPY | Facility: CLINIC | Age: 67
End: 2022-07-06
Payer: COMMERCIAL

## 2022-07-06 DIAGNOSIS — M51.36 DEGENERATION OF LUMBAR INTERVERTEBRAL DISC: ICD-10-CM

## 2022-07-06 DIAGNOSIS — M54.50 CHRONIC LOW BACK PAIN, UNSPECIFIED BACK PAIN LATERALITY, UNSPECIFIED WHETHER SCIATICA PRESENT: Primary | ICD-10-CM

## 2022-07-06 DIAGNOSIS — G89.29 CHRONIC LOW BACK PAIN, UNSPECIFIED BACK PAIN LATERALITY, UNSPECIFIED WHETHER SCIATICA PRESENT: Primary | ICD-10-CM

## 2022-07-06 DIAGNOSIS — M72.2 PLANTAR FASCIITIS OF LEFT FOOT: ICD-10-CM

## 2022-07-06 PROCEDURE — 97140 MANUAL THERAPY 1/> REGIONS: CPT

## 2022-07-06 PROCEDURE — 97110 THERAPEUTIC EXERCISES: CPT

## 2022-07-06 PROCEDURE — 97530 THERAPEUTIC ACTIVITIES: CPT

## 2022-07-06 PROCEDURE — 97010 HOT OR COLD PACKS THERAPY: CPT

## 2022-07-06 NOTE — PROGRESS NOTES
Daily Note     Today's date: 2022  Patient name: Cecilia Schreiber  : 1955  MRN: [id number]  Referring provider: Soraya Flor DO  Dx:   Encounter Diagnosis     ICD-10-CM    1  Chronic low back pain, unspecified back pain laterality, unspecified whether sciatica present  M54 50     G89 29    2  Degeneration of lumbar intervertebral disc  M51 36    3  Plantar fasciitis of left foot  M72 2                   Subjective: Pt reports having pain the day of first manual foot treatment but then states she is walking better after  Objective: See treatment diary below      Assessment: Tolerated treatment well  Patient would benefit from continued PT      Plan: Progress treatment as tolerated            Precautions: Hx of CA       Manuals 22       STM left post tib tendon and plantar fascia 15' 15'                           Neuro Re-Ed                                         Pt Ed/ HEP 14'                       Ther Ex        Nu Step  L4 12' L4 12' L4 12' L4 12'   Seated lumbar ball roll stretch  10" x12 to the right 10"x12 to R 10"x12 to right 10" x12 to the right   PPT  10" x15 10"x15 10" x15 10" 2x10   LTR  10" x12 right 10"x12 R  10" x12 R 10" 12x   DKTC  10" x12 10"x12 10" x12 10" 12x   Supine hamstring stretch  Bilaterally  10" x12 10"x12 radha  10" x12 bilat 10" x12 bilat   Single leg trunk rotation/ glut stretch  10" x12 10"x12 L  10" x12 L 10" 12x Left           Ther Activity        Isometric transverse abdominals  10" x20 x20 x20 x20   Isometric abs with alternating marches  30x x20 radha X 20 radha x20 radha   Bridges w/ isometric abdominals  2x10 2x10 2x10 2x10   Towel curls and arch forming    8' 8'                           Gait Training                        Modalities          MHP 15' post tx MHP 13' post tx 15' 15" post tx

## 2022-07-08 ENCOUNTER — RADIATION ONCOLOGY FOLLOW-UP (OUTPATIENT)
Dept: RADIATION ONCOLOGY | Facility: CLINIC | Age: 67
End: 2022-07-08
Attending: RADIOLOGY
Payer: COMMERCIAL

## 2022-07-08 VITALS
HEART RATE: 109 BPM | BODY MASS INDEX: 31.1 KG/M2 | DIASTOLIC BLOOD PRESSURE: 86 MMHG | SYSTOLIC BLOOD PRESSURE: 140 MMHG | TEMPERATURE: 96.8 F | WEIGHT: 184 LBS | RESPIRATION RATE: 16 BRPM | OXYGEN SATURATION: 97 %

## 2022-07-08 DIAGNOSIS — C50.412 MALIGNANT NEOPLASM OF UPPER-OUTER QUADRANT OF LEFT BREAST IN FEMALE, ESTROGEN RECEPTOR POSITIVE (HCC): Primary | ICD-10-CM

## 2022-07-08 DIAGNOSIS — Z17.0 MALIGNANT NEOPLASM OF UPPER-OUTER QUADRANT OF LEFT BREAST IN FEMALE, ESTROGEN RECEPTOR POSITIVE (HCC): Primary | ICD-10-CM

## 2022-07-08 PROCEDURE — G0463 HOSPITAL OUTPT CLINIC VISIT: HCPCS | Performed by: RADIOLOGY

## 2022-07-08 PROCEDURE — 99211 OFF/OP EST MAY X REQ PHY/QHP: CPT | Performed by: RADIOLOGY

## 2022-07-08 PROCEDURE — 99213 OFFICE O/P EST LOW 20 MIN: CPT | Performed by: RADIOLOGY

## 2022-07-08 NOTE — PROGRESS NOTES
Follow-up - Radiation Oncology   Bakari Borjas 1955 74 y o  female [id number]      History of Present Illness   Cancer Staging  Malignant neoplasm of upper-outer quadrant of left breast in female, estrogen receptor positive ([facility] [address] )  Staging form: Breast, AJCC 8th Edition  - Pathologic: Stage IA (pT1c, pN0(sn), cM0, G1, ER+, DC+, HER2-) - Unsigned  Neoadjuvant therapy: No  Method of lymph node assessment: [place] lymph node biopsy  Histologic grading system: 3 grade system  Laterality: Left  Tumor size (mm): 18      Bakari Borjas is a 74y o  year old female with a history of [id number] invasive lobular carcinoma of the left breast status post resection achieving negative margins   Tumor cells were ER/ DC positive and HER2 Mateo negative  She completed a course of adjuvant whole breast hypofractionated radiation on 12/4/19   She is maintained on exemestane  She returns today for follow up      The patient denies any new palpable nodules, suspicious skin changes, or nipple discharge of the breasts bilaterally   She denies any significant breast pain or tenderness   She denies any upper extremity edema or shoulder restriction   She notes that she finds her asymmetry "uncomfortable" and that this bothers her more over time  She has difficulty fitting bras due to left breast shrinkage associated with treatment  6/13/22 Bilateral mammogram was benign (BIRADS-2)  She will be due for repeat mammogram in 1 year  She denies vaginal discharge of bleeding  GYN exam last week was unremarkable  She is maintained on exemestane  She was switched from anastrazole in January 2022 in an effort to improve progressive myalgias and arthralgias, but the patient continues to have arthralgias  She is retiring in August and considering switching back to anastrazole due to significant difference in cost     She is compliant with surgical oncology and medical oncology follow-up  She is seeing Rhuematology as well    She was recently diagnosed with plantar fasciitis of the left foot and is undergoing physical therapy as well as PT for back pain  Upcomin22 Surgical Oncology  23 Medical Oncology      Historical Information   Oncology History   Malignant neoplasm of upper-outer quadrant of left breast in female, estrogen receptor positive ([facility] [address] )   2019 Biopsy    Left breast biopsy  1 o'clock, 5 cm from nipple  Invasive lobular carcinoma  Grade 1      HER2 0    Done at [facility]     10/1/2019 Surgery    Left breast needle localized lumpectomy with sentinel lymph node biopsy  Invasive lobular carcinoma  Grade 1  1 8 cm  Margins negative  0/2 Lymph nodes  Anatomic/Prognostic Stage IA     10/30/2019 -  Hormone Therapy    Anastrozole 1 mg daily  Dr Almazan Soldaiden     2019 - 2019 Radiation    entire right breast to 4005cGy in 15 daily 267cGy fractions  Dr Angelica Alicea         Past Medical History:   Diagnosis Date    Breast cancer [facility]) 2019    left breast    History of radiation therapy 10/2019    left breast cancer    Migraine     Osteoporosis     Pleurisy     Varicella     Vertigo      Past Surgical History:   Procedure Laterality Date    BREAST LUMPECTOMY Left 10/01/2019    Procedure: BREAST NEEDLE LOCALIZED LUMPECTOMY (NEEDLE LOC AT 0730); Surgeon: Anusha Andujar MD;  [facility];  Service: Surgical Oncology    CHOLECYSTECTOMY      COLONOSCOPY      DENTAL SURGERY      Bone graft with implant    EPIDURAL BLOCK INJECTION  2022    HALLUX VALGUS CORRECTION Bilateral     LYMPH NODE BIOPSY Left 10/01/2019    Procedure: SENTINEL LYMPH NODE BIOPSY; LYMPHATIC MAPPING WITH BLUE DYE AND RADIOACTIVE DYE (INJECT AT 0830 BY DR HA IN THE OR);   Surgeon: Anusha Andujar MD;  [facility];  Service: Surgical Oncology    MAMMO NEEDLE LOCALIZATION LEFT (ALL INC) Left 10/01/2019    RHINOPLASTY         Social History   Social History     Substance and Sexual Activity   Alcohol Use No     Social History     Substance and Sexual Activity   Drug Use Never     Social History     Tobacco Use   Smoking Status Never Smoker   Smokeless Tobacco Never Used         Meds/Allergies     Current Outpatient Medications:     acetaminophen (TYLENOL) 500 mg tablet, Take by mouth every 6 (six) hours as needed , Disp: , Rfl:     Cholecalciferol (VITAMIN D) 2000 units CAPS, Take 4,000 Units by mouth daily , Disp: , Rfl:     cyclobenzaprine (FLEXERIL) 10 mg tablet, Take 1 tablet (10 mg total) by mouth 3 (three) times a day as needed for muscle spasms, Disp: 20 tablet, Rfl: 0    Diclofenac Sodium (VOLTAREN) 1 %, Apply 2 g topically 4 (four) times a day, Disp: 50 g, Rfl: 0    exemestane (AROMASIN) 25 MG tablet, Take 1 tablet (25 mg total) by mouth daily, Disp: 90 tablet, Rfl: 3    ibandronate (BONIVA) 150 MG tablet, Take 150 mg by mouth every 30 (thirty) days , Disp: , Rfl:     Ivermectin 1 % CREA, Apply topically daily , Disp: , Rfl:     meclizine (ANTIVERT) 12 5 MG tablet, Take 12 5 mg by mouth 3 (three) times a day as needed for dizziness, Disp: , Rfl:     pimecrolimus (ELIDEL) 1 % cream, , Disp: , Rfl:     SUMAtriptan (IMITREX) 100 mg tablet, Take 100 mg by mouth once as needed for migraine, Disp: , Rfl:     venlafaxine (EFFEXOR) 50 mg tablet, Take 1 tablet (50 mg total) by mouth daily, Disp: 90 tablet, Rfl: 1  Allergies   Allergen Reactions    Ciprofloxacin Seizures    Cyclopentolate Other (See Comments) and Tachycardia     Resp distress    Sulfa Antibiotics Hives and Rash    Scopolamine Other (See Comments)     Precautionary for cycopentolate allergy         Review of Systems   Constitutional: Negative  HENT: Positive for hearing loss  Eyes: Negative  Respiratory: Negative  Cardiovascular: Negative  Gastrointestinal: Negative  Endocrine: Negative  Genitourinary: Positive for frequency and urgency  Musculoskeletal: Positive for arthralgias and back pain  Left foot pain    Following with PT   Skin: Negative  Allergic/Immunologic: Negative  Neurological: Positive for dizziness and headaches  Hematological: Negative  Psychiatric/Behavioral: Negative  OBJECTIVE:   /86   Pulse (!) 109   Temp (!) 96 8 °F (36 °C)   Resp 16   Wt 83 5 kg (184 lb)   SpO2 97%   BMI 31 10 kg/m²   Karnofsky: 80 - Normal activity with effort; some signs or symptoms of disease    Physical Exam  Vitals and nursing note reviewed  Constitutional:       General: She is not in acute distress  Appearance: She is well-developed  Eyes:      General: No scleral icterus  Cardiovascular:      Rate and Rhythm: Normal rate and regular rhythm  Pulmonary:      Breath sounds: No wheezing, rhonchi or rales  Chest:   Breasts:      Right: No axillary adenopathy or supraclavicular adenopathy  Left: No axillary adenopathy or supraclavicular adenopathy  Comments: Breast examination demonstrates left breast is smaller in size  The left breat has a well-healed upper outer scar  There is diffuse moderate fibrosis more pronounced near scar  There are no palpable nodules or suspicious skin changes bilaterally  Abdominal:      General: There is no distension  Palpations: Abdomen is soft  Tenderness: There is no abdominal tenderness  Musculoskeletal:      Right lower leg: No edema  Left lower leg: No edema  Comments: No upper extremity edema  Full range of motion of upper extremities bilaterally  Lymphadenopathy:      Cervical: No cervical adenopathy  Upper Body:      Right upper body: No supraclavicular or axillary adenopathy  Left upper body: No supraclavicular or axillary adenopathy  Neurological:      Mental Status: She is alert and oriented to person, place, and time  Gait: Gait abnormal (left sided limp)            RESULTS  Imaging Studies:Mammo diagnostic bilateral w 3d & cad    Result Date: 6/13/2022  Narrative: DIAGNOSIS: Malignant neoplasm of upper-outer quadrant of left breast in female, estrogen receptor positive ([address] ) TECHNIQUE: Digital diagnostic mammography was performed  Computer Aided Detection (CAD) analyzed all applicable images  Computer Aided Detection (CAD) analyzed all applicable images  COMPARISONS: Prior breast imaging dated: 06/11/2021, 05/29/2020, 07/23/2019, 07/15/2019, 05/24/2019, 03/16/2018, 12/09/2016, and 11/30/2015 RELEVANT HISTORY: Family Breast Cancer History: History of breast cancer in Neg Hx  Family Medical History: No known relevant family medical history  Personal History: Hormone history includes hormone replacement therapy  Surgical history includes breast biopsy and lumpectomy  Medical history includes breast cancer  RISK ASSESSMENT: [facility] risk assessment reporting was suppressed due to the patient's history and/or demographic factors  TISSUE DENSITY: There are scattered areas of fibroglandular density  INDICATION: Joanna Donahue is a 74 y o  female presenting for annual  FINDINGS:  Bilateral There are no suspicious masses, grouped microcalcifications or areas of unexplained architectural distortion  The skin and nipple areolar complex are unremarkable  There are postsurgical changes of the left breast   There are scattered calcifications present  There are biopsy marker clips in the left breast      Impression:  No evidence of malignancy  Stable postoperative changes of the left breast  ASSESSMENT/BI-RADS CATEGORY: Left: 2 - Benign Right: 2 - Benign Overall: 2 - Benign RECOMMENDATION:      - Diagnostic mammogram in 1 year for both breasts  Workstation ID: [id number]        Assessment/Plan:  Orders Placed This Encounter   Procedures    Ambulatory referral to Plastic Surgery        Joanna Donahue is a 74y o  year old female with a history of stage I invasive lobular carcinoma of the left breast status post resection followed by adjuvant radiation completed about 2 5 years ago   Tumor cells were ER/ NM positive and HER2 Mateo negative  She is maintained on exemustane   She remains clinically JOSUÉ     The patient notes more dissatisfaction and discomfort with her breast asymmetry today  She has tried to compensate, but this remains an issue for her  We discussed Plastic surgery consultation for possible reduction and lift of the right breast to improve symmetry  The patient would like to pursue consultation  I explained that she will need to check her insurance coverage of this procedure and also new policy that will start in September 2022  She expressed understanding  We will make referral for at least consultation today  I instructed her to practice sun protection to the irradiated skin  She will continue follow-up with her Rheumatology and Oncology teams  We will see her again in 1 year or sooner should the need arise  Mortimer Leas, MD  7/8/2022,9:59 AM    Portions of the record may have been created with voice recognition software   Occasional wrong word or "sound a like" substitutions may have occurred due to the inherent limitations of voice recognition software   Read the chart carefully and recognize, using context, where substitutions have occurred

## 2022-07-08 NOTE — PROGRESS NOTES
Evan Burris 1955 is a 74 y o  female  with a history of [id number] invasive lobular carcinoma of the left breast status post resection achieving negative margins  Tumor cells were ER/ ME positive and HER2 Mateo negative  She completed a course of adjuvant whole breast hypofractionated radiation on 19  She is maintained on exemestane  She was last seen 21 and returns today for follow up  22 Surgical Oncology  mammogram in    no concerns on today's exam    Follow up 6 months      22 Dr Bang Swann  currently on adjuvant hormonal therapy with anastrozole with progressive musculoskeletal symptoms  recommended her to switch anastrozole to exemestane 25 mg once a day to see if she has improvement of musculoskeletal symptoms  22 B/L mammogram  Bilateral  There are no suspicious masses, grouped microcalcifications or areas of unexplained architectural distortion  The skin and nipple areolar complex are unremarkable  There are postsurgical changes of the left breast   There are scattered calcifications present  There are biopsy marker clips in the left breast   RECOMMENDATION:       - Diagnostic mammogram in 1 year for both breasts  Upcomin22 Surgical Oncology  23 Medical Oncology        Follow up visit     Oncology History   Malignant neoplasm of upper-outer quadrant of left breast in female, estrogen receptor positive ([facility] [address] )   2019 Biopsy    Left breast biopsy  1 o'clock, 5 cm from nipple  Invasive lobular carcinoma  Grade 1      HER2 0    Done at [facility]     10/1/2019 Surgery    Left breast needle localized lumpectomy with sentinel lymph node biopsy  Invasive lobular carcinoma  Grade 1  1 8 cm  Margins negative  0/2 Lymph nodes  Anatomic/Prognostic Stage IA     10/30/2019 -  Hormone Therapy    Anastrozole 1 mg daily  Dr Bang Swann     2019 - 2019 Radiation    entire right breast to 4005cGy in 15 daily 267cGy fractions    Dr Evelyn Parish Review of Systems:  Review of Systems   Constitutional: Negative  HENT: Positive for hearing loss  Eyes: Negative  Respiratory: Negative  Cardiovascular: Negative  Gastrointestinal: Negative  Endocrine: Negative  Genitourinary: Positive for frequency and urgency  Musculoskeletal: Positive for arthralgias and back pain  Left foot pain  Following with PT   Skin: Negative  Allergic/Immunologic: Negative  Neurological: Positive for dizziness and headaches  Hematological: Negative  Psychiatric/Behavioral: Negative  Clinical Trial: no      Covid Vaccine Status yes + booster    Health Maintenance   Topic Date Due    PT PLAN OF CARE  Never done    HIV Screening  Never done    COVID-19 Vaccine (4 - Booster for Moderna series) 03/22/2022    Pneumococcal Vaccine: 65+ Years (1 - PCV) 04/11/2022    Depression Screening  07/02/2022    Colorectal Cancer Screening  04/28/2022    Influenza Vaccine (1) 09/01/2022    BMI: Followup Plan  09/03/2022    Annual Physical  09/03/2022    Breast Cancer Screening: Mammogram  06/13/2023    Fall Risk  06/16/2023    BMI: Adult  06/21/2023    Cervical Cancer Screening  06/17/2024    DTaP,Tdap,and Td Vaccines (2 - Td or Tdap) 03/11/2032    Hepatitis C Screening  Completed    Osteoporosis Screening  Completed    HIB Vaccine  Aged Out    Hepatitis B Vaccine  Aged Out    IPV Vaccine  Aged Out    Hepatitis A Vaccine  Aged Out    Meningococcal ACWY Vaccine  Aged Out    HPV Vaccine  Aged Out     Patient Active Problem List   Diagnosis    Fatigue    Age-related osteoporosis without current pathological fracture    Malignant neoplasm of upper-outer quadrant of left breast in female, estrogen receptor positive ([facility] [address] )    Overweight (BMI 25 0-29  9)    Numbness of both lower extremities    Prediabetes    Hearing aid worn    Spinal stenosis of lumbar region with neurogenic claudication    Lumbar spondylolysis    Lumbar foraminal stenosis    Lumbar disc disease with radiculopathy    Hot flashes     Past Medical History:   Diagnosis Date    Breast cancer ([address] ) 09/2019    left breast    History of radiation therapy 10/2019    left breast cancer    Migraine     Osteoporosis     Pleurisy     Varicella     Vertigo      Past Surgical History:   Procedure Laterality Date    BREAST LUMPECTOMY Left 10/01/2019    Procedure: BREAST NEEDLE LOCALIZED LUMPECTOMY (NEEDLE LOC AT 0730); Surgeon: Marilee Burgos MD;  [facility];  Service: Surgical Oncology    CHOLECYSTECTOMY      COLONOSCOPY      DENTAL SURGERY      Bone graft with implant    HALLUX VALGUS CORRECTION Bilateral     LYMPH NODE BIOPSY Left 10/01/2019    Procedure: SENTINEL LYMPH NODE BIOPSY; LYMPHATIC MAPPING WITH BLUE DYE AND RADIOACTIVE DYE (INJECT AT 0830 BY DR HA IN THE OR); Surgeon: Marilee Burgos MD;  [facility];  Service: Surgical Oncology    MAMMO NEEDLE LOCALIZATION LEFT (ALL INC) Left 10/01/2019    RHINOPLASTY       Family History   Problem Relation Age of Onset    No Known Problems Mother     Lung cancer Father 71    No Known Problems Maternal Grandmother     No Known Problems Maternal Grandfather     No Known Problems Paternal Grandmother     No Known Problems Paternal Grandfather     Breast cancer Neg Hx      Social History     Socioeconomic History    Marital status:       Spouse name: Not on file    Number of children: 2    Years of education: Not on file    Highest education level: Not on file   Occupational History    Occupation: curretnly works part-time    Occupation: medical professional   Tobacco Use    Smoking status: Never Smoker    Smokeless tobacco: Never Used   Vaping Use    Vaping Use: Never used   Substance and Sexual Activity    Alcohol use: No    Drug use: Never    Sexual activity: Not Currently     Partners: Male     Birth control/protection: None, Post-menopausal   Other Topics Concern    Not on file   Social History Narrative    Not on file     Social Determinants of Health     Financial Resource Strain: Not on file   Food Insecurity: Not on file   Transportation Needs: Not on file   Physical Activity: Not on file   Stress: Not on file   Social Connections: Not on file   Intimate Partner Violence: Not on file   Housing Stability: Not on file       Current Outpatient Medications:     acetaminophen (TYLENOL) 500 mg tablet, Take by mouth every 6 (six) hours as needed , Disp: , Rfl:     Cholecalciferol (VITAMIN D) 2000 units CAPS, Take 4,000 Units by mouth daily , Disp: , Rfl:     cyclobenzaprine (FLEXERIL) 10 mg tablet, Take 1 tablet (10 mg total) by mouth 3 (three) times a day as needed for muscle spasms, Disp: 20 tablet, Rfl: 0    Diclofenac Sodium (VOLTAREN) 1 %, Apply 2 g topically 4 (four) times a day, Disp: 50 g, Rfl: 0    exemestane (AROMASIN) 25 MG tablet, Take 1 tablet (25 mg total) by mouth daily, Disp: 90 tablet, Rfl: 3    ibandronate (BONIVA) 150 MG tablet, Take 150 mg by mouth every 30 (thirty) days , Disp: , Rfl:     Ivermectin 1 % CREA, Apply topically daily , Disp: , Rfl:     meclizine (ANTIVERT) 12 5 MG tablet, Take 12 5 mg by mouth 3 (three) times a day as needed for dizziness, Disp: , Rfl:     pimecrolimus (ELIDEL) 1 % cream, , Disp: , Rfl:     SUMAtriptan (IMITREX) 100 mg tablet, Take 100 mg by mouth once as needed for migraine, Disp: , Rfl:     venlafaxine (EFFEXOR) 50 mg tablet, Take 1 tablet (50 mg total) by mouth daily, Disp: 90 tablet, Rfl: 1  Allergies   Allergen Reactions    Ciprofloxacin Seizures    Cyclopentolate Other (See Comments) and Tachycardia     Resp distress    Sulfa Antibiotics Hives and Rash    Scopolamine Other (See Comments)     Precautionary for cycopentolate allergy     There were no vitals filed for this visit

## 2022-07-11 ENCOUNTER — TELEPHONE (OUTPATIENT)
Dept: PLASTIC SURGERY | Facility: CLINIC | Age: 67
End: 2022-07-11

## 2022-07-11 ENCOUNTER — OFFICE VISIT (OUTPATIENT)
Dept: PHYSICAL THERAPY | Facility: CLINIC | Age: 67
End: 2022-07-11
Payer: COMMERCIAL

## 2022-07-11 DIAGNOSIS — M54.50 CHRONIC LOW BACK PAIN, UNSPECIFIED BACK PAIN LATERALITY, UNSPECIFIED WHETHER SCIATICA PRESENT: Primary | ICD-10-CM

## 2022-07-11 DIAGNOSIS — M72.2 PLANTAR FASCIITIS OF LEFT FOOT: ICD-10-CM

## 2022-07-11 DIAGNOSIS — G89.29 CHRONIC LOW BACK PAIN, UNSPECIFIED BACK PAIN LATERALITY, UNSPECIFIED WHETHER SCIATICA PRESENT: Primary | ICD-10-CM

## 2022-07-11 DIAGNOSIS — M51.36 DEGENERATION OF LUMBAR INTERVERTEBRAL DISC: ICD-10-CM

## 2022-07-11 PROCEDURE — 97530 THERAPEUTIC ACTIVITIES: CPT

## 2022-07-11 PROCEDURE — 97010 HOT OR COLD PACKS THERAPY: CPT

## 2022-07-11 PROCEDURE — 97140 MANUAL THERAPY 1/> REGIONS: CPT

## 2022-07-11 PROCEDURE — 97110 THERAPEUTIC EXERCISES: CPT

## 2022-07-11 NOTE — PROGRESS NOTES
Daily Note     Today's date: 2022  Patient name: Pérez Meredith  : 1955  MRN: [id number]  Referring provider: Princess Mendiola DO  Dx:   Encounter Diagnosis     ICD-10-CM    1  Chronic low back pain, unspecified back pain laterality, unspecified whether sciatica present  M54 50     G89 29    2  Degeneration of lumbar intervertebral disc  M51 36    3  Plantar fasciitis of left foot  M72 2        Start Time: 0834  Stop Time: 1058  Total time in clinic (min): 75 minutes    Subjective: Pt reports no pain currently, is wearing KT tape on her ankle  Objective: See treatment diary below      Assessment: Tolerated treatment well  She reports no pain during session today  Min cues needed for proper exercise form and technique  Good effort noted throughout session  Patient would benefit from continued PT      Plan: Continue per plan of care           Precautions: Hx of CA       Manuals 22        STM left post tib tendon and plantar fascia 15'  KT tape post tib and achilles   STM left post tib tendon and plantar fascia 15' 15'                           Neuro Re-Ed                                         Pt Ed/ HEP                        Ther Ex        Nu Step L4 12' L4 12' L4 12' L4 12' L4 12'   Seated lumbar ball roll stretch 10" x12 to the right 10" x12 to the right 10"x12 to R 10"x12 to right 10" x12 to the right   PPT 10" 2x10 10" x15 10"x15 10" x15 10" 2x10   LTR 10" 12x 10" x12 right 10"x12 R  10" x12 R 10" 12x   DKTC 10" 12x 10" x12 10"x12 10" x12 10" 12x   Supine hamstring stretch 10" x12 bilat Bilaterally  10" x12 10"x12 radha  10" x12 bilat 10" x12 bilat   Single leg trunk rotation/ glut stretch  10" x12 10"x12 L  10" x12 L 10" 12x Left           Ther Activity        Isometric transverse abdominals 10" x20 10" x20 x20 x20 x20   Isometric abs with alternating marches x20 radha 30x x20 radha X 20 radha x20 radha   Bridges w/ isometric abdominals 2x10 2x10 2x10 2x10 2x10 Nargisel curls and arch forming 8'   8' 8'                           Gait Training                        Modalities         MHP 15' post tx MHP 13' post tx MHP 13' post tx 15' 15" post tx

## 2022-07-12 ENCOUNTER — OFFICE VISIT (OUTPATIENT)
Dept: SURGICAL ONCOLOGY | Facility: CLINIC | Age: 67
End: 2022-07-12
Payer: COMMERCIAL

## 2022-07-12 VITALS
HEIGHT: 65 IN | BODY MASS INDEX: 30.16 KG/M2 | WEIGHT: 181 LBS | HEART RATE: 91 BPM | DIASTOLIC BLOOD PRESSURE: 80 MMHG | RESPIRATION RATE: 14 BRPM | OXYGEN SATURATION: 98 % | SYSTOLIC BLOOD PRESSURE: 120 MMHG | TEMPERATURE: 98 F

## 2022-07-12 DIAGNOSIS — Z79.811 USE OF EXEMESTANE (AROMASIN): ICD-10-CM

## 2022-07-12 DIAGNOSIS — C50.412 MALIGNANT NEOPLASM OF UPPER-OUTER QUADRANT OF LEFT BREAST IN FEMALE, ESTROGEN RECEPTOR POSITIVE (HCC): Primary | ICD-10-CM

## 2022-07-12 DIAGNOSIS — Z17.0 MALIGNANT NEOPLASM OF UPPER-OUTER QUADRANT OF LEFT BREAST IN FEMALE, ESTROGEN RECEPTOR POSITIVE (HCC): Primary | ICD-10-CM

## 2022-07-12 DIAGNOSIS — R63.5 WEIGHT GAIN: ICD-10-CM

## 2022-07-12 PROCEDURE — 99214 OFFICE O/P EST MOD 30 MIN: CPT

## 2022-07-12 NOTE — PROGRESS NOTES
Surgical Oncology Follow Up       [address] [facility] [place]  [address]  [place] [address]    Purvis Cava  1955  [id number]  [facility]  [address] [address]    Chief Complaint   Patient presents with    Follow-up       Assessment/Plan:  1  Malignant neoplasm of upper-outer quadrant of left breast in female, estrogen receptor positive ([facility] [address] )  - 6 month follow up    2  Use of exemestane (Aromasin)  - continue use per medical oncology    3  Weight gain  - Ambulatory Referral to Weight Management; Future    Discussion/Summary:  Patient is a 71-year-old female presenting today for six-month follow-up for left breast cancer diagnosed in July 2019  Pathology revealed invasive lobular carcinoma ER/%, HER2 -  She had a left breast needle localized lumpectomy with sentinel node biopsy with Dr Jeferson Michaels and completed whole breast radiation therapy  She is currently on exemestane  She had a bilateral diagnostic mammogram on 06/13/2022 which was BI-RADS 2 category 2 density  There were no concerns on her clinical breast exam  Patient stated she is interested in having a right breast reduction and is scheduled to see plastics this fall  I will see the patient back in 6 months or sooner should the need arise  She was instructed to call with any questions or concerns prior to this time  All questions were answered today        History of Present Illness:     Oncology History   Malignant neoplasm of upper-outer quadrant of left breast in female, estrogen receptor positive ([facility] [address] )   7/23/2019 Biopsy    Left breast biopsy  1 o'clock, 5 cm from nipple  Invasive lobular carcinoma  Grade 1      HER2 0    [facility]     10/1/2019 Surgery    Left breast needle localized lumpectomy with sentinel lymph node biopsy  Invasive lobular carcinoma  Grade 1  1 8 cm  Margins negative  0/2 Lymph nodes  Anatomic/Prognostic Stage IA     10/30/2019 -  Hormone Therapy    Anastrozole 1 mg daily  Dr Erica Yao     11/13/2019 - 12/4/2019 Radiation    entire right breast to 4005cGy in 15 daily 267cGy fractions  Dr Jose Moore          -Interval History: Patient is a 63-year-old female presenting today for six-month follow-up for left breast cancer diagnosed in July 2019  She is currently on exemestane  She had a bilateral diagnostic mammogram on 06/13/2022 which was BI-RADS 2 category 2 density  Patient denies changes on her breast exam  She denies persistent headaches, bone pain, back pain, shortness of breath, or abdominal pain  Review of Systems:  Review of Systems   Constitutional: Negative for activity change, appetite change, fatigue and unexpected weight change  Respiratory: Negative for cough and shortness of breath  Cardiovascular: Negative for chest pain  Gastrointestinal: Negative for abdominal pain, diarrhea, nausea and vomiting  Endocrine: Negative for heat intolerance  Musculoskeletal: Negative for arthralgias, back pain and myalgias  Skin: Negative for rash  Neurological: Negative for weakness and headaches  Hematological: Negative for adenopathy  Patient Active Problem List   Diagnosis    Fatigue    Age-related osteoporosis without current pathological fracture    Malignant neoplasm of upper-outer quadrant of left breast in female, estrogen receptor positive ([facility] [address] )    Overweight (BMI 25 0-29  9)    Numbness of both lower extremities    Prediabetes    Hearing aid worn    Spinal stenosis of lumbar region with neurogenic claudication    Lumbar spondylolysis    Lumbar foraminal stenosis    Lumbar disc disease with radiculopathy    Hot flashes     Past Medical History:   Diagnosis Date    Breast cancer ([address] ) 09/2019    left breast    History of radiation therapy 10/2019    left breast cancer    Migraine     Osteoporosis     Pleurisy  Varicella     Vertigo      Past Surgical History:   Procedure Laterality Date    BREAST LUMPECTOMY Left 10/01/2019    Procedure: BREAST NEEDLE LOCALIZED LUMPECTOMY (NEEDLE LOC AT 0730); Surgeon: Trixie Burroughs MD;  [facility];  Service: Surgical Oncology    CHOLECYSTECTOMY      COLONOSCOPY      DENTAL SURGERY      Bone graft with implant    EPIDURAL BLOCK INJECTION  02/2022    HALLUX VALGUS CORRECTION Bilateral     LYMPH NODE BIOPSY Left 10/01/2019    Procedure: SENTINEL LYMPH NODE BIOPSY; LYMPHATIC MAPPING WITH BLUE DYE AND RADIOACTIVE DYE (INJECT AT 0830 BY DR HA IN THE OR); Surgeon: Trixie Burroughs MD;  [facility];  Service: Surgical Oncology    MAMMO NEEDLE LOCALIZATION LEFT (ALL INC) Left 10/01/2019    RHINOPLASTY       Family History   Problem Relation Age of Onset    No Known Problems Mother     Lung cancer Father 71    No Known Problems Maternal Grandmother     No Known Problems Maternal Grandfather     No Known Problems Paternal Grandmother     No Known Problems Paternal Grandfather     Breast cancer Neg Hx      Social History     Socioeconomic History    Marital status:       Spouse name: Not on file    Number of children: 2    Years of education: Not on file    Highest education level: Not on file   Occupational History    Occupation: curretnly works part-time    Occupation: medical professional   Tobacco Use    Smoking status: Never Smoker    Smokeless tobacco: Never Used   Vaping Use    Vaping Use: Never used   Substance and Sexual Activity    Alcohol use: No    Drug use: Never    Sexual activity: Not Currently     Partners: Male     Birth control/protection: None, Post-menopausal   Other Topics Concern    Not on file   Social History Narrative    Not on file     Social Determinants of Health     Financial Resource Strain: Not on file   Food Insecurity: Not on file   Transportation Needs: Not on file   Physical Activity: Not on file   Stress: Not on file   Social Connections: Not on file   Intimate Partner Violence: Not on file   Housing Stability: Not on file       Current Outpatient Medications:     acetaminophen (TYLENOL) 500 mg tablet, Take by mouth every 6 (six) hours as needed , Disp: , Rfl:     Cholecalciferol (VITAMIN D) 2000 units CAPS, Take 4,000 Units by mouth daily , Disp: , Rfl:     cyclobenzaprine (FLEXERIL) 10 mg tablet, Take 1 tablet (10 mg total) by mouth 3 (three) times a day as needed for muscle spasms, Disp: 20 tablet, Rfl: 0    Diclofenac Sodium (VOLTAREN) 1 %, Apply 2 g topically 4 (four) times a day, Disp: 50 g, Rfl: 0    exemestane (AROMASIN) 25 MG tablet, Take 1 tablet (25 mg total) by mouth daily, Disp: 90 tablet, Rfl: 3    ibandronate (BONIVA) 150 MG tablet, Take 150 mg by mouth every 30 (thirty) days , Disp: , Rfl:     Ivermectin 1 % CREA, Apply topically daily , Disp: , Rfl:     meclizine (ANTIVERT) 12 5 MG tablet, Take 12 5 mg by mouth 3 (three) times a day as needed for dizziness, Disp: , Rfl:     pimecrolimus (ELIDEL) 1 % cream, , Disp: , Rfl:     SUMAtriptan (IMITREX) 100 mg tablet, Take 100 mg by mouth once as needed for migraine, Disp: , Rfl:     venlafaxine (EFFEXOR) 50 mg tablet, Take 1 tablet (50 mg total) by mouth daily, Disp: 90 tablet, Rfl: 1  Allergies   Allergen Reactions    Ciprofloxacin Seizures    Cyclopentolate Other (See Comments) and Tachycardia     Resp distress    Sulfa Antibiotics Hives and Rash    Scopolamine Other (See Comments)     Precautionary for cycopentolate allergy     Vitals:    07/12/22 1102   BP: 120/80   Pulse: 91   Resp: 14   Temp: 98 °F (36 7 °C)   SpO2: 98%       Physical Exam  Constitutional:       General: She is not in acute distress  Appearance: Normal appearance  Cardiovascular:      Rate and Rhythm: Normal rate and regular rhythm  Pulses: Normal pulses  Heart sounds: Normal heart sounds     Pulmonary:      Effort: Pulmonary effort is normal       Breath sounds: Normal breath sounds  Chest:      Chest wall: No mass  Breasts:      Right: No swelling, bleeding, inverted nipple, mass, nipple discharge, skin change, tenderness, axillary adenopathy or supraclavicular adenopathy  Left: No swelling, bleeding, inverted nipple, mass, nipple discharge, skin change, tenderness, axillary adenopathy or supraclavicular adenopathy  Comments: Left breast lumpectomy scar  Right breast  > left breast  No masses, nodularity, skin changes, nipple changes or discharge, or adenopathy appreciated on physical exam      Abdominal:      General: Abdomen is flat  Palpations: Abdomen is soft  Lymphadenopathy:      Upper Body:      Right upper body: No supraclavicular, axillary or pectoral adenopathy  Left upper body: No supraclavicular, axillary or pectoral adenopathy  Skin:     General: Skin is warm  Neurological:      General: No focal deficit present  Mental Status: She is alert and oriented to person, place, and time  Psychiatric:         Mood and Affect: Mood normal          Behavior: Behavior normal            Results:    Imaging  Mammo diagnostic bilateral w 3d & cad    Result Date: 6/13/2022  Narrative: DIAGNOSIS: Malignant neoplasm of upper-outer quadrant of left breast in female, estrogen receptor positive ([facility] [address] ) TECHNIQUE: Digital diagnostic mammography was performed  Computer Aided Detection (CAD) analyzed all applicable images  Computer Aided Detection (CAD) analyzed all applicable images  COMPARISONS: Prior breast imaging dated: 06/11/2021, 05/29/2020, 07/23/2019, 07/15/2019, 05/24/2019, 03/16/2018, 12/09/2016, and 11/30/2015 RELEVANT HISTORY: Family Breast Cancer History: History of breast cancer in Neg Hx  Family Medical History: No known relevant family medical history  Personal History: Hormone history includes hormone replacement therapy  Surgical history includes breast biopsy and lumpectomy  Medical history includes breast cancer  RISK ASSESSMENT: Nigeler-Michael risk assessment reporting was suppressed due to the patient's history and/or demographic factors  TISSUE DENSITY: There are scattered areas of fibroglandular density  INDICATION: Efrem Ruelas is a 74 y o  female presenting for annual  FINDINGS:  Bilateral There are no suspicious masses, grouped microcalcifications or areas of unexplained architectural distortion  The skin and nipple areolar complex are unremarkable  There are postsurgical changes of the left breast   There are scattered calcifications present  There are biopsy marker clips in the left breast      Impression:  No evidence of malignancy  Stable postoperative changes of the left breast  ASSESSMENT/BI-RADS CATEGORY: Left: 2 - Benign Right: 2 - Benign Overall: 2 - Benign RECOMMENDATION:      - Diagnostic mammogram in 1 year for both breasts  Workstation ID: [id number]      I reviewed the above imaging data  Advance Care Planning/Advance Directives:  Discussed disease status, cancer treatment plans and/or cancer treatment goals with the patient

## 2022-07-13 ENCOUNTER — OFFICE VISIT (OUTPATIENT)
Dept: PHYSICAL THERAPY | Facility: CLINIC | Age: 67
End: 2022-07-13
Payer: COMMERCIAL

## 2022-07-13 DIAGNOSIS — G89.29 CHRONIC LOW BACK PAIN, UNSPECIFIED BACK PAIN LATERALITY, UNSPECIFIED WHETHER SCIATICA PRESENT: Primary | ICD-10-CM

## 2022-07-13 DIAGNOSIS — M72.2 PLANTAR FASCIITIS OF LEFT FOOT: ICD-10-CM

## 2022-07-13 DIAGNOSIS — M54.50 CHRONIC LOW BACK PAIN, UNSPECIFIED BACK PAIN LATERALITY, UNSPECIFIED WHETHER SCIATICA PRESENT: Primary | ICD-10-CM

## 2022-07-13 DIAGNOSIS — M51.36 DEGENERATION OF LUMBAR INTERVERTEBRAL DISC: ICD-10-CM

## 2022-07-13 PROCEDURE — 97110 THERAPEUTIC EXERCISES: CPT

## 2022-07-13 PROCEDURE — 97140 MANUAL THERAPY 1/> REGIONS: CPT

## 2022-07-13 PROCEDURE — 97010 HOT OR COLD PACKS THERAPY: CPT

## 2022-07-13 PROCEDURE — 97530 THERAPEUTIC ACTIVITIES: CPT

## 2022-07-13 NOTE — PROGRESS NOTES
Daily Note     Today's date: 2022  Patient name: Geradine Boeck  : 1955  MRN: [id number]  Referring provider: Saul Ugalde DO  Dx:   Encounter Diagnosis     ICD-10-CM    1  Chronic low back pain, unspecified back pain laterality, unspecified whether sciatica present  M54 50     G89 29    2  Degeneration of lumbar intervertebral disc  M51 36    3  Plantar fasciitis of left foot  M72 2                   Subjective: Pt reports improvements in both the back and foot  Objective: See treatment diary below      Assessment: Tolerated treatment well  Patient would benefit from continued PT      Plan: Progress treatment as tolerated            Precautions: Hx of CA       Manuals 22        STM left post tib tendon and plantar fascia 15'  KT tape post tib and achilles 15'  STM left post tib tendon and plantar fascia 15' 15'                           Neuro Re-Ed                                         Pt Ed/ HEP                        Ther Ex        Nu Step L4 12' L4 12' L4 12' L4 12' L4 12'   Seated lumbar ball roll stretch 10" x12 to the right 10" x12 to the right 10"x12 to R 10"x12 to right 10" x12 to the right   PPT 10" 2x10 10" x15 10"x15 10" x15 10" 2x10   LTR 10" 12x 10" x12 right 10"x12 R  10" x12 R 10" 12x   DKTC 10" 12x 10" x12 10"x12 10" x12 10" 12x   Supine hamstring stretch 10" x12 bilat Bilaterally  10" x12 10"x12 radha  10" x12 bilat 10" x12 bilat   Single leg trunk rotation/ glut stretch  10" x12 10"x12 L  10" x12 L 10" 12x Left           Ther Activity        Isometric transverse abdominals 10" x20 10" x20 x20 x20 x20   Isometric abs with alternating marches x20 radha 30x x20 radha X 20 radha x20 radha   Bridges w/ isometric abdominals 2x10 2x10 2x10 2x10 2x10   Towel curls and arch forming 8' 8'  8' 8'                           Gait Training                        Modalities         MHP 15' post tx MHP 13' post tx MHP 13' post tx 15' 15" post tx

## 2022-07-18 ENCOUNTER — OFFICE VISIT (OUTPATIENT)
Dept: PHYSICAL THERAPY | Facility: CLINIC | Age: 67
End: 2022-07-18
Payer: COMMERCIAL

## 2022-07-18 DIAGNOSIS — M72.2 PLANTAR FASCIITIS OF LEFT FOOT: ICD-10-CM

## 2022-07-18 DIAGNOSIS — M51.36 DEGENERATION OF LUMBAR INTERVERTEBRAL DISC: ICD-10-CM

## 2022-07-18 DIAGNOSIS — M54.50 CHRONIC LOW BACK PAIN, UNSPECIFIED BACK PAIN LATERALITY, UNSPECIFIED WHETHER SCIATICA PRESENT: Primary | ICD-10-CM

## 2022-07-18 DIAGNOSIS — G89.29 CHRONIC LOW BACK PAIN, UNSPECIFIED BACK PAIN LATERALITY, UNSPECIFIED WHETHER SCIATICA PRESENT: Primary | ICD-10-CM

## 2022-07-18 PROCEDURE — 97530 THERAPEUTIC ACTIVITIES: CPT

## 2022-07-18 PROCEDURE — 97110 THERAPEUTIC EXERCISES: CPT

## 2022-07-18 PROCEDURE — 97140 MANUAL THERAPY 1/> REGIONS: CPT

## 2022-07-18 NOTE — PROGRESS NOTES
Daily Note     Today's date: 2022  Patient name: Shalom Najera  : 1955  MRN: [id number]   Referring provider: Darnell Larry DO  Dx:   Encounter Diagnosis     ICD-10-CM    1  Chronic low back pain, unspecified back pain laterality, unspecified whether sciatica present  M54 50     G89 29    2  Degeneration of lumbar intervertebral disc  M51 36    3  Plantar fasciitis of left foot  M72 2                   Subjective: Yuri Baig reports pain in L foot over the weekend after cooking for 6 hours  She ambulates with less antalgic gait today  Objective: See treatment diary below      Assessment: Tolerated therapy session well w/o increasing pain in LB or L foot  Added pball squats to work on core strengthening and intrinsic foot musculature stability with visual and VC to ensure correct exercise technique  Improvement in soft tissue quality following STM to L post tib tendon and plantar fascia  Progress as able  Plan: Continue with current POC to address pt deficits           Precautions: Hx of CA       Manuals 22        STM left post tib tendon and plantar fascia 15'  KT tape post tib and achilles 15' 15' STM L post til tendon and plantar fascia 15' KTtape post tib and achilles  STM left post tib tendon and plantar fascia 15' 15'                           Neuro Re-Ed                                         Pt Ed/ HEP                        Ther Ex        Nu Step L4 12' L4 12' L4 12' L4 12' L4 12'   Seated lumbar ball roll stretch 10" x12 to the right 10" x12 to the right 10"x12 to the right  10"x12 to right 10" x12 to the right   PPT 10" 2x10 10" x15 5"x20 10" x15 10" 2x10   LTR 10" 12x 10" x12 right 10"x12 R  10" x12 R 10" 12x   DKTC 10" 12x 10" x12 10"x12 10" x12 10" 12x   Supine hamstring stretch 10" x12 bilat Bilaterally  10" x12 radha 10"x12 10" x12 bilat 10" x12 bilat   Single leg trunk rotation/ glut stretch  10" x12 10"x12 10" x12 L 10" 12x Left Ther Activity        Isometric transverse abdominals 10" x20 10" x20 10"x20 x20 x20   Isometric abs with alternating marches x20 radha 30x 2x10 radha  X 20 radha x20 radha   Bridges w/ isometric abdominals 2x10 2x10 3x10 2x10 2x10   Towel curls and arch forming 8' 8' HEP 8' 8'   pball squats    2x10 w/arch raise                      Gait Training                        Modalities         MHP 15' post tx MHP 13' post tx MHP 13' post tx  15' 15" post tx

## 2022-07-20 ENCOUNTER — OFFICE VISIT (OUTPATIENT)
Dept: PHYSICAL THERAPY | Facility: CLINIC | Age: 67
End: 2022-07-20
Payer: COMMERCIAL

## 2022-07-20 DIAGNOSIS — M72.2 PLANTAR FASCIITIS OF LEFT FOOT: ICD-10-CM

## 2022-07-20 DIAGNOSIS — M54.50 CHRONIC LOW BACK PAIN, UNSPECIFIED BACK PAIN LATERALITY, UNSPECIFIED WHETHER SCIATICA PRESENT: Primary | ICD-10-CM

## 2022-07-20 DIAGNOSIS — M51.36 DEGENERATION OF LUMBAR INTERVERTEBRAL DISC: ICD-10-CM

## 2022-07-20 DIAGNOSIS — G89.29 CHRONIC LOW BACK PAIN, UNSPECIFIED BACK PAIN LATERALITY, UNSPECIFIED WHETHER SCIATICA PRESENT: Primary | ICD-10-CM

## 2022-07-20 PROCEDURE — 97110 THERAPEUTIC EXERCISES: CPT

## 2022-07-20 PROCEDURE — 97140 MANUAL THERAPY 1/> REGIONS: CPT

## 2022-07-20 PROCEDURE — 97530 THERAPEUTIC ACTIVITIES: CPT

## 2022-07-20 NOTE — PROGRESS NOTES
Daily Note     Today's date: 2022  Patient name: Andrew Hoover  : 1955  MRN: [id number]  Referring provider: Harriet Argueta DO  Dx:   Encounter Diagnosis     ICD-10-CM    1  Chronic low back pain, unspecified back pain laterality, unspecified whether sciatica present  M54 50     G89 29    2  Plantar fasciitis of left foot  M72 2    3  Degeneration of lumbar intervertebral disc  M51 36                   Subjective: Cynthia Moura reports her low back pain is "coming along" and reports her L foot is doing well  Objective: See treatment diary below      Assessment: Tolerated therapy session well improvement in soft tissue quality following therapy session  Added HR with cues to avoid arch collapse; denied any increases in pain or discomfort to plantar fascia  Improvement in flexibility in LE following self stretches  Progress as able  Plan: Continue with current POC to address pt deficits           Precautions: Hx of CA       Manuals 22        STM left post tib tendon and plantar fascia 15'  KT tape post tib and achilles 15' 15' STM L post til tendon and plantar fascia 15' KTtape post tib and achilles  STM left post tib tendon and plantar fascia 15' 15'                           Neuro Re-Ed                                         Pt Ed/ HEP                        Ther Ex        Nu Step L4 12' L4 12' L4 12' L4 15' L4 12'   Seated lumbar ball roll stretch 10" x12 to the right 10" x12 to the right 10"x12 to the right  10"x12 to R  10" x12 to the right   PPT 10" 2x10 10" x15 5"x20 5" x20 10" 2x10   LTR 10" 12x 10" x12 right 10"x12 R  10"x12 R  10" 12x   DKTC 10" 12x 10" x12 10"x12 10"x12 10" 12x   Supine hamstring stretch 10" x12 bilat Bilaterally  10" x12 radha 10"x12 radha 10"x12 10" x12 bilat   Single leg trunk rotation/ glut stretch  10" x12 10"x12 10"x12 10" 12x Left   HR    2x10 w/arch support     Ther Activity        Isometric transverse abdominals 10" x20 10" x20 10"x20 10"x20 x20   Isometric abs with alternating marches x20 radha 30x 2x10 radha  2x10 radha  x20 radha   Bridges w/ isometric abdominals 2x10 2x10 3x10 3x10 2x10   Towel curls and arch forming 8' 8' HEP  8'   pball squats    2x10 w/arch raise  2x10                    Gait Training                        Modalities         MHP 15' post tx MHP 13' post tx MHP 13' post tx  MHP 13' post tx   15" post tx

## 2022-07-25 ENCOUNTER — OFFICE VISIT (OUTPATIENT)
Dept: PHYSICAL THERAPY | Facility: CLINIC | Age: 67
End: 2022-07-25
Payer: COMMERCIAL

## 2022-07-25 DIAGNOSIS — M51.36 DEGENERATION OF LUMBAR INTERVERTEBRAL DISC: ICD-10-CM

## 2022-07-25 DIAGNOSIS — M72.2 PLANTAR FASCIITIS OF LEFT FOOT: ICD-10-CM

## 2022-07-25 DIAGNOSIS — M54.50 CHRONIC LOW BACK PAIN, UNSPECIFIED BACK PAIN LATERALITY, UNSPECIFIED WHETHER SCIATICA PRESENT: Primary | ICD-10-CM

## 2022-07-25 DIAGNOSIS — G89.29 CHRONIC LOW BACK PAIN, UNSPECIFIED BACK PAIN LATERALITY, UNSPECIFIED WHETHER SCIATICA PRESENT: Primary | ICD-10-CM

## 2022-07-25 PROCEDURE — 97110 THERAPEUTIC EXERCISES: CPT

## 2022-07-25 PROCEDURE — 97530 THERAPEUTIC ACTIVITIES: CPT

## 2022-07-25 PROCEDURE — 97140 MANUAL THERAPY 1/> REGIONS: CPT

## 2022-07-25 NOTE — PROGRESS NOTES
Daily Note     Today's date: 2022  Patient name: Reshma Maurer  : 1955  MRN: [id number]  Referring provider: Tyson Dallas DO  Dx:   Encounter Diagnosis     ICD-10-CM    1  Chronic low back pain, unspecified back pain laterality, unspecified whether sciatica present  M54 50     G89 29    2  Degeneration of lumbar intervertebral disc  M51 36    3  Plantar fasciitis of left foot  M72 2                   Subjective: Felix Ballesteros reports walking over to therapy clinic today; denied any increases in LBP or R foot pain  She reports having pain to L aspect of lumbar paraspinals when walking in the [address] [place] [address] [place] over the weekend  Objective: See treatment diary below      Assessment: Added paloff press this visit to continue progressing core stab/anti rotation exercises; denied any increases in pain with visual and VC to ensure correct exercise technique  Improvement in LE/lumbar spine flexibility following manual stretching  Improvement in soft tissue quality following manuals to L post tib tendon and plantar fascia  Pt would continue to benefit from skilled PT  Plan: Continue with current POC to address pt deficits           Precautions: Hx of CA       Manuals 22        STM left post tib tendon and plantar fascia 15'  KT tape post tib and achilles 15' 15' STM L post til tendon and plantar fascia 15' KTtape post tib and achilles  STM left post tib tendon and plantar fascia 15' STM L post tib tendon and plantar fascia 15'                           Neuro Re-Ed                                         Pt Ed/ HEP                        Ther Ex        Nu Step L4 12' L4 12' L4 12' L4 15' L4 12'   Seated lumbar ball roll stretch 10" x12 to the right 10" x12 to the right 10"x12 to the right  10"x12 to R  10"x12 to R    PPT 10" 2x10 10" x15 5"x20 5" x20 10"x12   LTR 10" 12x 10" x12 right 10"x12 R  10"x12 R  10"x12 R    DKTC 10" 12x 10" x12 10"x12 10"x12 10"x12 Supine hamstring stretch 10" x12 bilat Bilaterally  10" x12 radha 10"x12 radha 10"x12 radha 10"x12   Single leg trunk rotation/ glut stretch  10" x12 10"x12 10"x12 10"x12   HR    2x10 w/arch support  2x10 w/arch support    Ther Activity        Isometric transverse abdominals 10" x20 10" x20 10"x20 10"x20 T/o visit    Isometric abs with alternating marches x20 radha 30x 2x10 radha  2x10 radha  NV   Bridges w/ isometric abdominals 2x10 2x10 3x10 3x10 3x10   Towel curls and arch forming 8' 8' HEP     pball squats    2x10 w/arch raise  2x10 2x10   paloff press      2x10 radha red    Gait Training                        Modalities         MHP 15' post tx MHP 13' post tx MHP 13' post tx  MHP 13' post tx  MHP 15' post tx

## 2022-07-27 ENCOUNTER — OFFICE VISIT (OUTPATIENT)
Dept: PHYSICAL THERAPY | Facility: CLINIC | Age: 67
End: 2022-07-27
Payer: COMMERCIAL

## 2022-07-27 DIAGNOSIS — M54.50 CHRONIC LOW BACK PAIN, UNSPECIFIED BACK PAIN LATERALITY, UNSPECIFIED WHETHER SCIATICA PRESENT: Primary | ICD-10-CM

## 2022-07-27 DIAGNOSIS — G89.29 CHRONIC LOW BACK PAIN, UNSPECIFIED BACK PAIN LATERALITY, UNSPECIFIED WHETHER SCIATICA PRESENT: Primary | ICD-10-CM

## 2022-07-27 DIAGNOSIS — M72.2 PLANTAR FASCIITIS OF LEFT FOOT: ICD-10-CM

## 2022-07-27 DIAGNOSIS — M51.36 DEGENERATION OF LUMBAR INTERVERTEBRAL DISC: ICD-10-CM

## 2022-07-27 PROCEDURE — 97140 MANUAL THERAPY 1/> REGIONS: CPT

## 2022-07-27 PROCEDURE — 97530 THERAPEUTIC ACTIVITIES: CPT

## 2022-07-27 NOTE — PROGRESS NOTES
Daily Note     Today's date: 2022  Patient name: Ana Boateng  : 1955  MRN: [id number]  Referring provider: Cyndi Gosselin, DO  Dx:   Encounter Diagnosis     ICD-10-CM    1  Chronic low back pain, unspecified back pain laterality, unspecified whether sciatica present  M54 50     G89 29    2  Degeneration of lumbar intervertebral disc  M51 36    3  Plantar fasciitis of left foot  M72 2                   Subjective: Pt reports she has been feeling much better with her foot and back  Objective: See treatment diary below      Assessment: Tolerated treatment well  Patient would benefit from continued PT      Plan: Progress treatment as tolerated            Precautions: Hx of CA       Manuals 22        STM left post tib tendon and plantar fascia 15'  KT tape post tib and achilles 15' 15' STM L post til tendon and plantar fascia 15' KTtape post tib and achilles  STM left post tib tendon and plantar fascia 15' STM L post tib tendon and plantar fascia 15'                           Neuro Re-Ed                                         Pt Ed/ HEP                        Ther Ex        Nu Step L4 15' L4 12' L4 12' L4 15' L4 12'   Seated lumbar ball roll stretch 10" x12 to the right 10" x12 to the right 10"x12 to the right  10"x12 to R  10"x12 to R    PPT 10" 2x10 10" x15 5"x20 5" x20 10"x12   LTR 10" 12x 10" x12 right 10"x12 R  10"x12 R  10"x12 R    DKTC 10" 12x 10" x12 10"x12 10"x12 10"x12   Supine hamstring stretch 10" x12 bilat Bilaterally  10" x12 radha 10"x12 radha 10"x12 radha 10"x12   Single leg trunk rotation/ glut stretch 10" x12 10" x12 10"x12 10"x12 10"x12   HR    2x10 w/arch support  2x10 w/arch support    Ther Activity        Isometric transverse abdominals 10" x20 10" x20 10"x20 10"x20 T/o visit    Isometric abs with alternating marches x30 radha 30x 2x10 radha  2x10 radha  NV   Bridges w/ isometric abdominals 3x10 2x10 3x10 3x10 3x10   Towel curls and arch forming 8' 8' HEP     pball squats    2x10 w/arch raise  2x10 2x10   paloff press      2x10 radha red    Gait Training                        Modalities         MHP 15' post tx MHP 13' post tx MHP 13' post tx  MHP 13' post tx  MHP 15' post tx

## 2022-08-01 ENCOUNTER — OFFICE VISIT (OUTPATIENT)
Dept: PHYSICAL THERAPY | Facility: CLINIC | Age: 67
End: 2022-08-01
Payer: COMMERCIAL

## 2022-08-01 DIAGNOSIS — M54.50 CHRONIC LOW BACK PAIN, UNSPECIFIED BACK PAIN LATERALITY, UNSPECIFIED WHETHER SCIATICA PRESENT: Primary | ICD-10-CM

## 2022-08-01 DIAGNOSIS — M72.2 PLANTAR FASCIITIS OF LEFT FOOT: ICD-10-CM

## 2022-08-01 DIAGNOSIS — G89.29 CHRONIC LOW BACK PAIN, UNSPECIFIED BACK PAIN LATERALITY, UNSPECIFIED WHETHER SCIATICA PRESENT: Primary | ICD-10-CM

## 2022-08-01 DIAGNOSIS — M51.36 DEGENERATION OF LUMBAR INTERVERTEBRAL DISC: ICD-10-CM

## 2022-08-01 PROCEDURE — 97530 THERAPEUTIC ACTIVITIES: CPT | Performed by: PHYSICAL THERAPIST

## 2022-08-01 PROCEDURE — 97140 MANUAL THERAPY 1/> REGIONS: CPT | Performed by: PHYSICAL THERAPIST

## 2022-08-01 PROCEDURE — 97110 THERAPEUTIC EXERCISES: CPT | Performed by: PHYSICAL THERAPIST

## 2022-08-01 NOTE — PROGRESS NOTES
Daily Note     Today's date: 2022  Patient name: Remigio Long  : 1955  MRN: [id number]  Referring provider: Howard Bishop DO  Dx:   Encounter Diagnosis     ICD-10-CM    1  Chronic low back pain, unspecified back pain laterality, unspecified whether sciatica present  M54 50     G89 29    2  Degeneration of lumbar intervertebral disc  M51 36    3  Plantar fasciitis of left foot  M72 2                   Subjective: The patient states that she is doing good today with minimal complaints of pain at start of session  Objective: See treatment diary below      Assessment: Tolerated treatment well  TTP is noted along posterior tib and into heel of foot  No increase in back pain or foot pain at start of session  Patient would benefit from continued PT to improve function and mobility  Plan: Continue per plan of care           Precautions: Hx of CA       Manuals 22        STM left post tib tendon and plantar fascia 15'  KT tape post tib and achilles STM left post tib tendon and plantar fascia 15'  KT tape post tib and achilles 15' STM L post til tendon and plantar fascia 15' KT tape post tib and achilles  STM left post tib tendon and plantar fascia 15' STM L post tib tendon and plantar fascia 15'                           Neuro Re-Ed                                         Pt Ed/ HEP                        Ther Ex        Nu Step L4 15' L4 15' L4 12' L4 15' L4 12'   Seated lumbar ball roll stretch 10" x12 to the right 10" x12 to the right 10"x12 to the right  10"x12 to R  10"x12 to R    PPT 10" 2x10 10" 2x10 5"x20 5" x20 10"x12   LTR 10" 12x 10" x12  10"x12 R  10"x12 R  10"x12 R    DKTC 10" 12x 10" x12 10"x12 10"x12 10"x12   Supine hamstring stretch 10" x12 bilat Bilaterally  10" x12 radha 10"x12 radha 10"x12 radha 10"x12   Single leg trunk rotation/ glut stretch 10" x12 10" x12 10"x12 10"x12 10"x12   HR    2x10 w/arch support  2x10 w/arch support    Ther Activity Isometric transverse abdominals 10" x20 10" x20 10"x20 10"x20 T/o visit    Isometric abs with alternating marches x30 rakesh 30x Rakesh 2x10 rakesh  2x10 rakesh  NV   Bridges w/ isometric abdominals 3x10 3x10 3x10 3x10 3x10   Towel curls and arch forming 8' HEP HEP     pball squats    2x10 w/arch raise  2x10 2x10   paloff press      2x10 rakesh red    Gait Training                        Modalities         MHP 15' post tx MHP 13' post tx MHP 13' post tx  MHP 13' post tx  MHP 15' post tx

## 2022-08-03 ENCOUNTER — APPOINTMENT (OUTPATIENT)
Dept: PHYSICAL THERAPY | Facility: CLINIC | Age: 67
End: 2022-08-03
Payer: COMMERCIAL

## 2022-08-08 ENCOUNTER — OFFICE VISIT (OUTPATIENT)
Dept: PHYSICAL THERAPY | Facility: CLINIC | Age: 67
End: 2022-08-08
Payer: COMMERCIAL

## 2022-08-08 DIAGNOSIS — M54.50 CHRONIC LOW BACK PAIN, UNSPECIFIED BACK PAIN LATERALITY, UNSPECIFIED WHETHER SCIATICA PRESENT: Primary | ICD-10-CM

## 2022-08-08 DIAGNOSIS — M51.36 DEGENERATION OF LUMBAR INTERVERTEBRAL DISC: ICD-10-CM

## 2022-08-08 DIAGNOSIS — M72.2 PLANTAR FASCIITIS OF LEFT FOOT: ICD-10-CM

## 2022-08-08 DIAGNOSIS — G89.29 CHRONIC LOW BACK PAIN, UNSPECIFIED BACK PAIN LATERALITY, UNSPECIFIED WHETHER SCIATICA PRESENT: Primary | ICD-10-CM

## 2022-08-08 PROCEDURE — 97530 THERAPEUTIC ACTIVITIES: CPT

## 2022-08-08 PROCEDURE — 97140 MANUAL THERAPY 1/> REGIONS: CPT

## 2022-08-08 PROCEDURE — 97110 THERAPEUTIC EXERCISES: CPT

## 2022-08-08 NOTE — PROGRESS NOTES
Daily Note     Today's date: 2022  Patient name: Yessy Rodriguez  : 1955  MRN: [id number]  Referring provider: Rommel Rubio DO  Dx:   Encounter Diagnosis     ICD-10-CM    1  Chronic low back pain, unspecified back pain laterality, unspecified whether sciatica present  M54 50     G89 29    2  Degeneration of lumbar intervertebral disc  M51 36    3  Plantar fasciitis of left foot  M72 2        Start Time: 0935          Subjective: Hanane Hallman reports only having pain in L foot upon initial stepping onto floor in her heel in the morning  She notes less antalgic gait  Most difficulty with standing in one position for too long  Objective: See treatment diary below      Assessment: Improvement in soft tissue quality following STM to L post tib tendon and plantar fascia  Visual and VC to ensure correct exercise technique and core stab with exercises performed  Progress as able  Plan: Continue with current POC to address pt deficits           Precautions: Hx of CA       Manuals 22        STM left post tib tendon and plantar fascia 15'  KT tape post tib and achilles STM left post tib tendon and plantar fascia 15'  KT tape post tib and achilles STM L post tib tendon and plantar fascia 12' KT tape post tib and achilles   STM left post tib tendon and plantar fascia 15' STM L post tib tendon and plantar fascia 15'                           Neuro Re-Ed                                         Pt Ed/ HEP                        Ther Ex        Nu Step L4 15' L4 15' L4 15' L4 15' L4 12'   Seated lumbar ball roll stretch 10" x12 to the right 10" x12 to the right 10" x12 to R  10"x12 to R  10"x12 to R    PPT 10" 2x10 10" 2x10 10"x20 5" x20 10"x12   LTR 10" 12x 10" x12  10"x12 10"x12 R  10"x12 R    DKTC 10" 12x 10" x12 10"x12 10"x12 10"x12   Supine hamstring stretch 10" x12 bilat Bilaterally  10" x12 10"x12 radha  radha 10"x12 radha 10"x12   Single leg trunk rotation/ glut stretch 10" x12 10" x12 10"x12 10"x12 10"x12   HR    2x10 w/arch support  2x10 w/arch support    Ther Activity        Isometric transverse abdominals 10" x20 10" x20 10"x20 10"x20 T/o visit    Isometric abs with alternating marches x30 rakesh 30x Rakesh 30x rakesh same side for core stab  2x10 rakesh  NV   Bridges w/ isometric abdominals 3x10 3x10 3x10 3x10 3x10   Towel curls and arch forming 8' HEP      pball squats    2x10 2x10 2x10   paloff press    2x10 rakesh grn   2x10 rakesh red    Gait Training                        Modalities         MHP 15' post tx MHP 13' post tx MHP 13' post tx  MHP 13' post tx  MHP 15' post tx

## 2022-08-10 ENCOUNTER — OFFICE VISIT (OUTPATIENT)
Dept: PHYSICAL THERAPY | Facility: CLINIC | Age: 67
End: 2022-08-10
Payer: COMMERCIAL

## 2022-08-10 DIAGNOSIS — G89.29 CHRONIC LOW BACK PAIN, UNSPECIFIED BACK PAIN LATERALITY, UNSPECIFIED WHETHER SCIATICA PRESENT: Primary | ICD-10-CM

## 2022-08-10 DIAGNOSIS — M54.50 CHRONIC LOW BACK PAIN, UNSPECIFIED BACK PAIN LATERALITY, UNSPECIFIED WHETHER SCIATICA PRESENT: Primary | ICD-10-CM

## 2022-08-10 DIAGNOSIS — M72.2 PLANTAR FASCIITIS OF LEFT FOOT: ICD-10-CM

## 2022-08-10 DIAGNOSIS — M51.36 DEGENERATION OF LUMBAR INTERVERTEBRAL DISC: ICD-10-CM

## 2022-08-10 PROCEDURE — 97110 THERAPEUTIC EXERCISES: CPT

## 2022-08-10 PROCEDURE — 97530 THERAPEUTIC ACTIVITIES: CPT

## 2022-08-10 PROCEDURE — 97140 MANUAL THERAPY 1/> REGIONS: CPT

## 2022-08-10 NOTE — PROGRESS NOTES
Daily Note     Today's date: 8/10/2022  Patient name: Irma Galindo  : 1955  MRN: [id number]  Referring provider: Jaun Miguel Sweeney DO  Dx:   Encounter Diagnosis     ICD-10-CM    1  Chronic low back pain, unspecified back pain laterality, unspecified whether sciatica present  M54 50     G89 29    2  Degeneration of lumbar intervertebral disc  M51 36    3  Plantar fasciitis of left foot  M72 2                   Subjective: Ned Lin reports that her low back is doing well today  She reports having pain in L heel after doing a lot of walking yesterday  Objective: See treatment diary below      Assessment: Added plantar fascia/gastroc stretching this visit to improve flexibility needing visual and VC to ensure correct exercise technique  Educated patient to try plantarfascia stretch before getting out of bed to see if this improves flexibility and decreases pain  Pt would continue to benefit from skilled PT  Progress as able  Plan: Continue with current POC to address pt deficits           Precautions: Hx of CA       Manuals 22 8/1 8/8/22 8/10/22 7/25/22        STM left post tib tendon and plantar fascia 15'  KT tape post tib and achilles STM left post tib tendon and plantar fascia 15'  KT tape post tib and achilles STM L post tib tendon and plantar fascia 12' KT tape post tib and achilles   STM left post tib tendon and plantar fascia 12' STM L post tib tendon and plantar fascia 15'                           Neuro Re-Ed                                         Pt Ed/ HEP                        Ther Ex        Nu Step L4 15' L4 15' L4 15' L4 15' L4 12'   Seated lumbar ball roll stretch 10" x12 to the right 10" x12 to the right 10" x12 to R  10"x12 to R  10"x12 to R    PPT 10" 2x10 10" 2x10 10"x20 5" x20 10"x12   LTR 10" 12x 10" x12  10"x12 10"x12 R  10"x12 R    DKTC 10" 12x 10" x12 10"x12 10"x12 10"x12   Supine hamstring stretch 10" x12 bilat Bilaterally  10" x12 10"x12 radha  radha 10"x12 radha 10"x12 Single leg trunk rotation/ glut stretch 10" x12 10" x12 10"x12 10"x12 10"x12   plantarfascia stretch w/towel        gastroc stretch off step    10"x12    HR    2x10 w/arch support  2x10 w/arch support    Ther Activity        Isometric transverse abdominals 10" x20 10" x20 10"x20  T/o visit    Isometric abs with alternating marches x30 rakesh 30x Rakesh 30x rakesh same side for core stab  2x10 rakesh  NV   Bridges w/ isometric abdominals 3x10 3x10 3x10 3x10 3x10   Towel curls and arch forming 8' HEP      pball squats    2x10 2x10 2x10   paloff press    2x10 rakesh grn  2x10 rakesh grn  2x10 rakesh red    Gait Training                        Modalities         MHP 15' post tx MHP 13' post tx MHP 13' post tx  MHP 13' post tx  MHP 15' post tx

## 2022-08-15 ENCOUNTER — OFFICE VISIT (OUTPATIENT)
Dept: PHYSICAL THERAPY | Facility: CLINIC | Age: 67
End: 2022-08-15
Payer: COMMERCIAL

## 2022-08-15 DIAGNOSIS — M72.2 PLANTAR FASCIITIS OF LEFT FOOT: ICD-10-CM

## 2022-08-15 DIAGNOSIS — M54.50 CHRONIC LOW BACK PAIN, UNSPECIFIED BACK PAIN LATERALITY, UNSPECIFIED WHETHER SCIATICA PRESENT: Primary | ICD-10-CM

## 2022-08-15 DIAGNOSIS — M51.36 DEGENERATION OF LUMBAR INTERVERTEBRAL DISC: ICD-10-CM

## 2022-08-15 DIAGNOSIS — G89.29 CHRONIC LOW BACK PAIN, UNSPECIFIED BACK PAIN LATERALITY, UNSPECIFIED WHETHER SCIATICA PRESENT: Primary | ICD-10-CM

## 2022-08-15 PROCEDURE — 97530 THERAPEUTIC ACTIVITIES: CPT

## 2022-08-15 PROCEDURE — 97140 MANUAL THERAPY 1/> REGIONS: CPT

## 2022-08-15 PROCEDURE — 97110 THERAPEUTIC EXERCISES: CPT

## 2022-08-15 NOTE — PROGRESS NOTES
Daily Note     Today's date: 8/15/2022  Patient name: Alpa Courser  : 1955  MRN: [id number]  Referring provider: Val Pace DO  Dx:   Encounter Diagnosis     ICD-10-CM    1  Chronic low back pain, unspecified back pain laterality, unspecified whether sciatica present  M54 50     G89 29    2  Degeneration of lumbar intervertebral disc  M51 36    3  Plantar fasciitis of left foot  M72 2                   Subjective: Judie Kapadia reports doing well today and reports she has been compliant with plantar fascia stretches prior to getting out of bed and has seen an improvement  Objective: See treatment diary below      Assessment: Visual and VC to ensure correct exercise technique and facilitate appropriate muscles for stretching  Improvement in soft tissue quality to plantar fascia and post tib tendon following manuals  Progressed paloff press tband to grn to furth challenge patient with no increases in pain  Pt reports less heel tenderness when on plinth table  Progress as able  Plan: Continue with current POC to address pt deficits           Precautions: Hx of CA       Manuals 7/27/22 8/1 8/8/22 8/10/22 8/15/22        STM left post tib tendon and plantar fascia 15'  KT tape post tib and achilles STM left post tib tendon and plantar fascia 15'  KT tape post tib and achilles STM L post tib tendon and plantar fascia 12' KT tape post tib and achilles   STM left post tib tendon and plantar fascia 12' STM L post tib tendon and plantar fascia 12'                           Neuro Re-Ed                                         Pt Ed/ HEP                        Ther Ex        Nu Step L4 15' L4 15' L4 15' L4 15' L4 15'   Seated lumbar ball roll stretch 10" x12 to the right 10" x12 to the right 10" x12 to R  10"x12 to R  10"x12 to R    PPT 10" 2x10 10" 2x10 10"x20 5" x20 10"x12   LTR 10" 12x 10" x12  10"x12 10"x12 R  10"x12 R    DKTC 10" 12x 10" x12 10"x12 10"x12 10"x12   Supine hamstring stretch 10" x12 bilat Bilaterally  10" x12 10"x12 rakesh  rakesh 10"x12 rakesh 10"x12   Single leg trunk rotation/ glut stretch 10" x12 10" x12 10"x12 10"x12 10"x12   plantarfascia stretch w/towel     10"x12   gastroc stretch off step    10"x12 10"x12   HR    2x10 w/arch support  2x10 w/arch support    Ther Activity        Isometric transverse abdominals 10" x20 10" x20 10"x20  T/o visit    Isometric abs with alternating marches x30 rakesh 30x Rakesh 30x rakesh same side for core stab  2x10 rakesh  3x10 rakesh    Bridges w/ isometric abdominals 3x10 3x10 3x10 3x10 3x10   Towel curls and arch forming 8' HEP      pball squats    2x10 2x10 25x   paloff press    2x10 rakesh grn  2x10 rakesh grn  2x10 rakesh grn    Gait Training                        Modalities         MHP 15' post tx MHP 13' post tx MHP 13' post tx  MHP 13' post tx  MHP 15' post tx

## 2022-08-17 ENCOUNTER — OFFICE VISIT (OUTPATIENT)
Dept: PHYSICAL THERAPY | Facility: CLINIC | Age: 67
End: 2022-08-17
Payer: COMMERCIAL

## 2022-08-17 DIAGNOSIS — M72.2 PLANTAR FASCIITIS OF LEFT FOOT: ICD-10-CM

## 2022-08-17 DIAGNOSIS — G89.29 CHRONIC LOW BACK PAIN, UNSPECIFIED BACK PAIN LATERALITY, UNSPECIFIED WHETHER SCIATICA PRESENT: Primary | ICD-10-CM

## 2022-08-17 DIAGNOSIS — M51.36 DEGENERATION OF LUMBAR INTERVERTEBRAL DISC: ICD-10-CM

## 2022-08-17 DIAGNOSIS — M54.50 CHRONIC LOW BACK PAIN, UNSPECIFIED BACK PAIN LATERALITY, UNSPECIFIED WHETHER SCIATICA PRESENT: Primary | ICD-10-CM

## 2022-08-17 PROCEDURE — 97140 MANUAL THERAPY 1/> REGIONS: CPT

## 2022-08-17 PROCEDURE — 97530 THERAPEUTIC ACTIVITIES: CPT

## 2022-08-17 PROCEDURE — 97110 THERAPEUTIC EXERCISES: CPT

## 2022-08-17 NOTE — PROGRESS NOTES
Daily Note     Today's date: 2022  Patient name: Joshua Hernandez  : 1955  MRN: [id number]  Referring provider: Allen Quiroz DO  Dx:   Encounter Diagnosis     ICD-10-CM    1  Chronic low back pain, unspecified back pain laterality, unspecified whether sciatica present  M54 50     G89 29    2  Degeneration of lumbar intervertebral disc  M51 36    3  Plantar fasciitis of left foot  M72 2                   Subjective: Yannick Jose reports doing well today  She had f/u with rheumatologist and was told if therapy doesn't help L foot to see a podiatrist but she feels stretching in the morning prior to getting out of bed has been helping  She ambulates into therapy with decreased antalgic gait  Objective: See treatment diary below      Assessment: Tolerated therapy session well today; tenderness to medial calf this visit noted during manuals therefore held IASTM  Visual and VC to ensure correct exercise technique; denied any increases in pain with exercises performed  Progress as able  Plan: Continue with current POC to address pt deficits           Precautions: Hx of CA       Manuals 8/17/22 8/1 8/8/22 8/10/22 8/15/22        STM left post tib tendon and plantar fascia 12'  KT tape post tib and achilles STM left post tib tendon and plantar fascia 15'  KT tape post tib and achilles STM L post tib tendon and plantar fascia 12' KT tape post tib and achilles   STM left post tib tendon and plantar fascia 12' STM L post tib tendon and plantar fascia 12'                           Neuro Re-Ed                                         Pt Ed/ HEP                        Ther Ex        Nu Step L4 15' L4 15' L4 15' L4 15' L4 15'   Seated lumbar ball roll stretch 10" x12 to the right 10" x12 to the right 10" x12 to R  10"x12 to R  10"x12 to R    PPT 10" 2x10 10" 2x10 10"x20 5" x20 10"x12   LTR 10" 12x 10" x12  10"x12 10"x12 R  10"x12 R    DKTC 10" 12x 10" x12 10"x12 10"x12 10"x12   Supine hamstring stretch 10" x12 bilat Bilaterally  10" x12 10"x12 rakesh  rakesh 10"x12 rakesh 10"x12   Single leg trunk rotation/ glut stretch 10" x12 10" x12 10"x12 10"x12 10"x12   plantarfascia stretch w/towel 10"x12    10"x12   gastroc stretch off step 10"x12   10"x12 10"x12   HR    2x10 w/arch support  2x10 w/arch support    Ther Activity        Isometric transverse abdominals T/o visit  10" x20 10"x20  T/o visit    Isometric abs with alternating marches x30 rakesh 30x Rakesh 30x rakesh same side for core stab  2x10 rakesh  3x10 rakesh    Bridges w/ isometric abdominals 3x10 3x10 3x10 3x10 3x10   Towel curls and arch forming  HEP      pball squats  25x  2x10 2x10 25x   paloff press  2x10 rakesh grn   2x10 rakesh grn  2x10 rakesh grn  2x10 rakesh grn    Gait Training                        Modalities         MHP 15' post tx MHP 13' post tx MHP 13' post tx  MHP 13' post tx  MHP 15' post tx

## 2022-08-22 ENCOUNTER — EVALUATION (OUTPATIENT)
Dept: PHYSICAL THERAPY | Facility: CLINIC | Age: 67
End: 2022-08-22
Payer: COMMERCIAL

## 2022-08-22 DIAGNOSIS — G89.29 CHRONIC LOW BACK PAIN, UNSPECIFIED BACK PAIN LATERALITY, UNSPECIFIED WHETHER SCIATICA PRESENT: Primary | ICD-10-CM

## 2022-08-22 DIAGNOSIS — M51.36 DEGENERATION OF LUMBAR INTERVERTEBRAL DISC: ICD-10-CM

## 2022-08-22 DIAGNOSIS — M72.2 PLANTAR FASCIITIS OF LEFT FOOT: ICD-10-CM

## 2022-08-22 DIAGNOSIS — M54.50 CHRONIC LOW BACK PAIN, UNSPECIFIED BACK PAIN LATERALITY, UNSPECIFIED WHETHER SCIATICA PRESENT: Primary | ICD-10-CM

## 2022-08-22 PROCEDURE — 97110 THERAPEUTIC EXERCISES: CPT

## 2022-08-22 PROCEDURE — 97530 THERAPEUTIC ACTIVITIES: CPT

## 2022-08-22 PROCEDURE — 97164 PT RE-EVAL EST PLAN CARE: CPT

## 2022-08-22 NOTE — LETTER
2022    MD Abdi Li [place] [address]    Patient: Doris Feldman   YOB: 1955   Date of Visit: 2022     Encounter Diagnosis     ICD-10-CM    1  Chronic low back pain, unspecified back pain laterality, unspecified whether sciatica present  M54 50     G89 29    2  Degeneration of lumbar intervertebral disc  M51 36    3  Plantar fasciitis of left foot  M72 2        Dear Dr Shannen Mcmullen:    Thank you for your recent referral of Doris Feldman  Please review the attached evaluation summary from Ayala's recent visit  Please verify that you agree with the plan of care by signing the attached order  If you have any questions or concerns, please do not hesitate to call  I sincerely appreciate the opportunity to share in the care of one of your patients and hope to have another opportunity to work with you in the near future  Sincerely,    Romero Alexander, PT      Referring Provider:      I certify that I have read the below Plan of Care and certify the need for these services furnished under this plan of treatment while under my care  MD Abdi Li [id number]  Via Fax: [phone]          PT Re-Evaluation    Today's date: 2022  Patient name: Doris Feldman  : 1955  MRN: [id number]  Referring provider: Shady Sanchez DO  Dx:   Encounter Diagnosis     ICD-10-CM    1  Chronic low back pain, unspecified back pain laterality, unspecified whether sciatica present  M54 50     G89 29    2  Degeneration of lumbar intervertebral disc  M51 36    3  Plantar fasciitis of left foot  M72 2                   Assessment  Assessment details: Pt presents with signs and symptoms consistent with referring diagnosis of DDD in the lumbar spine  22:   As patient's lumbar symptoms are starting to improve, pt's left foot/ ankle signs and symptoms are consistent, primarily, with posterior tibialis involvement as well as achilles and plantar fascia irritation  22:  Significant improvements seen in both lumbar and foot/ ankle  Pt would benefit from some more progression in dynamic core and functional strengthening in order to be safely discharged to home program   Impairments: impaired physical strength, lacks appropriate home exercise program and pain with function    Symptom irritability: moderateUnderstanding of Dx/Px/POC: good   Prognosis: good    Goals  ST) Pt  Will have centralized radicular symptoms in 6 weeks  -GOAL MET  2) Pt  Will be able to sit as long as desired without pain in 6 weeks  -GOAL MET  3)  Pt  Will be able to return to work under light/ modified duty in 6 weeks  -GOOD PROGRESS  4)  Pt  Will be able to perform all routine chores at home without pain or limitations in 6 weeks  GOOD PROGRESS  5) Pt  Will be able to perform all LE dressing tasks without limitations  -GOAL MET  LT) Pt  Will be barbara to stand as long as desired without pain in 12 weeks  -GOOD PROGRESS  2) Pt will have pain decreased to<2/10  in order to more comfortably perform ADL's in 12 weeks  -GOOD PROGRESS  4)  Pt  Will be able to perform all heavy activity at home, such as garbage, moving furniture, carrying weighted items on all surfaces without pain or limitation in 12 weeks  -GOOD PROGRESS    Plan  Patient would benefit from: skilled physical therapy and PT eval  Planned therapy interventions: manual therapy, neuromuscular re-education, patient education, self care, therapeutic exercise, therapeutic activities and home exercise program  Frequency: 2x week  Duration in weeks: 2  Treatment plan discussed with: patient        Subjective Evaluation    History of Present Illness  Date of onset: 2016  Mechanism of injury: 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 ; which 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 leg symptoms      22:  Pt has had insidious onset and worsening left heelcord, ankle and plantar fascia pain    22:  Pt reports significantly less back and foot pain  She has returned to performing ADL's and chores mainly without limitations  Recurrent probem    Quality of life: good    Pain  Current pain ratin  At best pain ratin  At worst pain ratin  Location: left L/S and glut area which radiates down her lateral left leg into her foot  She also reports having acute flare-up of left plantar fascitis  Quality: discomfort  Relieving factors: ice  Progression: improved    Social Support  Steps to enter house: yes  2  Stairs in house: no     Treatments  Previous treatment: injection treatment  Patient Goals  Patient goal: would like to be out of pain and / or that she can move and perform all chores at home without significant pain , weakness and difficulty        Objective     Postural Observations  Seated posture: good  Standing posture: fair  Correction of posture: makes symptoms better        Palpation   Left   Tenderness of the posterior tibialis  Tenderness   Left Ankle/Foot   Tenderness in the posterior tibial tendon  Additional Tenderness Details  Much less severity of TTP     Neurological Testing     Additional Neurological Details  Unremarkable    Active Range of Motion     Lumbar   Flexion:  Restriction level: minimal  Extension:  Restriction level: minimal  Left lateral flexion:  Restriction level: minimal  Right lateral flexion:  Restriction level: minimal    Joint Play   Joints within functional limits: L1, L2, L3, L4 and L5     Strength/Myotome Testing     Lumbar   Left   Normal strength    Right   Normal strength    Right Hip   Planes of Motion   Extension: 3+    Left Ankle/Foot   Plantar flexion: 4+    Additional Strength Details  Strong and painless with left great toe extension  Strong and painful with left plantar flexion/ inversion    22: much less degree    Tests     Lumbar     Left   Positive quadrant  Negative passive SLR and slump test      Right   Negative passive SLR and slump test      Additional Tests Details  Much less severity of symptoms with left posterior quadrant        Precautions: Hx of CA       Manuals 8/17/22 8/22/22 8/8/22 8/10/22 8/15/22        STM left post tib tendon and plantar fascia 12'  KT tape post tib and achilles STM left post tib tendon and plantar fascia 15'  KT tape post tib and achilles STM L post tib tendon and plantar fascia 12' KT tape post tib and achilles   STM left post tib tendon and plantar fascia 12' STM L post tib tendon and plantar fascia 12'                           Neuro Re-Ed                                         Pt Ed/ HEP                        Ther Ex        Nu Step L4 15' L4 15' L4 15' L4 15' L4 15'   Seated lumbar ball roll stretch 10" x12 to the right 10" x12 to the right 10" x12 to R  10"x12 to R  10"x12 to R    PPT 10" 2x10 10" 2x10 10"x20 5" x20 10"x12   LTR 10" 12x 10" x12  10"x12 10"x12 R  10"x12 R    DKTC 10" 12x 10" x12 10"x12 10"x12 10"x12   Supine hamstring stretch 10" x12 bilat Bilaterally  10" x12 10"x12 rakesh  rakesh 10"x12 rakesh 10"x12   Single leg trunk rotation/ glut stretch 10" x12 10" x12 10"x12 10"x12 10"x12   plantarfascia stretch w/towel 10"x12 10" x12   10"x12   gastroc stretch off step 10"x12 10" x12  10"x12 10"x12   HR    2x10 w/arch support  2x10 w/arch support    Ther Activity        Isometric transverse abdominals T/o visit  10" x20 10"x20  T/o visit    Isometric abs with alternating marches x30 rakesh 30x Rakesh 30x rakesh same side for core stab  2x10 rakesh  3x10 rakesh    Bridges w/ isometric abdominals 3x10 3x10 3x10 3x10 3x10   Towel curls and arch forming  HEP      pball squats  25x 30x 2x10 2x10 25x   paloff press  2x10 rakesh grn  3x10 rakesh black 2x10 rakesh grn  2x10 rakesh grn  2x10 rakesh grn    Gait Training                        Modalities         MHP 15' post tx MHP 13' post tx MHP 13' post tx  MHP 13' post tx  MHP 15' post tx

## 2022-08-22 NOTE — PROGRESS NOTES
PT Re-Evaluation    Today's date: 2022  Patient name: Wandy Ortiz  : 1955  MRN: [id number]  Referring provider: DO Lucretia Delcid:   Encounter Diagnosis     ICD-10-CM    1  Chronic low back pain, unspecified back pain laterality, unspecified whether sciatica present  M54 50     G89 29    2  Degeneration of lumbar intervertebral disc  M51 36    3  Plantar fasciitis of left foot  M72 2                   Assessment  Assessment details: Pt presents with signs and symptoms consistent with referring diagnosis of DDD in the lumbar spine  22: As patient's lumbar symptoms are starting to improve, pt's left foot/ ankle signs and symptoms are consistent, primarily, with posterior tibialis involvement as well as achilles and plantar fascia irritation  22:  Significant improvements seen in both lumbar and foot/ ankle  Pt would benefit from some more progression in dynamic core and functional strengthening in order to be safely discharged to home program   Impairments: impaired physical strength, lacks appropriate home exercise program and pain with function    Symptom irritability: moderateUnderstanding of Dx/Px/POC: good   Prognosis: good    Goals  ST) Pt  Will have centralized radicular symptoms in 6 weeks  -GOAL MET  2) Pt  Will be able to sit as long as desired without pain in 6 weeks  -GOAL MET  3)  Pt  Will be able to return to work under light/ modified duty in 6 weeks  -GOOD PROGRESS  4)  Pt  Will be able to perform all routine chores at home without pain or limitations in 6 weeks  GOOD PROGRESS  5) Pt  Will be able to perform all LE dressing tasks without limitations  -GOAL MET  LT) Pt  Will be barbara to stand as long as desired without pain in 12 weeks  -GOOD PROGRESS  2) Pt will have pain decreased to<2/10  in order to more comfortably perform ADL's in 12 weeks  -GOOD PROGRESS  4)  Pt   Will be able to perform all heavy activity at home, such as garbage, moving furniture, carrying weighted items on all surfaces without pain or limitation in 12 weeks  -GOOD PROGRESS    Plan  Patient would benefit from: skilled physical therapy and PT eval  Planned therapy interventions: manual therapy, neuromuscular re-education, patient education, self care, therapeutic exercise, therapeutic activities and home exercise program  Frequency: 2x week  Duration in weeks: 2  Treatment plan discussed with: patient        Subjective Evaluation    History of Present Illness  Date of onset: 2016  Mechanism of injury: 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 ; which 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 leg symptoms  22:  Pt has had insidious onset and worsening left heelcord, ankle and plantar fascia pain    22:  Pt reports significantly less back and foot pain  She has returned to performing ADL's and chores mainly without limitations  Recurrent probem    Quality of life: good    Pain  Current pain ratin  At best pain ratin  At worst pain ratin  Location: left L/S and glut area which radiates down her lateral left leg into her foot  She also reports having acute flare-up of left plantar fascitis  Quality: discomfort  Relieving factors: ice  Progression: improved    Social Support  Steps to enter house: yes  2  Stairs in house: no     Treatments  Previous treatment: injection treatment  Patient Goals  Patient goal: would like to be out of pain and / or that she can move and perform all chores at home without significant pain , weakness and difficulty        Objective     Postural Observations  Seated posture: good  Standing posture: fair  Correction of posture: makes symptoms better        Palpation   Left   Tenderness of the posterior tibialis  Tenderness   Left Ankle/Foot   Tenderness in the posterior tibial tendon       Additional Tenderness Details  Much less severity of TTP     Neurological Testing Additional Neurological Details  Unremarkable    Active Range of Motion     Lumbar   Flexion:  Restriction level: minimal  Extension:  Restriction level: minimal  Left lateral flexion:  Restriction level: minimal  Right lateral flexion:  Restriction level: minimal    Joint Play   Joints within functional limits: L1, L2, L3, L4 and L5     Strength/Myotome Testing     Lumbar   Left   Normal strength    Right   Normal strength    Right Hip   Planes of Motion   Extension: 3+    Left Ankle/Foot   Plantar flexion: 4+    Additional Strength Details  Strong and painless with left great toe extension  Strong and painful with left plantar flexion/ inversion   8-22-22: much less degree    Tests     Lumbar     Left   Positive quadrant     Negative passive SLR and slump test      Right   Negative passive SLR and slump test      Additional Tests Details  Much less severity of symptoms with left posterior quadrant        Precautions: Hx of CA       Manuals 8/17/22 8/22/22 8/8/22 8/10/22 8/15/22        STM left post tib tendon and plantar fascia 12'  KT tape post tib and achilles STM left post tib tendon and plantar fascia 15'  KT tape post tib and achilles STM L post tib tendon and plantar fascia 12' KT tape post tib and achilles   STM left post tib tendon and plantar fascia 12' STM L post tib tendon and plantar fascia 12'                           Neuro Re-Ed                                         Pt Ed/ HEP                        Ther Ex        Nu Step L4 15' L4 15' L4 15' L4 15' L4 15'   Seated lumbar ball roll stretch 10" x12 to the right 10" x12 to the right 10" x12 to R  10"x12 to R  10"x12 to R    PPT 10" 2x10 10" 2x10 10"x20 5" x20 10"x12   LTR 10" 12x 10" x12  10"x12 10"x12 R  10"x12 R    DKTC 10" 12x 10" x12 10"x12 10"x12 10"x12   Supine hamstring stretch 10" x12 bilat Bilaterally  10" x12 10"x12 radha  radha 10"x12 radha 10"x12   Single leg trunk rotation/ glut stretch 10" x12 10" x12 10"x12 10"x12 10"x12   plantarfascia stretch w/towel 10"x12 10" x12   10"x12   gastroc stretch off step 10"x12 10" x12  10"x12 10"x12   HR    2x10 w/arch support  2x10 w/arch support    Ther Activity        Isometric transverse abdominals T/o visit  10" x20 10"x20  T/o visit    Isometric abs with alternating marches x30 rakesh 30x Rakesh 30x rakesh same side for core stab  2x10 rakesh  3x10 rakesh    Bridges w/ isometric abdominals 3x10 3x10 3x10 3x10 3x10   Towel curls and arch forming  HEP      pball squats  25x 30x 2x10 2x10 25x   paloff press  2x10 rakesh grn  3x10 rakesh black 2x10 rakesh grn  2x10 rakesh grn  2x10 rakesh grn    Gait Training                        Modalities         MHP 15' post tx MHP 13' post tx MHP 13' post tx  MHP 13' post tx  MHP 15' post tx

## 2022-08-23 ENCOUNTER — CONSULT (OUTPATIENT)
Dept: PLASTIC SURGERY | Facility: CLINIC | Age: 67
End: 2022-08-23
Payer: COMMERCIAL

## 2022-08-23 VITALS
BODY MASS INDEX: 28.61 KG/M2 | DIASTOLIC BLOOD PRESSURE: 91 MMHG | SYSTOLIC BLOOD PRESSURE: 150 MMHG | HEIGHT: 66 IN | TEMPERATURE: 97.1 F | HEART RATE: 69 BPM | WEIGHT: 178 LBS

## 2022-08-23 DIAGNOSIS — N64.89 BREAST ASYMMETRY: Primary | ICD-10-CM

## 2022-08-23 PROCEDURE — 99204 OFFICE O/P NEW MOD 45 MIN: CPT | Performed by: PLASTIC SURGERY

## 2022-08-23 NOTE — PROGRESS NOTES
Assessment/Plan   53-year-old female with breast asymmetry, status post lumpectomy, interested in possible reduction/lift for symmetry    1 )  We discussed the risks, benefits, alternatives of reduction/mastopexy, patient is an excellent candidate for right-sided reduction with left-sided mastopexy  2 )  Because of the constellation chronicity of her symptoms, particularly given her history of breast cancer I do believe this is medically necessary for reconstruction  3 ), pictures taken, will inquire as to insurance authorization    Discussion- Discussion-- I discussed with patient the risks, benefits, alternatives of breast reduction and mastopexy surgery including risk of bleeding, infection, scarring, poor wound healing, demonstrating an underlying structures, need for further surgical need for multiple procedures, I discussed with her the risk of loss of nipple, nipple necrosis, partial nipple necrosis, seroma, DVT, change in nipple sensation, asymmetry, poor aesthetic result, need for further surgery, need for revision, high risk of poor wound healing at the triple point, the effect on breast-feeding, I discussed the patient's breasts are within the limits of normal asymmetry now and will likely be asymmetric post surgery within normal limits, I discussed with her the possibility for the need for re-reduction over time, we discussed the expected hospital stay, the expected recovery time, I discussed with her the use of drains, the use of jacek non DME negative pressure wound therapy dressings  Because of the constellation and chronicity of her symptoms I believe that this is a medically necessary surgery, I would estimate a 500g reduction for this patient  However as this is reconstructive procedure I do feel that this is medically necessary for breast symmetry and asymmetry based procedure    I also discussed with the patient I do feel that she would benefit from a lift on the left side, as doing a reduction alone would then result in an asymmetric nipple position and she would have a very similar issue as to what she has now  All the patient's questions were answered to her satisfaction  Pictures and diagrams were used in the patient's understanding, all the patient's questions were answered to her satisfaction, pictures taken, will inquire as to insurance authorization  Counseling dominated visit, total counseling time 35 minutes, total visit time 45 minutes  Subjective   Patient ID: Chapo Razo is a 74 y o  female  There were no vitals filed for this visit  HPI  Patient is a 70-year-old female with a history of left-sided breast cancer treated by lumpectomy and radiation, this was in 2019, this subsequently left the patient with significant breast asymmetry  She feels that her right side is much larger than her left and is interested today and possible reduction for symmetry, the patient is hard of hearing, she wears hearing aids, she takes maintenance chemotherapy, she has recently had a mammogram which was within normal limits and states that she has very aggressive with her screening, she has not felt any lumps or masses in her breast, she does not take steroids or blood thinners, she is a nonsmoker  She has a history of a cholecystectomy      The following portions of the patient's history were reviewed and updated as appropriate: allergies, current medications, past family history, past medical history, past social history, past surgical history and problem list     Review of Systems    Objective   Physical Exam   Constitutional  She appears well-developed and well-nourished  Eyes  Pupils are equal, round, and reactive to light  System normal      General -             Right: Right eye extraocular movements are normal              Left: Left eye extraocular movements are normal        Psychiatric  She has a normal mood and affect   Her behavior is normal  Judgment and thought content normal  Bilateral breasts-  Significant asymmetry between left and right breast   Right breast larger than left, left breast with contour deformity of lateral breast  No palpable masses or adenopathy  Grade II ptosis    Measurements- R- SN 29 NIMF 11                              L- SN 26 NIMF10 NN 26        Past Medical History:   Diagnosis Date    Breast cancer ([address] ) 09/2019    left breast    History of radiation therapy 10/2019    left breast cancer    Migraine     Osteoporosis     Pleurisy     Varicella     Vertigo      Past Surgical History:   Procedure Laterality Date    BREAST LUMPECTOMY Left 10/01/2019    Procedure: BREAST NEEDLE LOCALIZED LUMPECTOMY (NEEDLE LOC AT 0730); Surgeon: Kiki Pike MD;  [facility];  Service: Surgical Oncology    CHOLECYSTECTOMY      COLONOSCOPY      DENTAL SURGERY      Bone graft with implant    EPIDURAL BLOCK INJECTION  02/2022    HALLUX VALGUS CORRECTION Bilateral     LYMPH NODE BIOPSY Left 10/01/2019    Procedure: SENTINEL LYMPH NODE BIOPSY; LYMPHATIC MAPPING WITH BLUE DYE AND RADIOACTIVE DYE (INJECT AT 0830 BY DR HA IN THE OR); Surgeon: Kiki Pike MD;  Location[facility];  Service: Surgical Oncology    MAMMO NEEDLE LOCALIZATION LEFT (ALL INC) Left 10/01/2019    RHINOPLASTY       Current Outpatient Medications   Medication Instructions    acetaminophen (TYLENOL) 500 mg tablet Oral, Every 6 hours PRN    cyclobenzaprine (FLEXERIL) 10 mg, Oral, 3 times daily PRN    Diclofenac Sodium (VOLTAREN) 2 g, Topical, 4 times daily    exemestane (AROMASIN) 25 mg, Oral, Daily    ibandronate (BONIVA) 150 mg, Oral, Every 30 days    Ivermectin 1 % CREA Apply externally, Daily    meclizine (ANTIVERT) 12 5 mg, Oral, 3 times daily PRN    pimecrolimus (ELIDEL) 1 % cream No dose, route, or frequency recorded      SUMAtriptan (IMITREX) 100 mg, Oral, Once as needed    venlafaxine (EFFEXOR) 50 mg, Oral, Daily    Vitamin D 4,000 Units, Oral, Daily       Social History Social History Narrative    Not on file     Social History     Tobacco Use   Smoking Status Never Smoker   Smokeless Tobacco Never Used

## 2022-08-24 ENCOUNTER — OFFICE VISIT (OUTPATIENT)
Dept: PHYSICAL THERAPY | Facility: CLINIC | Age: 67
End: 2022-08-24
Payer: COMMERCIAL

## 2022-08-24 DIAGNOSIS — M72.2 PLANTAR FASCIITIS OF LEFT FOOT: ICD-10-CM

## 2022-08-24 DIAGNOSIS — G89.29 CHRONIC LOW BACK PAIN, UNSPECIFIED BACK PAIN LATERALITY, UNSPECIFIED WHETHER SCIATICA PRESENT: Primary | ICD-10-CM

## 2022-08-24 DIAGNOSIS — M51.36 DEGENERATION OF LUMBAR INTERVERTEBRAL DISC: ICD-10-CM

## 2022-08-24 DIAGNOSIS — M54.50 CHRONIC LOW BACK PAIN, UNSPECIFIED BACK PAIN LATERALITY, UNSPECIFIED WHETHER SCIATICA PRESENT: Primary | ICD-10-CM

## 2022-08-24 PROCEDURE — 97112 NEUROMUSCULAR REEDUCATION: CPT

## 2022-08-24 PROCEDURE — 97110 THERAPEUTIC EXERCISES: CPT

## 2022-08-24 PROCEDURE — 97140 MANUAL THERAPY 1/> REGIONS: CPT

## 2022-08-24 PROCEDURE — 97530 THERAPEUTIC ACTIVITIES: CPT

## 2022-08-24 NOTE — PROGRESS NOTES
Daily Note     Today's date: 2022  Patient name: Hernan Reed  : 1955  MRN: [id number]  Referring provider: Shahriar Kendrick MD  Dx:   Encounter Diagnosis     ICD-10-CM    1  Chronic low back pain, unspecified back pain laterality, unspecified whether sciatica present  M54 50     G89 29    2  Degeneration of lumbar intervertebral disc  M51 36                   Subjective: Pipe Ruano reports doing well today  She reports compliance with plantar fascia stretches in morning before getting out of bed  Objective: See treatment diary below      Assessment: Emphasis on standing/functional exercises this visit with good tolerance; visual and verbal cues to ensure correct exercise technique  Difficulty with tandem walking but was able to perform w/o handheld support by end of repetitions  Cues for core stab t/o tx  Pt denied any increases in pain  Pt would continue to benefit from skilled PT  Plan: Continue with current POC to address pt deficits           Precautions: Hx of CA       Manuals 8/17/22 8/22/22 8/24/22 8/10/22 8/15/22        STM left post tib tendon and plantar fascia 12'  KT tape post tib and achilles STM left post tib tendon and plantar fascia 15'  KT tape post tib and achilles STM L post tib tendon and plantar [id number]' KT tape post tib and achilles STM left post tib tendon and plantar fascia 12' STM L post tib tendon and plantar fascia 12'                           Neuro Re-Ed         Tandem walking   4x at bar      Goblet carry    5# 3 laps 20ft      Suitcase carry    5# 2 laps ea UE 20 ft             Pt Ed/ HEP                        Ther Ex        Nu Step L4 15' L4 15' L4 15' L4 15' L4 15'   Seated lumbar ball roll stretch 10" x12 to the right 10" x12 to the right  10"x12 to R  10"x12 to R    PPT 10" 2x10 10" 2x10  5" x20 10"x12   LTR 10" 12x 10" x12  10"x12 radha  10"x12 R  10"x12 R    DKTC 10" 12x 10" x12 10"x12  10"x12 10"x12   Supine hamstring stretch 10" x12 bilat Bilaterally  10" x12 rakesh 10"x12 rakesh 10"x12   Single leg trunk rotation/ glut stretch 10" x12 10" x12  10"x12 10"x12   plantarfascia stretch w/towel 10"x12 10" x12 10"x12   10"x12   gastroc stretch off step 10"x12 10" x12 10"x12 10"x12 10"x12   HR   3x10 2x10 w/arch support  2x10 w/arch support    Ther Activity        Isometric transverse abdominals T/o visit  10" x20 T/o standing exercises   T/o visit    Isometric abs with alternating marches x30 rakesh 30x Rakesh  2x10 rakesh  3x10 rakesh    Bridges w/ isometric abdominals 3x10 3x10  3x10 3x10   Towel curls and arch forming  HEP      Step ups w/arch support    2x10 6"      pball squats  25x 30x 3x10 2x10 25x   paloff press  2x10 rakesh grn  3x10 rakesh black 3x10 ea bl 2x10 rakesh grn  2x10 rakesh grn    Gait Training                        Modalities         MHP 15' post tx MHP 13' post tx MHP 13' post tx  MHP 13' post tx  MHP 15' post tx

## 2022-08-29 ENCOUNTER — OFFICE VISIT (OUTPATIENT)
Dept: PHYSICAL THERAPY | Facility: CLINIC | Age: 67
End: 2022-08-29
Payer: COMMERCIAL

## 2022-08-29 DIAGNOSIS — M54.50 CHRONIC LOW BACK PAIN, UNSPECIFIED BACK PAIN LATERALITY, UNSPECIFIED WHETHER SCIATICA PRESENT: Primary | ICD-10-CM

## 2022-08-29 DIAGNOSIS — M72.2 PLANTAR FASCIITIS OF LEFT FOOT: ICD-10-CM

## 2022-08-29 DIAGNOSIS — M51.36 DEGENERATION OF LUMBAR INTERVERTEBRAL DISC: ICD-10-CM

## 2022-08-29 DIAGNOSIS — G89.29 CHRONIC LOW BACK PAIN, UNSPECIFIED BACK PAIN LATERALITY, UNSPECIFIED WHETHER SCIATICA PRESENT: Primary | ICD-10-CM

## 2022-08-29 PROCEDURE — 97110 THERAPEUTIC EXERCISES: CPT

## 2022-08-29 PROCEDURE — 97530 THERAPEUTIC ACTIVITIES: CPT

## 2022-08-29 PROCEDURE — 97112 NEUROMUSCULAR REEDUCATION: CPT

## 2022-08-29 PROCEDURE — 97140 MANUAL THERAPY 1/> REGIONS: CPT

## 2022-08-29 NOTE — PROGRESS NOTES
Daily Note     Today's date: 2022  Patient name: Ana Boateng  : 1955  MRN: [id number]  Referring provider: Cyndi Gosselin, DO  Dx:   Encounter Diagnosis     ICD-10-CM    1  Chronic low back pain, unspecified back pain laterality, unspecified whether sciatica present  M54 50     G89 29    2  Degeneration of lumbar intervertebral disc  M51 36    3  Plantar fasciitis of left foot  M72 2                   Subjective: Micky Butt reports soreness in low back on Saturday after [place] her [place] but reports it has subsided  Objective: See treatment diary below      Assessment: Visual and verbal cueing to ensure correct exercise technique  Improvement in STM quality/tone following STM to L post tib tendon and plantar fascia  No LOB noted with tandem walking but did have mild sway present  Progress as able  Plan: Continue with current POC to address pt deficits           Precautions: Hx of CA       Manuals 8/17/22 8/22/22 8/24/22 8/29/22 8/15/22        STM left post tib tendon and plantar fascia 12'  KT tape post tib and achilles STM left post tib tendon and plantar fascia 15'  KT tape post tib and achilles STM L post tib tendon and plantar [id number]' KT tape post tib and achilles 12' STM L post tib tendon and plantar fascia +KT tape post tib and achilles  STM L post tib tendon and plantar fascia 12'                           Neuro Re-Ed         Tandem walking   4x at bar  5x at bar     Goblet carry    5# 3 laps 20ft  5# 3 laps 20 ft     Suitcase carry    5# 2 laps ea UE 20 ft 5# 2 laps ea UE 20 ft             Pt Ed/ HEP                        Ther Ex        Nu Step L4 15' L4 15' L4 15' L4 15' L4 15'   Seated lumbar ball roll stretch 10" x12 to the right 10" x12 to the right   10"x12 to R    PPT 10" 2x10 10" 2x10   10"x12   LTR 10" 12x 10" x12  10"x12 radha  10"x12 radha  10"x12 R    DKTC 10" 12x 10" x12 10"x12  10"x12 10"x12   Supine hamstring stretch 10" x12 bilat Bilaterally  10" x12   rakesh 10"x12   Single leg trunk rotation/ glut stretch 10" x12 10" x12   10"x12   plantarfascia stretch w/towel 10"x12 10" x12 10"x12  10"x12 10"x12   gastroc stretch off step 10"x12 10" x12 10"x12  10"x12   HR   3x10 3x10 2x10 w/arch support    Ther Activity        Isometric transverse abdominals T/o visit  10" x20 T/o standing exercises  T/o standing exercises  T/o visit    Isometric abs with alternating marches x30 rakesh 30x Rakesh   3x10 rakesh    Bridges w/ isometric abdominals 3x10 3x10   3x10   Towel curls and arch forming  HEP      Step ups w/arch support    2x10 6"  2x10 6"     pball squats  25x 30x 3x10 3x10 25x   paloff press  2x10 rakesh grn  3x10 rakesh black 3x10 ea bl 3x10 ea bl grn  2x10 rakesh grn    Gait Training                        Modalities         MHP 15' post tx MHP 13' post tx MHP 13' post tx  MHP 13' post tx  MHP 13' post tx

## 2022-08-31 ENCOUNTER — OFFICE VISIT (OUTPATIENT)
Dept: PHYSICAL THERAPY | Facility: CLINIC | Age: 67
End: 2022-08-31
Payer: COMMERCIAL

## 2022-08-31 DIAGNOSIS — M54.50 CHRONIC LOW BACK PAIN, UNSPECIFIED BACK PAIN LATERALITY, UNSPECIFIED WHETHER SCIATICA PRESENT: Primary | ICD-10-CM

## 2022-08-31 DIAGNOSIS — M51.36 DEGENERATION OF LUMBAR INTERVERTEBRAL DISC: ICD-10-CM

## 2022-08-31 DIAGNOSIS — M72.2 PLANTAR FASCIITIS OF LEFT FOOT: ICD-10-CM

## 2022-08-31 DIAGNOSIS — G89.29 CHRONIC LOW BACK PAIN, UNSPECIFIED BACK PAIN LATERALITY, UNSPECIFIED WHETHER SCIATICA PRESENT: Primary | ICD-10-CM

## 2022-08-31 PROCEDURE — 97110 THERAPEUTIC EXERCISES: CPT

## 2022-08-31 PROCEDURE — 97112 NEUROMUSCULAR REEDUCATION: CPT

## 2022-08-31 PROCEDURE — 97530 THERAPEUTIC ACTIVITIES: CPT

## 2022-08-31 NOTE — PROGRESS NOTES
Daily Note     Today's date: 2022  Patient name: Yessy Rodriguez  : 1955  MRN: [id number]  Referring provider: Rommel Rubio DO  Dx:   Encounter Diagnosis     ICD-10-CM    1  Chronic low back pain, unspecified back pain laterality, unspecified whether sciatica present  M54 50     G89 29    2  Degeneration of lumbar intervertebral disc  M51 36    3  Plantar fasciitis of left foot  M72 2                   Subjective: all improving with some soreness in left heel      Objective: See treatment diary below      Assessment: Tolerated treatment well  Patient would benefit from continued PT      Plan: Progress treatment as tolerated            Precautions: Hx of CA       Manuals 22        STM left post tib tendon and plantar fascia 12'  KT tape post tib and achilles STM left post tib tendon and plantar fascia 15'  KT tape post tib and achilles STM L post tib tendon and plantar [id number]' KT tape post tib and achilles 12' STM L post tib tendon and plantar fascia +KT tape post tib and achilles  STM L post tib tendon and plantar fascia 12'                           Neuro Re-Ed         Tandem walking   4x at bar  5x at bar  5x at bar   Goblet carry    5# 3 laps 20ft  5# 3 laps 20 ft  5# 20' x3   Suitcase carry    5# 2 laps ea UE 20 ft 5# 2 laps ea UE 20 ft  5# 20' 2 laps each arm           Pt Ed/ HEP                        Ther Ex        Nu Step L4 15' L4 15' L4 15' L4 15' L4 15'   Seated lumbar ball roll stretch 10" x12 to the right 10" x12 to the right   10"x12 to R    PPT 10" 2x10 10" 2x10   10"x12   LTR 10" 12x 10" x12  10"x12 radha  10"x12 radha  10"x12 R    DKTC 10" 12x 10" x12 10"x12  10"x12 10"x12   Supine hamstring stretch 10" x12 bilat Bilaterally  10" x12   radha 10"x12   Single leg trunk rotation/ glut stretch 10" x12 10" x12   10"x12   plantarfascia stretch w/towel 10"x12 10" x12 10"x12  10"x12 10"x12   gastroc stretch off step 10"x12 10" x12 10"x12  10"x12   HR   3x10 3x10 2x10 w/arch support    Ther Activity        Isometric transverse abdominals T/o visit  10" x20 T/o standing exercises  T/o standing exercises  T/o visit    Isometric abs with alternating marches x30 rakesh 30x Rakesh   3x10 rakesh    Bridges w/ isometric abdominals 3x10 3x10   3x10   Towel curls and arch forming  HEP      Step ups w/arch support    2x10 6"  2x10 6"     pball squats  25x 30x 3x10 3x10 25x   paloff press  2x10 rakesh grn  3x10 rakesh black 3x10 ea bl 3x10 ea bl grn  2x10 rakesh grn    Gait Training                        Modalities         MHP 15' post tx MHP 13' post tx MHP 13' post tx  MHP 13' post tx  MHP 13' post tx

## 2022-09-07 ENCOUNTER — TELEPHONE (OUTPATIENT)
Dept: HEMATOLOGY ONCOLOGY | Facility: CLINIC | Age: 67
End: 2022-09-07

## 2022-09-07 ENCOUNTER — OFFICE VISIT (OUTPATIENT)
Dept: PHYSICAL THERAPY | Facility: CLINIC | Age: 67
End: 2022-09-07
Payer: COMMERCIAL

## 2022-09-07 DIAGNOSIS — M72.2 PLANTAR FASCIITIS OF LEFT FOOT: ICD-10-CM

## 2022-09-07 DIAGNOSIS — G89.29 CHRONIC LOW BACK PAIN, UNSPECIFIED BACK PAIN LATERALITY, UNSPECIFIED WHETHER SCIATICA PRESENT: Primary | ICD-10-CM

## 2022-09-07 DIAGNOSIS — M54.50 CHRONIC LOW BACK PAIN, UNSPECIFIED BACK PAIN LATERALITY, UNSPECIFIED WHETHER SCIATICA PRESENT: Primary | ICD-10-CM

## 2022-09-07 DIAGNOSIS — Z17.0 MALIGNANT NEOPLASM OF UPPER-OUTER QUADRANT OF LEFT BREAST IN FEMALE, ESTROGEN RECEPTOR POSITIVE (HCC): Primary | ICD-10-CM

## 2022-09-07 DIAGNOSIS — M51.36 DEGENERATION OF LUMBAR INTERVERTEBRAL DISC: ICD-10-CM

## 2022-09-07 DIAGNOSIS — C50.412 MALIGNANT NEOPLASM OF UPPER-OUTER QUADRANT OF LEFT BREAST IN FEMALE, ESTROGEN RECEPTOR POSITIVE (HCC): Primary | ICD-10-CM

## 2022-09-07 PROCEDURE — 97530 THERAPEUTIC ACTIVITIES: CPT

## 2022-09-07 PROCEDURE — 97140 MANUAL THERAPY 1/> REGIONS: CPT

## 2022-09-07 PROCEDURE — 97112 NEUROMUSCULAR REEDUCATION: CPT

## 2022-09-07 PROCEDURE — 97110 THERAPEUTIC EXERCISES: CPT

## 2022-09-07 RX ORDER — ANASTROZOLE 1 MG/1
1 TABLET ORAL DAILY
Qty: 90 TABLET | Refills: 1 | Status: SHIPPED | OUTPATIENT
Start: 2022-09-07

## 2022-09-07 NOTE — LETTER
2022    MD Abdi [place] [address]    Patient: Shalom Najera   YOB: 1955   Date of Visit: 2022     Encounter Diagnosis     ICD-10-CM    1  Chronic low back pain, unspecified back pain laterality, unspecified whether sciatica present  M54 50     G89 29    2  Degeneration of lumbar intervertebral disc  M51 36    3  Plantar fasciitis of left foot  M72 2        Dear Dr Scott Shook:    Thank you for your recent referral of Shalom Najera  Please review the attached evaluation summary from Ayala's recent visit  Please verify that you agree with the plan of care by signing the attached order  If you have any questions or concerns, please do not hesitate to call  I sincerely appreciate the opportunity to share in the care of one of your patients and hope to have another opportunity to work with you in the near future  Sincerely,    Cathy Martinez      Referring Provider:      I certify that I have read the below Plan of Care and certify the need for these services furnished under this plan of treatment while under my care  MD Abdi [place] [id number]  Via Fax: [phone]          PT Re-Evaluation/Discharge  Collection of subjective/objective data performed by Cathy Martinez PTA; Assessment, POC, and Goal attainment performed by Jerri Vergara DPT      Today's date: 2022  Patient name: Shalom Najera  : 1955  MRN: [id number]  Referring provider: Jadyn Winchester MD  Dx:   Encounter Diagnosis     ICD-10-CM    1  Chronic low back pain, unspecified back pain laterality, unspecified whether sciatica present  M54 50     G89 29    2  Degeneration of lumbar intervertebral disc  M51 36    3   Plantar fasciitis of left foot  M72 2        Start Time: 1014          Assessment  Assessment details: Pt presents with signs and symptoms consistent with referring diagnosis of DDD in the lumbar spine     22: As patient's lumbar symptoms are starting to improve, pt's left foot/ ankle signs and symptoms are consistent, primarily, with posterior tibialis involvement as well as achilles and plantar fascia irritation  22:  Significant improvements seen in both lumbar and foot/ ankle  Pt would benefit from some more progression in dynamic core and functional strengthening in order to be safely discharged to home program     22:  Continued significant improvement in both the back and foot/ ankle  She is able to continue her progression of strengthening at home  [facility]  Symptom irritability: lowUnderstanding of Dx/Px/POC: good   Prognosis: good    Goals  ST) Pt  Will have centralized radicular symptoms in 6 weeks  -GOAL MET  2) Pt  Will be able to sit as long as desired without pain in 6 weeks  -GOAL MET  3)  Pt  Will be able to return to work under light/ modified duty in 6 weeks  -GOAL MET   4)  Pt  Will be able to perform all routine chores at home without pain or limitations in 6 weeks  GOAL MET  5) Pt  Will be able to perform all LE dressing tasks without limitations  -GOAL MET  LT) Pt  Will be barbara to stand as long as desired without pain in 12 weeks  -GOAL MET  2) Pt will have pain decreased to<2/10  in order to more comfortably perform ADL's in 12 weeks  -GOAL MET  4)  Pt  Will be able to perform all heavy activity at home, such as garbage, moving furniture, carrying weighted items on all surfaces without pain or limitation in 12 weeks  -GOAL MET    Plan  Plan details: Pt discharged from physical therapy  Treatment plan discussed with: patient        Subjective Evaluation    History of Present Illness  Date of onset: 2016  Mechanism of injury: 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 ; which 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 leg symptoms      22:  Pt has had insidious onset and worsening left heelcord, ankle and plantar fascia pain    22:  Pt reports significantly less back and foot pain  She has returned to performing ADL's and chores mainly without limitations  22:  Patient reports less pain overall in lumbar spine and L plantar fascia/peroneal musculature  She reports stubbing her toe over the weekend and has visible bruising but is able to move it on her own; educated if pain does not decrease in the next couple days to get it evaluated  Patient reports feeling comfortable performing exercises at home and was informed about our direct access program if she needs us in the future  Recurrent probem    Quality of life: good    Pain  Current pain ratin  At best pain ratin  At worst pain ratin  Location: left L/S and glut area which radiates down her lateral left leg into her foot  She also reports having acute flare-up of left plantar fascitis  Quality: discomfort  Relieving factors: ice  Progression: improved    Social Support  Steps to enter house: yes  2  Stairs in house: no     Treatments  Previous treatment: injection treatment  Patient Goals  Patient goal: would like to be out of pain and / or that she can move and perform all chores at home without significant pain , weakness and difficulty        Objective     Postural Observations  Seated posture: good  Standing posture: fair  Correction of posture: makes symptoms better        Palpation   Left   Tenderness of the posterior tibialis  Tenderness   Left Ankle/Foot   Tenderness in the posterior tibial tendon       Additional Tenderness Details  Much less severity of TTP     Neurological Testing     Additional Neurological Details  Unremarkable    Active Range of Motion     Lumbar   Flexion:  Restriction level: minimal  Extension:  Restriction level: minimal  Left lateral flexion:  Restriction level: minimal  Right lateral flexion:  Restriction level: minimal    Joint Play   Joints within functional limits: L1, L2, L3, L4 and L5     Strength/Myotome Testing     Lumbar   Left   Normal strength    Right   Normal strength    Right Hip   Planes of Motion   Extension: 3+    Left Ankle/Foot   Plantar flexion: 4+    Additional Strength Details  Strong and painless with left great toe extension  Strong and painful with left plantar flexion/ inversion   8-22-22: much less degree    Tests     Lumbar     Left   Positive quadrant     Negative passive SLR and slump test      Right   Negative passive SLR and slump test      Additional Tests Details  Much less severity of symptoms with left posterior quadrant        Precautions: Hx of CA       Manuals 9/7/22 8/22/22 8/24/22 8/29/22 8/31/22        STM left post tib tendon and plantar fascia 12'  KT tape post tib and achilles STM left post tib tendon and plantar fascia 15'  KT tape post tib and achilles STM L post tib tendon and plantar [id number]' KT tape post tib and achilles 12' STM L post tib tendon and plantar fascia +KT tape post tib and achilles  STM L post tib tendon and plantar fascia 12'                           Neuro Re-Ed         Tandem walking 5x at bar   4x at bar  5x at bar  5x at bar   Goblet carry  5# 20' x3   5# 3 laps 20ft  5# 3 laps 20 ft  5# 20' x3   Suitcase carry  5# 20' 2 laps ea arm   5# 2 laps ea UE 20 ft 5# 2 laps ea UE 20 ft  5# 20' 2 laps each arm           Pt Ed/ HEP                        Ther Ex        Nu Step L4 15' L4 15' L4 15' L4 15' L4 15'   Seated lumbar ball roll stretch 10" x12 to the right 10" x12 to the right   10"x12 to R    PPT 10" 2x10 10" 2x10   10"x12   LTR 10" 12x 10" x12  10"x12 radha  10"x12 radha  10"x12 R    DKTC 10" 12x 10" x12 10"x12  10"x12 10"x12   Supine hamstring stretch 10" x12 bilat Bilaterally  10" x12   radha 10"x12   Single leg trunk rotation/ glut stretch  10" x12   10"x12   plantarfascia stretch w/towel 10"x12 10" x12 10"x12  10"x12 10"x12   gastroc stretch off step 10"x12 10" x12 10"x12  10"x12   HEP Updated and provided handout HR 3x10  3x10 3x10 2x10 w/arch support    Ther Activity        Isometric transverse abdominals T/o visit  10" x20 T/o standing exercises  T/o standing exercises  T/o visit    Isometric abs with alternating marches x30 rakesh 30x Rakesh   3x10 rakesh    Bridges w/ isometric abdominals 3x10 3x10   3x10   Towel curls and arch forming  HEP      Step ups w/arch support    2x10 6"  2x10 6"     pball squats  30x 30x 3x10 3x10 25x   paloff press  3x10 rakesh grn  3x10 rakesh black 3x10 ea bl 3x10 ea bl grn  2x10 rakesh grn    Gait Training                        Modalities         MHP 15' post tx MHP 13' post tx MHP 13' post tx  MHP 13' post tx  MHP 13' post tx  Attestation signed by Nat Stout PT at 9/7/2022  5:30 PM:  I supervised the visit  We discussed the case to ensure appropriate continuation and progression of care and I reviewed the documentation

## 2022-09-07 NOTE — PROGRESS NOTES
PT Re-Evaluation/Discharge  Collection of subjective/objective data performed by Cathy Martinez PTA; Assessment, POC, and Goal attainment performed by Dilia Lacy DPT      Today's date: 2022  Patient name: Irving Menard  : 1955  MRN: [id number]  Referring provider: Umu Briscoe MD  Dx:   Encounter Diagnosis     ICD-10-CM    1  Chronic low back pain, unspecified back pain laterality, unspecified whether sciatica present  M54 50     G89 29    2  Degeneration of lumbar intervertebral disc  M51 36    3  Plantar fasciitis of left foot  M72 2        Start Time: 1014          Assessment  Assessment details: Pt presents with signs and symptoms consistent with referring diagnosis of DDD in the lumbar spine  22: As patient's lumbar symptoms are starting to improve, pt's left foot/ ankle signs and symptoms are consistent, primarily, with posterior tibialis involvement as well as achilles and plantar fascia irritation  22:  Significant improvements seen in both lumbar and foot/ ankle  Pt would benefit from some more progression in dynamic core and functional strengthening in order to be safely discharged to home program     22:  Continued significant improvement in both the back and foot/ ankle  She is able to continue her progression of strengthening at home  Pt could be discharged to a [facility]  Symptom irritability: lowUnderstanding of Dx/Px/POC: good   Prognosis: good    Goals  ST) Pt  Will have centralized radicular symptoms in 6 weeks  -GOAL MET  2) Pt  Will be able to sit as long as desired without pain in 6 weeks  -GOAL MET  3)  Pt  Will be able to return to work under light/ modified duty in 6 weeks  -GOAL MET   4)  Pt  Will be able to perform all routine chores at home without pain or limitations in 6 weeks  GOAL MET  5) Pt  Will be able to perform all LE dressing tasks without limitations  -GOAL MET  LT) Pt   Will be barbara to stand as long as desired without pain in 12 weeks   -GOAL MET  2) Pt will have pain decreased to<2/10  in order to more comfortably perform ADL's in 12 weeks  -GOAL MET  4)  Pt  Will be able to perform all heavy activity at home, such as garbage, moving furniture, carrying weighted items on all surfaces without pain or limitation in 12 weeks  -GOAL MET    Plan  Plan details: Pt discharged from physical therapy  Treatment plan discussed with: patient        Subjective Evaluation    History of Present Illness  Date of onset: 2016  Mechanism of injury: 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 ; which 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 leg symptoms  22:  Pt has had insidious onset and worsening left heelcord, ankle and plantar fascia pain    22:  Pt reports significantly less back and foot pain  She has returned to performing ADL's and chores mainly without limitations  22:  Patient reports less pain overall in lumbar spine and L plantar fascia/peroneal musculature  She reports stubbing her toe over the weekend and has visible bruising but is able to move it on her own; educated if pain does not decrease in the next couple days to get it evaluated  Patient reports feeling comfortable performing exercises at home and was informed about our direct access program if she needs us in the future  Recurrent probem    Quality of life: good    Pain  Current pain ratin  At best pain ratin  At worst pain ratin  Location: left L/S and glut area which radiates down her lateral left leg into her foot  She also reports having acute flare-up of left plantar fascitis    Quality: discomfort  Relieving factors: ice  Progression: improved    Social Support  Steps to enter house: yes  2  Stairs in house: no     Treatments  Previous treatment: injection treatment  Patient Goals  Patient goal: would like to be out of pain and / or that she can move and perform all chores at home without significant pain , weakness and difficulty        Objective     Postural Observations  Seated posture: good  Standing posture: fair  Correction of posture: makes symptoms better        Palpation   Left   Tenderness of the posterior tibialis  Tenderness   Left Ankle/Foot   Tenderness in the posterior tibial tendon  Additional Tenderness Details  Much less severity of TTP     Neurological Testing     Additional Neurological Details  Unremarkable    Active Range of Motion     Lumbar   Flexion:  Restriction level: minimal  Extension:  Restriction level: minimal  Left lateral flexion:  Restriction level: minimal  Right lateral flexion:  Restriction level: minimal    Joint Play   Joints within functional limits: L1, L2, L3, L4 and L5     Strength/Myotome Testing     Lumbar   Left   Normal strength    Right   Normal strength    Right Hip   Planes of Motion   Extension: 3+    Left Ankle/Foot   Plantar flexion: 4+    Additional Strength Details  Strong and painless with left great toe extension  Strong and painful with left plantar flexion/ inversion   8-22-22: much less degree    Tests     Lumbar     Left   Positive quadrant     Negative passive SLR and slump test      Right   Negative passive SLR and slump test      Additional Tests Details  Much less severity of symptoms with left posterior quadrant        Precautions: Hx of CA       Manuals 9/7/22 8/22/22 8/24/22 8/29/22 8/31/22        STM left post tib tendon and plantar fascia 12'  KT tape post tib and achilles STM left post tib tendon and plantar fascia 15'  KT tape post tib and achilles STM L post tib tendon and plantar [id number]' KT tape post tib and achilles 12' STM L post tib tendon and plantar fascia +KT tape post tib and achilles  STM L post tib tendon and plantar fascia 12'                           Neuro Re-Ed         Tandem walking 5x at bar   4x at bar  5x at bar  5x at bar   Goblet carry  5# 20' x3   5# 3 laps 20ft  5# 3 laps 20 ft  5# 20' x3 Suitcase carry  5# 20' 2 laps ea arm   5# 2 laps ea UE 20 ft 5# 2 laps ea UE 20 ft  5# 20' 2 laps each arm           Pt Ed/ HEP                        Ther Ex        Nu Step L4 15' L4 15' L4 15' L4 15' L4 15'   Seated lumbar ball roll stretch 10" x12 to the right 10" x12 to the right   10"x12 to R    PPT 10" 2x10 10" 2x10   10"x12   LTR 10" 12x 10" x12  10"x12 rakesh  10"x12 rakesh  10"x12 R    DKTC 10" 12x 10" x12 10"x12  10"x12 10"x12   Supine hamstring stretch 10" x12 bilat Bilaterally  10" x12   rakesh 10"x12   Single leg trunk rotation/ glut stretch  10" x12   10"x12   plantarfascia stretch w/towel 10"x12 10" x12 10"x12  10"x12 10"x12   gastroc stretch off step 10"x12 10" x12 10"x12  10"x12   HEP Updated and provided handout       HR 3x10  3x10 3x10 2x10 w/arch support    Ther Activity        Isometric transverse abdominals T/o visit  10" x20 T/o standing exercises  T/o standing exercises  T/o visit    Isometric abs with alternating marches x30 rakesh 30x Rakesh   3x10 rakesh    Bridges w/ isometric abdominals 3x10 3x10   3x10   Towel curls and arch forming  HEP      Step ups w/arch support    2x10 6"  2x10 6"     pball squats  30x 30x 3x10 3x10 25x   paloff press  3x10 rakesh grn  3x10 rakesh black 3x10 ea bl 3x10 ea bl grn  2x10 rakesh grn    Gait Training                        Modalities         MHP 15' post tx MHP 13' post tx MHP 13' post tx  MHP 13' post tx  MHP 13' post tx

## 2022-09-07 NOTE — TELEPHONE ENCOUNTER
Patient called in as she wanted to give office update  She is now on medicare as she retired  Due to her insurance change, her Lilian Tadeo would cost her 100$ per month  Anastrozole would cost 5$ per month  She would like to switch back to the Anastrozole as she had the same symptoms on both medication  She still has 2 5 years left to take the medication  She will take a week break from medication and then start the Anastrozole again  Patient verbalized understanding and agreed to plan

## 2022-09-09 ENCOUNTER — OFFICE VISIT (OUTPATIENT)
Dept: FAMILY MEDICINE CLINIC | Facility: CLINIC | Age: 67
End: 2022-09-09
Payer: COMMERCIAL

## 2022-09-09 ENCOUNTER — TELEPHONE (OUTPATIENT)
Dept: ADMINISTRATIVE | Facility: OTHER | Age: 67
End: 2022-09-09

## 2022-09-09 VITALS
SYSTOLIC BLOOD PRESSURE: 121 MMHG | HEART RATE: 88 BPM | DIASTOLIC BLOOD PRESSURE: 78 MMHG | TEMPERATURE: 97.8 F | HEIGHT: 66 IN | WEIGHT: 181 LBS | BODY MASS INDEX: 29.09 KG/M2 | OXYGEN SATURATION: 97 %

## 2022-09-09 DIAGNOSIS — N39.41 URGE URINARY INCONTINENCE: ICD-10-CM

## 2022-09-09 DIAGNOSIS — Z12.11 COLON CANCER SCREENING: ICD-10-CM

## 2022-09-09 DIAGNOSIS — Z00.00 WELCOME TO MEDICARE PREVENTIVE VISIT: Primary | ICD-10-CM

## 2022-09-09 DIAGNOSIS — Z23 NEED FOR PNEUMOCOCCAL VACCINE: ICD-10-CM

## 2022-09-09 PROCEDURE — 3288F FALL RISK ASSESSMENT DOCD: CPT | Performed by: FAMILY MEDICINE

## 2022-09-09 PROCEDURE — 90677 PCV20 VACCINE IM: CPT | Performed by: FAMILY MEDICINE

## 2022-09-09 PROCEDURE — 3725F SCREEN DEPRESSION PERFORMED: CPT | Performed by: FAMILY MEDICINE

## 2022-09-09 PROCEDURE — G0402 INITIAL PREVENTIVE EXAM: HCPCS | Performed by: FAMILY MEDICINE

## 2022-09-09 PROCEDURE — G0403 EKG FOR INITIAL PREVENT EXAM: HCPCS | Performed by: FAMILY MEDICINE

## 2022-09-09 PROCEDURE — G0009 ADMIN PNEUMOCOCCAL VACCINE: HCPCS | Performed by: FAMILY MEDICINE

## 2022-09-09 NOTE — TELEPHONE ENCOUNTER
----- Message from Nicole Keyes MD sent at 9/9/2022 10:56 AM EDT -----  Regarding: dexa  09/09/22 10:56 AM    Hello, our patient Ana Boateng has had DEXA Scan completed/performed  Please assist in updating the patient chart by pulling the Care Everywhere (CE) document  The date of service is 6/30/22 at [facility]       Thank you,  Nicole Keyes MD  [facility]

## 2022-09-09 NOTE — PROGRESS NOTES
Assessment and Plan:     Problem List Items Addressed This Visit        Other    Welcome to Medicare preventive visit - Primary    Relevant Orders    POCT ECG (Completed)    Urge urinary incontinence     Patient declined treatment           Other Visit Diagnoses     Colon cancer screening        Relevant Orders    Cologuard    Need for pneumococcal vaccine        Relevant Orders    Pneumococcal Conjugate Vaccine 20-valent (Pcv20) (Completed)        BMI Counseling: Body mass index is 29 66 kg/m²  The BMI is above normal  Nutrition recommendations include encouraging healthy choices of fruits and vegetables  Exercise recommendations include exercising 3-5 times per week  No pharmacotherapy was ordered  Rationale for BMI follow-up plan is due to patient being overweight or obese  Depression Screening and Follow-up Plan: Patient was screened for depression during today's encounter  They screened negative with a PHQ-2 score of 0  Urinary Incontinence Plan of Care: counseling topics discussed: limiting fluid intake to 60 oz  per day  Preventive health issues were discussed with patient, and age appropriate screening tests were ordered as noted in patient's After Visit Summary  Personalized health advice and appropriate referrals for health education or preventive services given if needed, as noted in patient's After Visit Summary  History of Present Illness:     Patient presents for a Medicare Wellness Visit    She is asking about breast reconstruction  She saw plastic surgery, plans to set it up  She bruised her left middle toe a few days ago - tripped on [employer] track at home  No other injuries       Patient Care Team:  Ana Cristina Pierce MD as PCP - General (Family Medicine)  Nicole Castelan MD as Surgeon (Surgical Oncology)  Adriana Dover RN as Nurse Navigator (Oncology)  Zenaida Mcmanus MD (Hematology and Oncology)  Pato Russo MD (Radiation Oncology)  Loretta Arceo DO (Ophthalmology)  Leadnro Frye MD as Referring Physician (Rheumatology)     Review of Systems:     Review of Systems   Constitutional: Negative for activity change, appetite change, chills, fatigue, fever and unexpected weight change  HENT: Negative for congestion, ear discharge, ear pain, postnasal drip, sinus pressure and sore throat  Eyes: Negative for discharge and visual disturbance  Respiratory: Negative for cough, shortness of breath and wheezing  Cardiovascular: Negative for chest pain, palpitations and leg swelling  Gastrointestinal: Negative for abdominal pain, constipation, diarrhea, nausea and vomiting  Endocrine: Negative for cold intolerance, heat intolerance, polydipsia and polyuria  Genitourinary: Negative for difficulty urinating and frequency  Musculoskeletal: Positive for arthralgias (foot pain - plantar fasciitis)  Negative for back pain, joint swelling and myalgias  Skin: Negative for rash  Neurological: Negative for dizziness, weakness, light-headedness, numbness and headaches  Hematological: Negative for adenopathy  Psychiatric/Behavioral: Negative for behavioral problems, confusion, dysphoric mood, sleep disturbance and suicidal ideas  The patient is not nervous/anxious  Problem List:     Patient Active Problem List   Diagnosis    Fatigue    Age-related osteoporosis without current pathological fracture    Malignant neoplasm of upper-outer quadrant of left breast in female, estrogen receptor positive ([facility] [address] )    Use of exemestane (Aromasin)    Overweight (BMI 25 0-29  9)    Numbness of both lower extremities    Prediabetes    Hearing aid worn    Spinal stenosis of lumbar region with neurogenic claudication    Lumbar spondylolysis    Lumbar foraminal stenosis    Lumbar disc disease with radiculopathy    Hot flashes    Welcome to Medicare preventive visit    Urge urinary incontinence      Past Medical and Surgical History:     Past Medical History:   Diagnosis Date    Breast cancer ([facility] [address] [facility] 09/2019    left breast    History of radiation therapy 10/2019    left breast cancer    Migraine     Osteoporosis     Pleurisy     Varicella     Vertigo      Past Surgical History:   Procedure Laterality Date    BREAST LUMPECTOMY Left 10/01/2019    Procedure: BREAST NEEDLE LOCALIZED LUMPECTOMY (NEEDLE LOC AT 0730); Surgeon: Emelyn Martin MD;  [facility];  Service: Surgical Oncology    CHOLECYSTECTOMY      COLONOSCOPY      DENTAL SURGERY      Bone graft with implant    EPIDURAL BLOCK INJECTION  02/2022    HALLUX VALGUS CORRECTION Bilateral     LYMPH NODE BIOPSY Left 10/01/2019    Procedure: SENTINEL LYMPH NODE BIOPSY; LYMPHATIC MAPPING WITH BLUE DYE AND RADIOACTIVE DYE (INJECT AT 0830 BY DR HA IN THE OR); Surgeon: Emelyn Martin MD;  [facility];  Service: Surgical Oncology    MAMMO NEEDLE LOCALIZATION LEFT (ALL INC) Left 10/01/2019    RHINOPLASTY        Family History:     Family History   Problem Relation Age of Onset    No Known Problems Mother     Lung cancer Father 71    No Known Problems Maternal Grandmother     No Known Problems Maternal Grandfather     No Known Problems Paternal Grandmother     No Known Problems Paternal Grandfather     Breast cancer Neg Hx       Social History:     Social History     Socioeconomic History    Marital status:       Spouse name: None    Number of children: 2    Years of education: None    Highest education level: None   Occupational History    Occupation: curretnly works part-time    Occupation: medical professional   Tobacco Use    Smoking status: Never Smoker    Smokeless tobacco: Never Used   Vaping Use    Vaping Use: Never used   Substance and Sexual Activity    Alcohol use: No    Drug use: Never    Sexual activity: Not Currently     Partners: Male     Birth control/protection: None, Post-menopausal   Other Topics Concern    None   Social History Narrative    None     Social Determinants of Health Financial Resource Strain: Low Risk     Difficulty of Paying Living Expenses: Not hard at all   Food Insecurity: Not on file   Transportation Needs: No Transportation Needs    Lack of Transportation (Medical): No    Lack of Transportation (Non-Medical): No   Physical Activity: Not on file   Stress: Not on file   Social Connections: Not on file   Intimate Partner Violence: Not on file   Housing Stability: Not on file      Medications and Allergies:     Current Outpatient Medications   Medication Sig Dispense Refill    acetaminophen (TYLENOL) 500 mg tablet Take by mouth every 6 (six) hours as needed       anastrozole (ARIMIDEX) 1 mg tablet Take 1 tablet (1 mg total) by mouth daily 90 tablet 1    Cholecalciferol (VITAMIN D) 2000 units CAPS Take 4,000 Units by mouth daily       cyclobenzaprine (FLEXERIL) 10 mg tablet Take 1 tablet (10 mg total) by mouth 3 (three) times a day as needed for muscle spasms 20 tablet 0    Diclofenac Sodium (VOLTAREN) 1 % Apply 2 g topically 4 (four) times a day 50 g 0    ibandronate (BONIVA) 150 MG tablet Take 150 mg by mouth every 30 (thirty) days  (Patient not taking: Reported on 8/23/2022)      Ivermectin 1 % CREA Apply topically daily       meclizine (ANTIVERT) 12 5 MG tablet Take 12 5 mg by mouth 3 (three) times a day as needed for dizziness      pimecrolimus (ELIDEL) 1 % cream       SUMAtriptan (IMITREX) 100 mg tablet Take 100 mg by mouth once as needed for migraine      venlafaxine (EFFEXOR) 50 mg tablet Take 1 tablet (50 mg total) by mouth daily 90 tablet 1     No current facility-administered medications for this visit       Allergies   Allergen Reactions    Ciprofloxacin Seizures    Cyclopentolate Other (See Comments) and Tachycardia     Resp distress    Sulfa Antibiotics Hives and Rash    Scopolamine Other (See Comments)     Precautionary for cycopentolate allergy      Immunizations:     Immunization History   Administered Date(s) Administered    COVID-19 MODERNA VACC 0 5 ML IM 01/15/2021, 02/10/2021, 11/22/2021    INFLUENZA 01/23/2019, 10/02/2019, 10/02/2019, 10/02/2020    Pneumococcal Conjugate Vaccine 20-valent (Pcv20), Polysace 09/09/2022    Pneumococcal Polysaccharide PPV23 03/04/2013    Tdap 03/11/2022      Health Maintenance:         Topic Date Due    HIV Screening  Never done    Colorectal Cancer Screening  04/28/2022    Breast Cancer Screening: Mammogram  06/13/2023    Cervical Cancer Screening  06/17/2024    Hepatitis C Screening  Completed         Topic Date Due    COVID-19 Vaccine (4 - Booster for Moderna series) 03/22/2022    Influenza Vaccine (1) 09/01/2022      Medicare Screening Tests and Risk Assessments:     Torey March is here for her Welcome to Medicare visit  Health Risk Assessment:   Patient rates overall health as good  Patient feels that their physical health rating is slightly better  Patient is satisfied with their life  Eyesight was rated as same  Hearing was rated as slightly worse  Patient feels that their emotional and mental health rating is slightly better  Patients states they are never, rarely angry  Patient states they are often unusually tired/fatigued  Pain experienced in the last 7 days has been some  Patient's pain rating has been 4/10  Patient states that she has experienced weight loss or gain in last 6 months  Depression Screening:   PHQ-2 Score: 0      Fall Risk Screening: In the past year, patient has experienced: no history of falling in past year      Urinary Incontinence Screening:   Patient has leaked urine accidently in the last six months  Home Safety:  Patient does not have trouble with stairs inside or outside of their home  Patient has working smoke alarms and has working carbon monoxide detector  Home safety hazards include: none  Nutrition:   Current diet is Unhealthy  Medications:   Patient is not currently taking any over-the-counter supplements  Patient is able to manage medications  Activities of Daily Living (ADLs)/Instrumental Activities of Daily Living (IADLs):   Walk and transfer into and out of bed and chair?: Yes  Dress and groom yourself?: Yes    Bathe or shower yourself?: Yes    Feed yourself? Yes  Do your laundry/housekeeping?: Yes  Manage your money, pay your bills and track your expenses?: Yes  Make your own meals?: Yes    Do your own shopping?: Yes    Durable Medical Equipment Suppliers  None    Previous Hospitalizations:   Any hospitalizations or ED visits within the last 12 months?: Yes    How many hospitalizations have you had in the last year?: 1-2    Advance Care Planning:   Living will: Yes    Durable POA for healthcare: No    Advanced directive: No      Cognitive Screening:   Provider or family/friend/caregiver concerned regarding cognition?: No    PREVENTIVE SCREENINGS      Cardiovascular Screening:    General: Screening Current and Risks and Benefits Discussed      Diabetes Screening:     General: Screening Current and Risks and Benefits Discussed      Colorectal Cancer Screening:     General: Risks and Benefits Discussed    Due for: Cologuard      Breast Cancer Screening:     General: History Breast Cancer, Risks and Benefits Discussed and Screening Current      Cervical Cancer Screening:    General: Screening Not Indicated      Osteoporosis Screening:    General: Screening Not Indicated, History Osteoporosis, Risks and Benefits Discussed and Screening Current      Abdominal Aortic Aneurysm (AAA) Screening:        General: Screening Not Indicated      Lung Cancer Screening:     General: Screening Not Indicated      Hepatitis C Screening:    General: Screening Current    Screening, Brief Intervention, and Referral to Treatment (SBIRT)    Screening  Typical number of drinks in a day: 0  Typical number of drinks in a week: 0  Interpretation: Low risk drinking behavior      AUDIT-C Screenin) How often did you have a drink containing alcohol in the past year? never  2) How many drinks did you have on a typical day when you were drinking in the past year? 0  3) How often did you have 6 or more drinks on one occasion in the past year? never    AUDIT-C Score: 0  Interpretation: Score 0-2 (female): Negative screen for alcohol misuse    Single Item Drug Screening:  How often have you used an illegal drug (including marijuana) or a prescription medication for non-medical reasons in the past year? never    Single Item Drug Screen Score: 0  Interpretation: Negative screen for possible drug use disorder    Brief Intervention  Alcohol & drug use screenings were reviewed  No concerns regarding substance use disorder identified  Other Counseling Topics:   Regular weightbearing exercise  Visual Acuity Screening    Right eye Left eye Both eyes   Without correction:      With correction: 20/20 20/30 20/20   Comments: Passed Color            Physical Exam:     /78   Pulse 88   Temp 97 8 °F (36 6 °C)   Ht 5' 5 5" (1 664 m)   Wt 82 1 kg (181 lb)   SpO2 97%   BMI 29 66 kg/m²     Physical Exam  Constitutional:       General: She is not in acute distress  Appearance: Normal appearance  She is well-developed  She is not ill-appearing, toxic-appearing or diaphoretic  HENT:      Head: Normocephalic and atraumatic  Right Ear: External ear normal       Left Ear: External ear normal       Mouth/Throat:      Pharynx: No oropharyngeal exudate  Eyes:      General: No scleral icterus  Right eye: No discharge  Left eye: No discharge  Conjunctiva/sclera: Conjunctivae normal       Pupils: Pupils are equal, round, and reactive to light  Neck:      Thyroid: No thyromegaly  Cardiovascular:      Rate and Rhythm: Normal rate and regular rhythm  Heart sounds: Normal heart sounds  No murmur heard  No friction rub  No gallop  Pulmonary:      Effort: Pulmonary effort is normal  No respiratory distress  Breath sounds: Normal breath sounds   No wheezing or rales  Chest:      Chest wall: No tenderness  Abdominal:      General: Bowel sounds are normal  There is no distension  Palpations: Abdomen is soft  There is no mass  Tenderness: There is no abdominal tenderness  There is no guarding or rebound  Hernia: No hernia is present  Musculoskeletal:         General: No swelling  Right lower leg: No edema  Left lower leg: No edema  Comments: Bruised middle toe L foot   Lymphadenopathy:      Cervical: No cervical adenopathy  Skin:     General: Skin is warm  Findings: No rash  Neurological:      General: No focal deficit present  Mental Status: She is alert and oriented to person, place, and time  Mental status is at baseline  Cranial Nerves: No cranial nerve deficit  Gait: Gait normal    Psychiatric:         Mood and Affect: Mood normal          Behavior: Behavior normal          Thought Content: Thought content normal          Judgment: Judgment normal         EKG: normal EKG, normal sinus rhythm      Jonathan Thornton MD

## 2022-09-09 NOTE — PATIENT INSTRUCTIONS
Medicare Preventive Visit Patient Instructions  Thank you for completing your Welcome to Medicare Visit or Medicare Annual Wellness Visit today  Your next wellness visit will be due in one year (9/10/2023)  The screening/preventive services that you may require over the next 5-10 years are detailed below  Some tests may not apply to you based off risk factors and/or age  Screening tests ordered at today's visit but not completed yet may show as past due  Also, please note that scanned in results may not display below  Preventive Screenings:  Service Recommendations Previous Testing/Comments   Colorectal Cancer Screening  * Colonoscopy    * Fecal Occult Blood Test (FOBT)/Fecal Immunochemical Test (FIT)  * Fecal DNA/Cologuard Test  * Flexible Sigmoidoscopy Age: 39-70 years old   Colonoscopy: every 10 years (may be performed more frequently if at higher risk)  OR  FOBT/FIT: every 1 year  OR  Cologuard: every 3 years  OR  Sigmoidoscopy: every 5 years  Screening may be recommended earlier than age 39 if at higher risk for colorectal cancer  Also, an individualized decision between you and your healthcare provider will decide whether screening between the ages of 74-80 would be appropriate  Colonoscopy: 03/24/2009  FOBT/FIT: Not on file  Cologuard: Not on file  Sigmoidoscopy: Not on file          Breast Cancer Screening Age: 36 years old  Frequency: every 1-2 years  Not required if history of left and right mastectomy Mammogram: 06/13/2022    History Breast Cancer   Cervical Cancer Screening Between the ages of 21-29, pap smear recommended once every 3 years  Between the ages of 33-67, can perform pap smear with HPV co-testing every 5 years     Recommendations may differ for women with a history of total hysterectomy, cervical cancer, or abnormal pap smears in past  Pap Smear: 06/17/2021    Screening Not Indicated   Hepatitis C Screening Once for adults born between 1945 and 1965  More frequently in patients at high risk for Hepatitis C Hep C Antibody: 03/30/2019    Screening Current   Diabetes Screening 1-2 times per year if you're at risk for diabetes or have pre-diabetes Fasting glucose: 101 mg/dL (3/5/2022)  A1C: 5 8 % (3/5/2022)  Screening Current   Cholesterol Screening Once every 5 years if you don't have a lipid disorder  May order more often based on risk factors  Lipid panel: 03/05/2022    Screening Current     Other Preventive Screenings Covered by Medicare:  1  Abdominal Aortic Aneurysm (AAA) Screening: covered once if your at risk  You're considered to be at risk if you have a family history of AAA  2  Lung Cancer Screening: covers low dose CT scan once per year if you meet all of the following conditions: (1) Age 50-69; (2) No signs or symptoms of lung cancer; (3) Current smoker or have quit smoking within the last 15 years; (4) You have a tobacco smoking history of at least 20 pack years (packs per day multiplied by number of years you smoked); (5) You get a written order from a healthcare provider  3  Glaucoma Screening: covered annually if you're considered high risk: (1) You have diabetes OR (2) Family history of glaucoma OR (3)  aged 48 and older OR (3)  American aged 72 and older  3  Osteoporosis Screening: covered every 2 years if you meet one of the following conditions: (1) You're estrogen deficient and at risk for osteoporosis based off medical history and other findings; (2) Have a vertebral abnormality; (3) On glucocorticoid therapy for more than 3 months; (4) Have primary hyperparathyroidism; (5) On osteoporosis medications and need to assess response to drug therapy  · Last bone density test (DXA Scan): 06/23/2020   5  HIV Screening: covered annually if you're between the age of 15-65  Also covered annually if you are younger than 13 and older than 72 with risk factors for HIV infection   For pregnant patients, it is covered up to 3 times per pregnancy  Immunizations:  Immunization Recommendations   Influenza Vaccine Annual influenza vaccination during flu season is recommended for all persons aged >= 6 months who do not have contraindications   Pneumococcal Vaccine   * Pneumococcal conjugate vaccine = PCV13 (Prevnar 13), PCV15 (Vaxneuvance), PCV20 (Prevnar 20)  * Pneumococcal polysaccharide vaccine = PPSV23 (Pneumovax) Adults 25-60 years old: 1-3 doses may be recommended based on certain risk factors  Adults 72 years old: 1-2 doses may be recommended based off what pneumonia vaccine you previously received   Hepatitis B Vaccine 3 dose series if at intermediate or high risk (ex: diabetes, end stage renal disease, liver disease)   Tetanus (Td) Vaccine - COST NOT COVERED BY MEDICARE PART B Following completion of primary series, a booster dose should be given every 10 years to maintain immunity against tetanus  Td may also be given as tetanus wound prophylaxis  Tdap Vaccine - COST NOT COVERED BY MEDICARE PART B Recommended at least once for all adults  For pregnant patients, recommended with each pregnancy  Shingles Vaccine (Shingrix) - COST NOT COVERED BY MEDICARE PART B  2 shot series recommended in those aged 48 and above     Health Maintenance Due:      Topic Date Due    HIV Screening  Never done    Colorectal Cancer Screening  04/28/2022    Breast Cancer Screening: Mammogram  06/13/2023    Cervical Cancer Screening  06/17/2024    Hepatitis C Screening  Completed     Immunizations Due:      Topic Date Due    COVID-19 Vaccine (4 - Booster for Moderna series) 03/22/2022    Pneumococcal Vaccine: 65+ Years (1 - PCV) 04/11/2022    Influenza Vaccine (1) 09/01/2022     Advance Directives   What are advance directives? Advance directives are legal documents that state your wishes and plans for medical care  These plans are made ahead of time in case you lose your ability to make decisions for yourself   Advance directives can apply to any medical decision, such as the treatments you want, and if you want to donate organs  What are the types of advance directives? There are many types of advance directives, and each state has rules about how to use them  You may choose a combination of any of the following:  · Living will: This is a written record of the treatment you want  You can also choose which treatments you do not want, which to limit, and which to stop at a certain time  This includes surgery, medicine, IV fluid, and tube feedings  · Durable power of  for healthcare [facility]): This is a written record that states who you want to make healthcare choices for you when you are unable to make them for yourself  This person, called a proxy, is usually a family member or a friend  You may choose more than 1 proxy  · Do not resuscitate (DNR) order:  A DNR order is used in case your heart stops beating or you stop breathing  It is a request not to have certain forms of treatment, such as CPR  A DNR order may be included in other types of advance directives  · Medical directive: This covers the care that you want if you are in a coma, near death, or unable to make decisions for yourself  You can list the treatments you want for each condition  Treatment may include pain medicine, surgery, blood transfusions, dialysis, IV or tube feedings, and a ventilator (breathing machine)  · Values history: This document has questions about your views, beliefs, and how you feel and think about life  This information can help others choose the care that you would choose  Why are advance directives important? An advance directive helps you control your care  Although spoken wishes may be used, it is better to have your wishes written down  Spoken wishes can be misunderstood, or not followed  Treatments may be given even if you do not want them  An advance directive may make it easier for your family to make difficult choices about your care  Urinary Incontinence   Urinary incontinence (UI)  is when you lose control of your bladder  UI develops because your bladder cannot store or empty urine properly  The 3 most common types of UI are stress incontinence, urge incontinence, or both  Medicines:   · May be given to help strengthen your bladder control  Report any side effects of medication to your healthcare provider  Do pelvic muscle exercises often:  Your pelvic muscles help you stop urinating  Squeeze these muscles tight for 5 seconds, then relax for 5 seconds  Gradually work up to squeezing for 10 seconds  Do 3 sets of 15 repetitions a day, or as directed  This will help strengthen your pelvic muscles and improve bladder control  Train your bladder:  Go to the bathroom at set times, such as every 2 hours, even if you do not feel the urge to go  You can also try to hold your urine when you feel the urge to go  For example, hold your urine for 5 minutes when you feel the urge to go  As that becomes easier, hold your urine for 10 minutes  Self-care:   · Keep a UI record  Write down how often you leak urine and how much you leak  Make a note of what you were doing when you leaked urine  · Drink liquids as directed  You may need to limit the amount of liquid you drink to help control your urine leakage  Do not drink any liquid right before you go to bed  Limit or do not have drinks that contain caffeine or alcohol  · Prevent constipation  Eat a variety of high-fiber foods  Good examples are high-fiber cereals, beans, vegetables, and whole-grain breads  Walking is the best way to trigger your intestines to have a bowel movement  · Exercise regularly and maintain a healthy weight  Weight loss and exercise will decrease pressure on your bladder and help you control your leakage  · Use a catheter as directed  to help empty your bladder  A catheter is a tiny, plastic tube that is put into your bladder to drain your urine     · Go to behavior therapy as directed  Behavior therapy may be used to help you learn to control your urge to urinate  Weight Management   Why it is important to manage your weight:  Being overweight increases your risk of health conditions such as heart disease, high blood pressure, type 2 diabetes, and certain types of cancer  It can also increase your risk for osteoarthritis, sleep apnea, and other respiratory problems  Aim for a slow, steady weight loss  Even a small amount of weight loss can lower your risk of health problems  How to lose weight safely:  A safe and healthy way to lose weight is to eat fewer calories and get regular exercise  You can lose up about 1 pound a week by decreasing the number of calories you eat by 500 calories each day  Healthy meal plan for weight management:  A healthy meal plan includes a variety of foods, contains fewer calories, and helps you stay healthy  A healthy meal plan includes the following:  · Eat whole-grain foods more often  A healthy meal plan should contain fiber  Fiber is the part of grains, fruits, and vegetables that is not broken down by your body  Whole-grain foods are healthy and provide extra fiber in your diet  Some examples of whole-grain foods are whole-wheat breads and pastas, oatmeal, brown rice, and bulgur  · Eat a variety of vegetables every day  Include dark, leafy greens such as spinach, kale, lonnie greens, and mustard greens  Eat yellow and orange vegetables such as carrots, sweet potatoes, and winter squash  · Eat a variety of fruits every day  Choose fresh or canned fruit (canned in its own juice or light syrup) instead of juice  Fruit juice has very little or no fiber  · Eat low-fat dairy foods  Drink fat-free (skim) milk or 1% milk  Eat fat-free yogurt and low-fat cottage cheese  Try low-fat cheeses such as mozzarella and other reduced-fat cheeses  · Choose meat and other protein foods that are low in fat    Choose beans or other legumes such as split peas or lentils  Choose fish, skinless poultry (chicken or turkey), or lean cuts of red meat (beef or pork)  Before you cook meat or poultry, cut off any visible fat  · Use less fat and oil  Try baking foods instead of frying them  Add less fat, such as margarine, sour cream, regular salad dressing and mayonnaise to foods  Eat fewer high-fat foods  Some examples of high-fat foods include french fries, doughnuts, ice cream, and cakes  · Eat fewer sweets  Limit foods and drinks that are high in sugar  This includes candy, cookies, regular soda, and sweetened drinks  Exercise:  Exercise at least 30 minutes per day on most days of the week  Some examples of exercise include walking, biking, dancing, and swimming  You can also fit in more physical activity by taking the stairs instead of the elevator or parking farther away from stores  Ask your healthcare provider about the best exercise plan for you  © Copyright "[employer] 2018 Information is for End User's use only and may not be sold, redistributed or otherwise used for commercial purposes   All illustrations and images included in CareNotes® are the copyrighted property of A D A M , Inc  or [address] [place] [address]

## 2022-09-14 DIAGNOSIS — C50.412 MALIGNANT NEOPLASM OF UPPER-OUTER QUADRANT OF LEFT BREAST IN FEMALE, ESTROGEN RECEPTOR POSITIVE (HCC): ICD-10-CM

## 2022-09-14 DIAGNOSIS — R23.2 HOT FLASHES: ICD-10-CM

## 2022-09-14 DIAGNOSIS — Z17.0 MALIGNANT NEOPLASM OF UPPER-OUTER QUADRANT OF LEFT BREAST IN FEMALE, ESTROGEN RECEPTOR POSITIVE (HCC): ICD-10-CM

## 2022-09-14 RX ORDER — VENLAFAXINE 50 MG/1
50 TABLET ORAL DAILY
Qty: 30 TABLET | Refills: 1 | Status: SHIPPED | OUTPATIENT
Start: 2022-09-14

## 2022-09-14 NOTE — TELEPHONE ENCOUNTER
Upon review of the In Basket request we were able to locate, review, and update the patient chart as requested for DEXA Scan  Any additional questions or concerns should be emailed to the Practice Liaisons via [email or web address] email, please do not reply via In Basket      Thank you  Jeanine Gates MA

## 2022-09-28 ENCOUNTER — PREP FOR PROCEDURE (OUTPATIENT)
Dept: PLASTIC SURGERY | Facility: CLINIC | Age: 67
End: 2022-09-28

## 2022-09-28 DIAGNOSIS — Z17.0 ESTROGEN RECEPTOR POSITIVE: ICD-10-CM

## 2022-09-28 DIAGNOSIS — C50.412 MALIGNANT NEOPLASM OF UPPER-OUTER QUADRANT OF LEFT FEMALE BREAST, UNSPECIFIED ESTROGEN RECEPTOR STATUS (HCC): Primary | ICD-10-CM

## 2022-10-14 LAB — COLOGUARD RESULT REPORTABLE: NEGATIVE

## 2022-11-02 DIAGNOSIS — C50.412 MALIGNANT NEOPLASM OF UPPER-OUTER QUADRANT OF LEFT BREAST IN FEMALE, ESTROGEN RECEPTOR POSITIVE (HCC): ICD-10-CM

## 2022-11-02 DIAGNOSIS — R23.2 HOT FLASHES: ICD-10-CM

## 2022-11-02 DIAGNOSIS — Z17.0 MALIGNANT NEOPLASM OF UPPER-OUTER QUADRANT OF LEFT BREAST IN FEMALE, ESTROGEN RECEPTOR POSITIVE (HCC): ICD-10-CM

## 2022-11-02 RX ORDER — VENLAFAXINE 50 MG/1
50 TABLET ORAL DAILY
Qty: 30 TABLET | Refills: 1 | Status: SHIPPED | OUTPATIENT
Start: 2022-11-02

## 2022-11-07 ENCOUNTER — APPOINTMENT (OUTPATIENT)
Dept: LAB | Facility: CLINIC | Age: 67
End: 2022-11-07

## 2022-11-07 DIAGNOSIS — C50.412 MALIGNANT NEOPLASM OF UPPER-OUTER QUADRANT OF LEFT FEMALE BREAST, UNSPECIFIED ESTROGEN RECEPTOR STATUS (HCC): ICD-10-CM

## 2022-11-07 DIAGNOSIS — Z17.0 ESTROGEN RECEPTOR POSITIVE: ICD-10-CM

## 2022-11-07 LAB
ANION GAP SERPL CALCULATED.3IONS-SCNC: 7 MMOL/L (ref 4–13)
BASOPHILS # BLD AUTO: 0.04 THOUSANDS/ÂΜL (ref 0–0.1)
BASOPHILS NFR BLD AUTO: 1 % (ref 0–1)
BUN SERPL-MCNC: 12 MG/DL (ref 5–25)
CALCIUM SERPL-MCNC: 9.9 MG/DL (ref 8.3–10.1)
CHLORIDE SERPL-SCNC: 108 MMOL/L (ref 96–108)
CO2 SERPL-SCNC: 24 MMOL/L (ref 21–32)
CREAT SERPL-MCNC: 0.72 MG/DL (ref 0.6–1.3)
EOSINOPHIL # BLD AUTO: 0.19 THOUSAND/ÂΜL (ref 0–0.61)
EOSINOPHIL NFR BLD AUTO: 3 % (ref 0–6)
ERYTHROCYTE [DISTWIDTH] IN BLOOD BY AUTOMATED COUNT: 11.9 % (ref 11.6–15.1)
GFR SERPL CREATININE-BSD FRML MDRD: 88 ML/MIN/1.73SQ M
GLUCOSE P FAST SERPL-MCNC: 98 MG/DL (ref 65–99)
HCT VFR BLD AUTO: 42.9 % (ref 34.8–46.1)
HGB BLD-MCNC: 14.2 G/DL (ref 11.5–15.4)
IMM GRANULOCYTES # BLD AUTO: 0.02 THOUSAND/UL (ref 0–0.2)
IMM GRANULOCYTES NFR BLD AUTO: 0 % (ref 0–2)
LYMPHOCYTES # BLD AUTO: 2.14 THOUSANDS/ÂΜL (ref 0.6–4.47)
LYMPHOCYTES NFR BLD AUTO: 29 % (ref 14–44)
MCH RBC QN AUTO: 32.2 PG (ref 26.8–34.3)
MCHC RBC AUTO-ENTMCNC: 33.1 G/DL (ref 31.4–37.4)
MCV RBC AUTO: 97 FL (ref 82–98)
MONOCYTES # BLD AUTO: 0.47 THOUSAND/ÂΜL (ref 0.17–1.22)
MONOCYTES NFR BLD AUTO: 6 % (ref 4–12)
NEUTROPHILS # BLD AUTO: 4.51 THOUSANDS/ÂΜL (ref 1.85–7.62)
NEUTS SEG NFR BLD AUTO: 61 % (ref 43–75)
NRBC BLD AUTO-RTO: 0 /100 WBCS
PLATELET # BLD AUTO: 291 THOUSANDS/UL (ref 149–390)
PMV BLD AUTO: 9.9 FL (ref 8.9–12.7)
POTASSIUM SERPL-SCNC: 4.1 MMOL/L (ref 3.5–5.3)
RBC # BLD AUTO: 4.41 MILLION/UL (ref 3.81–5.12)
SODIUM SERPL-SCNC: 139 MMOL/L (ref 135–147)
WBC # BLD AUTO: 7.37 THOUSAND/UL (ref 4.31–10.16)

## 2022-11-08 PROBLEM — Z00.00 WELCOME TO MEDICARE PREVENTIVE VISIT: Status: RESOLVED | Noted: 2022-09-09 | Resolved: 2022-11-08

## 2022-11-10 ENCOUNTER — VBI (OUTPATIENT)
Dept: ADMINISTRATIVE | Facility: OTHER | Age: 67
End: 2022-11-10

## 2022-11-17 ENCOUNTER — OFFICE VISIT (OUTPATIENT)
Dept: FAMILY MEDICINE CLINIC | Facility: CLINIC | Age: 67
End: 2022-11-17

## 2022-11-17 VITALS
HEART RATE: 97 BPM | WEIGHT: 181 LBS | OXYGEN SATURATION: 97 % | TEMPERATURE: 98.3 F | BODY MASS INDEX: 29.09 KG/M2 | HEIGHT: 66 IN | DIASTOLIC BLOOD PRESSURE: 74 MMHG | SYSTOLIC BLOOD PRESSURE: 110 MMHG

## 2022-11-17 DIAGNOSIS — Z17.0 MALIGNANT NEOPLASM OF UPPER-OUTER QUADRANT OF LEFT BREAST IN FEMALE, ESTROGEN RECEPTOR POSITIVE (HCC): Primary | ICD-10-CM

## 2022-11-17 DIAGNOSIS — C50.412 MALIGNANT NEOPLASM OF UPPER-OUTER QUADRANT OF LEFT BREAST IN FEMALE, ESTROGEN RECEPTOR POSITIVE (HCC): Primary | ICD-10-CM

## 2022-11-17 NOTE — PROGRESS NOTES
Yessy Rodriguez 1955 female MRN: [id number]        ASSESSMENT/PLAN  Problem List Items Addressed This Visit        Other    Malignant neoplasm of upper-outer quadrant of left breast in female, estrogen receptor positive ([facility] [address] ) - Primary       High Risk Surgery: no  CAD: no  CHF: no  CVD: no  DM2 on insulin: no  Cr>2: no        Yessy Rodriguez is undergoing a Low Risk surgery  She is at [address] for major adverse cardiac event (MACE)  She may proceed with surgery as planned without further workup  SUBJECTIVE  CC: Pre-op Exam (Right Breast reduction and Left mastopexy 12/6)      HPI:  Yessy Rodriguez is a 74 y o  female who is planning to undergo breast surgery under general by Dr Angie Borges on 12/6/22  Patient has not had complications with anesthesia in the past   Functional status: good     She had labs done - CBC, BMP normal  EKG in September was normal     Review of Systems   Constitutional: Negative for activity change  Respiratory: Negative for chest tightness and shortness of breath  Cardiovascular: Negative for chest pain and leg swelling  Neurological: Negative for headaches  Psychiatric/Behavioral: Negative for dysphoric mood  The patient is not nervous/anxious  Historical Information   The patient history was reviewed as follows:    Past Medical History:   Diagnosis Date   • [facility] ) 09/2019    left breast   • History of radiation therapy 10/2019    left breast cancer   • Migraine    • Osteoporosis    • Pleurisy    • Varicella    • Vertigo      Past Surgical History:   Procedure Laterality Date   • BREAST LUMPECTOMY Left 10/01/2019    Procedure: BREAST NEEDLE LOCALIZED LUMPECTOMY (NEEDLE LOC AT 0730);   Surgeon: Sudheer Poe MD;  [facility];  Service: Surgical Oncology   • CHOLECYSTECTOMY     • COLONOSCOPY     • DENTAL SURGERY      Bone graft with implant   • EPIDURAL BLOCK INJECTION  02/2022   • HALLUX VALGUS CORRECTION Bilateral    • LYMPH NODE BIOPSY Left 10/01/2019 Procedure: SENTINEL LYMPH NODE BIOPSY; LYMPHATIC MAPPING WITH BLUE DYE AND RADIOACTIVE DYE (INJECT AT 0830 BY DR HA IN THE OR);   Surgeon: Alex Mcginnis MD;  [facility];  Service: Surgical Oncology   • MAMMO NEEDLE LOCALIZATION LEFT (ALL INC) Left 10/01/2019   • RHINOPLASTY       Family History   Problem Relation Age of Onset   • No Known Problems Mother    • Lung cancer Father 71   • No Known Problems Maternal Grandmother    • No Known Problems Maternal Grandfather    • No Known Problems Paternal Grandmother    • No Known Problems Paternal Grandfather    • Breast cancer Neg Hx       Social History       Medications:     Current Outpatient Medications:   •  acetaminophen (TYLENOL) 500 mg tablet, Take by mouth every 6 (six) hours as needed , Disp: , Rfl:   •  anastrozole (ARIMIDEX) 1 mg tablet, Take 1 tablet (1 mg total) by mouth daily, Disp: 90 tablet, Rfl: 1  •  Cholecalciferol (VITAMIN D) 2000 units CAPS, Take 4,000 Units by mouth daily , Disp: , Rfl:   •  cyclobenzaprine (FLEXERIL) 10 mg tablet, Take 1 tablet (10 mg total) by mouth 3 (three) times a day as needed for muscle spasms, Disp: 20 tablet, Rfl: 0  •  Diclofenac Sodium (VOLTAREN) 1 %, Apply 2 g topically 4 (four) times a day, Disp: 50 g, Rfl: 0  •  Ivermectin 1 % CREA, Apply topically daily , Disp: , Rfl:   •  meclizine (ANTIVERT) 12 5 MG tablet, Take 12 5 mg by mouth 3 (three) times a day as needed for dizziness, Disp: , Rfl:   •  pimecrolimus (ELIDEL) 1 % cream, , Disp: , Rfl:   •  SUMAtriptan (IMITREX) 100 mg tablet, Take 100 mg by mouth once as needed for migraine, Disp: , Rfl:   •  venlafaxine (EFFEXOR) 50 mg tablet, TAKE 1 TABLET (50 MG TOTAL) BY MOUTH DAILY, Disp: 30 tablet, Rfl: 1  Allergies   Allergen Reactions   • Ciprofloxacin Seizures   • Cyclopentolate Other (See Comments) and Tachycardia     Resp distress   • Sulfa Antibiotics Hives and Rash   • Scopolamine Other (See Comments)     Precautionary for cycopentolate allergy OBJECTIVE    Vitals:   Vitals:    11/17/22 1052   BP: 110/74   BP Location: Left arm   Patient Position: Sitting   Cuff Size: Large   Pulse: 97   Temp: 98 3 °F (36 8 °C)   SpO2: 97%   Weight: 82 1 kg (181 lb)   Height: 5' 5 5" (1 664 m)           Physical Exam  Vitals and nursing note reviewed  Constitutional:       General: She is not in acute distress  Appearance: Normal appearance  She is not ill-appearing, toxic-appearing or diaphoretic  HENT:      Head: Normocephalic and atraumatic  Right Ear: External ear normal       Left Ear: External ear normal    Cardiovascular:      Rate and Rhythm: Normal rate and regular rhythm  Heart sounds: No murmur heard  No friction rub  Pulmonary:      Effort: Pulmonary effort is normal  No respiratory distress  Breath sounds: Normal breath sounds  No stridor  No wheezing, rhonchi or rales  Musculoskeletal:         General: No swelling  Right lower leg: No edema  Left lower leg: No edema  Neurological:      General: No focal deficit present  Mental Status: She is alert  Mental status is at baseline  Psychiatric:         Attention and Perception: Attention normal          Mood and Affect: Mood normal          Speech: Speech normal          Behavior: Behavior normal          Thought Content:  Thought content normal          Judgment: Judgment normal             Rissa Powell MD  [facility] [address] [facility]   11/17/2022  11:11 AM

## 2022-11-29 ENCOUNTER — OFFICE VISIT (OUTPATIENT)
Dept: PLASTIC SURGERY | Facility: CLINIC | Age: 67
End: 2022-11-29

## 2022-11-29 VITALS
DIASTOLIC BLOOD PRESSURE: 89 MMHG | BODY MASS INDEX: 29.09 KG/M2 | HEART RATE: 91 BPM | SYSTOLIC BLOOD PRESSURE: 135 MMHG | HEIGHT: 66 IN | TEMPERATURE: 98.5 F | WEIGHT: 181 LBS

## 2022-11-29 DIAGNOSIS — Z01.818 PREOPERATIVE EXAMINATION: ICD-10-CM

## 2022-11-29 DIAGNOSIS — N64.89 BREAST ASYMMETRY: Primary | ICD-10-CM

## 2022-11-29 NOTE — PROGRESS NOTES
Subjective   Patient ID: Sean Welsh is a 74 y o  female  Vitals:    11/29/22 1122   BP: 135/89   Pulse: 91   Temp: 98 5 °F (36 9 °C)     HPI Patient is a 72-year-old female who presents today for preoperative H&P for right breast reduction and left mastopexy  Pt has a history of left-sided breast cancer treated by lumpectomy and radiation, this was in 2019, this subsequently left the patient with significant breast asymmetry  She feels that her right side is much larger than her left and is interested today and possible reduction for symmetry  The patient is hard of hearing, she wears hearing aids  She takes maintenance chemotherapy, she has recently had a mammogram (6/13/22) which was within normal limits and states that she has very aggressive with her screening, she has not felt any lumps or masses in her breast  She does not take steroids or blood thinners, she is a nonsmoker  She has a history of a cholecystectomy  The patient reports multiple medication allergies  Davis Sakentrell has received medical clearance for surgery  Past Medical History:   Diagnosis Date   • Breast cancer ([facility] [address] ) 09/2019    left breast   • History of radiation therapy 10/2019    left breast cancer   • Migraine    • Osteoporosis    • Pleurisy    • Varicella    • Vertigo      Patient Active Problem List   Diagnosis   • Fatigue   • Age-related osteoporosis without current pathological fracture   • Malignant neoplasm of upper-outer quadrant of left breast in female, estrogen receptor positive (HCC)   • Use of exemestane (Aromasin)   • Overweight (BMI 25 0-29  9)   • Numbness of both lower extremities   • Prediabetes   • Hearing aid worn   • Spinal stenosis of lumbar region with neurogenic claudication   • Lumbar spondylolysis   • Lumbar foraminal stenosis   • Lumbar disc disease with radiculopathy   • Hot flashes   • Urge urinary incontinence       Past Surgical History:   Procedure Laterality Date   • BREAST LUMPECTOMY Left 10/01/2019 Procedure: BREAST NEEDLE LOCALIZED LUMPECTOMY (NEEDLE LOC AT 0730); Surgeon: Richard Villalta MD;  [facility];  Service: Surgical Oncology   • CHOLECYSTECTOMY     • COLONOSCOPY     • DENTAL SURGERY      Bone graft with implant   • EPIDURAL BLOCK INJECTION  02/2022   • [address] Bilateral    • LYMPH NODE BIOPSY Left 10/01/2019    Procedure: SENTINEL LYMPH NODE BIOPSY; LYMPHATIC MAPPING WITH BLUE DYE AND RADIOACTIVE DYE (INJECT AT 0830 BY DR HA IN THE OR); Surgeon: Richard Villalta MD;  [facility];  Service: Surgical Oncology   • MAMMO NEEDLE LOCALIZATION LEFT (ALL INC) Left 10/01/2019   • RHINOPLASTY       Current Outpatient Medications   Medication Instructions   • acetaminophen (TYLENOL) 500 mg tablet Oral, Every 6 hours PRN   • anastrozole (ARIMIDEX) 1 mg, Oral, Daily   • cyclobenzaprine (FLEXERIL) 10 mg, Oral, 3 times daily PRN   • Diclofenac Sodium (VOLTAREN) 2 g, Topical, 4 times daily   • Ivermectin 1 % CREA Apply externally, Daily   • meclizine (ANTIVERT) 12 5 mg, Oral, 3 times daily PRN   • pimecrolimus (ELIDEL) 1 % cream No dose, route, or frequency recorded  • SUMAtriptan (IMITREX) 100 mg, Oral, Once as needed   • venlafaxine (EFFEXOR) 50 mg, Oral, Daily   • Vitamin D 4,000 Units, Oral, Daily       Social History     Social History Narrative   • Not on file     Social History     Tobacco Use   Smoking Status Never   Smokeless Tobacco Never        ROS:  Per HPI     Objective   Vitals:    11/29/22 1122   BP: 135/89   Pulse: 91   Temp: 98 5 °F (36 9 °C)       Physical Exam   Constitutional  She appears well-developed and well-nourished  Eyes  Pupils are equal, round, and reactive to light  System normal      General -             Right: Right eye extraocular movements are normal              Left: Left eye extraocular movements are normal      Heart -   RRR    Lungs -   Normal respiratory effort     Psychiatric  She has a normal mood and affect   Her behavior is normal  Judgment and thought content normal    Bilateral breasts-  Significant asymmetry between left and right breast   Right breast larger than left, left breast with contour deformity of lateral breast  No palpable masses or adenopathy  No rashes or nipple discharge  Grade II ptosis    Measurements- R- SN 29 NIMF 11                              L- SN 26 NIMF10 NN 26        Assessment/Plan  79-year-old female with breast asymmetry, status post lumpectomy      Medical clearance reviewed  Patient aware to hold Arimidex 5 days prior to surgery  Surgical consent signed  Patient UTD on mammogram  The surgery was discussed in detail along with the expected surgical time, hospital stay and recovery time  The use of drains and PICOs was discussed  Risks of the surgery was discussed in detail including bleeding, infection, scarring, hematoma, seroma, skin/nipple necrosis, wound healing delay, asymmetry, contour deformity, blood clots, and need for additional procedures  The patient understands and agrees to the plan  Pt also seen by Dr Ana Mcgrath PA-C  Plastic & Reconstructive Surgery

## 2022-12-06 ENCOUNTER — TELEPHONE (OUTPATIENT)
Dept: PLASTIC SURGERY | Facility: CLINIC | Age: 67
End: 2022-12-06

## 2023-01-14 DIAGNOSIS — C50.412 MALIGNANT NEOPLASM OF UPPER-OUTER QUADRANT OF LEFT BREAST IN FEMALE, ESTROGEN RECEPTOR POSITIVE (HCC): ICD-10-CM

## 2023-01-14 DIAGNOSIS — R23.2 HOT FLASHES: ICD-10-CM

## 2023-01-14 DIAGNOSIS — Z17.0 MALIGNANT NEOPLASM OF UPPER-OUTER QUADRANT OF LEFT BREAST IN FEMALE, ESTROGEN RECEPTOR POSITIVE (HCC): ICD-10-CM

## 2023-01-16 RX ORDER — VENLAFAXINE 50 MG/1
50 TABLET ORAL DAILY
Qty: 30 TABLET | Refills: 1 | Status: SHIPPED | OUTPATIENT
Start: 2023-01-16

## 2023-01-23 ENCOUNTER — TELEPHONE (OUTPATIENT)
Dept: PLASTIC SURGERY | Facility: CLINIC | Age: 68
End: 2023-01-23

## 2023-01-23 DIAGNOSIS — N64.89 BREAST ASYMMETRY: ICD-10-CM

## 2023-01-23 DIAGNOSIS — Z01.818 PREOPERATIVE EXAMINATION: Primary | ICD-10-CM

## 2023-01-24 ENCOUNTER — OFFICE VISIT (OUTPATIENT)
Dept: SURGICAL ONCOLOGY | Facility: CLINIC | Age: 68
End: 2023-01-24

## 2023-01-24 ENCOUNTER — OFFICE VISIT (OUTPATIENT)
Dept: HEMATOLOGY ONCOLOGY | Facility: CLINIC | Age: 68
End: 2023-01-24

## 2023-01-24 VITALS
RESPIRATION RATE: 17 BRPM | OXYGEN SATURATION: 97 % | TEMPERATURE: 97.4 F | DIASTOLIC BLOOD PRESSURE: 78 MMHG | HEART RATE: 96 BPM | SYSTOLIC BLOOD PRESSURE: 120 MMHG | WEIGHT: 183 LBS | BODY MASS INDEX: 29.41 KG/M2 | HEIGHT: 66 IN

## 2023-01-24 DIAGNOSIS — C50.412 MALIGNANT NEOPLASM OF UPPER-OUTER QUADRANT OF LEFT BREAST IN FEMALE, ESTROGEN RECEPTOR POSITIVE (HCC): Primary | ICD-10-CM

## 2023-01-24 DIAGNOSIS — Z17.0 MALIGNANT NEOPLASM OF UPPER-OUTER QUADRANT OF LEFT BREAST IN FEMALE, ESTROGEN RECEPTOR POSITIVE (HCC): Primary | ICD-10-CM

## 2023-01-24 DIAGNOSIS — Z79.811 USE OF EXEMESTANE (AROMASIN): ICD-10-CM

## 2023-01-24 NOTE — PROGRESS NOTES
Surgical Oncology Follow Up       [address] [facility],[address]  [facility]  [address] [facility] [address]  [facility] [address]    [place] Park  1955  [id number]  [address]  [facility]  [address] [address]    Chief Complaint   Patient presents with   • Follow-up       Assessment/Plan:  1  Malignant neoplasm of upper-outer quadrant of left breast in female, estrogen receptor positive ([facility] [address] )  - 6 month follow up  - Mammo diagnostic bilateral w 3d & cad; Future    2  Use of exemestane (Aromasin)  - continue use per medical oncology      Discussion/Summary: Patient is a 79-year-old female presenting today for six-month follow-up for left breast cancer diagnosed in July 2019  Pathology revealed invasive lobular carcinoma ER/%, HER2 -  She had a left breast lumpectomy with sentinel node biopsy with Dr Maureen Montoya and completed whole breast radiation therapy  She is currently on exemestane  She is scheduled for right breast reduction next month with Dr Andrew Cruz  She is due for a mammogram in June  I will see the patient back in 6 months or sooner should the need arise  She was instructed to call with any questions or concerns prior to this time  All questions were answered today        History of Present Illness:     Oncology History   Malignant neoplasm of upper-outer quadrant of left breast in female, estrogen receptor positive ([facility] [address] )   7/23/2019 Biopsy    Left breast biopsy  1 o'clock, 5 cm from nipple  Invasive lobular carcinoma  Grade 1      HER2 0    Done at [facility]     10/1/2019 Surgery    Left breast needle localized lumpectomy with sentinel lymph node biopsy  Invasive lobular carcinoma  Grade 1  1 8 cm  Margins negative  0/2 Lymph nodes  Anatomic/Prognostic Stage IA     10/30/2019 -  Hormone Therapy    Anastrozole 1 mg daily  Dr Rossi Joyner     11/13/2019 - 12/4/2019 Radiation    entire right breast to 4005cGy in 15 daily 267cGy fractions  Dr Amos Coe          -Interval History: Patient is a 26-year-old female presenting today for six-month follow-up for left breast cancer diagnosed in July 2019  She is currently on exemestane  She is scheduled for right breast reduction next month with Dr [employer]  She states she fell into hole outside and hurt her wrist  It is not broken  I encouraged her to wear a brace for support  Patient denies changes on her breast exam  She denies persistent headaches, bone pain, back pain, shortness of breath, or abdominal pain  Review of Systems:  Review of Systems   Constitutional: Negative for activity change, appetite change, fatigue and unexpected weight change  Respiratory: Negative for cough and shortness of breath  Cardiovascular: Negative for chest pain  Gastrointestinal: Negative for abdominal pain, diarrhea, nausea and vomiting  Endocrine: Negative for heat intolerance  Musculoskeletal: Negative for arthralgias, back pain and myalgias  Skin: Negative for rash  Neurological: Negative for weakness and headaches  Hematological: Negative for adenopathy  Patient Active Problem List   Diagnosis   • Fatigue   • Age-related osteoporosis without current pathological fracture   • Malignant neoplasm of upper-outer quadrant of left breast in female, estrogen receptor positive (HCC)   • Use of exemestane (Aromasin)   • Overweight (BMI 25 0-29  9)   • Numbness of both lower extremities   • Prediabetes   • Hearing aid worn   • Spinal stenosis of lumbar region with neurogenic claudication   • Lumbar spondylolysis   • Lumbar foraminal stenosis   • Lumbar disc disease with radiculopathy   • Hot flashes   • Urge urinary incontinence     Past Medical History:   Diagnosis Date   • Breast cancer ([facility] [address] ) 09/2019    left breast   • History of radiation therapy 10/2019    left breast cancer   • Migraine    • Osteoporosis    • Pancreatitis    • Pleurisy    • PONV (postoperative nausea and vomiting)    • Varicella    • Vertigo      Past Surgical History:   Procedure Laterality Date   • BREAST LUMPECTOMY Left 10/01/2019    Procedure: BREAST NEEDLE LOCALIZED LUMPECTOMY (NEEDLE LOC AT 0730); Surgeon: Thuy Montoya MD;  [facility];  Service: Surgical Oncology   • CHOLECYSTECTOMY     • COLONOSCOPY     • DENTAL SURGERY      Bone graft with implant   • EPIDURAL BLOCK INJECTION  02/2022   • [address] Bilateral    • LYMPH NODE BIOPSY Left 10/01/2019    Procedure: SENTINEL LYMPH NODE BIOPSY; LYMPHATIC MAPPING WITH BLUE DYE AND RADIOACTIVE DYE (INJECT AT 0830 BY DR HA IN THE OR); Surgeon: Thuy Montoya MD;  [facility];  Service: Surgical Oncology   • MAMMO NEEDLE LOCALIZATION LEFT (ALL INC) Left 10/01/2019   • RHINOPLASTY       Family History   Problem Relation Age of Onset   • No Known Problems Mother    • Lung cancer Father 71   • No Known Problems Maternal Grandmother    • No Known Problems Maternal Grandfather    • No Known Problems Paternal Grandmother    • No Known Problems Paternal Grandfather    • Breast cancer Neg Hx      Social History     Socioeconomic History   • Marital status:       Spouse name: Not on file   • Number of children: 2   • Years of education: Not on file   • Highest education level: Not on file   Occupational History   • Occupation: [employer] works part-time   • Occupation: medical professional   Tobacco Use   • Smoking status: Never   • Smokeless tobacco: Never   Vaping Use   • Vaping Use: Never used   Substance and Sexual Activity   • Alcohol use: No   • Drug use: Never   • Sexual activity: Not Currently     Partners: Male     Birth control/protection: None, Post-menopausal   Other Topics Concern   • Not on file   Social History Narrative   • Not on file     Social Determinants of Health     Financial Resource Strain: Low Risk    • Difficulty of Paying Living Expenses: Not hard at all   Food Insecurity: Not on file Transportation Needs: No Transportation Needs   • Lack of Transportation (Medical): No   • Lack of Transportation (Non-Medical): No   Physical Activity: Not on file   Stress: Not on file   Social Connections: Not on file   Intimate Partner Violence: Not on file   Housing Stability: Not on file       Current Outpatient Medications:   •  acetaminophen (TYLENOL) 500 mg tablet, Take by mouth every 6 (six) hours as needed , Disp: , Rfl:   •  anastrozole (ARIMIDEX) 1 mg tablet, Take 1 tablet (1 mg total) by mouth daily, Disp: 90 tablet, Rfl: 1  •  Cholecalciferol (VITAMIN D) 2000 units CAPS, Take 4,000 Units by mouth daily , Disp: , Rfl:   •  Diclofenac Sodium (VOLTAREN) 1 %, Apply 2 g topically 4 (four) times a day, Disp: 50 g, Rfl: 0  •  Ivermectin 1 % CREA, Apply topically daily , Disp: , Rfl:   •  meclizine (ANTIVERT) 12 5 MG tablet, Take 12 5 mg by mouth 3 (three) times a day as needed for dizziness, Disp: , Rfl:   •  pimecrolimus (ELIDEL) 1 % cream, , Disp: , Rfl:   •  SUMAtriptan (IMITREX) 100 mg tablet, Take 100 mg by mouth once as needed for migraine, Disp: , Rfl:   •  venlafaxine (EFFEXOR) 50 mg tablet, TAKE 1 TABLET (50 MG TOTAL) BY MOUTH DAILY, Disp: 30 tablet, Rfl: 1  Allergies   Allergen Reactions   • Ciprofloxacin Seizures   • Cyclopentolate Other (See Comments) and Tachycardia     Resp distress   • Sulfa Antibiotics Hives and Rash   • Scopolamine Other (See Comments)     Precautionary for cycopentolate allergy     Vitals:    01/24/23 0956   Resp: 17       Physical Exam  Constitutional:       General: She is not in acute distress  Appearance: Normal appearance  Cardiovascular:      Rate and Rhythm: Normal rate and regular rhythm  Pulses: Normal pulses  Heart sounds: Normal heart sounds  Pulmonary:      Effort: Pulmonary effort is normal       Breath sounds: Normal breath sounds  Chest:      Chest wall: No mass     Breasts:     Right: No swelling, bleeding, inverted nipple, mass, nipple discharge, skin change or tenderness  Left: No swelling, bleeding, inverted nipple, mass, nipple discharge, skin change or tenderness  Comments: Left breast lumpectomy scar with sln bx scar  No masses, nodularity, skin changes, nipple changes or discharge, or adenopathy appreciated on physical exam      Abdominal:      General: Abdomen is flat  Palpations: Abdomen is soft  Lymphadenopathy:      Upper Body:      Right upper body: No supraclavicular, axillary or pectoral adenopathy  Left upper body: No supraclavicular, axillary or pectoral adenopathy  Skin:     General: Skin is warm  Neurological:      General: No focal deficit present  Mental Status: She is alert and oriented to person, place, and time  Psychiatric:         Mood and Affect: Mood normal          Behavior: Behavior normal            Results:    Imaging  No results found  I reviewed the above imaging data  Advance Care Planning/Advance Directives:  Discussed disease status, cancer treatment plans and/or cancer treatment goals with the patient

## 2023-01-24 NOTE — PROGRESS NOTES
Hematology / Oncology Outpatient Follow Up Note    Abimael Cast 74 y o  female DLF:[id number] UTX:[id number]         Date:  1/24/2023    Assessment / Plan:  A 56-year-old postmenopausal woman with stage IA left breast cancer, grade 1, with invasive lobular histology, % positive, % positive, HER2 negative disease   She underwent lumpectomy and sentinel lymph node biopsy, resulting in JOSUÉ    She is currently on adjuvant hormonal therapy with anastrozole  with reasonable tolerance  Clinically, she has no evidence of recurrent disease  I recommended her to stay on anastrozole for 2 more years to complete 5 years of adjuvant hormonal therapy  She is in agreement with my recommendation  I will see her again in a year for routine follow-up       Subjective:      HPI:  A 28-year-old postmenopausal woman who was on hormone replacement therapy in the past for 20 years  Yudith Rosas was recently found to have abnormality in her left breast based on a screening mammography  Jerica Ravi, she underwent left breast biopsy in July 23, 2019 which showed invasive lobular carcinoma, grade 1  Subsequently, she underwent lumpectomy and sentinel lymph node biopsy by Dr Catherine Mercer in October 1, 2019   She had 1 8 cm of invasive lobular carcinoma, grade 1  2 sentinel lymph nodes were negative for metastatic disease   This was % positive, % positive, HER2 negative disease   She presents today to discuss adjuvant treatment options   She has history of osteoporosis   Since 2016, she has been on Boniva   Based on a DEXA scan in 2018, her T-score was -2 0, consistent with osteopenia   She has no complaint of pain   She denied any respiratory symptoms   Her weight is stable   She has no pain   She has some insomnia  Yudith Rosas is a lifetime never smoker  Yudith Rosas has no family history of breast cancer   Her performance status is normal              Interval History:  A 56-year-old postmenopausal woman with stage IA left breast cancer, grade 1, with invasive lobular histology, % positive, % positive, HER2 negative disease   She underwent lumpectomy and sentinel lymph node biopsy, resulting in JOSUÉ    Since November 2019, she has been on adjuvant hormonal therapy with anastrozole   She developed significant hot flashes   Therefore, she started low-dose venlafaxine with improvement of hot flashes  Last year, she was agreeable to switch over to exemestane due to the musculoskeletal symptom with anastrozole  However, cost of medication is quite high with exemestane  She stayed on anastrozole  She continues to have moderate musculoskeletal symptoms  She denied respiratory symptoms such as cough, sputum production or shortness of breath  Her weight is stable    Her performance status is normal      Objective:      Primary Diagnosis:     Left breast cancer, stage IA (pT1c, pN0, M0) grade 1 with invasive lobular histology, % positive, % positive, HER2 negative disease   Diagnosed in October 2019       Cancer Staging:  Cancer Staging  Malignant neoplasm of upper-outer quadrant of left breast in female, estrogen receptor positive ([facility] [address] )  Staging form: Breast, AJCC 8th Edition  - Pathologic: Stage IA (pT1c, pN0(sn), cM0, G1, ER+, OR+, HER2-) - Unsigned  Neoadjuvant therapy: No  Laterality: Left  Tumor size (mm): 18  Method of lymph node assessment: [place] lymph node biopsy  Histologic grading system: 3 grade system           Previous Hematologic/ Oncologic Treatment:           Current Hematologic/ Oncologic Treatment:       Adjuvant hormonal therapy with anastrozole since October 2019      Disease Status:      JOSUÉ status post lumpectomy and sentinel lymph node biopsy      Test Results:     Pathology:     1 8 cm of invasive lobular carcinoma, grade 1  2 sentinel lymph nodes was negative for metastatic disease   % positive, % positive, HER2 negative disease   Stage IA (pT1c, pN0, M0)     Radiology:     Mammography in June 2022 was benign  BI-RADS 2      DEXA scan in June 2020 showed T-score-1 6, consistent with osteopenia        Laboratory:     See below      Physical Exam:        General Appearance:    Alert, oriented          Eyes:    PERRL   Ears:    Normal external ear canals, both ears   Nose:   Nares normal, septum midline   Throat:   Mucosa moist  Pharynx without injection  Neck:   Supple         Lungs:     Clear to auscultation bilaterally   Chest Wall:    No tenderness or deformity    Heart:    Regular rate and rhythm         Abdomen:     Soft, non-tender, bowel sounds +, no organomegaly               Extremities:   Extremities no cyanosis or edema         Skin:   no rash or icterus  Lymph nodes:   Cervical, supraclavicular, and axillary nodes normal   Neurologic:   CNII-XII intact, normal strength, sensation and reflexes     Throughout             Breast exam:   Lumpectomy scar at outer upper quadrant of her left breast with no palpable abnormality   Right breast exam is negative                   ROS: Review of Systems   All other systems reviewed and are negative  Imaging: No results found  Labs:   Lab Results   Component Value Date    WBC 7 37 11/07/2022    HGB 14 2 11/07/2022    HCT 42 9 11/07/2022    MCV 97 11/07/2022     11/07/2022     Lab Results   Component Value Date    K 4 1 11/07/2022     11/07/2022    CO2 24 11/07/2022    BUN 12 11/07/2022    CREATININE 0 72 11/07/2022    GLUF 98 11/07/2022    CALCIUM 9 9 11/07/2022    AST 14 03/05/2022    ALT 26 03/05/2022    ALKPHOS 101 03/05/2022    EGFR 88 11/07/2022         Lab Results   Component Value Date    IRON 98 03/30/2019       Lab Results   Component Value Date    DZVZORAX21 348 08/02/2021       Lab Results   Component Value Date    FOLATE >20 0 (H) 08/02/2021         Current Medications: Reviewed  Allergies: Reviewed  PMH/FH/SH:  Reviewed      Vital Sign:    There is no height or weight on file to calculate BSA      Wt Readings from Last 3 Encounters:   01/24/23 83 kg (183 lb)   11/29/22 82 1 kg (181 lb)   11/17/22 82 1 kg (181 lb)        Temp Readings from Last 3 Encounters:   01/24/23 (!) 97 4 °F (36 3 °C)   11/29/22 98 5 °F (36 9 °C)   11/17/22 98 3 °F (36 8 °C)        BP Readings from Last 3 Encounters:   01/24/23 120/78   11/29/22 135/89   11/17/22 110/74         Pulse Readings from Last 3 Encounters:   01/24/23 96   11/29/22 91   11/17/22 97     @LASTSAO2(3)@

## 2023-02-02 ENCOUNTER — APPOINTMENT (OUTPATIENT)
Dept: LAB | Facility: CLINIC | Age: 68
End: 2023-02-02

## 2023-02-02 DIAGNOSIS — Z01.818 PREOPERATIVE EXAMINATION: ICD-10-CM

## 2023-02-02 DIAGNOSIS — N64.89 BREAST ASYMMETRY: ICD-10-CM

## 2023-02-02 LAB
ANION GAP SERPL CALCULATED.3IONS-SCNC: 5 MMOL/L (ref 4–13)
BASOPHILS # BLD AUTO: 0.05 THOUSANDS/ÂΜL (ref 0–0.1)
BASOPHILS NFR BLD AUTO: 1 % (ref 0–1)
BUN SERPL-MCNC: 15 MG/DL (ref 5–25)
CALCIUM SERPL-MCNC: 9.8 MG/DL (ref 8.3–10.1)
CHLORIDE SERPL-SCNC: 104 MMOL/L (ref 96–108)
CO2 SERPL-SCNC: 28 MMOL/L (ref 21–32)
CREAT SERPL-MCNC: 0.82 MG/DL (ref 0.6–1.3)
EOSINOPHIL # BLD AUTO: 0.14 THOUSAND/ÂΜL (ref 0–0.61)
EOSINOPHIL NFR BLD AUTO: 2 % (ref 0–6)
ERYTHROCYTE [DISTWIDTH] IN BLOOD BY AUTOMATED COUNT: 12 % (ref 11.6–15.1)
GFR SERPL CREATININE-BSD FRML MDRD: 75 ML/MIN/1.73SQ M
GLUCOSE P FAST SERPL-MCNC: 97 MG/DL (ref 65–99)
HCT VFR BLD AUTO: 44.7 % (ref 34.8–46.1)
HGB BLD-MCNC: 14.7 G/DL (ref 11.5–15.4)
IMM GRANULOCYTES # BLD AUTO: 0.02 THOUSAND/UL (ref 0–0.2)
IMM GRANULOCYTES NFR BLD AUTO: 0 % (ref 0–2)
LYMPHOCYTES # BLD AUTO: 2.26 THOUSANDS/ÂΜL (ref 0.6–4.47)
LYMPHOCYTES NFR BLD AUTO: 29 % (ref 14–44)
MCH RBC QN AUTO: 32.7 PG (ref 26.8–34.3)
MCHC RBC AUTO-ENTMCNC: 32.9 G/DL (ref 31.4–37.4)
MCV RBC AUTO: 99 FL (ref 82–98)
MONOCYTES # BLD AUTO: 0.47 THOUSAND/ÂΜL (ref 0.17–1.22)
MONOCYTES NFR BLD AUTO: 6 % (ref 4–12)
NEUTROPHILS # BLD AUTO: 4.76 THOUSANDS/ÂΜL (ref 1.85–7.62)
NEUTS SEG NFR BLD AUTO: 62 % (ref 43–75)
NRBC BLD AUTO-RTO: 0 /100 WBCS
PLATELET # BLD AUTO: 342 THOUSANDS/UL (ref 149–390)
PMV BLD AUTO: 9.4 FL (ref 8.9–12.7)
POTASSIUM SERPL-SCNC: 3.9 MMOL/L (ref 3.5–5.3)
RBC # BLD AUTO: 4.5 MILLION/UL (ref 3.81–5.12)
SODIUM SERPL-SCNC: 137 MMOL/L (ref 135–147)
WBC # BLD AUTO: 7.7 THOUSAND/UL (ref 4.31–10.16)

## 2023-02-15 NOTE — H&P
Subjective   Patient ID: Andrew Hoover is a 74 y o  female  Vitals:    02/23/23 0728   BP: 121/81   Pulse: 93   Resp: 18   Temp: 98 2 °F (36 8 °C)   SpO2: 96%       HPI Patient is a 17-year-old female who presents today for preoperative H&P for right breast reduction and left mastopexy  Pt has a history of left-sided breast cancer treated by lumpectomy and radiation, this was in 2019, this subsequently left the patient with significant breast asymmetry  She feels that her right side is much larger than her left and is interested today and possible reduction for symmetry  The patient is hard of hearing, she wears hearing aids  She takes maintenance chemotherapy, she has recently had a mammogram (6/13/22) which was within normal limits and states that she has very aggressive with her screening, she has not felt any lumps or masses in her breast  She does not take steroids or blood thinners, she is a nonsmoker  She has a history of a cholecystectomy  The patient reports multiple medication allergies  Blessing Ohara has received medical clearance for surgery          Medical History        Past Medical History:   Diagnosis Date   • Breast cancer ([facility] [address] ) 09/2019     left breast   • History of radiation therapy 10/2019     left breast cancer   • Migraine     • Osteoporosis     • Pleurisy     • Varicella     • Vertigo               Patient Active Problem List   Diagnosis   • Fatigue   • Age-related osteoporosis without current pathological fracture   • Malignant neoplasm of upper-outer quadrant of left breast in female, estrogen receptor positive (HCC)   • Use of exemestane (Aromasin)   • Overweight (BMI 25 0-29  9)   • Numbness of both lower extremities   • Prediabetes   • Hearing aid worn   • Spinal stenosis of lumbar region with neurogenic claudication   • Lumbar spondylolysis   • Lumbar foraminal stenosis   • Lumbar disc disease with radiculopathy   • Hot flashes   • Urge urinary incontinence         Surgical History Past Surgical History:   Procedure Laterality Date   • BREAST LUMPECTOMY Left 10/01/2019     Procedure: BREAST NEEDLE LOCALIZED LUMPECTOMY (NEEDLE LOC AT 0730); Surgeon: Thu Marc MD;  [facility];  Service: Surgical Oncology   • CHOLECYSTECTOMY       • COLONOSCOPY       • DENTAL SURGERY         Bone graft with implant   • EPIDURAL BLOCK INJECTION   02/2022   • HALLUX VALGUS CORRECTION Bilateral     • LYMPH NODE BIOPSY Left 10/01/2019     Procedure: SENTINEL LYMPH NODE BIOPSY; LYMPHATIC MAPPING WITH BLUE DYE AND RADIOACTIVE DYE (INJECT AT 0830 BY DR HA IN THE OR); Surgeon: Thu Marc MD;  [facility];  Service: Surgical Oncology   • MAMMO NEEDLE LOCALIZATION LEFT (ALL INC) Left 10/01/2019   • RHINOPLASTY                  Current Outpatient Medications   Medication Instructions   • acetaminophen (TYLENOL) 500 mg tablet Oral, Every 6 hours PRN   • anastrozole (ARIMIDEX) 1 mg, Oral, Daily   • cyclobenzaprine (FLEXERIL) 10 mg, Oral, 3 times daily PRN   • Diclofenac Sodium (VOLTAREN) 2 g, Topical, 4 times daily   • Ivermectin 1 % CREA Apply externally, Daily   • meclizine (ANTIVERT) 12 5 mg, Oral, 3 times daily PRN   • pimecrolimus (ELIDEL) 1 % cream No dose, route, or frequency recorded  • SUMAtriptan (IMITREX) 100 mg, Oral, Once as needed   • venlafaxine (EFFEXOR) 50 mg, Oral, Daily   • Vitamin D 4,000 Units, Oral, Daily         Social History          Social History Narrative   • Not on file      Social History          Tobacco Use   Smoking Status Never   Smokeless Tobacco Never         ROS:  Per HPI      Objective       Physical Exam   Constitutional  She appears well-developed and well-nourished       Eyes  Pupils are equal, round, and reactive to light   System normal       General -             Right: Right eye extraocular movements are normal              Left: Left eye extraocular movements are normal       Heart -   RRR     Lungs -   Normal respiratory effort      Psychiatric  She has a normal mood and affect  Her behavior is normal  Judgment and thought content normal    Bilateral breasts-  Significant asymmetry between left and right breast   Right breast larger than left, left breast with contour deformity of lateral breast  No palpable masses or adenopathy  No rashes or nipple discharge  Grade II ptosis     Measurements- R- SN 29 NIMF 11                              L- SN 26 NIMF10 NN 26          Assessment/Plan  82-year-old female with breast asymmetry, status post lumpectomy        Medical clearance reviewed  Patient aware to hold Arimidex 5 days prior to surgery  Surgical consent signed  Patient UTD on mammogram  The surgery was discussed in detail along with the expected surgical time, hospital stay and recovery time  The use of drains and PICOs was discussed  Risks of the surgery was discussed in detail including bleeding, infection, scarring, hematoma, seroma, skin/nipple necrosis, wound healing delay, asymmetry, contour deformity, blood clots, and need for additional procedures  The patient understands and agrees to the plan   Pt also seen by Dr Maya Park, PA-C  Plastic & Reconstructive Surgery

## 2023-02-17 NOTE — PRE-PROCEDURE INSTRUCTIONS
Pre-Surgery Instructions:   Medication Instructions   • acetaminophen (TYLENOL) 500 mg tablet Continue to take as prescribed including DOS with a small sip of water, unless usually taken at night   • anastrozole (ARIMIDEX) 1 mg tablet Per patient, per surgeon, hold for 5 days    • Cholecalciferol (VITAMIN D) 2000 units CAPS Avoid 1 week prior to surgery    • Diclofenac Sodium (VOLTAREN) 1 % Hold for at least 3 days prior DOS    • Ivermectin 1 % CREA continue as prescribed excluding DOS   • meclizine (ANTIVERT) 12 5 MG tablet Continue to take as prescribed including DOS with a small sip of water, unless usually taken at night   • pimecrolimus (ELIDEL) 1 % cream continue as prescribed excluding DOS   • SUMAtriptan (IMITREX) 100 mg tablet Continue to take as prescribed including DOS with a small sip of water, unless usually taken at night   • venlafaxine (EFFEXOR) 50 mg tablet Continue to take as prescribed including DOS with a small sip of water, unless usually taken at night    Avoid non-prescribed ASPIRIN, OTC vitamins and NSAIDS prior to surgery  Tylenol okay PRN  Continue scheduled medications excluding DOS  Avoid smoking prior to Surgery   Avoid alcohol, illicit drugs and marijuana for 24 hours prior to DOS  NPO instructions given: no food, water or anything else by mouth after midnight prior to surgery  Shower the night before and the morning of surgery using CHG wash or antibacterial soap if CHG is not indicated  Avoid shaving for 24 hours prior to DOS  Avoid using lotions, powders, oils, makeup, hair products, etc  DOS  Patient given up to date visitor guidelines  A ride home after surgery is needed- failure to have a ride can result in cancellation  Remove jewelry and not to bring valuables DOS  Dentures and contact lenses will have to be removed for surgery  Patient understands he or she will receive a call the afternoon before surgery regarding an arrival time    If you have any changes in your health before DOS please call the surgeon's office  Patient verbalized understanding of all instructions

## 2023-02-20 ENCOUNTER — TELEPHONE (OUTPATIENT)
Dept: PLASTIC SURGERY | Facility: CLINIC | Age: 68
End: 2023-02-20

## 2023-02-20 NOTE — PROGRESS NOTES
Josefina Elizabeth 1955 female MRN: [id number]        ASSESSMENT/PLAN  Problem List Items Addressed This Visit    None  Visit Diagnoses     Breast asymmetry    -  Primary    Pre-op evaluation            High Risk Surgery: no  CAD: no  CHF: no  CVD: no  DM2 on insulin: no  Cr>2: no    Labs benign     Josefina Elizabeth is undergoing a Low-to-Moderate Risk surgery  She is at [address] for major adverse cardiac event (MACE)  She may proceed with surgery as planned without further workup  SUBJECTIVE  CC: Pre-op Exam      HPI:  Josefina Elizabeth is a 74 y o  female who is planning to undergo R breast reduction and L mastopexy under general by Dr Gela Lora on 02/23/2023  Patient has not had complications with anesthesia in the past   Functional status: Ambulatory     Review of Systems   Constitutional: Negative for chills and fever  HENT: Negative for congestion, ear pain, rhinorrhea and sore throat  Respiratory: Negative for cough and shortness of breath  Cardiovascular: Negative for chest pain, palpitations and leg swelling  Gastrointestinal: Negative for blood in stool  Genitourinary: Negative for hematuria  Neurological: Negative for dizziness and headaches  Hematological: Does not bruise/bleed easily  Historical Information   The patient history was reviewed as follows:    Past Medical History:   Diagnosis Date   • Breast cancer ([facility] [address] ) 09/2019    left breast   • History of radiation therapy 10/2019    left breast cancer   • Migraine    • Osteoporosis    • Pancreatitis    • Pleurisy    • PONV (postoperative nausea and vomiting)    • Varicella    • Vertigo      Past Surgical History:   Procedure Laterality Date   • BREAST LUMPECTOMY Left 10/01/2019    Procedure: BREAST NEEDLE LOCALIZED LUMPECTOMY (NEEDLE LOC AT 0730);   Surgeon: Kiki Pike MD;  [facility];  Service: Surgical Oncology   • CHOLECYSTECTOMY     • COLONOSCOPY     • DENTAL SURGERY      Bone graft with implant   • EPIDURAL BLOCK INJECTION  02/2022   • HALLUX VALGUS CORRECTION Bilateral    • LYMPH NODE BIOPSY Left 10/01/2019    Procedure: SENTINEL LYMPH NODE BIOPSY; LYMPHATIC MAPPING WITH BLUE DYE AND RADIOACTIVE DYE (INJECT AT 0830 BY DR HA IN THE OR);   Surgeon: Jeremy Diggs MD;  [facility];  Service: Surgical Oncology   • MAMMO NEEDLE LOCALIZATION LEFT (ALL INC) Left 10/01/2019   • RHINOPLASTY       Family History   Problem Relation Age of Onset   • No Known Problems Mother    • Lung cancer Father 71   • No Known Problems Maternal Grandmother    • No Known Problems Maternal Grandfather    • No Known Problems Paternal Grandmother    • No Known Problems Paternal Grandfather    • Breast cancer Neg Hx       Social History       Medications:     Current Outpatient Medications:   •  acetaminophen (TYLENOL) 500 mg tablet, Take by mouth every 6 (six) hours as needed , Disp: , Rfl:   •  anastrozole (ARIMIDEX) 1 mg tablet, Take 1 tablet (1 mg total) by mouth daily, Disp: 90 tablet, Rfl: 1  •  Cholecalciferol (VITAMIN D) 2000 units CAPS, Take 4,000 Units by mouth daily , Disp: , Rfl:   •  Diclofenac Sodium (VOLTAREN) 1 %, Apply 2 g topically 4 (four) times a day, Disp: 50 g, Rfl: 0  •  Ivermectin 1 % CREA, Apply topically daily , Disp: , Rfl:   •  meclizine (ANTIVERT) 12 5 MG tablet, Take 12 5 mg by mouth 3 (three) times a day as needed for dizziness, Disp: , Rfl:   •  pimecrolimus (ELIDEL) 1 % cream, , Disp: , Rfl:   •  SUMAtriptan (IMITREX) 100 mg tablet, Take 100 mg by mouth once as needed for migraine, Disp: , Rfl:   •  venlafaxine (EFFEXOR) 50 mg tablet, TAKE 1 TABLET (50 MG TOTAL) BY MOUTH DAILY, Disp: 30 tablet, Rfl: 1  Allergies   Allergen Reactions   • Ciprofloxacin Seizures   • Cyclopentolate Other (See Comments) and Tachycardia     Resp distress   • Sulfa Antibiotics Hives and Rash   • Scopolamine Other (See Comments)     Precautionary for cycopentolate allergy       OBJECTIVE    Vitals:   Vitals:    02/21/23 1001   BP: 122/88 BP Location: Left arm   Patient Position: Sitting   Cuff Size: Large   Pulse: 92   Temp: (!) 95 7 °F (35 4 °C)   SpO2: 97%   Weight: 82 6 kg (182 lb)   Height: 5' 5 5" (1 664 m)           Physical Exam  Vitals and nursing note reviewed  Constitutional:       General: She is not in acute distress  Appearance: Normal appearance  HENT:      Head: Normocephalic and atraumatic  Right Ear: Tympanic membrane, ear canal and external ear normal       Left Ear: Tympanic membrane, ear canal and external ear normal       Nose: Nose normal       Mouth/Throat:      Mouth: Mucous membranes are moist       Pharynx: No oropharyngeal exudate or posterior oropharyngeal erythema  Eyes:      Conjunctiva/sclera: Conjunctivae normal    Cardiovascular:      Rate and Rhythm: Normal rate and regular rhythm  Pulmonary:      Effort: Pulmonary effort is normal  No respiratory distress  Breath sounds: Normal breath sounds  Abdominal:      General: Bowel sounds are normal  There is no distension  Palpations: Abdomen is soft  Tenderness: There is no abdominal tenderness  Musculoskeletal:      Right lower leg: No edema  Left lower leg: No edema  Lymphadenopathy:      Cervical: No cervical adenopathy  Skin:     General: Skin is warm and dry  Neurological:      General: No focal deficit present  Mental Status: She is alert     Psychiatric:         Mood and Affect: Mood normal                 Malissa Corey DO  [facility]   2/21/2023  10:23 AM

## 2023-02-21 ENCOUNTER — OFFICE VISIT (OUTPATIENT)
Dept: FAMILY MEDICINE CLINIC | Facility: CLINIC | Age: 68
End: 2023-02-21

## 2023-02-21 VITALS
OXYGEN SATURATION: 97 % | TEMPERATURE: 95.7 F | SYSTOLIC BLOOD PRESSURE: 122 MMHG | HEART RATE: 92 BPM | DIASTOLIC BLOOD PRESSURE: 88 MMHG | WEIGHT: 182 LBS | HEIGHT: 66 IN | BODY MASS INDEX: 29.25 KG/M2

## 2023-02-21 DIAGNOSIS — Z01.818 PRE-OP EVALUATION: ICD-10-CM

## 2023-02-21 DIAGNOSIS — N64.89 BREAST ASYMMETRY: Primary | ICD-10-CM

## 2023-02-21 PROBLEM — Z79.811 USE OF EXEMESTANE (AROMASIN): Status: RESOLVED | Noted: 2020-01-16 | Resolved: 2023-02-21

## 2023-02-21 NOTE — LETTER
February 21, 2023     Anna Smith MD  [address] [address]    Patient: Reshma Maurer   YOB: 1955   Date of Visit: 2/21/2023       Dear Dr Colby Baldwin: Thank you for referring Reshma Maurer to me for evaluation  Below are my notes for this consultation  If you have questions, please do not hesitate to call me  I look forward to following your patient along with you  Sincerely,        Miah Canales DO        CC: No Recipients  Miah Canales DO  2/21/2023 10:21 AM  Sign when Signing Visit  Reshma Maurer 1955 female MRN: [id number]        ASSESSMENT/PLAN  Problem List Items Addressed This Visit    None  Visit Diagnoses     Breast asymmetry    -  Primary    Pre-op evaluation            High Risk Surgery: no  CAD: no  CHF: no  CVD: no  DM2 on insulin: no  Cr>2: no    Labs benign     Reshma Maurer is undergoing a Low-to-Moderate Risk surgery  She is at [address] for major adverse cardiac event (MACE)  She may proceed with surgery as planned without further workup  SUBJECTIVE  CC: Pre-op Exam      HPI:  Reshma Maurer is a 74 y o  female who is planning to undergo R breast reduction and L mastopexy under general by Dr Colby Baldwin on 02/23/2023  Patient has not had complications with anesthesia in the past   Functional status: Ambulatory     Review of Systems   Constitutional: Negative for chills and fever  HENT: Negative for congestion, ear pain, rhinorrhea and sore throat  Respiratory: Negative for cough and shortness of breath  Cardiovascular: Negative for chest pain, palpitations and leg swelling  Gastrointestinal: Negative for blood in stool  Genitourinary: Negative for hematuria  Neurological: Negative for dizziness and headaches  Hematological: Does not bruise/bleed easily           Historical Information   The patient history was reviewed as follows:    Past Medical History:   Diagnosis Date   • Breast cancer ([facility] [address] ) 09/2019    left breast   • History of radiation therapy 10/2019    left breast cancer   • Migraine    • Osteoporosis    • Pancreatitis    • Pleurisy    • PONV (postoperative nausea and vomiting)    • Varicella    • Vertigo      Past Surgical History:   Procedure Laterality Date   • BREAST LUMPECTOMY Left 10/01/2019    Procedure: BREAST NEEDLE LOCALIZED LUMPECTOMY (NEEDLE LOC AT 0730); Surgeon: Sudheer Diamond MD;  [facility];  Service: Surgical Oncology   • CHOLECYSTECTOMY     • COLONOSCOPY     • DENTAL SURGERY      Bone graft with implant   • EPIDURAL BLOCK INJECTION  02/2022   • [address] Bilateral    • LYMPH NODE BIOPSY Left 10/01/2019    Procedure: SENTINEL LYMPH NODE BIOPSY; LYMPHATIC MAPPING WITH BLUE DYE AND RADIOACTIVE DYE (INJECT AT 0830 BY DR HA IN THE OR);   Surgeon: Sudheer Diamond MD;  [facility];  Service: Surgical Oncology   • MAMMO NEEDLE LOCALIZATION LEFT (ALL INC) Left 10/01/2019   • RHINOPLASTY       Family History   Problem Relation Age of Onset   • No Known Problems Mother    • Lung cancer Father 71   • No Known Problems Maternal Grandmother    • No Known Problems Maternal Grandfather    • No Known Problems Paternal Grandmother    • No Known Problems Paternal Grandfather    • Breast cancer Neg Hx       Social History       Medications:     Current Outpatient Medications:   •  acetaminophen (TYLENOL) 500 mg tablet, Take by mouth every 6 (six) hours as needed , Disp: , Rfl:   •  anastrozole (ARIMIDEX) 1 mg tablet, Take 1 tablet (1 mg total) by mouth daily, Disp: 90 tablet, Rfl: 1  •  Cholecalciferol (VITAMIN D) 2000 units CAPS, Take 4,000 Units by mouth daily , Disp: , Rfl:   •  Diclofenac Sodium (VOLTAREN) 1 %, Apply 2 g topically 4 (four) times a day, Disp: 50 g, Rfl: 0  •  Ivermectin 1 % CREA, Apply topically daily , Disp: , Rfl:   •  meclizine (ANTIVERT) 12 5 MG tablet, Take 12 5 mg by mouth 3 (three) times a day as needed for dizziness, Disp: , Rfl:   •  pimecrolimus (ELIDEL) 1 % cream, , Disp: , Rfl:   •  SUMAtriptan (IMITREX) 100 mg tablet, Take 100 mg by mouth once as needed for migraine, Disp: , Rfl:   •  venlafaxine (EFFEXOR) 50 mg tablet, TAKE 1 TABLET (50 MG TOTAL) BY MOUTH DAILY, Disp: 30 tablet, Rfl: 1  Allergies   Allergen Reactions   • Ciprofloxacin Seizures   • Cyclopentolate Other (See Comments) and Tachycardia     Resp distress   • Sulfa Antibiotics Hives and Rash   • Scopolamine Other (See Comments)     Precautionary for cycopentolate allergy       OBJECTIVE    Vitals:   Vitals:    02/21/23 1001   BP: 122/88   BP Location: Left arm   Patient Position: Sitting   Cuff Size: Large   Pulse: 92   Temp: (!) 95 7 °F (35 4 °C)   SpO2: 97%   Weight: 82 6 kg (182 lb)   Height: 5' 5 5" (1 664 m)           Physical Exam  Vitals and nursing note reviewed  Constitutional:       General: She is not in acute distress  Appearance: Normal appearance  HENT:      Head: Normocephalic and atraumatic  Right Ear: Tympanic membrane, ear canal and external ear normal       Left Ear: Tympanic membrane, ear canal and external ear normal       Nose: Nose normal       Mouth/Throat:      Mouth: Mucous membranes are moist       Pharynx: No oropharyngeal exudate or posterior oropharyngeal erythema  Eyes:      Conjunctiva/sclera: Conjunctivae normal    Cardiovascular:      Rate and Rhythm: Normal rate and regular rhythm  Pulmonary:      Effort: Pulmonary effort is normal  No respiratory distress  Breath sounds: Normal breath sounds  Abdominal:      General: Bowel sounds are normal  There is no distension  Palpations: Abdomen is soft  Tenderness: There is no abdominal tenderness  Musculoskeletal:      Right lower leg: No edema  Left lower leg: No edema  Lymphadenopathy:      Cervical: No cervical adenopathy  Skin:     General: Skin is warm and dry  Neurological:      General: No focal deficit present  Mental Status: She is alert     Psychiatric:         Mood and Affect: Mood normal                 Warwick Lolling, DO  [facility] [address] [facility]   2/21/2023  10:21 AM

## 2023-02-22 ENCOUNTER — ANESTHESIA EVENT (OUTPATIENT)
Dept: PERIOP | Facility: HOSPITAL | Age: 68
End: 2023-02-22

## 2023-02-23 ENCOUNTER — ANESTHESIA (OUTPATIENT)
Dept: PERIOP | Facility: HOSPITAL | Age: 68
End: 2023-02-23

## 2023-02-23 ENCOUNTER — HOSPITAL ENCOUNTER (OUTPATIENT)
Facility: HOSPITAL | Age: 68
Setting detail: OUTPATIENT SURGERY
Discharge: HOME | End: 2023-02-24
Attending: PLASTIC SURGERY | Admitting: PLASTIC SURGERY

## 2023-02-23 DIAGNOSIS — Z98.890 S/P BREAST RECONSTRUCTION, BILATERAL: Primary | ICD-10-CM

## 2023-02-23 DIAGNOSIS — C50.412 MALIGNANT NEOPLASM OF UPPER-OUTER QUADRANT OF LEFT FEMALE BREAST, UNSPECIFIED ESTROGEN RECEPTOR STATUS (HCC): ICD-10-CM

## 2023-02-23 DIAGNOSIS — Z17.0 ESTROGEN RECEPTOR POSITIVE: ICD-10-CM

## 2023-02-23 PROBLEM — R11.2 PONV (POSTOPERATIVE NAUSEA AND VOMITING): Status: ACTIVE | Noted: 2023-02-23

## 2023-02-23 PROBLEM — R51.9 HEADACHE: Status: ACTIVE | Noted: 2023-02-23

## 2023-02-23 LAB
ANION GAP SERPL CALCULATED.3IONS-SCNC: 9 MMOL/L (ref 4–13)
BASOPHILS # BLD AUTO: 0.01 THOUSANDS/ÂΜL (ref 0–0.1)
BASOPHILS NFR BLD AUTO: 0 % (ref 0–1)
BUN SERPL-MCNC: 12 MG/DL (ref 5–25)
CALCIUM SERPL-MCNC: 8.7 MG/DL (ref 8.3–10.1)
CHLORIDE SERPL-SCNC: 102 MMOL/L (ref 96–108)
CO2 SERPL-SCNC: 27 MMOL/L (ref 21–32)
CREAT SERPL-MCNC: 0.73 MG/DL (ref 0.6–1.3)
EOSINOPHIL # BLD AUTO: 0.01 THOUSAND/ÂΜL (ref 0–0.61)
EOSINOPHIL NFR BLD AUTO: 0 % (ref 0–6)
ERYTHROCYTE [DISTWIDTH] IN BLOOD BY AUTOMATED COUNT: 12.4 % (ref 11.6–15.1)
GFR SERPL CREATININE-BSD FRML MDRD: 86 ML/MIN/1.73SQ M
GLUCOSE P FAST SERPL-MCNC: 153 MG/DL (ref 65–99)
GLUCOSE SERPL-MCNC: 153 MG/DL (ref 65–140)
GLUCOSE SERPL-MCNC: 171 MG/DL (ref 65–140)
HCT VFR BLD AUTO: 38.2 % (ref 34.8–46.1)
HGB BLD-MCNC: 12.8 G/DL (ref 11.5–15.4)
IMM GRANULOCYTES # BLD AUTO: 0.03 THOUSAND/UL (ref 0–0.2)
IMM GRANULOCYTES NFR BLD AUTO: 0 % (ref 0–2)
LYMPHOCYTES # BLD AUTO: 1.07 THOUSANDS/ÂΜL (ref 0.6–4.47)
LYMPHOCYTES NFR BLD AUTO: 10 % (ref 14–44)
MCH RBC QN AUTO: 32.7 PG (ref 26.8–34.3)
MCHC RBC AUTO-ENTMCNC: 33.5 G/DL (ref 31.4–37.4)
MCV RBC AUTO: 97 FL (ref 82–98)
MONOCYTES # BLD AUTO: 0.11 THOUSAND/ÂΜL (ref 0.17–1.22)
MONOCYTES NFR BLD AUTO: 1 % (ref 4–12)
NEUTROPHILS # BLD AUTO: 9.07 THOUSANDS/ÂΜL (ref 1.85–7.62)
NEUTS SEG NFR BLD AUTO: 89 % (ref 43–75)
NRBC BLD AUTO-RTO: 0 /100 WBCS
PLATELET # BLD AUTO: 217 THOUSANDS/UL (ref 149–390)
PMV BLD AUTO: 9.3 FL (ref 8.9–12.7)
POTASSIUM SERPL-SCNC: 3.9 MMOL/L (ref 3.5–5.3)
RBC # BLD AUTO: 3.92 MILLION/UL (ref 3.81–5.12)
SODIUM SERPL-SCNC: 138 MMOL/L (ref 135–147)
WBC # BLD AUTO: 10.3 THOUSAND/UL (ref 4.31–10.16)

## 2023-02-23 RX ORDER — CEFAZOLIN SODIUM 2 G/50ML
2000 SOLUTION INTRAVENOUS ONCE
Status: COMPLETED | OUTPATIENT
Start: 2023-02-23 | End: 2023-02-23

## 2023-02-23 RX ORDER — LIDOCAINE HYDROCHLORIDE 20 MG/ML
INJECTION, SOLUTION EPIDURAL; INFILTRATION; INTRACAUDAL; PERINEURAL AS NEEDED
Status: DISCONTINUED | OUTPATIENT
Start: 2023-02-23 | End: 2023-02-23

## 2023-02-23 RX ORDER — MAGNESIUM HYDROXIDE 1200 MG/15ML
LIQUID ORAL AS NEEDED
Status: DISCONTINUED | OUTPATIENT
Start: 2023-02-23 | End: 2023-02-23 | Stop reason: HOSPADM

## 2023-02-23 RX ORDER — HYDROMORPHONE HCL/PF 1 MG/ML
0.5 SYRINGE (ML) INJECTION EVERY 4 HOURS PRN
Status: DISCONTINUED | OUTPATIENT
Start: 2023-02-23 | End: 2023-02-24 | Stop reason: HOSPADM

## 2023-02-23 RX ORDER — CEFAZOLIN SODIUM 1 G/50ML
1000 SOLUTION INTRAVENOUS EVERY 8 HOURS
Status: DISCONTINUED | OUTPATIENT
Start: 2023-02-23 | End: 2023-02-24 | Stop reason: HOSPADM

## 2023-02-23 RX ORDER — PROPOFOL 10 MG/ML
INJECTION, EMULSION INTRAVENOUS CONTINUOUS PRN
Status: DISCONTINUED | OUTPATIENT
Start: 2023-02-23 | End: 2023-02-23

## 2023-02-23 RX ORDER — OXYCODONE HYDROCHLORIDE 5 MG/1
5 TABLET ORAL EVERY 4 HOURS PRN
Status: DISCONTINUED | OUTPATIENT
Start: 2023-02-23 | End: 2023-02-23 | Stop reason: HOSPADM

## 2023-02-23 RX ORDER — ENOXAPARIN SODIUM 100 MG/ML
40 INJECTION SUBCUTANEOUS ONCE
Status: COMPLETED | OUTPATIENT
Start: 2023-02-23 | End: 2023-02-23

## 2023-02-23 RX ORDER — GABAPENTIN 300 MG/1
300 CAPSULE ORAL ONCE
Status: COMPLETED | OUTPATIENT
Start: 2023-02-23 | End: 2023-02-23

## 2023-02-23 RX ORDER — CEPHALEXIN 500 MG/1
500 CAPSULE ORAL 3 TIMES DAILY
Qty: 21 CAPSULE | Refills: 0 | Status: SHIPPED | OUTPATIENT
Start: 2023-02-23 | End: 2023-03-02

## 2023-02-23 RX ORDER — DIPHENHYDRAMINE HYDROCHLORIDE 50 MG/ML
25 INJECTION INTRAMUSCULAR; INTRAVENOUS EVERY 6 HOURS PRN
Status: DISCONTINUED | OUTPATIENT
Start: 2023-02-23 | End: 2023-02-24 | Stop reason: HOSPADM

## 2023-02-23 RX ORDER — ACETAMINOPHEN 500 MG
1000 TABLET ORAL EVERY 6 HOURS
Qty: 56 TABLET | Refills: 0 | Status: SHIPPED | OUTPATIENT
Start: 2023-02-23 | End: 2023-03-02

## 2023-02-23 RX ORDER — SODIUM CHLORIDE, SODIUM LACTATE, POTASSIUM CHLORIDE, CALCIUM CHLORIDE 600; 310; 30; 20 MG/100ML; MG/100ML; MG/100ML; MG/100ML
125 INJECTION, SOLUTION INTRAVENOUS CONTINUOUS
Status: DISCONTINUED | OUTPATIENT
Start: 2023-02-23 | End: 2023-02-23

## 2023-02-23 RX ORDER — TRANEXAMIC ACID 100 MG/ML
INJECTION, SOLUTION INTRAVENOUS AS NEEDED
Status: DISCONTINUED | OUTPATIENT
Start: 2023-02-23 | End: 2023-02-23

## 2023-02-23 RX ORDER — FENTANYL CITRATE/PF 50 MCG/ML
25 SYRINGE (ML) INJECTION
Status: DISCONTINUED | OUTPATIENT
Start: 2023-02-23 | End: 2023-02-23 | Stop reason: HOSPADM

## 2023-02-23 RX ORDER — ENOXAPARIN SODIUM 100 MG/ML
40 INJECTION SUBCUTANEOUS DAILY
Qty: 2.8 ML | Refills: 0 | Status: SHIPPED | OUTPATIENT
Start: 2023-02-23 | End: 2023-03-13

## 2023-02-23 RX ORDER — ONDANSETRON 2 MG/ML
4 INJECTION INTRAMUSCULAR; INTRAVENOUS ONCE AS NEEDED
Status: COMPLETED | OUTPATIENT
Start: 2023-02-23 | End: 2023-02-23

## 2023-02-23 RX ORDER — ACETAMINOPHEN 325 MG/1
975 TABLET ORAL ONCE
Status: COMPLETED | OUTPATIENT
Start: 2023-02-23 | End: 2023-02-23

## 2023-02-23 RX ORDER — FENTANYL CITRATE 50 UG/ML
INJECTION, SOLUTION INTRAMUSCULAR; INTRAVENOUS AS NEEDED
Status: DISCONTINUED | OUTPATIENT
Start: 2023-02-23 | End: 2023-02-23

## 2023-02-23 RX ORDER — PROPOFOL 10 MG/ML
INJECTION, EMULSION INTRAVENOUS AS NEEDED
Status: DISCONTINUED | OUTPATIENT
Start: 2023-02-23 | End: 2023-02-23

## 2023-02-23 RX ORDER — OXYCODONE HYDROCHLORIDE 5 MG/1
5 TABLET ORAL EVERY 4 HOURS PRN
Status: DISCONTINUED | OUTPATIENT
Start: 2023-02-23 | End: 2023-02-24 | Stop reason: HOSPADM

## 2023-02-23 RX ORDER — ROCURONIUM BROMIDE 10 MG/ML
INJECTION, SOLUTION INTRAVENOUS AS NEEDED
Status: DISCONTINUED | OUTPATIENT
Start: 2023-02-23 | End: 2023-02-23

## 2023-02-23 RX ORDER — DEXAMETHASONE SODIUM PHOSPHATE 10 MG/ML
INJECTION, SOLUTION INTRAMUSCULAR; INTRAVENOUS AS NEEDED
Status: DISCONTINUED | OUTPATIENT
Start: 2023-02-23 | End: 2023-02-23

## 2023-02-23 RX ORDER — HYDROMORPHONE HCL 110MG/55ML
PATIENT CONTROLLED ANALGESIA SYRINGE INTRAVENOUS AS NEEDED
Status: DISCONTINUED | OUTPATIENT
Start: 2023-02-23 | End: 2023-02-23

## 2023-02-23 RX ORDER — SODIUM CHLORIDE, SODIUM LACTATE, POTASSIUM CHLORIDE, CALCIUM CHLORIDE 600; 310; 30; 20 MG/100ML; MG/100ML; MG/100ML; MG/100ML
20 INJECTION, SOLUTION INTRAVENOUS CONTINUOUS
Status: DISCONTINUED | OUTPATIENT
Start: 2023-02-23 | End: 2023-02-24 | Stop reason: HOSPADM

## 2023-02-23 RX ORDER — HYDROMORPHONE HCL/PF 1 MG/ML
0.2 SYRINGE (ML) INJECTION
Status: DISCONTINUED | OUTPATIENT
Start: 2023-02-23 | End: 2023-02-23 | Stop reason: HOSPADM

## 2023-02-23 RX ORDER — DOCUSATE SODIUM 100 MG/1
100 CAPSULE, LIQUID FILLED ORAL 2 TIMES DAILY
Status: DISCONTINUED | OUTPATIENT
Start: 2023-02-23 | End: 2023-02-24 | Stop reason: HOSPADM

## 2023-02-23 RX ORDER — ACETAMINOPHEN 325 MG/1
975 TABLET ORAL EVERY 6 HOURS SCHEDULED
Status: DISCONTINUED | OUTPATIENT
Start: 2023-02-23 | End: 2023-02-24 | Stop reason: HOSPADM

## 2023-02-23 RX ORDER — SODIUM CHLORIDE 9 MG/ML
125 INJECTION, SOLUTION INTRAVENOUS CONTINUOUS
Status: DISCONTINUED | OUTPATIENT
Start: 2023-02-23 | End: 2023-02-23

## 2023-02-23 RX ORDER — SODIUM CHLORIDE 9 MG/ML
50 INJECTION, SOLUTION INTRAVENOUS CONTINUOUS
Status: DISCONTINUED | OUTPATIENT
Start: 2023-02-23 | End: 2023-02-24 | Stop reason: HOSPADM

## 2023-02-23 RX ORDER — VENLAFAXINE 25 MG/1
50 TABLET ORAL DAILY
Status: DISCONTINUED | OUTPATIENT
Start: 2023-02-24 | End: 2023-02-24 | Stop reason: HOSPADM

## 2023-02-23 RX ORDER — ONDANSETRON 2 MG/ML
4 INJECTION INTRAMUSCULAR; INTRAVENOUS EVERY 6 HOURS PRN
Status: DISCONTINUED | OUTPATIENT
Start: 2023-02-23 | End: 2023-02-24 | Stop reason: HOSPADM

## 2023-02-23 RX ORDER — ENOXAPARIN SODIUM 100 MG/ML
40 INJECTION SUBCUTANEOUS DAILY
Status: DISCONTINUED | OUTPATIENT
Start: 2023-02-24 | End: 2023-02-24 | Stop reason: HOSPADM

## 2023-02-23 RX ORDER — OXYCODONE HYDROCHLORIDE 5 MG/1
5 TABLET ORAL EVERY 6 HOURS PRN
Qty: 10 TABLET | Refills: 0 | Status: SHIPPED | OUTPATIENT
Start: 2023-02-23

## 2023-02-23 RX ORDER — MIDAZOLAM HYDROCHLORIDE 2 MG/2ML
INJECTION, SOLUTION INTRAMUSCULAR; INTRAVENOUS AS NEEDED
Status: DISCONTINUED | OUTPATIENT
Start: 2023-02-23 | End: 2023-02-23

## 2023-02-23 RX ORDER — GABAPENTIN 300 MG/1
300 CAPSULE ORAL 3 TIMES DAILY
Qty: 21 CAPSULE | Refills: 0 | Status: SHIPPED | OUTPATIENT
Start: 2023-02-23 | End: 2023-02-24

## 2023-02-23 RX ADMIN — ONDANSETRON 4 MG: 2 INJECTION INTRAMUSCULAR; INTRAVENOUS at 21:42

## 2023-02-23 RX ADMIN — ACETAMINOPHEN 975 MG: 325 TABLET, FILM COATED ORAL at 17:05

## 2023-02-23 RX ADMIN — TRANEXAMIC ACID 1000 MG: 1 INJECTION, SOLUTION INTRAVENOUS at 10:36

## 2023-02-23 RX ADMIN — CEFAZOLIN SODIUM 1000 MG: 1 SOLUTION INTRAVENOUS at 21:56

## 2023-02-23 RX ADMIN — PROPOFOL 200 MG: 10 INJECTION, EMULSION INTRAVENOUS at 08:34

## 2023-02-23 RX ADMIN — GABAPENTIN 300 MG: 300 CAPSULE ORAL at 07:32

## 2023-02-23 RX ADMIN — MIDAZOLAM 2 MG: 1 INJECTION INTRAMUSCULAR; INTRAVENOUS at 08:27

## 2023-02-23 RX ADMIN — ACETAMINOPHEN 975 MG: 325 TABLET, FILM COATED ORAL at 07:32

## 2023-02-23 RX ADMIN — ONDANSETRON 4 MG: 2 INJECTION INTRAMUSCULAR; INTRAVENOUS at 13:13

## 2023-02-23 RX ADMIN — DEXAMETHASONE SODIUM PHOSPHATE 8 MG: 10 INJECTION, SOLUTION INTRAMUSCULAR; INTRAVENOUS at 08:37

## 2023-02-23 RX ADMIN — FENTANYL CITRATE 25 MCG: 50 INJECTION INTRAMUSCULAR; INTRAVENOUS at 09:00

## 2023-02-23 RX ADMIN — ROCURONIUM BROMIDE 50 MG: 50 INJECTION INTRAVENOUS at 08:34

## 2023-02-23 RX ADMIN — Medication 10 MG: at 10:38

## 2023-02-23 RX ADMIN — SODIUM CHLORIDE 50 ML/HR: 0.9 INJECTION, SOLUTION INTRAVENOUS at 22:00

## 2023-02-23 RX ADMIN — SODIUM CHLORIDE, SODIUM LACTATE, POTASSIUM CHLORIDE, AND CALCIUM CHLORIDE 125 ML/HR: .6; .31; .03; .02 INJECTION, SOLUTION INTRAVENOUS at 08:18

## 2023-02-23 RX ADMIN — Medication 20 MG: at 09:03

## 2023-02-23 RX ADMIN — HYDROMORPHONE HYDROCHLORIDE 0.5 MG: 2 INJECTION, SOLUTION INTRAMUSCULAR; INTRAVENOUS; SUBCUTANEOUS at 11:36

## 2023-02-23 RX ADMIN — SODIUM CHLORIDE 50 ML/HR: 0.9 INJECTION, SOLUTION INTRAVENOUS at 17:12

## 2023-02-23 RX ADMIN — PHENYLEPHRINE HYDROCHLORIDE 30 MCG/MIN: 10 INJECTION INTRAVENOUS at 08:45

## 2023-02-23 RX ADMIN — CEFAZOLIN SODIUM 2000 MG: 2 SOLUTION INTRAVENOUS at 08:17

## 2023-02-23 RX ADMIN — ENOXAPARIN SODIUM 40 MG: 40 INJECTION SUBCUTANEOUS at 07:33

## 2023-02-23 RX ADMIN — LIDOCAINE HYDROCHLORIDE 100 MG: 20 INJECTION, SOLUTION EPIDURAL; INFILTRATION; INTRACAUDAL at 08:34

## 2023-02-23 RX ADMIN — Medication 10 MG: at 11:20

## 2023-02-23 RX ADMIN — PROPOFOL 70 MCG/KG/MIN: 10 INJECTION, EMULSION INTRAVENOUS at 08:39

## 2023-02-23 RX ADMIN — FENTANYL CITRATE 75 MCG: 50 INJECTION INTRAMUSCULAR; INTRAVENOUS at 08:35

## 2023-02-23 NOTE — OP NOTE
OPERATIVE REPORT  PATIENT NAME: Kristie Christiansen    :  1955  MRN: [id number]  Pt Location: [facility] ROOM 02    SURGERY DATE: 2023    Surgeon(s) and Role:     * Milo Hendrickson MD - Primary     * Devin Wheatley PA-C - Assisting    Preop Diagnosis:  Breast asymmetry s/p lumpectomy left breast, macromastia right breast    Post-Op:  Same    Procedure(s):  Right - RIGHT BREAST REDUCTION with inferior pedicle  Left - LEFT MASTOPEXY    Placement of bilateral jacek none DME negative pressure wound therapy dressings, each less than 50 cm²  Modifier 22 for increased time needed for dissection left breast secondary to seroma cavity and scar tissue from previous surgery  Specimen(s):  ID Type Source Tests Collected by Time Destination   1 : right breast  Tissue Breast, Right TISSUE EXAM Milo Hendrickson MD 2023 11:21 AM    2 : Left breast Tissue Breast, Left TISSUE EXAM Milo Hendrickson MD 2023 11:22 AM        Estimated Blood Loss:   Minimal    Drains:  Closed/Suction Drain Lateral Breast Bulb 15 Fr  (Active)   Number of days: 0       Closed/Suction Drain Lateral Breast Bulb 15 Fr  (Active)   Number of days: 0       Anesthesia Type:   General    Operative Indications:  As Above    Operative Findings:  Seroma cavity left breast    Complications:   None    Procedure and Technique:      Indication is operative details  Ms Rebecca Crowley is a 79-year-old female known to me for previous consultation for breast asymmetry that arose from a lumpectomy and radiation to the left breast and macromastia on the right breast   We had discussed the risks, benefits, alternatives and options of breast reduction on the right side for symmetry and a mastopexy on the left    I discussed with patient the risks, benefits, alternatives of breast reduction surgery including risk of bleeding, infection, scarring, poor wound healing, demonstrating an underlying structures, need for further surgical need for multiple procedures, I discussed with her the risk of loss of nipple, nipple necrosis, partial nipple necrosis, seroma, DVT, change in nipple sensation, asymmetry, poor aesthetic result, need for further surgery, need for revision, high risk of poor wound healing at the triple point, the effect on breast-feeding, I discussed the patient's breasts are within the limits of normal asymmetry now and will likely be asymmetric post surgery within normal limits, I discussed with her the possibility for the need for re-reduction over time, we discussed the expected hospital stay, the expected recovery time, I discussed with her the use of drains, the use of jacek non DME negative pressure wound therapy dressings  Because of the constellation chronicity of her symptoms I believe that this was a medically necessary procedure  All the patient's questions were answered to her satisfaction, informed consent was obtained and signed and the patient was brought to the hospital as an outpatient on elective basis have the procedure performed  She is brought to the preoperative holding area where she was given preoperative antibiotics, Lovenox, and her SCDs were activated  She was then brought to the OR identified verbally, by site, and by armband on the operating room table prior to the induction of anesthesia  After the induction of anesthesia she was prepped and draped in the standard surgical fashion, time-out was performed the surgical site identified  At that time the procedure began on the right side where the Wise pattern had been previously marked in the holding area  The new nipple position was marked at 21 5  cm, with 7 cm limbs  It was determined that a 7 cm inferior pedicle would be used, at this time the Banks pattern was incised down to the dermis with a 15 blade  A round hole pattern and a 38 mm cookie cutter was used to jose rafael the new areolar reduction    Once this was performed a towel clamp was placed on the superior portion of the proposed excision pattern  The procedure started on the left side, the breast was retracted towards the ceiling and a Penrose was used as a tourniquet around the breast and clamped in place with a Padmaja clamp  Once this was performed the entire Wise pattern was de-epithelialized with a combination of a 15 blade, a 10 blade, and scissors  Once the pedicle was de-epithelialized  Bovie electrocautery was then used to begin the dissection, dissection was started at the limbs keeping a 1 5 cm cuff of fat on the flaps  Bovie electrocautery was used to deepen the dissection  Allis clamps were placed nontraumatically on the flap in order to keep the associated fat and dermis at the triple point once this was performed dissection was continued down to the chest wall taking special care and attention to the thickness of the flaps keep consistent and to ensure adequate thickness for proper blood supply  Dissection was quite difficult in the upper outer quadrant of the left breast secondary to her previous lumpectomy  The patient had significant scar tissue in this area making dissection quite difficult to find the plane and maintain adequate thickness between the breast flaps and the breast tissue  During the dissection a large seroma cavity was encountered on the left breast, this area was partially dissected off the chest wall and the capsule was debrided  This required approximately extra 30 to 45 minutes of operative time due to the significant scar tissue encountered  Once this was performed meticulous hemostasis was obtained and a 15 Western Lisandra Lawson drain was placed into the wound and the wound was temporarily closed in order to assess for symmetry  Attention was then turned to the right side where the procedure began in the same fashion  Once the chest wall was reached dissection was  The level of pectoralis fascia    At this time the lateral portion of the Wise pattern excision was incised with the Bovie electrocautery, the lateral triangular shaped area tissue was excised using Bovie electrocautery taking care to not retract pedicle so as to avoid undermining the blood supply  Once this was resected was handed off for specimen, attention was then turned to the medial portion of the chest wall that was also the skin and down to the level of the fat  The superior portion of the Banks pattern was then also excised with Bovie electrocautery  At this time the pedicle was inspected, a medial portion of the pedicle was marked for excision in order to retain shape at the medial pole  This was also excised and sent for specimen  The wound was then copiously irrigated and the 1st pass and hemostasis was obtained with Bovie electrocautery  Using a combination of Bovie electrocautery and scissor dissection an area of bulk was taken off the posterior portion of the pedicle  Once this was done the pedicle had adequate shape and contour, the nipple was intact vascularly  Attention was then taken to the flap that was minimally undermined so as to maintain upper pole projection  Allis clamps were used to debulk the flaps with combination of sharp dissection and Bovie electrocautery again maintaining adequate thickness to flaps  The in the area with irrigated and Bovie a cautery was used to obtain meticulous hemostasis  At this point a 13 Western Lisandra Lawson drain was placed, and ex parole pectoral block was performed under direct visualization  The wound was then temporarily closed in the exact same fashion the procedure was performed on the left side  Should be noted that the right  side was  larger than the left   The excision on the right side totaled 331  g  Attention was then turned to the left side where the exact same fashion the procedures performed  The excision measured 70 g on the left side  At that time the wound temporary closed and the patient was sat up to assess for symmetry    It was determined the patient had excellent shape and symmetry, the patient was then sent back in the supine position and the wounds were closed with a combination of 3-0 Monocryl in the deep dermal layer, 3-0 Stratafix in the deep dermal and subcuticular layer  Once the closure was performed the vertical and horizontal limbs of the Wise pattern the new nipple position was marked  A to jose rafael the new nipple to in at the area of the most projection of the breast which was also proximally 5 cm from the inferior mammary fold to the lower border of the areola  Once this was performed a suture was placed in the sternal notch in order to assess for symmetry, once the nipple position was marked for symmetry it was excised with a 15 blade for starting on the right side, and Bovie electrocautery was used to obtain hemostasis  It was then excised on the left side as well  Once this was performed the nipple was inset with combination of 3-0 Monocryl in a deep dermal layer and Stratafix in the deep dermal and subcuticular layer  Patient was once again sat up, the patient excellent shape and symmetry  At this time the drains were sutured into place, because of the high risk of wound healing complication at the triple point, a jacek non DME negative pressure wound therapy dressing was applied  The patient tolerated the procedure well without complication, she has an extubated and sent to PACU or we will monitor her status, she will likely be discharged today  Sharyle Denise, [facility] assistance was necessary as no qualified resident was available, assistant was necessary for flap elevation, retraction, exposure given the patient's body habitus and wound closure             I was present for the entire procedure    Patient Disposition:  PACU         SIGNATURE: Ariane Sarmiento MD  DATE: February 23, 2023  TIME: 12:01 PM

## 2023-02-23 NOTE — DISCHARGE SUMMARY
Discharge Summary - Plastic Surgery   Dougie Post 74 y o  female MRN: [id number]  Unit/Bed#: -01 Encounter: [id number]    Admission Date: 2/23/23    Discharge Date: 2/24/23    Admitting Diagnosis: Malignant neoplasm of upper-outer quadrant of left female breast, unspecified estrogen receptor status ([facility] [address] ) [C50 412]  Estrogen receptor positive [Z17 0]    Discharge Diagnosis: Same as above  Medical Problems     Resolved Problems  Date Reviewed: 2/23/2023   None         Attending: Dr Maren Vasquez Physician(s): None  Procedures Performed: No orders of the defined types were placed in this encounter  Pathology: Right breast skin and tissue; Left breast skin     Hospital Course: Outpatient procedure; Overnight observation     Condition at Discharge: stable     Discharge instructions/Information to patient and family:   See after visit summary for information provided to patient and family  Provisions for Follow-Up Care:  See after visit summary for information related to follow-up care and any pertinent home health orders  Disposition: Home      Planned Readmission: No    Discharge Statement   I spent 15 minutes discharging the patient  This time was spent on the day of discharge  I had direct contact with the patient on the day of discharge  Additional documentation is required if more than 30 minutes were spent on discharge  Discharge Medications:  See after visit summary for reconciled discharge medications provided to patient and family

## 2023-02-23 NOTE — DISCHARGE INSTR - AVS FIRST PAGE
Breast Surgery Instructions:     Keep SURJIT dressings on until seen at your follow up appointment  If the lig  ht is blinking green, the battery pack is functioning properly  Buzzing is normal    If the light blinks orange, hit the orange button to re-establish the seal, this will usually correct the problem, if the light continues to blink orange, the dressing will still continue to function  Call the office if the dressing becomes completely saturated  Do NOT get SURJIT dressings wet  2          Milk the drain tubing using an alcohol wipe and empty the bulbs every 8 hours or as necessary as shown (uncap, drain fluid, record amount, discard, squeeze air out, recap)  Keep track of total output from each drain per every 24 hours  The drains will typically stay in 1-2 weeks post-operatively  Call the office once the drains are less than 30mL in a 24 hour period for removal   NO showering with drains in place  3  Keep surgical bra on at all times, ok to place ABD pads between SURJIT dressings and bra for comfort, ok to open bra for comfort while in bed for short breaks  4  No ice or heat to breasts  5  No pushing, pulling, or lifting more than 10lbs, repetitive arm motions, or strenuous activity for 4-6 weeks post-operatively  All Surgeries     You must be off all pain medications and have a clear field of vision before driving  For the next 24 hours:  Do not sign any legal documents or operate machinery  Have a responsible adult help you  Take it easy & rest   Clear liquids first, if no nausea, progress to soft diet, and then regular diet as tolerated  Take medications as ordered, and do not take any pain medication on an empty stomach  Once pain meds are finished you may alternate Tylenol & Advil as directed for pain  Make sure to take all antibiotics until completion  If lovenox or heparin was prescribed, use as directed until completion  Avoid alcoholic beverages    Resume any prior medications at home unless otherwise instructed by Dr Paige Quach  Call Dr Paige Quach at [phone] -office,  Obvious bleeding, excessive swelling, and tenderness  Hardness of face on palpation  Swelling & hardness at eyelids  Fever over 101 0°   Shortness of breath, severe calf or thigh pain  Redness, odor, or pus at the wound {some oozing is normal from incision sites and may continue for several days ABD pads or feminine pads can be used for absorption )  Persistent vomiting or pain that is not relieved by your medication    To make your follow-up appointment , ask a question, or report a problem

## 2023-02-23 NOTE — PROGRESS NOTES
Anesthesiology Attending    Called to PACU for lethargy, patient minimally responsive with sternal rub and pale appearing  IV Fluids opened wide, t-deluca position, o2 applied, Vitals taken and stable  Labs and glucose taken all within normal limits  After a few minutes, patient regained energy and responsiveness  Patient sat up, family brought in at bedside, consistent gradual improvement while in PACU with no focal deficits subjectively per patient or on exam     AAOx3, BROWNING spontaneously, FAST Stroke exam negative, continued dizziness with sudden upright movement  Of note, patient voided immediately prior to onset of symptoms      A/P:  68y/o POD0 s/p Right Breast Reduction, Left Mastopexy  - Vagal > narcotic/ketamine related lethargy >>> stroke/MI/seizure/metabolic abn  - AVSS, labs wnl  - Admit overnight, fluid hydration    Neo Deal MD  Department of Anesthesiology and Acute Pain Management  February 23, 2023  4:37 PM

## 2023-02-23 NOTE — ANESTHESIA POSTPROCEDURE EVALUATION
Post-Op Assessment Note    CV Status:  Stable  Pain Score: 0    Pain management: adequate  Multimodal analgesia used between 6 hours prior to anesthesia start to PACU discharge    Mental Status:  Sleepy and arousable   Hydration Status:  Stable   PONV Controlled:  None      Post Op Vitals Reviewed: Yes      Staff: Anesthesiologist, CRNA         There were no known notable events for this encounter      /72 (02/23/23 1231)    Temp (!) 97 4 °F (36 3 °C) (02/23/23 1231)    Pulse 74 (02/23/23 1231)   Resp 18 (02/23/23 1231)    SpO2 100 % (02/23/23 1231)

## 2023-02-23 NOTE — ANESTHESIA PREPROCEDURE EVALUATION
Procedure:  RIGHT BREAST REDUCTION (Right: Breast)  LEFT MASTOPEXY (Left: Breast)    Relevant Problems   ANESTHESIA   (+) PONV (postoperative nausea and vomiting)      CARDIO (within normal limits)      GI/HEPATIC  Confirmed NPO appropriate      /RENAL (within normal limits)      GYN   (+) Malignant neoplasm of upper-outer quadrant of left breast in female, estrogen receptor positive (HCC)      HEMATOLOGY (within normal limits)      MUSCULOSKELETAL (within normal limits)      NEURO/PSYCH   (+) Migraines      PULMONARY   (-) Smoking   (-) URI (upper respiratory infection)      Other   (+) Hearing aid worn   (+) Prediabetes        Physical Exam    Airway    Mallampati score: II  TM Distance: <3 FB  Neck ROM: full     Dental   implants,     Cardiovascular  Rhythm: regular, Rate: normal,     Pulmonary  Breath sounds clear to auscultation,     Other Findings        Anesthesia Plan  ASA Score- 2     Anesthesia Type- general with ASA Monitors  Additional Monitors:   Airway Plan: ETT  Plan Factors-Exercise tolerance (METS): >4 METS  Chart reviewed  EKG reviewed  Existing labs reviewed  Patient is not a current smoker  Induction- intravenous  Postoperative Plan-     Informed Consent- Anesthetic plan and risks discussed with patient and son  I personally reviewed this patient with the CRNA  Discussed and agreed on the Anesthesia Plan with the CRNA             Lab Results   Component Value Date    WBC 7 70 02/02/2023    HGB 14 7 02/02/2023    HCT 44 7 02/02/2023    MCV 99 (H) 02/02/2023     02/02/2023     Lab Results   Component Value Date    SODIUM 137 02/02/2023    K 3 9 02/02/2023     02/02/2023    CO2 28 02/02/2023    BUN 15 02/02/2023    CREATININE 0 82 02/02/2023    GLUC 92 11/29/2021    CALCIUM 9 8 02/02/2023     Lab Results   Component Value Date    ALT 26 03/05/2022    AST 14 03/05/2022    ALKPHOS 101 03/05/2022     No results found for: INR, PROTIME  Lab Results Component Value Date    [facility] 5 8 (H) 03/05/2022

## 2023-02-24 PROBLEM — Z98.890 S/P BREAST RECONSTRUCTION, BILATERAL: Status: ACTIVE | Noted: 2023-02-24

## 2023-02-24 LAB
ANION GAP SERPL CALCULATED.3IONS-SCNC: 6 MMOL/L (ref 4–13)
BUN SERPL-MCNC: 7 MG/DL (ref 5–25)
CALCIUM SERPL-MCNC: 7.4 MG/DL (ref 8.3–10.1)
CHLORIDE SERPL-SCNC: 112 MMOL/L (ref 96–108)
CO2 SERPL-SCNC: 25 MMOL/L (ref 21–32)
CREAT SERPL-MCNC: 0.56 MG/DL (ref 0.6–1.3)
ERYTHROCYTE [DISTWIDTH] IN BLOOD BY AUTOMATED COUNT: 12.2 % (ref 11.6–15.1)
GFR SERPL CREATININE-BSD FRML MDRD: 98 ML/MIN/1.73SQ M
GLUCOSE P FAST SERPL-MCNC: 123 MG/DL (ref 65–99)
GLUCOSE SERPL-MCNC: 123 MG/DL (ref 65–140)
HCT VFR BLD AUTO: 35.5 % (ref 34.8–46.1)
HGB BLD-MCNC: 12.1 G/DL (ref 11.5–15.4)
MAGNESIUM SERPL-MCNC: 1.6 MG/DL (ref 1.6–2.6)
MCH RBC QN AUTO: 33.1 PG (ref 26.8–34.3)
MCHC RBC AUTO-ENTMCNC: 34.1 G/DL (ref 31.4–37.4)
MCV RBC AUTO: 97 FL (ref 82–98)
PLATELET # BLD AUTO: 279 THOUSANDS/UL (ref 149–390)
PMV BLD AUTO: 9.1 FL (ref 8.9–12.7)
POTASSIUM SERPL-SCNC: 3.4 MMOL/L (ref 3.5–5.3)
RBC # BLD AUTO: 3.66 MILLION/UL (ref 3.81–5.12)
SODIUM SERPL-SCNC: 143 MMOL/L (ref 135–147)
WBC # BLD AUTO: 13.51 THOUSAND/UL (ref 4.31–10.16)

## 2023-02-24 RX ORDER — MECLIZINE HCL 12.5 MG/1
12.5 TABLET ORAL EVERY 8 HOURS PRN
Qty: 30 TABLET | Refills: 0 | Status: SHIPPED | OUTPATIENT
Start: 2023-02-24 | End: 2023-03-13

## 2023-02-24 RX ORDER — ONDANSETRON 4 MG/1
4 TABLET, FILM COATED ORAL EVERY 8 HOURS PRN
Qty: 10 TABLET | Refills: 0 | Status: SHIPPED | OUTPATIENT
Start: 2023-02-24

## 2023-02-24 RX ORDER — MECLIZINE HYDROCHLORIDE 25 MG/1
25 TABLET ORAL ONCE
Status: COMPLETED | OUTPATIENT
Start: 2023-02-24 | End: 2023-02-24

## 2023-02-24 RX ADMIN — DOCUSATE SODIUM 100 MG: 100 CAPSULE, LIQUID FILLED ORAL at 08:55

## 2023-02-24 RX ADMIN — CEFAZOLIN SODIUM 1000 MG: 1 SOLUTION INTRAVENOUS at 05:30

## 2023-02-24 RX ADMIN — ACETAMINOPHEN 325 MG: 325 TABLET, FILM COATED ORAL at 05:28

## 2023-02-24 RX ADMIN — MECLIZINE HYDROCHLORIDE 25 MG: 25 TABLET ORAL at 10:51

## 2023-02-24 RX ADMIN — ENOXAPARIN SODIUM 40 MG: 40 INJECTION SUBCUTANEOUS at 08:55

## 2023-02-24 RX ADMIN — VENLAFAXINE 50 MG: 25 TABLET ORAL at 08:55

## 2023-02-24 NOTE — PLAN OF CARE
Problem: PAIN - ADULT  Goal: Verbalizes/displays adequate comfort level or baseline comfort level  Description: Interventions:  - Encourage patient to monitor pain and request assistance  - Assess pain using appropriate pain scale  - Administer analgesics based on type and severity of pain and evaluate response  - Implement non-pharmacological measures as appropriate and evaluate response  - Consider cultural and social influences on pain and pain management  - Notify physician/advanced practitioner if interventions unsuccessful or patient reports new pain  Outcome: Progressing     Problem: INFECTION - ADULT  Goal: Absence or prevention of progression during hospitalization  Description: INTERVENTIONS:  - Assess and monitor for signs and symptoms of infection  - Monitor lab/diagnostic results  - Monitor all insertion sites, i e  indwelling lines, tubes, and drains  - Monitor endotracheal if appropriate and nasal secretions for changes in amount and color  - [place] appropriate cooling/warming therapies per order  - Administer medications as ordered  - Instruct and encourage patient and family to use good hand hygiene technique  - Identify and instruct in appropriate isolation precautions for identified infection/condition  Outcome: Progressing  Goal: Absence of fever/infection during neutropenic period  Description: INTERVENTIONS:  - Monitor WBC    Outcome: Progressing     Problem: SAFETY ADULT  Goal: Maintain or return to baseline ADL function  Description: INTERVENTIONS:  -  Assess patient's ability to carry out ADLs; assess patient's baseline for ADL function and identify physical deficits which impact ability to perform ADLs (bathing, care of mouth/teeth, toileting, grooming, dressing, etc )  - Assess/evaluate cause of self-care deficits   - Assess range of motion  - Assess patient's mobility; develop plan if impaired  - Assess patient's need for assistive devices and provide as appropriate  - Encourage maximum independence but intervene and supervise when necessary  - Involve family in performance of ADLs  - Assess for home care needs following discharge   - Consider OT consult to assist with ADL evaluation and planning for discharge  - Provide patient education as appropriate  Outcome: Progressing  Goal: Maintains/Returns to pre admission functional level  Description: INTERVENTIONS:  - Perform BMAT or MOVE assessment daily    - Set and communicate daily mobility goal to care team and patient/family/caregiver  - Collaborate with rehabilitation services on mobility goals if consulted  - Perform Range of Motion 3 times a day  - Reposition patient every 2 hours    - Dangle patient 3 times a day  - Stand patient 4 times a day  - Ambulate patient 4 times a day  - Out of bed to chair 3 times a day   - Out of bed for meals 3 times a day  - Out of bed for toileting  - Record patient progress and toleration of activity level   Outcome: Progressing  Goal: Patient will remain free of falls  Description: INTERVENTIONS:  - Educate patient/family on patient safety including physical limitations  - Instruct patient to call for assistance with activity   - Consult OT/PT to assist with strengthening/mobility   - Keep Call bell within reach  - Keep bed low and locked with side rails adjusted as appropriate  - Keep care items and personal belongings within reach  - Initiate and maintain comfort rounds  - Make Fall Risk Sign visible to staff  - Offer Toileting every 2 Hours, in advance of need  - Initiate/Maintain bed alarm  - Apply yellow socks and bracelet for high fall risk patients  - Consider moving patient to room near nurses station  Outcome: Progressing     Problem: DISCHARGE PLANNING  Goal: Discharge to home or other facility with appropriate resources  Description: INTERVENTIONS:  - Identify barriers to discharge w/patient and caregiver  - Arrange for needed discharge resources and transportation as appropriate  - Identify discharge learning needs (meds, wound care, etc )  - Arrange for interpretive services to assist at discharge as needed  - Refer to Case Management Department for coordinating discharge planning if the patient needs post-hospital services based on physician/advanced practitioner order or complex needs related to functional status, cognitive ability, or social support system  Outcome: Progressing

## 2023-02-24 NOTE — PROGRESS NOTES
Progress Note - Plastic Surgery   [place] Shell 74 y o  female MRN: [id number]  Unit/Bed#: -01 Encounter: [id number]        Subjective/Objective   The patient is a 19-year-old female postop day 1 status post right breast reduction with inferior pedicle and left breast mastopexy  It was anticipated that the patient would be discharged home yesterday, however, in the afternoon the patient became very lethargic and pale appearing  Vitals, labs, and glucose were all within normal limits  Anesthesia attributed the symptoms to being vasovagal with narcotic related lethargy  The patient was kept overnight for observation  The patient has done well overnight without any acute complications  The patient has been eating, drinking, and voiding without difficulty  She has been out of bed  She does report some residual dizziness, but states that she is overall feeling a lot better than yesterday  The patient reports that she suffers from dizziness at baseline and takes meclizine at home  The patient reports some postoperative discomfort in her chest but denies any severe pain, fever, chills, or other issues at this time  Pt's son will be staying with her  Blood pressure 120/55, pulse 85, temperature 98 3 °F (36 8 °C), temperature source Oral, resp  rate 18, height 5' 5" (1 651 m), weight 82 3 kg (181 lb 7 oz), SpO2 92 %, not currently breastfeeding  ,Body mass index is 30 19 kg/m²        Intake/Output Summary (Last 24 hours) at 2/24/2023 5171  Last data filed at 2/24/2023 0930  Gross per 24 hour   Intake 1340 ml   Output 1648 ml   Net -308 ml       Invasive Devices     Peripheral Intravenous Line  Duration           Peripheral IV 02/23/23 Left Wrist 1 day          Drain  Duration           Closed/Suction Drain Lateral Breast Bulb 15 Fr  <1 day    Closed/Suction Drain Lateral Breast Bulb 15 Fr  <1 day    External Urinary Catheter <1 day                Physical Exam:   General: No acute distress, alert, cooperative  Breasts: [de-identified] of bilateral breast incisions are covered with jacek dressings, which are intact and functioning well  Bilateral breasts with expected postoperative edema and ecchymosis, right greater than left  There is no evidence of hematoma formation  Bilateral drains intact and patent with serosanguineous output  Lab, Imaging and other studies:  I have personally reviewed pertinent lab results  , CBC:   Lab Results   Component Value Date    WBC 13 51 (H) 02/24/2023    HGB 12 1 02/24/2023    HCT 35 5 02/24/2023    MCV 97 02/24/2023     02/24/2023    MCH 33 1 02/24/2023    MCHC 34 1 02/24/2023    RDW 12 2 02/24/2023    MPV 9 1 02/24/2023    NRBC 0 02/23/2023     VTE Pharmacologic Prophylaxis: Sequential compression device (Venodyne)  and Fondaparinux (Arixtra)  VTE Mechanical Prophylaxis: sequential compression device      Assessment:  37-year-old female postop day 1 status post right breast reduction with inferior pedicle and left breast mastopexy, POD 1, stable      Plan:  Patient doing well, stable for discharge from plastic surgery standpoint  Drain outputs, labs, and vitals are all within normal limits  Patient is not showing any signs of hematoma  We will send meclizine and Zofran to the pharmacy  Patient to receive dose of meclizine before discharge  Postoperative activity restrictions discussed  Patient to wear surgical bra at all times and keep jacek dressings dry and intact  Discussed drain care  We will see patient back in 1 week for follow-up  Multimodal pain management, antibiotics, Lovenox, meclizine, and Zofran sent to the pharmacy  Patient understands and agrees to the plan        Monica Barker PA-C  Plastic & Reconstructive Surgery

## 2023-02-24 NOTE — PLAN OF CARE
Problem: Prexisting or High Potential for Compromised Skin Integrity  Goal: Skin integrity is maintained or improved  Description: INTERVENTIONS:  - Identify patients at risk for skin breakdown  - Assess and monitor skin integrity  - Assess and monitor nutrition and hydration status  - Monitor labs   - Assess for incontinence   - Turn and reposition patient  - Assist with mobility/ambulation  - Relieve pressure over bony prominences  - Avoid friction and shearing  - Provide appropriate hygiene as needed including keeping skin clean and dry  - Evaluate need for skin moisturizer/barrier cream  - Collaborate with interdisciplinary team   - Patient/family teaching  - Consider wound care consult   Outcome: Adequate for Discharge     Problem: MOBILITY - ADULT  Goal: Maintain or return to baseline ADL function  Description: INTERVENTIONS:  -  Assess patient's ability to carry out ADLs; assess patient's baseline for ADL function and identify physical deficits which impact ability to perform ADLs (bathing, care of mouth/teeth, toileting, grooming, dressing, etc )  - Assess/evaluate cause of self-care deficits   - Assess range of motion  - Assess patient's mobility; develop plan if impaired  - Assess patient's need for assistive devices and provide as appropriate  - Encourage maximum independence but intervene and supervise when necessary  - Involve family in performance of ADLs  - Assess for home care needs following discharge   - Consider OT consult to assist with ADL evaluation and planning for discharge  - Provide patient education as appropriate  Outcome: Adequate for Discharge  Goal: Maintains/Returns to pre admission functional level  Description: INTERVENTIONS:  - Perform BMAT or MOVE assessment daily    - Set and communicate daily mobility goal to care team and patient/family/caregiver  - Collaborate with rehabilitation services on mobility goals if consulted  - Perform Range of Motion 2 times a day    - Reposition patient every 2 hours    - Dangle patient 2 times a day  - Stand patient 2 times a day  - Ambulate patient 2 times a day  - Out of bed to chair 3 times a day   - Out of bed for meals 3 times a day  - Out of bed for toileting  - Record patient progress and toleration of activity level   Outcome: Adequate for Discharge     Problem: PAIN - ADULT  Goal: Verbalizes/displays adequate comfort level or baseline comfort level  Description: Interventions:  - Encourage patient to monitor pain and request assistance  - Assess pain using appropriate pain scale  - Administer analgesics based on type and severity of pain and evaluate response  - Implement non-pharmacological measures as appropriate and evaluate response  - Consider cultural and social influences on pain and pain management  - Notify physician/advanced practitioner if interventions unsuccessful or patient reports new pain  Outcome: Adequate for Discharge     Problem: INFECTION - ADULT  Goal: Absence or prevention of progression during hospitalization  Description: INTERVENTIONS:  - Assess and monitor for signs and symptoms of infection  - Monitor lab/diagnostic results  - Monitor all insertion sites, i e  indwelling lines, tubes, and drains  - Monitor endotracheal if appropriate and nasal secretions for changes in amount and color  - [place] appropriate cooling/warming therapies per order  - Administer medications as ordered  - Instruct and encourage patient and family to use good hand hygiene technique  - Identify and instruct in appropriate isolation precautions for identified infection/condition  Outcome: Adequate for Discharge  Goal: Absence of fever/infection during neutropenic period  Description: INTERVENTIONS:  - Monitor WBC    Outcome: Adequate for Discharge     Problem: SAFETY ADULT  Goal: Maintain or return to baseline ADL function  Description: INTERVENTIONS:  -  Assess patient's ability to carry out ADLs; assess patient's baseline for ADL function and identify physical deficits which impact ability to perform ADLs (bathing, care of mouth/teeth, toileting, grooming, dressing, etc )  - Assess/evaluate cause of self-care deficits   - Assess range of motion  - Assess patient's mobility; develop plan if impaired  - Assess patient's need for assistive devices and provide as appropriate  - Encourage maximum independence but intervene and supervise when necessary  - Involve family in performance of ADLs  - Assess for home care needs following discharge   - Consider OT consult to assist with ADL evaluation and planning for discharge  - Provide patient education as appropriate  Outcome: Adequate for Discharge  Goal: Maintains/Returns to pre admission functional level  Description: INTERVENTIONS:  - Perform BMAT or MOVE assessment daily    - Set and communicate daily mobility goal to care team and patient/family/caregiver  - Collaborate with rehabilitation services on mobility goals if consulted  - Perform Range of Motion 2 times a day  - Reposition patient every 2 hours    - Dangle patient 2 times a day  - Stand patient 2 times a day  - Ambulate patient 2 times a day  - Out of bed to chair 3 times a day   - Out of bed for meals 3 times a day  - Out of bed for toileting  - Record patient progress and toleration of activity level   Outcome: Adequate for Discharge  Goal: Patient will remain free of falls  Description: INTERVENTIONS:  - Educate patient/family on patient safety including physical limitations  - Instruct patient to call for assistance with activity   - Consult OT/PT to assist with strengthening/mobility   - Keep Call bell within reach  - Keep bed low and locked with side rails adjusted as appropriate  - Keep care items and personal belongings within reach  - Initiate and maintain comfort rounds  - Make Fall Risk Sign visible to staff  - Offer Toileting every 2 Hours, in advance of need  - Initiate/Maintain bed/chair alarm  - Obtain necessary fall risk management equipment:   - Apply yellow socks and bracelet for high fall risk patients  - Consider moving patient to room near nurses station  Outcome: Adequate for Discharge     Problem: DISCHARGE PLANNING  Goal: Discharge to home or other facility with appropriate resources  Description: INTERVENTIONS:  - Identify barriers to discharge w/patient and caregiver  - Arrange for needed discharge resources and transportation as appropriate  - Identify discharge learning needs (meds, wound care, etc )  - Arrange for interpretive services to assist at discharge as needed  - Refer to Case Management Department for coordinating discharge planning if the patient needs post-hospital services based on physician/advanced practitioner order or complex needs related to functional status, cognitive ability, or social support system  Outcome: Adequate for Discharge     Problem: Knowledge Deficit  Goal: Patient/family/caregiver demonstrates understanding of disease process, treatment plan, medications, and discharge instructions  Description: Complete learning assessment and assess knowledge base    Interventions:  - Provide teaching at level of understanding  - Provide teaching via preferred learning methods  Outcome: Adequate for Discharge

## 2023-02-25 VITALS
DIASTOLIC BLOOD PRESSURE: 68 MMHG | HEART RATE: 71 BPM | TEMPERATURE: 97.4 F | SYSTOLIC BLOOD PRESSURE: 121 MMHG | OXYGEN SATURATION: 96 % | HEIGHT: 65 IN | RESPIRATION RATE: 18 BRPM | WEIGHT: 181.44 LBS | BODY MASS INDEX: 30.23 KG/M2

## 2023-03-03 ENCOUNTER — OFFICE VISIT (OUTPATIENT)
Dept: PLASTIC SURGERY | Facility: CLINIC | Age: 68
End: 2023-03-03

## 2023-03-03 VITALS
HEART RATE: 87 BPM | DIASTOLIC BLOOD PRESSURE: 83 MMHG | TEMPERATURE: 97.3 F | WEIGHT: 183 LBS | HEIGHT: 66 IN | SYSTOLIC BLOOD PRESSURE: 103 MMHG | BODY MASS INDEX: 29.41 KG/M2

## 2023-03-03 DIAGNOSIS — N64.89 BREAST ASYMMETRY: Primary | ICD-10-CM

## 2023-03-03 NOTE — PROGRESS NOTES
Subjective:    Patient ID: Cristóbal Marcano is a 74 y o  female  HPI The patient is s/p right breast reduction and left mastopexy on 2/23/23  It was anticipated that the patient would be discharged home the same day, however, in the afternoon the patient became very lethargic and pale appearing  Vitals, labs, and glucose were all within normal limits  Anesthesia attributed the symptoms to being vasovagal with narcotic related lethargy  The patient was kept overnight for observation  The patient did well overnight without any acute complications  The patient has been eating, drinking, and voiding without difficulty  The patient reports that she suffers from dizziness at baseline and takes meclizine at home  The patient reports some postoperative discomfort in her chest but denies any severe pain, fever, chills, or other issues at this time  Pt's son has been staying with her  Reports drains are putting out <10cc daily  She has been resting, wearing compression, and taking post-op meds as prescribed  Past Medical History:   Diagnosis Date   • Breast cancer ([facility] ) 09/2019    left breast   • History of radiation therapy 10/2019    left breast cancer   • Migraine    • Osteoporosis    • Pancreatitis    • Pleurisy    • PONV (postoperative nausea and vomiting)    • Seizures (HCC)    • Varicella    • Vertigo        Patient Active Problem List   Diagnosis   • Fatigue   • Age-related osteoporosis without current pathological fracture   • Malignant neoplasm of upper-outer quadrant of left breast in female, estrogen receptor positive ([facility] )   • Overweight (BMI 25 0-29  9)   • Numbness of both lower extremities   • Prediabetes   • Hearing aid worn   • Spinal stenosis of lumbar region with neurogenic claudication   • Lumbar spondylolysis   • Lumbar foraminal stenosis   • Lumbar disc disease with radiculopathy   • Hot flashes   • Urge urinary incontinence   • PONV (postoperative nausea and vomiting)   • Migraines   • S/P breast reconstruction, bilateral         Current Outpatient Medications:   •  Cholecalciferol (VITAMIN D) 2000 units CAPS, Take 4,000 Units by mouth daily , Disp: , Rfl:   •  Diclofenac Sodium (VOLTAREN) 1 %, Apply 2 g topically 4 (four) times a day, Disp: 50 g, Rfl: 0  •  enoxaparin (Lovenox) 40 mg/0 4 mL, Inject 0 4 mL (40 mg total) under the skin in the morning for 7 days, Disp: 2 8 mL, Rfl: 0  •  Ivermectin 1 % CREA, Apply topically daily , Disp: , Rfl:   •  meclizine (ANTIVERT) 12 5 MG tablet, Take 12 5 mg by mouth 3 (three) times a day as needed for dizziness, Disp: , Rfl:   •  meclizine (ANTIVERT) 12 5 MG tablet, Take 1 tablet (12 5 mg total) by mouth every 8 (eight) hours as needed for dizziness, Disp: 30 tablet, Rfl: 0  •  ondansetron (ZOFRAN) 4 mg tablet, Take 1 tablet (4 mg total) by mouth every 8 (eight) hours as needed for nausea or vomiting, Disp: 10 tablet, Rfl: 0  •  oxyCODONE (Roxicodone) 5 immediate release tablet, Take 1 tablet (5 mg total) by mouth every 6 (six) hours as needed for severe pain Max Daily Amount: 20 mg, Disp: 10 tablet, Rfl: 0  •  pimecrolimus (ELIDEL) 1 % cream, , Disp: , Rfl:   •  SUMAtriptan (IMITREX) 100 mg tablet, Take 100 mg by mouth once as needed for migraine, Disp: , Rfl:   •  venlafaxine (EFFEXOR) 50 mg tablet, TAKE 1 TABLET (50 MG TOTAL) BY MOUTH DAILY, Disp: 30 tablet, Rfl: 1      Past Surgical History:   Procedure Laterality Date   • BREAST LUMPECTOMY Left 10/01/2019    Procedure: BREAST NEEDLE LOCALIZED LUMPECTOMY (NEEDLE LOC AT 0730); Surgeon: Lynsey Ch MD;  [facility];  Service: Surgical Oncology   • CHOLECYSTECTOMY     • COLONOSCOPY     • DENTAL SURGERY      Bone graft with implant   • EPIDURAL BLOCK INJECTION  02/2022   • Juan M King 63 Bilateral    • LYMPH NODE BIOPSY Left 10/01/2019    Procedure: SENTINEL LYMPH NODE BIOPSY; LYMPHATIC MAPPING WITH BLUE DYE AND RADIOACTIVE DYE (INJECT AT 0830 BY DR HA IN THE OR);   Surgeon: Lynsey Ch MD; [facility];  Service: Surgical Oncology   • MAMMO NEEDLE LOCALIZATION LEFT (ALL INC) Left 10/01/2019   • MA BREAST REDUCTION Right 2/23/2023    Procedure: RIGHT BREAST REDUCTION;  Surgeon: Sonny Rivera MD;  [facility];  Service: Plastics   • MA MASTOPEXY Left 2/23/2023    Procedure: LEFT MASTOPEXY;  Surgeon: Sonny Rivera MD;  [facility];  Service: Plastics   • RHINOPLASTY         Social History     Tobacco Use   • Smoking status: Never   • Smokeless tobacco: Never   Substance Use Topics   • Alcohol use: Never       Objective:  /83   Pulse 87   Temp (!) 97 3 °F (36 3 °C)   Ht 5' 5 5" (1 664 m)   Wt 83 kg (183 lb)   BMI 29 99 kg/m²    Physical Exam      General: NAD, alert    Breasts: Incisions are clean, dry, and intact  No evidence of wound breakdown, hematoma, or purulence  Expected amount of edema and ecchymosis  Nipples viable bilaterally  No signs of infection  B/l drains removed without difficulty  Assessment/Plan: 74year old s/p right breast reduction and left mastopexy      Patient doing well  May shower tomorrow, then begin daily application of ointment to all incisions  Postoperative activity restrictions discussed  Patient to wear surgical bra at all times  We will see patient back in 1 week for follow-up  Patient understands and agrees to the plan        Cb Montague PA-C  Plastic & Reconstructive Surgery

## 2023-03-05 ENCOUNTER — RA CDI HCC (OUTPATIENT)
Dept: OTHER | Facility: HOSPITAL | Age: 68
End: 2023-03-05

## 2023-03-05 NOTE — PROGRESS NOTES
[facility]  coding opportunities       Chart reviewed, no opportunity found:   [address]        Patients Insurance     Medicare Insurance: [employer]

## 2023-03-06 ENCOUNTER — OFFICE VISIT (OUTPATIENT)
Dept: PLASTIC SURGERY | Facility: CLINIC | Age: 68
End: 2023-03-06

## 2023-03-06 DIAGNOSIS — N64.89 BREAST ASYMMETRY: Primary | ICD-10-CM

## 2023-03-06 NOTE — PROGRESS NOTES
Subjective:    Patient ID: Brock Wilkins is a 74 y o  female  HPI The patient is s/p right breast reduction and left mastopexy on 2/23/23  The patient reports some postoperative discomfort in her chest but denies any severe pain, fever, chills, wounds, drainage or other issues at this time  Pt's son has been staying with her  She has been resting, wearing compression, and applying ointment to incisions  Past Medical History:   Diagnosis Date   • Breast cancer ([facility] ) 09/2019    left breast   • History of radiation therapy 10/2019    left breast cancer   • Migraine    • Osteoporosis    • Pancreatitis    • Pleurisy    • PONV (postoperative nausea and vomiting)    • Seizures (HCC)    • Varicella    • Vertigo        Patient Active Problem List   Diagnosis   • Fatigue   • Age-related osteoporosis without current pathological fracture   • Malignant neoplasm of upper-outer quadrant of left breast in female, estrogen receptor positive ([facility] 75 )   • Overweight (BMI 25 0-29  9)   • Numbness of both lower extremities   • Prediabetes   • Hearing aid worn   • Spinal stenosis of lumbar region with neurogenic claudication   • Lumbar spondylolysis   • Lumbar foraminal stenosis   • Lumbar disc disease with radiculopathy   • Hot flashes   • Urge urinary incontinence   • PONV (postoperative nausea and vomiting)   • Migraines   • S/P breast reconstruction, bilateral         Current Outpatient Medications:   •  Cholecalciferol (VITAMIN D) 2000 units CAPS, Take 4,000 Units by mouth daily , Disp: , Rfl:   •  Diclofenac Sodium (VOLTAREN) 1 %, Apply 2 g topically 4 (four) times a day, Disp: 50 g, Rfl: 0  •  enoxaparin (Lovenox) 40 mg/0 4 mL, Inject 0 4 mL (40 mg total) under the skin in the morning for 7 days, Disp: 2 8 mL, Rfl: 0  •  Ivermectin 1 % CREA, Apply topically daily , Disp: , Rfl:   •  meclizine (ANTIVERT) 12 5 MG tablet, Take 12 5 mg by mouth 3 (three) times a day as needed for dizziness, Disp: , Rfl:   •  meclizine (ANTIVERT) 12 5 MG tablet, Take 1 tablet (12 5 mg total) by mouth every 8 (eight) hours as needed for dizziness, Disp: 30 tablet, Rfl: 0  •  ondansetron (ZOFRAN) 4 mg tablet, Take 1 tablet (4 mg total) by mouth every 8 (eight) hours as needed for nausea or vomiting, Disp: 10 tablet, Rfl: 0  •  oxyCODONE (Roxicodone) 5 immediate release tablet, Take 1 tablet (5 mg total) by mouth every 6 (six) hours as needed for severe pain Max Daily Amount: 20 mg, Disp: 10 tablet, Rfl: 0  •  pimecrolimus (ELIDEL) 1 % cream, , Disp: , Rfl:   •  SUMAtriptan (IMITREX) 100 mg tablet, Take 100 mg by mouth once as needed for migraine, Disp: , Rfl:   •  venlafaxine (EFFEXOR) 50 mg tablet, TAKE 1 TABLET (50 MG TOTAL) BY MOUTH DAILY, Disp: 30 tablet, Rfl: 1      Past Surgical History:   Procedure Laterality Date   • BREAST LUMPECTOMY Left 10/01/2019    Procedure: BREAST NEEDLE LOCALIZED LUMPECTOMY (NEEDLE LOC AT 0730); Surgeon: Zen Price MD;  [facility];  Service: Surgical Oncology   • CHOLECYSTECTOMY     • COLONOSCOPY     • DENTAL SURGERY      Bone graft with implant   • EPIDURAL BLOCK INJECTION  02/2022   • T[address] Fernando 63 Bilateral    • LYMPH NODE BIOPSY Left 10/01/2019    Procedure: SENTINEL LYMPH NODE BIOPSY; LYMPHATIC MAPPING WITH BLUE DYE AND RADIOACTIVE DYE (INJECT AT 0830 BY DR HA IN THE OR); Surgeon: Zen Price MD;  [facility];  Service: Surgical Oncology   • MAMMO NEEDLE LOCALIZATION LEFT (ALL INC) Left 10/01/2019   • IL BREAST REDUCTION Right 2/23/2023    Procedure: RIGHT BREAST REDUCTION;  Surgeon: Angie Ledezma MD;  [facility];  Service: Plastics   • IL MASTOPEXY Left 2/23/2023    Procedure: LEFT MASTOPEXY;  Surgeon: Angie Ledezma MD;  [facility];  Service: Plastics   • RHINOPLASTY         Social History     Tobacco Use   • Smoking status: Never   • Smokeless tobacco: Never   Substance Use Topics   • Alcohol use: Never       Objective:   There were no vitals taken for this visit  Physical Exam      General: NAD, alert    Breasts: Incisions are clean, dry, and intact  No evidence of wound breakdown, hematoma, seroma, or purulence  Expected amount of edema and ecchymosis  Nipples viable bilaterally  No signs of infection  Assessment/Plan: 74year old s/p right breast reduction and left mastopexy      Patient doing well  Continue daily application of ointment to all incisions  Postoperative activity restrictions discussed  Patient to wear surgical bra at all times  We will see patient back in 2 weeks for follow-up         Kyung Holstein, PA-C  Plastic & Reconstructive Surgery

## 2023-03-13 ENCOUNTER — OFFICE VISIT (OUTPATIENT)
Dept: FAMILY MEDICINE CLINIC | Facility: CLINIC | Age: 68
End: 2023-03-13

## 2023-03-13 VITALS
TEMPERATURE: 97.7 F | HEIGHT: 66 IN | SYSTOLIC BLOOD PRESSURE: 118 MMHG | HEART RATE: 85 BPM | DIASTOLIC BLOOD PRESSURE: 86 MMHG | OXYGEN SATURATION: 97 % | BODY MASS INDEX: 28.77 KG/M2 | WEIGHT: 179 LBS

## 2023-03-13 DIAGNOSIS — R23.2 HOT FLASHES: ICD-10-CM

## 2023-03-13 DIAGNOSIS — C50.412 MALIGNANT NEOPLASM OF UPPER-OUTER QUADRANT OF LEFT BREAST IN FEMALE, ESTROGEN RECEPTOR POSITIVE (HCC): ICD-10-CM

## 2023-03-13 DIAGNOSIS — Z17.0 MALIGNANT NEOPLASM OF UPPER-OUTER QUADRANT OF LEFT BREAST IN FEMALE, ESTROGEN RECEPTOR POSITIVE (HCC): ICD-10-CM

## 2023-03-13 DIAGNOSIS — Z98.890 HISTORY OF MASTOPEXY: ICD-10-CM

## 2023-03-13 DIAGNOSIS — M65.4 DE QUERVAIN'S TENOSYNOVITIS, RIGHT: Primary | ICD-10-CM

## 2023-03-13 PROBLEM — R11.2 PONV (POSTOPERATIVE NAUSEA AND VOMITING): Status: RESOLVED | Noted: 2023-02-23 | Resolved: 2023-03-13

## 2023-03-13 RX ORDER — VENLAFAXINE 50 MG/1
50 TABLET ORAL DAILY
Qty: 30 TABLET | Refills: 1 | Status: SHIPPED | OUTPATIENT
Start: 2023-03-13

## 2023-03-13 RX ORDER — VENLAFAXINE 50 MG/1
50 TABLET ORAL DAILY
Qty: 30 TABLET | Refills: 1
Start: 2023-03-13 | End: 2023-03-13

## 2023-03-13 NOTE — PROGRESS NOTES
Assessment/Plan:         Problem List Items Addressed This Visit        Cardiovascular and Mediastinum    Hot flashes    Relevant Medications    venlafaxine (EFFEXOR) 50 mg tablet       Musculoskeletal and Integument    De Quervain's tenosynovitis, right - Primary     Advised thumb spica splint, NSAIDs  If not improving will refer to ortho            Other    Malignant neoplasm of upper-outer quadrant of left breast in female, estrogen receptor positive ([facility] )     S/p reconstructive surgery         Relevant Medications    venlafaxine (EFFEXOR) 50 mg tablet   Other Visit Diagnoses     History of mastopexy                Subjective:      Patient ID: Milton Mosquera is a 74 y o  female  The patient is s/p right breast reduction and left mastopexy on 2/23/23  She is recovering from this  Has to lay on her back  She is taking Tylenol for pain  She is having R wrist pain - had Xrays done at [facility] which showed  Mild bony demineralization  No fracture  Normal alignment  Joint spaces are preserved  Tiny spur at the triscaphe joint  No chondrocalcinosis  Soft tissues are unremarkable  She has pain along the thumb up the forearm  +Finklesteins test      The following portions of the patient's history were reviewed and updated as appropriate:   Past Medical History:  She has a past medical history of Breast cancer ([facility] ) (09/2019), History of radiation therapy (10/2019), Migraine, Osteoporosis, Pancreatitis, Pleurisy, PONV (postoperative nausea and vomiting), [facility]izures ([facility] ), Varicella, and Vertigo  ,  _______________________________________________________________________  Medical Problems:  does not have any pertinent problems on file ,  _______________________________________________________________________  Past Surgical History:   has a past surgical history that includes Cholecystectomy; Dental surgery; Rhinoplasty; Hallux valgus correction (Bilateral);  Colonoscopy; Mammo needle localization left (all inc) (Left, 10/01/2019); Breast lumpectomy (Left, 10/01/2019); Lymph node biopsy (Left, 10/01/2019); Epidural block injection (02/2022); pr breast reduction (Right, 2/23/2023); and pr mastopexy (Left, 2/23/2023)  ,  _______________________________________________________________________  Family History:  family history includes Lung cancer (age of onset: 71) in her father; No Known Problems in her maternal grandfather, maternal grandmother, mother, paternal grandfather, and paternal grandmother ,  _______________________________________________________________________  Social History:   reports that she has never smoked  She has never used smokeless tobacco  She reports that she does not drink alcohol and does not use drugs  ,  _______________________________________________________________________  Allergies:  is allergic to ciprofloxacin, cyclopentolate, sulfa antibiotics, and scopolamine     _______________________________________________________________________  Current Outpatient Medications   Medication Sig Dispense Refill   • venlafaxine (EFFEXOR) 50 mg tablet Take 1 tablet (50 mg total) by mouth daily 30 tablet 1   • Cholecalciferol (VITAMIN D) 2000 units CAPS Take 4,000 Units by mouth daily      • Diclofenac Sodium (VOLTAREN) 1 % Apply 2 g topically 4 (four) times a day 50 g 0   • Ivermectin 1 % CREA Apply topically daily      • meclizine (ANTIVERT) 12 5 MG tablet Take 12 5 mg by mouth 3 (three) times a day as needed for dizziness     • ondansetron (ZOFRAN) 4 mg tablet Take 1 tablet (4 mg total) by mouth every 8 (eight) hours as needed for nausea or vomiting 10 tablet 0   • oxyCODONE (Roxicodone) 5 immediate release tablet Take 1 tablet (5 mg total) by mouth every 6 (six) hours as needed for severe pain Max Daily Amount: 20 mg 10 tablet 0   • pimecrolimus (ELIDEL) 1 % cream      • SUMAtriptan (IMITREX) 100 mg tablet Take 100 mg by mouth once as needed for migraine       No current facility-administered medications for this visit      _______________________________________________________________________  Review of Systems   Constitutional: Negative for activity change  Respiratory: Negative for chest tightness and shortness of breath  Cardiovascular: Negative for chest pain and leg swelling  Musculoskeletal: Positive for arthralgias  Pain in chest wall   Neurological: Negative for headaches  Psychiatric/Behavioral: Negative for dysphoric mood  The patient is not nervous/anxious  Objective:  Vitals:    03/13/23 1038   BP: 118/86   BP Location: Left arm   Patient Position: Sitting   Cuff Size: Large   Pulse: 85   Temp: 97 7 °F (36 5 °C)   SpO2: 97%   Weight: 81 2 kg (179 lb)   Height: 5' 5 5" (1 664 m)     Body mass index is 29 33 kg/m²  Physical Exam  Vitals and nursing note reviewed  Constitutional:       General: She is not in acute distress  Appearance: Normal appearance  She is well-developed  She is not ill-appearing, toxic-appearing or diaphoretic  HENT:      Head: Normocephalic and atraumatic  Right Ear: External ear normal       Left Ear: External ear normal    Cardiovascular:      Rate and Rhythm: Normal rate and regular rhythm  Heart sounds: No murmur heard  No friction rub  Pulmonary:      Effort: Pulmonary effort is normal  No respiratory distress  Breath sounds: Normal breath sounds  No stridor  No wheezing, rhonchi or rales  Musculoskeletal:         General: No swelling  Right wrist: Tenderness present  Left wrist: No tenderness  Right lower leg: No edema  Left lower leg: No edema  Comments: +Finklesteins test R wrist   Neurological:      General: No focal deficit present  Mental Status: She is alert  Mental status is at baseline     Psychiatric:         Attention and Perception: Attention normal          Mood and Affect: Mood normal          Speech: Speech normal          Behavior: Behavior normal          Thought Content: Thought content normal          Judgment: Judgment normal

## 2023-03-21 ENCOUNTER — OFFICE VISIT (OUTPATIENT)
Dept: PLASTIC SURGERY | Facility: CLINIC | Age: 68
End: 2023-03-21

## 2023-03-21 DIAGNOSIS — N64.89 BREAST ASYMMETRY: Primary | ICD-10-CM

## 2023-03-21 NOTE — PROGRESS NOTES
Subjective:    Patient ID: Hernan Reed is a 74 y o  female  HPI The patient is s/p right breast reduction and left mastopexy on 2/23/23  The patient is now one month post-op, she reports small wounds under b/l breasts  She has been padding these areas as needed with gauze  The patient denies any severe pain, fever, chills, or other issues at this time  She has been following restrictions, wearing compression, and applying ointment to incisions  Past Medical History:   Diagnosis Date   • [email or web address] ) 09/2019    left breast   • History of radiation therapy 10/2019    left breast cancer   • Migraine    • Osteoporosis    • Pancreatitis    • Pleurisy    • PONV (postoperative nausea and vomiting)    • Seizures (HCC)    • Varicella    • Vertigo        Patient Active Problem List   Diagnosis   • Fatigue   • Age-related osteoporosis without current pathological fracture   • Malignant neoplasm of upper-outer quadrant of left breast in female, estrogen receptor positive ([facility] )   • Overweight (BMI 25 0-29  9)   • Numbness of both lower extremities   • Prediabetes   • Hearing aid worn   • Spinal stenosis of lumbar region with neurogenic claudication   • Lumbar spondylolysis   • Lumbar foraminal stenosis   • Lumbar disc disease with radiculopathy   • Hot flashes   • Urge urinary incontinence   • Migraines   • S/P breast reconstruction, bilateral   • De Quervain's tenosynovitis, right         Current Outpatient Medications:   •  Cholecalciferol (VITAMIN D) 2000 units CAPS, Take 4,000 Units by mouth daily , Disp: , Rfl:   •  Diclofenac Sodium (VOLTAREN) 1 %, Apply 2 g topically 4 (four) times a day, Disp: 50 g, Rfl: 0  •  Ivermectin 1 % CREA, Apply topically daily , Disp: , Rfl:   •  meclizine (ANTIVERT) 12 5 MG tablet, Take 12 5 mg by mouth 3 (three) times a day as needed for dizziness, Disp: , Rfl:   •  ondansetron (ZOFRAN) 4 mg tablet, Take 1 tablet (4 mg total) by mouth every 8 (eight) hours as needed for nausea or vomiting, Disp: 10 tablet, Rfl: 0  •  oxyCODONE (Roxicodone) 5 immediate release tablet, Take 1 tablet (5 mg total) by mouth every 6 (six) hours as needed for severe pain Max Daily Amount: 20 mg, Disp: 10 tablet, Rfl: 0  •  pimecrolimus (ELIDEL) 1 % cream, , Disp: , Rfl:   •  SUMAtriptan (IMITREX) 100 mg tablet, Take 100 mg by mouth once as needed for migraine, Disp: , Rfl:   •  venlafaxine (EFFEXOR) 50 mg tablet, TAKE 1 TABLET (50 MG TOTAL) BY MOUTH DAILY, Disp: 30 tablet, Rfl: 1      Past Surgical History:   Procedure Laterality Date   • BREAST LUMPECTOMY Left 10/01/2019    Procedure: BREAST NEEDLE LOCALIZED LUMPECTOMY (NEEDLE LOC AT 0730); Surgeon: Faisal Jarquin MD;  [facility];  Service: Surgical Oncology   • CHOLECYSTECTOMY     • COLONOSCOPY     • DENTAL SURGERY      Bone graft with implant   • EPIDURAL BLOCK INJECTION  02/2022   • Juan M King 63 Bilateral    • LYMPH NODE BIOPSY Left 10/01/2019    Procedure: SENTINEL LYMPH NODE BIOPSY; LYMPHATIC MAPPING WITH BLUE DYE AND RADIOACTIVE DYE (INJECT AT 0830 BY DR HA IN THE OR); Surgeon: Faisal Jarquin MD;  [facility];  Service: Surgical Oncology   • MAMMO NEEDLE LOCALIZATION LEFT (ALL INC) Left 10/01/2019   • MN BREAST REDUCTION Right 2/23/2023    Procedure: RIGHT BREAST REDUCTION;  Surgeon: Samara Foreman MD;  [facility];  Service: Plastics   • MN MASTOPEXY Left 2/23/2023    Procedure: LEFT MASTOPEXY;  Surgeon: Samara Foreman MD;  [facility];  Service: Plastics   • RHINOPLASTY         Social History     Tobacco Use   • Smoking status: Never   • Smokeless tobacco: Never   Substance Use Topics   • Alcohol use: Never       Objective: There were no vitals taken for this visit  Physical Exam      General: NAD, alert    Breasts:  5cm x  5cm scab of the left breat triple point  1cm x 2cm wound of the right triple point, this was sharply debrided, bleeding cauterized w/ silver nitrate   Multiple spitting sutures removed as well  Remaining incisions are clean, dry, and intact  No evidence of further wound breakdown, hematoma, seroma, or purulence  Expected amount of edema, wnl  Nipples viable bilaterally  No signs of infection  Right breast wound dressed with bacitracin, xeroform, and gauze  Assessment/Plan: 74year old s/p right breast reduction and left mastopexy      Patient doing well overall, educated patient on daily wound care with bacitracin, xeroform, and gauze  Continue daily application of ointment to all remaining incisions  Activity as tolerated  Patient to wear surgical bra at all times  We will see patient back in 2 weeks for follow-up         Flex Carvalho PA-C  Plastic & Reconstructive Surgery

## 2023-04-06 ENCOUNTER — OFFICE VISIT (OUTPATIENT)
Dept: PLASTIC SURGERY | Facility: CLINIC | Age: 68
End: 2023-04-06

## 2023-04-06 DIAGNOSIS — N64.89 BREAST ASYMMETRY: Primary | ICD-10-CM

## 2023-04-06 NOTE — PROGRESS NOTES
Subjective:    Patient ID: Piedad Grandchild is a 74 y o  female  HPI The patient is s/p right breast reduction and left mastopexy on 2/23/23  The patient is now 6 weeks post-op, she reports continued small wounds under b/l breasts  She has been doing daily wound care with bacitracin, xeroform and gauze  The patient denies any severe pain, fever, chills, or other issues at this time  She has been wearing compression  Past Medical History:   Diagnosis Date   • Breast [facility] ([facility] ) 09/2019    left breast   • History of radiation therapy 10/2019    left breast cancer   • Migraine    • Osteoporosis    • Pancreatitis    • Pleurisy    • PONV (postoperative nausea and vomiting)    • Seizures (HCC)    • Varicella    • Vertigo        Patient Active Problem List   Diagnosis   • Fatigue   • Age-related osteoporosis without current pathological fracture   • Malignant neoplasm of upper-outer quadrant of left breast in female, estrogen receptor positive ([facility] )   • Overweight (BMI 25 0-29  9)   • Numbness of both lower extremities   • Prediabetes   • Hearing aid worn   • Spinal stenosis of lumbar region with neurogenic claudication   • Lumbar spondylolysis   • Lumbar foraminal stenosis   • Lumbar disc disease with radiculopathy   • Hot flashes   • Urge urinary incontinence   • Migraines   • S/P breast reconstruction, bilateral   • De Quervain's tenosynovitis, right         Current Outpatient Medications:   •  Cholecalciferol (VITAMIN D) 2000 units CAPS, Take 4,000 Units by mouth daily , Disp: , Rfl:   •  Diclofenac Sodium (VOLTAREN) 1 %, Apply 2 g topically 4 (four) times a day, Disp: 50 g, Rfl: 0  •  Ivermectin 1 % CREA, Apply topically daily , Disp: , Rfl:   •  meclizine (ANTIVERT) 12 5 MG tablet, Take 12 5 mg by mouth 3 (three) times a day as needed for dizziness, Disp: , Rfl:   •  ondansetron (ZOFRAN) 4 mg tablet, Take 1 tablet (4 mg total) by mouth every 8 (eight) hours as needed for nausea or vomiting, Disp: 10 tablet, Rfl: 0  •  pimecrolimus (ELIDEL) 1 % cream, , Disp: , Rfl:   •  SUMAtriptan (IMITREX) 100 mg tablet, Take 100 mg by mouth once as needed for migraine, Disp: , Rfl:   •  venlafaxine (EFFEXOR) 50 mg tablet, TAKE 1 TABLET (50 MG TOTAL) BY MOUTH DAILY, Disp: 30 tablet, Rfl: 1      Past Surgical History:   Procedure Laterality Date   • BREAST LUMPECTOMY Left 10/01/2019    Procedure: BREAST NEEDLE LOCALIZED LUMPECTOMY (NEEDLE LOC AT 0730); Surgeon: Rizwan Scahefer MD;  [facility];  Service: Surgical Oncology   • CHOLECYSTECTOMY     • COLONOSCOPY     • DENTAL SURGERY      Bone graft with implant   • EPIDURAL BLOCK INJECTION  02/2022   • [address] Bilateral    • LYMPH NODE BIOPSY Left 10/01/2019    Procedure: SENTINEL LYMPH NODE BIOPSY; LYMPHATIC MAPPING WITH BLUE DYE AND RADIOACTIVE DYE (INJECT AT 0830 BY DR HA IN THE OR); Surgeon: Rizwan Schaefer MD;  [facility];  Service: Surgical Oncology   • MAMMO NEEDLE LOCALIZATION LEFT (ALL INC) Left 10/01/2019   • [place] BREAST REDUCTION Right 2/23/2023    Procedure: RIGHT BREAST REDUCTION;  Surgeon: Marce Robertson MD;  [facility];  Service: Plastics   • [place] MASTOPEXY Left 2/23/2023    Procedure: LEFT MASTOPEXY;  Surgeon: Marce Robertson MD;  [facility];  Service: Plastics   • RHINOPLASTY         Social History     Tobacco Use   • Smoking status: Never   • Smokeless tobacco: Never   Substance Use Topics   • Alcohol use: Never       Objective: There were no vitals taken for this visit  Physical Exam      General: NAD, alert    Breasts: 2cm x 2 5cm wound of the right breat triple point  1cm x 1cm wound of the left triple point  Remaining incisions are clean, dry, and intact  No evidence of further wound breakdown, hematoma, seroma, or purulence  Nipples viable bilaterally  No signs of infection  Wounds dressed with calcium alginate and gauze          Assessment/Plan: 74year old s/p right breast reduction and left mastopexy      Patient doing well overall, educated patient on daily wound care with calcium alginate and gauze  Discussed scar care to remaining incisions  Activity as tolerated  Patient to wear compression during the day  We will see patient back in 2 weeks for wound check           Trish Dhillon PA-C  Plastic & Reconstructive Surgery

## 2023-05-01 ENCOUNTER — OFFICE VISIT (OUTPATIENT)
Dept: PLASTIC SURGERY | Facility: CLINIC | Age: 68
End: 2023-05-01

## 2023-05-01 DIAGNOSIS — N64.89 BREAST ASYMMETRY: Primary | ICD-10-CM

## 2023-05-01 NOTE — PROGRESS NOTES
Subjective:    Patient ID: Karyle Murphy is a 79 y o  female  HPI The patient is s/p right breast reduction and left mastopexy on 2/23/23  The patient reports continued small wounds under b/l breasts  She has been doing daily wound care with WTD dressings  The patient denies any severe pain, fever, chills, or other issues at this time  She has been wearing compression and applying scar care to healed areas  Past Medical History:   Diagnosis Date    Breast cancer ([facility] ) 09/2019    left breast    History of radiation therapy 10/2019    left breast cancer    Migraine     Osteoporosis     Pancreatitis     Pleurisy     PONV (postoperative nausea and vomiting)     Seizures (HCC)     Varicella     Vertigo        Patient Active Problem List   Diagnosis    Fatigue    Age-related osteoporosis without current pathological fracture    Malignant neoplasm of upper-outer quadrant of left breast in female, estrogen receptor positive ([facility] )    Overweight (BMI 25 0-29  9)    Numbness of both lower extremities    Prediabetes    Hearing aid worn    Spinal stenosis of lumbar region with neurogenic claudication    Lumbar spondylolysis    Lumbar foraminal stenosis    Lumbar disc disease with radiculopathy    Hot flashes    Urge urinary incontinence    Migraines    S/P breast reconstruction, bilateral    De Quervain's tenosynovitis, right         Current Outpatient Medications:     Cholecalciferol (VITAMIN D) 2000 units CAPS, Take 4,000 Units by mouth daily , Disp: , Rfl:     Diclofenac Sodium (VOLTAREN) 1 %, Apply 2 g topically 4 (four) times a day, Disp: 50 g, Rfl: 0    Ivermectin 1 % CREA, Apply topically daily , Disp: , Rfl:     meclizine (ANTIVERT) 12 5 MG tablet, Take 12 5 mg by mouth 3 (three) times a day as needed for dizziness, Disp: , Rfl:     ondansetron (ZOFRAN) 4 mg tablet, Take 1 tablet (4 mg total) by mouth every 8 (eight) hours as needed for nausea or vomiting, Disp: 10 tablet, Rfl: 0   pimecrolimus (ELIDEL) 1 % cream, , Disp: , Rfl:     SUMAtriptan (IMITREX) 100 mg tablet, Take 100 mg by mouth once as needed for migraine, Disp: , Rfl:     venlafaxine (EFFEXOR) 50 mg tablet, TAKE 1 TABLET (50 MG TOTAL) BY MOUTH DAILY, Disp: 30 tablet, Rfl: 1      Past Surgical History:   Procedure Laterality Date    BREAST LUMPECTOMY Left 10/01/2019    Procedure: BREAST NEEDLE LOCALIZED LUMPECTOMY (NEEDLE LOC AT 0730); Surgeon: Shahnaz Choi MD;  [facility];  Service: Surgical Oncology    CHOLECYSTECTOMY      COLONOSCOPY      DENTAL SURGERY      Bone graft with implant    EPIDURAL BLOCK INJECTION  02/2022    HALLUX VALGUS CORRECTION Bilateral     LYMPH NODE BIOPSY Left 10/01/2019    Procedure: SENTINEL LYMPH NODE BIOPSY; LYMPHATIC MAPPING WITH BLUE DYE AND RADIOACTIVE DYE (INJECT AT 0830 BY DR HA IN THE OR); Surgeon: Shahnaz Choi MD;  [facility];  Service: Surgical Oncology    MAMMO NEEDLE LOCALIZATION LEFT (ALL INC) Left 10/01/2019    [place] BREAST REDUCTION Right 2/23/2023    Procedure: RIGHT BREAST REDUCTION;  Surgeon: Sharifa Shipman MD;  [facility];  Service: Plastics    MS MASTOPEXY Left 2/23/2023    Procedure: LEFT MASTOPEXY;  Surgeon: Sharifa Shipman MD;  [facility];  Service: Plastics    RHINOPLASTY         Social History     Tobacco Use    Smoking status: Never    Smokeless tobacco: Never   Substance Use Topics    Alcohol use: Never       Objective: There were no vitals taken for this visit  Physical Exam      General: NAD, alert    Breasts: 2cm x 3cm wound of the right breat triple point  1 5cm x 1cm wound of the left triple point  Wounds cauterized with silver nitrate  Remaining incisions are clean, dry, and intact  No evidence of further wound breakdown, hematoma, seroma, or purulence  Nipples viable bilaterally  No signs of infection  Wounds overall healthier with granulation tissue  Wounds dressed with WTD dressings  Assessment/Plan: 79year old s/p right breast reduction and left mastopexy      Patient doing well overall, wounds slowly improving  Educated patient on daily wound care with WTD dressings  Discussed scar care to remaining incisions  Activity as tolerated  Patient to wear compression during the day  We will see patient back in 3 weeks for wound check           Jacek Pederson PA-C  Plastic & Reconstructive Surgery

## 2023-05-04 DIAGNOSIS — Z17.0 MALIGNANT NEOPLASM OF UPPER-OUTER QUADRANT OF LEFT BREAST IN FEMALE, ESTROGEN RECEPTOR POSITIVE (HCC): ICD-10-CM

## 2023-05-04 DIAGNOSIS — R23.2 HOT FLASHES: ICD-10-CM

## 2023-05-04 DIAGNOSIS — C50.412 MALIGNANT NEOPLASM OF UPPER-OUTER QUADRANT OF LEFT BREAST IN FEMALE, ESTROGEN RECEPTOR POSITIVE (HCC): ICD-10-CM

## 2023-05-04 RX ORDER — VENLAFAXINE 50 MG/1
50 TABLET ORAL DAILY
Qty: 30 TABLET | Refills: 1 | Status: SHIPPED | OUTPATIENT
Start: 2023-05-04

## 2023-05-05 ENCOUNTER — TELEPHONE (OUTPATIENT)
Dept: PLASTIC SURGERY | Facility: CLINIC | Age: 68
End: 2023-05-05

## 2023-05-05 NOTE — TELEPHONE ENCOUNTER
SARAH pt on 5/1/23 upon check-in to post op re: denial received from insurance from surgery on 2/23/23  Pt stated that the authorization was not completed correctly which resulted in denial and the surgery was cancer-related which should be covered  Confirmed with the pt that any reconstructive procedures related to breast cancer should be covered by insurance  Let pt know I would reach out to , Fernando Saleem on 5/2, confirmed that she would take care of the billing issues  LVM for pt on 5/5 confirming that Harper is aware and working on making changes  In VM let pt know she could ask for me by name if had any further questions

## 2023-06-06 ENCOUNTER — OFFICE VISIT (OUTPATIENT)
Dept: PLASTIC SURGERY | Facility: CLINIC | Age: 68
End: 2023-06-06

## 2023-06-06 DIAGNOSIS — N64.89 BREAST ASYMMETRY: Primary | ICD-10-CM

## 2023-06-06 NOTE — PROGRESS NOTES
Subjective:    Patient ID: Duong Stokes is a 79 y o  female  HPI The patient is s/p right breast reduction and left mastopexy on 2/23/23  The patient reports continued small wounds under b/l breasts  She has been doing daily wound care with WTD dressings  The patient denies any severe pain, fever, chills, or other issues at this time  She has been wearing compression and applying scar care to healed areas  The patient states she is frustrated that wounds are still present  Past Medical History:   Diagnosis Date   • [facility] ([facility] ) 09/2019    left breast   • History of radiation therapy 10/2019    left breast cancer   • Migraine    • Osteoporosis    • Pancreatitis    • Pleurisy    • PONV (postoperative nausea and vomiting)    • Seizures (HCC)    • Varicella    • Vertigo        Patient Active Problem List   Diagnosis   • Fatigue   • Age-related osteoporosis without current pathological fracture   • Malignant neoplasm of upper-outer quadrant of left breast in female, estrogen receptor positive ([facility] )   • Overweight (BMI 25 0-29  9)   • Numbness of both lower extremities   • Prediabetes   • Hearing aid worn   • Spinal stenosis of lumbar region with neurogenic claudication   • Lumbar spondylolysis   • Lumbar foraminal stenosis   • Lumbar disc disease with radiculopathy   • Hot flashes   • Urge urinary incontinence   • Migraines   • S/P breast reconstruction, bilateral   • De Quervain's tenosynovitis, right         Current Outpatient Medications:   •  Cholecalciferol (VITAMIN D) 2000 units CAPS, Take 4,000 Units by mouth daily , Disp: , Rfl:   •  Diclofenac Sodium (VOLTAREN) 1 %, Apply 2 g topically 4 (four) times a day, Disp: 50 g, Rfl: 0  •  Ivermectin 1 % CREA, Apply topically daily , Disp: , Rfl:   •  meclizine (ANTIVERT) 12 5 MG tablet, Take 12 5 mg by mouth 3 (three) times a day as needed for dizziness, Disp: , Rfl:   •  ondansetron (ZOFRAN) 4 mg tablet, Take 1 tablet (4 mg total) by mouth every 8 (eight) hours as needed for nausea or vomiting, Disp: 10 tablet, Rfl: 0  •  pimecrolimus (ELIDEL) 1 % cream, , Disp: , Rfl:   •  SUMAtriptan (IMITREX) 100 mg tablet, Take 100 mg by mouth once as needed for migraine, Disp: , Rfl:   •  venlafaxine (EFFEXOR) 50 mg tablet, TAKE 1 TABLET (50 MG TOTAL) BY MOUTH DAILY, Disp: 30 tablet, Rfl: 1      Past Surgical History:   Procedure Laterality Date   • BREAST LUMPECTOMY Left 10/01/2019    Procedure: BREAST NEEDLE LOCALIZED LUMPECTOMY (NEEDLE LOC AT 0730); Surgeon: Gillian Thayer MD;  [facility];  Service: Surgical Oncology   • CHOLECYSTECTOMY     • COLONOSCOPY     • DENTAL SURGERY      Bone graft with implant   • EPIDURAL BLOCK INJECTION  02/2022   • [address] Bilateral    • LYMPH NODE BIOPSY Left 10/01/2019    Procedure: SENTINEL LYMPH NODE BIOPSY; LYMPHATIC MAPPING WITH BLUE DYE AND RADIOACTIVE DYE (INJECT AT 0830 BY DR HA IN THE OR); Surgeon: Gillian Thayer MD;  [facility];  Service: Surgical Oncology   • MAMMO NEEDLE LOCALIZATION LEFT (ALL INC) Left 10/01/2019   • [place] BREAST REDUCTION Right 2/23/2023    Procedure: RIGHT BREAST REDUCTION;  Surgeon: Olimpia Schmitz MD;  [facility];  Service: Plastics   • ND MASTOPEXY Left 2/23/2023    Procedure: LEFT MASTOPEXY;  Surgeon: Olimpia Schmitz MD;  [facility];  Service: Plastics   • RHINOPLASTY         Social History     Tobacco Use   • Smoking status: Never   • Smokeless tobacco: Never   Substance Use Topics   • Alcohol use: Never       Objective: There were no vitals taken for this visit  Physical Exam      General: NAD, alert    Breasts: 1 5cm x 1cm wound of the right breat triple point  1 5cm x 2cm wound of the left triple point  wounds are very superficial and have improved in appearance  Remaining incisions are clean, dry, and intact  No evidence of further wound breakdown, hematoma, seroma, or purulence  Nipples viable bilaterally  No signs of infection   Wounds overall healthier with granulation tissue  Wounds dressed with collagen  Assessment/Plan: 79year old s/p right breast reduction and left mastopexy      Patient doing well overall, wounds slowly improving  Educated patient on high protein diet and daily wound care with Puracol dressings  Discussed scar care to remaining incisions  Activity as tolerated  We will see patient back in 4 weeks for wound check           Wai Olson PA-C  Plastic & Reconstructive Surgery

## 2023-07-03 ENCOUNTER — TELEPHONE (OUTPATIENT)
Dept: PLASTIC SURGERY | Facility: CLINIC | Age: 68
End: 2023-07-03

## 2023-07-05 ENCOUNTER — TELEPHONE (OUTPATIENT)
Dept: PLASTIC SURGERY | Facility: CLINIC | Age: 68
End: 2023-07-05

## 2023-07-05 NOTE — TELEPHONE ENCOUNTER
Patient called and asked if she could  more Puracol. Called and informed patient that she could pick it up at the [facility] office. Patient verbalized understanding.

## 2023-07-07 ENCOUNTER — RADIATION ONCOLOGY FOLLOW-UP (OUTPATIENT)
Dept: RADIATION ONCOLOGY | Facility: CLINIC | Age: 68
End: 2023-07-07
Attending: RADIOLOGY
Payer: COMMERCIAL

## 2023-07-07 VITALS
RESPIRATION RATE: 16 BRPM | WEIGHT: 183 LBS | DIASTOLIC BLOOD PRESSURE: 80 MMHG | HEART RATE: 83 BPM | SYSTOLIC BLOOD PRESSURE: 126 MMHG | TEMPERATURE: 96.8 F | OXYGEN SATURATION: 97 % | BODY MASS INDEX: 29.99 KG/M2

## 2023-07-07 DIAGNOSIS — Z17.0 MALIGNANT NEOPLASM OF UPPER-OUTER QUADRANT OF LEFT BREAST IN FEMALE, ESTROGEN RECEPTOR POSITIVE (HCC): Primary | ICD-10-CM

## 2023-07-07 DIAGNOSIS — C50.412 MALIGNANT NEOPLASM OF UPPER-OUTER QUADRANT OF LEFT BREAST IN FEMALE, ESTROGEN RECEPTOR POSITIVE (HCC): Primary | ICD-10-CM

## 2023-07-07 PROCEDURE — 99213 OFFICE O/P EST LOW 20 MIN: CPT | Performed by: RADIOLOGY

## 2023-07-07 PROCEDURE — 99211 OFF/OP EST MAY X REQ PHY/QHP: CPT | Performed by: RADIOLOGY

## 2023-07-07 NOTE — PROGRESS NOTES
Mikel Neff 1955 is a 79 y.o. female with a history of [id number] invasive lobular carcinoma of the left breast status post resection achieving negative margins. Tumor cells were ER/ TX positive and HER2 Mateo negative. She completed a course of adjuvant whole breast hypofractionated radiation on 19. She is maintained on anastrozole. She was last seen 22 and returns today for follow up.    23 Dr. Jason Young  Clinically, she has no evidence of recurrent disease. I recommended her to stay on anastrozole for 2 more years to complete 5 years of adjuvant hormonal therapy. Follow up 1 year    23 Surgical Oncology, Zayra Padilla  will see the patient back in 6 months       23 RIGHT BREAST REDUCTION (Right: Breast)       LEFT MASTOPEXY (Left: Breast)      23 Plastic surgery  s/p right breast reduction and left mastopexy on 23. The patient reports continued small wounds under b/l breasts. doing well overall, wounds slowly improving      Upcomin23 Plastic surgeryMarcy  23 Surgical Oncology  23 Mammogram  24 Dr. Jason Young      Follow up visit     Oncology History   Malignant neoplasm of upper-outer quadrant of left breast in female, estrogen receptor positive ([address])   2019 Biopsy    Left breast biopsy  1 o'clock, 5 cm from nipple  Invasive lobular carcinoma  Grade 1      HER2 0    Done at [facility]     10/1/2019 Surgery    Left breast needle localized lumpectomy with sentinel lymph node biopsy  Invasive lobular carcinoma  Grade 1  1.8 cm  Margins negative  0/2 Lymph nodes  Anatomic/Prognostic Stage IA     10/30/2019 -  Hormone Therapy    Anastrozole 1 mg daily  Dr. Jason Young     2019 - 2019 Radiation    entire right breast to 4005cGy in 15 daily 267cGy fractions. Dr Glenn Toney         Review of Systems:  Review of Systems   Constitutional: Negative. HENT: Positive for hearing loss. Eyes: Negative. Respiratory: Negative. Cardiovascular: Negative. Gastrointestinal: Negative. Endocrine: Negative. Genitourinary: Positive for frequency and urgency. Musculoskeletal: Positive for arthralgias and back pain. Skin: Positive for wound (left breast after reconstruction ). Allergic/Immunologic: Negative. Neurological: Negative. Hematological: Negative. Psychiatric/Behavioral: Positive for sleep disturbance. Clinical Trial: no      Health Maintenance   Topic Date Due   • COVID-19 Vaccine (4 - Moderna series) 01/17/2022   • PT PLAN OF CARE  12/30/2022   • ONC Colorectal Surgery Screening  Never done   • Breast Cancer Survivorship Visit  Never done   • ONC Cervical Cancer Screening  01/10/2023   • ONC DXA Scan  01/10/2023   • ONC Physical Therapy Referral  Never done   • Breast Cancer Screening: Mammogram  06/13/2023   • Influenza Vaccine (1) 09/01/2023   • Fall Risk  09/09/2023   • Depression Screening  09/09/2023   • Medicare Annual Wellness Visit (AWV)  09/09/2023   • BMI: Followup Plan  09/09/2023   • Urinary Incontinence Screening  09/09/2023   • BMI: Adult  03/13/2024   • Cervical Cancer Screening  06/17/2024   • Colorectal Cancer Screening  10/14/2025   • Hepatitis C Screening  Completed   • Osteoporosis Screening  Completed   • Pneumococcal Vaccine: 65+ Years  Completed   • HIB Vaccine  Aged Out   • IPV Vaccine  Aged Out   • Hepatitis A Vaccine  Aged Out   • Meningococcal ACWY Vaccine  Aged Out   • HPV Vaccine  Aged Out     Patient Active Problem List   Diagnosis   • Fatigue   • Age-related osteoporosis without current pathological fracture   • Malignant neoplasm of upper-outer quadrant of left breast in female, estrogen receptor positive ([address])   • Overweight (BMI 25.0-29. 9)   • Numbness of both lower extremities   • Prediabetes   • Hearing aid worn   • Spinal stenosis of lumbar region with neurogenic claudication   • Lumbar spondylolysis   • Lumbar foraminal stenosis   • Lumbar disc disease with radiculopathy   • Hot flashes   • Urge urinary incontinence   • Migraines   • S/P breast reconstruction, bilateral   • De Quervain's tenosynovitis, right     Past Medical History:   Diagnosis Date   • Breast cancer ([address]) 09/2019    left breast   • History of radiation therapy 10/2019    left breast cancer   • Migraine    • Osteoporosis    • Pancreatitis    • Pleurisy    • PONV (postoperative nausea and vomiting)    • Seizures ([address])    • Varicella    • Vertigo      Past Surgical History:   Procedure Laterality Date   • BREAST LUMPECTOMY Left 10/01/2019    Procedure: BREAST NEEDLE LOCALIZED LUMPECTOMY (NEEDLE LOC AT 0730); Surgeon: Denton Coley MD;  [facility];  Service: Surgical Oncology   • CHOLECYSTECTOMY     • COLONOSCOPY     • DENTAL SURGERY      Bone graft with implant   • EPIDURAL BLOCK INJECTION  02/2022   • [address] Bilateral    • LYMPH NODE BIOPSY Left 10/01/2019    Procedure: SENTINEL LYMPH NODE BIOPSY; LYMPHATIC MAPPING WITH BLUE DYE AND RADIOACTIVE DYE (INJECT AT 0830 BY DR HA IN THE OR); Surgeon: Denton Coley MD;  [facility];  Service: Surgical Oncology   • MAMMO NEEDLE LOCALIZATION LEFT (ALL INC) Left 10/01/2019   • [place] BREAST REDUCTION Right 2/23/2023    Procedure: RIGHT BREAST REDUCTION;  Surgeon: Paige Irvin MD;  [facility];  Service: Plastics   • [place] MASTOPEXY Left 2/23/2023    Procedure: LEFT MASTOPEXY;  Surgeon: Paige Irvin MD;  [facility];  Service: Plastics   • RHINOPLASTY       Family History   Problem Relation Age of Onset   • No Known Problems Mother    • Lung cancer Father 71   • No Known Problems Maternal Grandmother    • No Known Problems Maternal Grandfather    • No Known Problems Paternal Grandmother    • No Known Problems Paternal Grandfather    • Breast cancer Neg Hx      Social History     Socioeconomic History   • Marital status:       Spouse name: Not on file   • Number of children: 2   • Years of education: Not on file   • Highest education level: Not on file   Occupational History   • Occupation: [employer] works part-time   • Occupation: medical professional   Tobacco Use   • Smoking status: Never   • Smokeless tobacco: Never   Vaping Use   • Vaping Use: Never used   Substance and Sexual Activity   • Alcohol use: Never   • Drug use: Never   • Sexual activity: Not Currently     Partners: Male     Birth control/protection: None, Post-menopausal   Other Topics Concern   • Not on file   Social History Narrative   • Not on file     Social Determinants of Health     Financial Resource Strain: Low Risk  (9/9/2022)    Overall Financial Resource Strain (CARDIA)    • Difficulty of Paying Living Expenses: Not hard at all   Food Insecurity: Not on file   Transportation Needs: No Transportation Needs (9/9/2022)    PRAPARE - Transportation    • Lack of Transportation (Medical): No    • Lack of Transportation (Non-Medical):  No   Physical Activity: Not on file   Stress: Not on file   Social Connections: Not on file   Intimate Partner Violence: Not on file   Housing Stability: Not on file       Current Outpatient Medications:   •  Cholecalciferol (VITAMIN D) 2000 units CAPS, Take 4,000 Units by mouth daily , Disp: , Rfl:   •  Diclofenac Sodium (VOLTAREN) 1 %, Apply 2 g topically 4 (four) times a day, Disp: 50 g, Rfl: 0  •  Ivermectin 1 % CREA, Apply topically daily , Disp: , Rfl:   •  meclizine (ANTIVERT) 12.5 MG tablet, Take 12.5 mg by mouth 3 (three) times a day as needed for dizziness, Disp: , Rfl:   •  ondansetron (ZOFRAN) 4 mg tablet, Take 1 tablet (4 mg total) by mouth every 8 (eight) hours as needed for nausea or vomiting, Disp: 10 tablet, Rfl: 0  •  pimecrolimus (ELIDEL) 1 % cream, , Disp: , Rfl:   •  SUMAtriptan (IMITREX) 100 mg tablet, Take 100 mg by mouth once as needed for migraine, Disp: , Rfl:   •  venlafaxine (EFFEXOR) 50 mg tablet, TAKE 1 TABLET (50 MG TOTAL) BY MOUTH DAILY, Disp: 30 tablet, Rfl: 1  Allergies   Allergen Reactions   • Ciprofloxacin Seizures   • Cyclopentolate Other (See Comments) and Tachycardia     Resp distress   • Sulfa Antibiotics Hives and Rash   • Scopolamine Other (See Comments)     Precautionary for cycopentolate allergy     There were no vitals filed for this visit.

## 2023-07-07 NOTE — Clinical Note
Lito Hansen. I saw Ms. Fabi Albright last week and she had stopped chemo in December and has been off dealing with wound complications after bilateral mastopexy. She says she is going to see your NP/PA soon to discuss resuming. Her joint pains have significant improved during these 6 months, but if recommended would be willing to resume for another year. Wanted to let you know as she is not scheduled to see you until January.

## 2023-07-07 NOTE — PROGRESS NOTES
Follow-up - Radiation Oncology   Dickson Plascencia 1955 79 y.o. female [id number]      History of Present Illness   Cancer Staging   Malignant neoplasm of upper-outer quadrant of left breast in female, estrogen receptor positive ([address])  Staging form: Breast, AJCC 8th Edition  - Pathologic: Stage IA (pT1c, pN0(sn), cM0, G1, ER+, OK+, HER2-) - Unsigned  Neoadjuvant therapy: No  Method of lymph node assessment: [place] lymph node biopsy  Histologic grading system: 3 grade system  Laterality: Left  Tumor size (mm): Yanique Plascencia is a 79 y.o. woman with a history of [id number] invasive lobular carcinoma of the left breast status post resection achieving negative margins.  Tumor cells were ER/ OK positive and HER2 Mateo negative. She completed a course of adjuvant whole breast hypofractionated radiation on 12/4/19.  She is maintained on anastrozole. She was last seen 7/8/22 and returns today for follow up.     1/24/23 Dr. Stevenson Rodriges  Clinically, she has no evidence of recurrent disease.  I recommended her to stay on anastrozole for 2 more years to complete 5 years of adjuvant hormonal therapy. Follow up 1 year     1/24/23 Surgical Oncology, Keisha Rachel  will see the patient back in 6 months      2/23/23 RIGHT BREAST REDUCTION (Right: Breast)       LEFT MASTOPEXY (Left: Breast)    6/6/23 Plastic surgery  s/p right breast reduction and left mastopexy on 2/23/23. The patient reports continued small wounds under b/l breasts. Doing well overall, wounds slowly improving     The patient notes that her wounds have almost closed. Her right breast wounds closed recently and her left breast inferior wound is closing. The wound is almost closed, but still slightly open and draining small amounts. She denies pain, bleeding, or fever. She has follow-up with Plastics on 7/17/23 for follow-up. She denies pain, but notes some tenderness due to continued recovery from recent bilateral reconstructive surgery.   She denies new palpable nodules, suspicious skin changes, nipple discharge of the breasts bilaterally. She denies upper extremity edema or shoulder restriction. In preparation for her surgery that was initially scheduled in December she stopped her anastrazole as instructed. Her surgery was then rescheduled and she opted not to start in interim. She has now been off AI for about 6 months. She notes significant improvement of energy and joint pains. She plans to discuss resuming versus discontinuing treatment with Medical Oncology at her follow-up. She notes she is scheduled to see Medical Oncology PA in the next month and Dr. Migel Yip in 2024. Upcomin23 Plastic surgery, Masha Juárez  23 Surgical Oncology  23 Mammogram         Historical Information   Oncology History   Malignant neoplasm of upper-outer quadrant of left breast in female, estrogen receptor positive ([address])   2019 Biopsy    Left breast biopsy  1 o'clock, 5 cm from nipple  Invasive lobular carcinoma  Grade 1      HER2 0    Done at [facility]     10/1/2019 Surgery    Left breast needle localized lumpectomy with sentinel lymph node biopsy  Invasive lobular carcinoma  Grade 1  1.8 cm  Margins negative  0/2 Lymph nodes  Anatomic/Prognostic Stage IA     10/30/2019 -  Hormone Therapy    Anastrozole 1 mg daily  Dr. Migel Yip     2019 - 2019 Radiation    entire right breast to 4005cGy in 15 daily 267cGy fractions. Dr Ilsa Montiel         Past Medical History:   Diagnosis Date   • Breast cancer ([address]) 2019    left breast   • History of radiation therapy 10/2019    left breast cancer   • Migraine    • Osteoporosis    • Pancreatitis    • Pleurisy    • PONV (postoperative nausea and vomiting)    • Seizures ([address])    • Varicella    • Vertigo      Past Surgical History:   Procedure Laterality Date   • BREAST LUMPECTOMY Left 10/01/2019    Procedure: BREAST NEEDLE LOCALIZED LUMPECTOMY (NEEDLE LOC AT 0730);   Surgeon: López Glass MD; [facility];  Service: Surgical Oncology   • CHOLECYSTECTOMY     • COLONOSCOPY     • DENTAL SURGERY      Bone graft with implant   • EPIDURAL BLOCK INJECTION  02/2022   • HALLUX VALGUS CORRECTION Bilateral    • LYMPH NODE BIOPSY Left 10/01/2019    Procedure: SENTINEL LYMPH NODE BIOPSY; LYMPHATIC MAPPING WITH BLUE DYE AND RADIOACTIVE DYE (INJECT AT 0830 BY DR HA IN THE OR);   Surgeon: Bladimir Alston MD;  [facility];  Service: Surgical Oncology   • MAMMO NEEDLE LOCALIZATION LEFT (ALL INC) Left 10/01/2019   • ME BREAST REDUCTION Right 2/23/2023    Procedure: RIGHT BREAST REDUCTION;  Surgeon: Silvano Summers MD;  [facility];  Service: Plastics   • ME MASTOPEXY Left 2/23/2023    Procedure: LEFT MASTOPEXY;  Surgeon: Silvano Summers MD;  [facility];  Service: Plastics   • RHINOPLASTY         Social History   Social History     Substance and Sexual Activity   Alcohol Use Never     Social History     Substance and Sexual Activity   Drug Use Never     Social History     Tobacco Use   Smoking Status Never   Smokeless Tobacco Never         Meds/Allergies     Current Outpatient Medications:   •  Cholecalciferol (VITAMIN D) 2000 units CAPS, Take 4,000 Units by mouth daily , Disp: , Rfl:   •  Diclofenac Sodium (VOLTAREN) 1 %, Apply 2 g topically 4 (four) times a day, Disp: 50 g, Rfl: 0  •  Ivermectin 1 % CREA, Apply topically daily , Disp: , Rfl:   •  meclizine (ANTIVERT) 12.5 MG tablet, Take 12.5 mg by mouth 3 (three) times a day as needed for dizziness, Disp: , Rfl:   •  ondansetron (ZOFRAN) 4 mg tablet, Take 1 tablet (4 mg total) by mouth every 8 (eight) hours as needed for nausea or vomiting, Disp: 10 tablet, Rfl: 0  •  pimecrolimus (ELIDEL) 1 % cream, , Disp: , Rfl:   •  SUMAtriptan (IMITREX) 100 mg tablet, Take 100 mg by mouth once as needed for migraine, Disp: , Rfl:   •  venlafaxine (EFFEXOR) 50 mg tablet, TAKE 1 TABLET (50 MG TOTAL) BY MOUTH DAILY, Disp: 30 tablet, Rfl: 1  Allergies   Allergen Reactions   • Ciprofloxacin Seizures   • Cyclopentolate Other (See Comments) and Tachycardia     Resp distress   • Sulfa Antibiotics Hives and Rash   • Scopolamine Other (See Comments)     Precautionary for cycopentolate allergy         Review of Systems   Constitutional: Negative. HENT: Positive for hearing loss. Eyes: Negative. Respiratory: Negative. Cardiovascular: Negative. Gastrointestinal: Negative. Endocrine: Negative. Genitourinary: Positive for frequency and urgency. Musculoskeletal: Positive for arthralgias and back pain. Skin: Positive for wound (left breast after reconstruction ). Allergic/Immunologic: Negative. Neurological: Negative. Hematological: Negative. Psychiatric/Behavioral: Positive for sleep disturbance. OBJECTIVE:   /80   Pulse 83   Temp (!) 96.8 °F (36 °C)   Resp 16   Wt 83 kg (183 lb)   SpO2 97%   BMI 29.99 kg/m²   Karnofsky: 80 - Normal activity with effort; some signs or symptoms of disease    Physical Exam  Vitals and nursing note reviewed. Constitutional:       General: She is not in acute distress. Appearance: She is well-developed. Cardiovascular:      Rate and Rhythm: Normal rate and regular rhythm. Pulmonary:      Breath sounds: No wheezing, rhonchi or rales. Chest:          Comments: Breast examination demonstrates symmetric size and when sitting contour. Well-healed mastopexy right breast scar are closed and healed. The left breast demonstrates mastopexy scars with 1cm area inferior wound is mostly closed with small areas of fisures that are dry. There are no palpable nodules or suspicious skin changes bilaterally. There is retraction of the inferior left breast and diffuse fibrosis and mild hyperpigmentation. Abdominal:      Palpations: Abdomen is soft. Tenderness: There is no abdominal tenderness. Musculoskeletal:      Right lower leg: No edema. Left lower leg: No edema. Comments: No upper extremity edema. Full range of motion upper extremities bilaterally. Lymphadenopathy:      Cervical: No cervical adenopathy. Upper Body:      Right upper body: No supraclavicular or axillary adenopathy. Left upper body: No supraclavicular or axillary adenopathy. Neurological:      Mental Status: She is alert and oriented to person, place, and time. Gait: Gait normal.            Assessment/Plan:  Vivian Mccartney is a 79 y.o. woman with a history of stage I invasive lobular carcinoma of the left breast status post resection followed by adjuvant radiation completed about 3.5 years ago. Tumor cells were ER/ VT positive and HER2 Mateo negative. She remains clinically JOSUÉ. The patient has undergone bilateral mastopexy complicated with slow wound closing, but now largely closed. Bilateral mammogram delayed to allow for breast healing, but her exam remains benign and bilateral mammogram has already been scheduled. Follow-up Medical and Surgical Oncology follow-up appointments have been scheduled. Her symmetry overall has improved. She has temporarily stopped endocrine therapy with significant improvement in arthralgias and myalgias. She is willing to discuss resumption of AI treatment and states that she has follow-up appointment. I instructed her to practice sun protection to the irradiated skin. We will see her again in 1 year or sooner should the need arise. Shasta Pickens MD  7/7/2023,2:06 PM    Portions of the record may have been created with voice recognition software.  Occasional wrong word or "sound a like" substitutions may have occurred due to the inherent limitations of voice recognition software.  Read the chart carefully and recognize, using context, where substitutions have occurred.

## 2023-07-14 ENCOUNTER — TELEPHONE (OUTPATIENT)
Dept: PLASTIC SURGERY | Facility: CLINIC | Age: 68
End: 2023-07-14

## 2023-07-18 DIAGNOSIS — R23.2 HOT FLASHES: ICD-10-CM

## 2023-07-18 DIAGNOSIS — Z17.0 MALIGNANT NEOPLASM OF UPPER-OUTER QUADRANT OF LEFT BREAST IN FEMALE, ESTROGEN RECEPTOR POSITIVE (HCC): ICD-10-CM

## 2023-07-18 DIAGNOSIS — C50.412 MALIGNANT NEOPLASM OF UPPER-OUTER QUADRANT OF LEFT BREAST IN FEMALE, ESTROGEN RECEPTOR POSITIVE (HCC): ICD-10-CM

## 2023-07-18 RX ORDER — VENLAFAXINE 50 MG/1
50 TABLET ORAL DAILY
Qty: 30 TABLET | Refills: 1 | Status: SHIPPED | OUTPATIENT
Start: 2023-07-18

## 2023-07-24 ENCOUNTER — OFFICE VISIT (OUTPATIENT)
Dept: PLASTIC SURGERY | Facility: CLINIC | Age: 68
End: 2023-07-24
Payer: COMMERCIAL

## 2023-07-24 DIAGNOSIS — N64.89 BREAST ASYMMETRY: Primary | ICD-10-CM

## 2023-07-24 PROCEDURE — 99213 OFFICE O/P EST LOW 20 MIN: CPT | Performed by: PHYSICIAN ASSISTANT

## 2023-07-24 NOTE — PROGRESS NOTES
Subjective:    Patient ID: Pedro Pablo Tucker is a 79 y.o. female. HPI The patient is s/p right breast reduction and left mastopexy on 2/23/23. The patient reports wounds under b/l breasts have healed. She states collagen dressings really helped her wounds heal. The patient denies any severe pain, fever, chills, or other issues at this time. She has been wearing compression and applying scar care to healed areas. Past Medical History:   Diagnosis Date   • Breast cancer ([address]) 09/2019    left breast   • History of radiation therapy 10/2019    left breast cancer   • Migraine    • Osteoporosis    • Pancreatitis    • Pleurisy    • PONV (postoperative nausea and vomiting)    • Seizures (HCC)    • Varicella    • Vertigo        Patient Active Problem List   Diagnosis   • Fatigue   • Age-related osteoporosis without current pathological fracture   • Malignant neoplasm of upper-outer quadrant of left breast in female, estrogen receptor positive ([address])   • Overweight (BMI 25.0-29. 9)   • Numbness of both lower extremities   • Prediabetes   • Hearing aid worn   • Spinal stenosis of lumbar region with neurogenic claudication   • Lumbar spondylolysis   • Lumbar foraminal stenosis   • Lumbar disc disease with radiculopathy   • Hot flashes   • Urge urinary incontinence   • Migraines   • S/P breast reconstruction, bilateral   • De Quervain's tenosynovitis, right         Current Outpatient Medications:   •  Cholecalciferol (VITAMIN D) 2000 units CAPS, Take 4,000 Units by mouth daily , Disp: , Rfl:   •  Diclofenac Sodium (VOLTAREN) 1 %, Apply 2 g topically 4 (four) times a day, Disp: 50 g, Rfl: 0  •  Ivermectin 1 % CREA, Apply topically daily , Disp: , Rfl:   •  meclizine (ANTIVERT) 12.5 MG tablet, Take 12.5 mg by mouth 3 (three) times a day as needed for dizziness, Disp: , Rfl:   •  ondansetron (ZOFRAN) 4 mg tablet, Take 1 tablet (4 mg total) by mouth every 8 (eight) hours as needed for nausea or vomiting, Disp: 10 tablet, Rfl: 0  • pimecrolimus (ELIDEL) 1 % cream, , Disp: , Rfl:   •  SUMAtriptan (IMITREX) 100 mg tablet, Take 100 mg by mouth once as needed for migraine, Disp: , Rfl:   •  venlafaxine (EFFEXOR) 50 mg tablet, TAKE 1 TABLET (50 MG TOTAL) BY MOUTH DAILY, Disp: 30 tablet, Rfl: 1    . Past Surgical History:   Procedure Laterality Date   • BREAST LUMPECTOMY Left 10/01/2019    Procedure: BREAST NEEDLE LOCALIZED LUMPECTOMY (NEEDLE LOC AT 0730); Surgeon: Denton Coley MD;  [facility];  Service: Surgical Oncology   • CHOLECYSTECTOMY     • COLONOSCOPY     • DENTAL SURGERY      Bone graft with implant   • EPIDURAL BLOCK INJECTION  02/2022   • [address] Bilateral    • LYMPH NODE BIOPSY Left 10/01/2019    Procedure: SENTINEL LYMPH NODE BIOPSY; LYMPHATIC MAPPING WITH BLUE DYE AND RADIOACTIVE DYE (INJECT AT 0830 BY DR HA IN THE OR); Surgeon: Denton Coley MD;  [facility];  Service: Surgical Oncology   • MAMMO NEEDLE LOCALIZATION LEFT (ALL INC) Left 10/01/2019   • [place] BREAST REDUCTION Right 2/23/2023    Procedure: RIGHT BREAST REDUCTION;  Surgeon: Paige Irvin MD;  [facility];  Service: Plastics   • ID MASTOPEXY Left 2/23/2023    Procedure: LEFT MASTOPEXY;  Surgeon: Paige Irvin MD;  [facility];  Service: Plastics   • RHINOPLASTY         Social History     Tobacco Use   • Smoking status: Never   • Smokeless tobacco: Never   Substance Use Topics   • Alcohol use: Never       Objective: There were no vitals taken for this visit. Physical Exam      General: NAD, alert    Breasts: Wounds healed b/l. Incisions are clean, dry, and intact. No evidence of further wound breakdown, hematoma, seroma, or purulence. Nipples viable bilaterally. No signs of infection. Assessment/Plan: 79year old s/p right breast reduction and left mastopexy      Patient doing well overall. Wounds healed fully. Discussed scar care and scar massage daily. Activity as tolerated.   We will see patient back in 4 weeks, will take post-op pictures.          Enrique Tena PA-C  Plastic & Reconstructive Surgery

## 2023-08-02 ENCOUNTER — OFFICE VISIT (OUTPATIENT)
Dept: SURGICAL ONCOLOGY | Facility: CLINIC | Age: 68
End: 2023-08-02
Payer: COMMERCIAL

## 2023-08-02 VITALS
HEART RATE: 82 BPM | HEIGHT: 66 IN | RESPIRATION RATE: 16 BRPM | OXYGEN SATURATION: 98 % | TEMPERATURE: 97.6 F | DIASTOLIC BLOOD PRESSURE: 82 MMHG | BODY MASS INDEX: 29.89 KG/M2 | WEIGHT: 186 LBS | SYSTOLIC BLOOD PRESSURE: 122 MMHG

## 2023-08-02 DIAGNOSIS — C50.412 MALIGNANT NEOPLASM OF UPPER-OUTER QUADRANT OF LEFT BREAST IN FEMALE, ESTROGEN RECEPTOR POSITIVE (HCC): Primary | ICD-10-CM

## 2023-08-02 DIAGNOSIS — Z17.0 MALIGNANT NEOPLASM OF UPPER-OUTER QUADRANT OF LEFT BREAST IN FEMALE, ESTROGEN RECEPTOR POSITIVE (HCC): Primary | ICD-10-CM

## 2023-08-02 PROCEDURE — 99213 OFFICE O/P EST LOW 20 MIN: CPT

## 2023-08-02 RX ORDER — ANASTROZOLE 1 MG/1
1 TABLET ORAL DAILY
Qty: 90 TABLET | Refills: 3 | Status: SHIPPED | OUTPATIENT
Start: 2023-08-02

## 2023-08-02 NOTE — PROGRESS NOTES
Surgical Oncology Follow Up       [address] [facility] SURGICAL ONCOLOGY [place]  [address] [facility] [place] [address]    Ramses Thakur  1955  [id number]  [address] [facility] SURGICAL ONCOLOGY [facility] [place] [address]    Chief Complaint   Patient presents with   • Follow-up       Assessment/Plan:  1. Malignant neoplasm of upper-outer quadrant of left breast in female, estrogen receptor positive ([address])  - 6 month follow up       Discussion/Summary: Patient is a 53-year-old female presenting today for six-month follow-up for left breast cancer diagnosed in July 2019.  Pathology revealed invasive lobular carcinoma ER/SC 100%, HER2 -.  She had a left breast lumpectomy with sentinel node biopsy with Dr. Nakia Hopkins and completed whole breast radiation therapy.  She discontinued AI therapy earlier this year secondary to side effects. She was on anti-hormone medication for 3 years but took an 8 month break. She states she wants to go back on it for 1 more year. She had a right breast reduction in February. She is scheduled for mammogram at the end of the month. There were no concerns on her cbe. I will see the patient back in 6 months or sooner should the need arise. She was instructed to call with any questions or concerns prior to this time.  All questions were answered today.       History of Present Illness:     Oncology History   Malignant neoplasm of upper-outer quadrant of left breast in female, estrogen receptor positive ([address])   7/23/2019 Biopsy    Left breast biopsy  1 o'clock, 5 cm from nipple  Invasive lobular carcinoma  Grade 1      HER2 0    Done at [facility]     10/1/2019 Surgery    Left breast needle localized lumpectomy with sentinel lymph node biopsy  Invasive lobular carcinoma  Grade 1  1.8 cm  Margins negative  0/2 Lymph nodes  Anatomic/Prognostic Stage IA     10/30/2019 -  Hormone Therapy    Anastrozole 1 mg daily  Dr. Roshan Mart     11/13/2019 - 12/4/2019 Radiation    entire right breast to 4005cGy in 15 daily 267cGy fractions. Dr Gia Barrera          -Interval History: Patient is a 17-year-old female presenting today for six-month follow-up for left breast cancer diagnosed in July 2019. She was on anti-hormone medication for 3 years but took an 8 month break. She states she wants to go back on it for 1 more year. Patient denies changes on her breast exam. She denies persistent headaches, bone pain, back pain, shortness of breath, or abdominal pain. Review of Systems:  Review of Systems   Constitutional: Negative for activity change, appetite change, fatigue and unexpected weight change. Respiratory: Negative for cough and shortness of breath. Cardiovascular: Negative for chest pain. Gastrointestinal: Negative for abdominal pain, diarrhea, nausea and vomiting. Endocrine: Negative for heat intolerance. Musculoskeletal: Negative for arthralgias, back pain and myalgias. Skin: Negative for rash. Neurological: Negative for weakness and headaches. Hematological: Negative for adenopathy. Patient Active Problem List   Diagnosis   • Fatigue   • Age-related osteoporosis without current pathological fracture   • Malignant neoplasm of upper-outer quadrant of left breast in female, estrogen receptor positive ([address])   • Overweight (BMI 25.0-29. 9)   • Numbness of both lower extremities   • Prediabetes   • Hearing aid worn   • Spinal stenosis of lumbar region with neurogenic claudication   • Lumbar spondylolysis   • Lumbar foraminal stenosis   • Lumbar disc disease with radiculopathy   • Hot flashes   • Urge urinary incontinence   • Migraines   • S/P breast reconstruction, bilateral   • De Quervain's tenosynovitis, right     Past Medical History:   Diagnosis Date   • Breast cancer ([address]) 09/2019    left breast   • History of radiation therapy 10/2019    left breast cancer   • Migraine    • Osteoporosis    • Pancreatitis    • Pleurisy    • PONV (postoperative nausea and vomiting)    • Seizures (HCC)    • Varicella    • Vertigo      Past Surgical History:   Procedure Laterality Date   • BREAST LUMPECTOMY Left 10/01/2019    Procedure: BREAST NEEDLE LOCALIZED LUMPECTOMY (NEEDLE LOC AT 0730); Surgeon: Oscar Camilo MD;  [facility];  Service: Surgical Oncology   • CHOLECYSTECTOMY     • COLONOSCOPY     • DENTAL SURGERY      Bone graft with implant   • EPIDURAL BLOCK INJECTION  02/2022   • [address] Bilateral    • LYMPH NODE BIOPSY Left 10/01/2019    Procedure: SENTINEL LYMPH NODE BIOPSY; LYMPHATIC MAPPING WITH BLUE DYE AND RADIOACTIVE DYE (INJECT AT 0830 BY DR HA IN THE OR); Surgeon: sOcar Camilo MD;  [facility];  Service: Surgical Oncology   • MAMMO NEEDLE LOCALIZATION LEFT (ALL INC) Left 10/01/2019   • [place] BREAST REDUCTION Right 2/23/2023    Procedure: RIGHT BREAST REDUCTION;  Surgeon: Shelia Sears MD;  [facility];  Service: Plastics   • [place] MASTOPEXY Left 2/23/2023    Procedure: LEFT MASTOPEXY;  Surgeon: Shelia Sears MD;  [facility];  Service: Plastics   • RHINOPLASTY       Family History   Problem Relation Age of Onset   • No Known Problems Mother    • Lung cancer Father 71   • No Known Problems Maternal Grandmother    • No Known Problems Maternal Grandfather    • No Known Problems Paternal Grandmother    • No Known Problems Paternal Grandfather    • Breast cancer Neg Hx      Social History     Socioeconomic History   • Marital status:       Spouse name: Not on file   • Number of children: 2   • Years of education: Not on file   • Highest education level: Not on file   Occupational History   • Occupation: [employer] works part-time   • Occupation: medical professional   Tobacco Use   • Smoking status: Never   • Smokeless tobacco: Never   Vaping Use   • Vaping Use: Never used   Substance and Sexual Activity   • Alcohol use: Never   • Drug use: Never • Sexual activity: Not Currently     Partners: Male     Birth control/protection: None, Post-menopausal   Other Topics Concern   • Not on file   Social History Narrative   • Not on file     Social Determinants of Health     Financial Resource Strain: Low Risk  (9/9/2022)    Overall Financial Resource Strain (CARDIA)    • Difficulty of Paying Living Expenses: Not hard at all   Food Insecurity: Not on file   Transportation Needs: No Transportation Needs (9/9/2022)    PRAPARE - Transportation    • Lack of Transportation (Medical): No    • Lack of Transportation (Non-Medical):  No   Physical Activity: Not on file   Stress: Not on file   Social Connections: Not on file   Intimate Partner Violence: Not on file   Housing Stability: Not on file       Current Outpatient Medications:   •  Cholecalciferol (VITAMIN D) 2000 units CAPS, Take 4,000 Units by mouth daily , Disp: , Rfl:   •  Ivermectin 1 % CREA, Apply topically daily , Disp: , Rfl:   •  meclizine (ANTIVERT) 12.5 MG tablet, Take 12.5 mg by mouth 3 (three) times a day as needed for dizziness, Disp: , Rfl:   •  ondansetron (ZOFRAN) 4 mg tablet, Take 1 tablet (4 mg total) by mouth every 8 (eight) hours as needed for nausea or vomiting, Disp: 10 tablet, Rfl: 0  •  pimecrolimus (ELIDEL) 1 % cream, , Disp: , Rfl:   •  SUMAtriptan (IMITREX) 100 mg tablet, Take 100 mg by mouth once as needed for migraine, Disp: , Rfl:   •  venlafaxine (EFFEXOR) 50 mg tablet, TAKE 1 TABLET (50 MG TOTAL) BY MOUTH DAILY, Disp: 30 tablet, Rfl: 1  •  Diclofenac Sodium (VOLTAREN) 1 %, Apply 2 g topically 4 (four) times a day, Disp: 50 g, Rfl: 0  Allergies   Allergen Reactions   • Ciprofloxacin Seizures   • Cyclopentolate Other (See Comments) and Tachycardia     Resp distress   • Sulfa Antibiotics Hives and Rash   • Scopolamine Other (See Comments)     Precautionary for cycopentolate allergy     Vitals:    08/02/23 1026   BP: 122/82   Pulse: 82   Resp: 16   Temp: 97.6 °F (36.4 °C)   SpO2: 98% Physical Exam  Constitutional:       General: She is not in acute distress. Appearance: Normal appearance. Cardiovascular:      Rate and Rhythm: Normal rate and regular rhythm. Pulses: Normal pulses. Heart sounds: Normal heart sounds. Pulmonary:      Effort: Pulmonary effort is normal.      Breath sounds: Normal breath sounds. Chest:      Chest wall: No mass. Breasts:     Right: No swelling, bleeding, inverted nipple, mass, nipple discharge, skin change or tenderness. Left: No swelling, bleeding, inverted nipple, mass, nipple discharge, skin change or tenderness. Abdominal:      General: Abdomen is flat. Palpations: Abdomen is soft. Lymphadenopathy:      Upper Body:      Right upper body: No supraclavicular, axillary or pectoral adenopathy. Left upper body: No supraclavicular, axillary or pectoral adenopathy. Skin:     General: Skin is warm. Neurological:      General: No focal deficit present. Mental Status: She is alert and oriented to person, place, and time. Psychiatric:         Mood and Affect: Mood normal.         Behavior: Behavior normal.           Results:    Imaging  No results found. I reviewed the above imaging data. Advance Care Planning/Advance Directives:  Discussed disease status, cancer treatment plans and/or cancer treatment goals with the patient.

## 2023-08-20 NOTE — PATIENT INSTRUCTIONS
Influenza- suspected Flu  Drink plenty of fluids  Treat fever with tylenol and Advil  Start antibiotic prophylactically  Return to work next week  Do not drive when taking cough medicine because it can make you drowsy  Severe sepsis, , CHF, New onset of AFIB. Melena

## 2023-08-28 ENCOUNTER — HOSPITAL ENCOUNTER (OUTPATIENT)
Dept: MAMMOGRAPHY | Facility: CLINIC | Age: 68
Discharge: HOME | End: 2023-08-28
Payer: COMMERCIAL

## 2023-08-28 ENCOUNTER — OFFICE VISIT (OUTPATIENT)
Dept: PLASTIC SURGERY | Facility: CLINIC | Age: 68
End: 2023-08-28
Payer: COMMERCIAL

## 2023-08-28 VITALS — BODY MASS INDEX: 29.89 KG/M2 | HEIGHT: 66 IN | WEIGHT: 186 LBS

## 2023-08-28 DIAGNOSIS — N64.89 BREAST ASYMMETRY: Primary | ICD-10-CM

## 2023-08-28 DIAGNOSIS — C50.412 MALIGNANT NEOPLASM OF UPPER-OUTER QUADRANT OF LEFT BREAST IN FEMALE, ESTROGEN RECEPTOR POSITIVE (HCC): ICD-10-CM

## 2023-08-28 DIAGNOSIS — Z17.0 MALIGNANT NEOPLASM OF UPPER-OUTER QUADRANT OF LEFT BREAST IN FEMALE, ESTROGEN RECEPTOR POSITIVE (HCC): ICD-10-CM

## 2023-08-28 PROCEDURE — 99214 OFFICE O/P EST MOD 30 MIN: CPT | Performed by: PHYSICIAN ASSISTANT

## 2023-08-28 PROCEDURE — 77066 DX MAMMO INCL CAD BI: CPT

## 2023-08-28 PROCEDURE — G0279 TOMOSYNTHESIS, MAMMO: HCPCS

## 2023-08-28 NOTE — PROGRESS NOTES
Subjective:    Patient ID: Nicole Zhao is a 79 y.o. female. HPI The patient is s/p right breast reduction and left mastopexy on 2/23/23. She offers no complaints today. The patient denies any severe pain, fever, chills, or other issues at this time. She has been applying scar care daily. She is pleased with her results. She states she had mammogram today which went well. Past Medical History:   Diagnosis Date   • Breast cancer ([address]) 09/2019    left breast   • History of radiation therapy 10/2019    left breast cancer   • Migraine    • Osteoporosis    • Pancreatitis    • Pleurisy    • PONV (postoperative nausea and vomiting)    • Seizures (HCC)    • Varicella    • Vertigo        Patient Active Problem List   Diagnosis   • Fatigue   • Age-related osteoporosis without current pathological fracture   • Malignant neoplasm of upper-outer quadrant of left breast in female, estrogen receptor positive ([address])   • Overweight (BMI 25.0-29. 9)   • Numbness of both lower extremities   • Prediabetes   • Hearing aid worn   • Spinal stenosis of lumbar region with neurogenic claudication   • Lumbar spondylolysis   • Lumbar foraminal stenosis   • Lumbar disc disease with radiculopathy   • Hot flashes   • Urge urinary incontinence   • Migraines   • S/P breast reconstruction, bilateral   • De Quervain's tenosynovitis, right         Current Outpatient Medications:   •  anastrozole (ARIMIDEX) 1 mg tablet, Take 1 tablet (1 mg total) by mouth daily, Disp: 90 tablet, Rfl: 3  •  Cholecalciferol (VITAMIN D) 2000 units CAPS, Take 4,000 Units by mouth daily , Disp: , Rfl:   •  Diclofenac Sodium (VOLTAREN) 1 %, Apply 2 g topically 4 (four) times a day, Disp: 50 g, Rfl: 0  •  Ivermectin 1 % CREA, Apply topically daily , Disp: , Rfl:   •  meclizine (ANTIVERT) 12.5 MG tablet, Take 12.5 mg by mouth 3 (three) times a day as needed for dizziness, Disp: , Rfl:   •  ondansetron (ZOFRAN) 4 mg tablet, Take 1 tablet (4 mg total) by mouth every 8 (eight) hours as needed for nausea or vomiting, Disp: 10 tablet, Rfl: 0  •  pimecrolimus (ELIDEL) 1 % cream, , Disp: , Rfl:   •  SUMAtriptan (IMITREX) 100 mg tablet, Take 100 mg by mouth once as needed for migraine, Disp: , Rfl:   •  venlafaxine (EFFEXOR) 50 mg tablet, TAKE 1 TABLET (50 MG TOTAL) BY MOUTH DAILY, Disp: 30 tablet, Rfl: 1    . Past Surgical History:   Procedure Laterality Date   • BREAST LUMPECTOMY Left 10/01/2019    Procedure: BREAST NEEDLE LOCALIZED LUMPECTOMY (NEEDLE LOC AT 0730); Surgeon: Cristian Hodgson MD;  [facility];  Service: Surgical Oncology   • CHOLECYSTECTOMY     • COLONOSCOPY     • DENTAL SURGERY      Bone graft with implant   • EPIDURAL BLOCK INJECTION  02/2022   • [address] Bilateral    • LYMPH NODE BIOPSY Left 10/01/2019    Procedure: SENTINEL LYMPH NODE BIOPSY; LYMPHATIC MAPPING WITH BLUE DYE AND RADIOACTIVE DYE (INJECT AT 0830 BY DR HA IN THE OR); Surgeon: Cristian Hodgson MD;  [facility];  Service: Surgical Oncology   • MAMMO NEEDLE LOCALIZATION LEFT (ALL INC) Left 10/01/2019   • ID BREAST REDUCTION Right 2/23/2023    Procedure: RIGHT BREAST REDUCTION;  Surgeon: Yakov Benjamin MD;  [facility];  Service: Plastics   • ID MASTOPEXY Left 2/23/2023    Procedure: LEFT MASTOPEXY;  Surgeon: Yakov Benjamin MD;  [facility];  Service: Plastics   • RHINOPLASTY         Social History     Tobacco Use   • Smoking status: Never   • Smokeless tobacco: Never   Substance Use Topics   • Alcohol use: Never       Objective: There were no vitals taken for this visit. Physical Exam      General: NAD, alert    Breasts: Wounds healed b/l. Incisions are clean, dry, and intact. No evidence of further wound breakdown, hematoma, seroma, or purulence. Nipples viable bilaterally. No signs of infection. Great shape and contour, significantly improved symmetry.        Assessment/Plan: 79year old s/p right breast reduction and left mastopexy      Pictures taken today. Patient doing well overall. Discussed scar care and scar massage daily. Activity as tolerated. We will see patient back in 6 months.         Navi Soares PA-C  Plastic & Reconstructive Surgery

## 2023-09-01 ENCOUNTER — RA CDI HCC (OUTPATIENT)
Dept: OTHER | Facility: HOSPITAL | Age: 68
End: 2023-09-01

## 2023-09-01 NOTE — PROGRESS NOTES
[facility] coding opportunities       Chart reviewed, no opportunity found: [address]        Patients Insurance     Medicare Insurance: [employer]

## 2023-09-11 ENCOUNTER — OFFICE VISIT (OUTPATIENT)
Dept: FAMILY MEDICINE CLINIC | Facility: CLINIC | Age: 68
End: 2023-09-11
Payer: COMMERCIAL

## 2023-09-11 ENCOUNTER — APPOINTMENT (OUTPATIENT)
Dept: RADIOLOGY | Facility: CLINIC | Age: 68
End: 2023-09-11
Payer: COMMERCIAL

## 2023-09-11 VITALS
TEMPERATURE: 96.9 F | DIASTOLIC BLOOD PRESSURE: 76 MMHG | OXYGEN SATURATION: 98 % | HEIGHT: 66 IN | SYSTOLIC BLOOD PRESSURE: 116 MMHG | HEART RATE: 96 BPM | BODY MASS INDEX: 29.57 KG/M2 | WEIGHT: 184 LBS

## 2023-09-11 DIAGNOSIS — Z13.220 LIPID SCREENING: ICD-10-CM

## 2023-09-11 DIAGNOSIS — R73.03 PREDIABETES: Primary | ICD-10-CM

## 2023-09-11 DIAGNOSIS — R06.09 DOE (DYSPNEA ON EXERTION): ICD-10-CM

## 2023-09-11 DIAGNOSIS — Z00.00 MEDICARE ANNUAL WELLNESS VISIT, SUBSEQUENT: ICD-10-CM

## 2023-09-11 DIAGNOSIS — R53.83 FATIGUE, UNSPECIFIED TYPE: ICD-10-CM

## 2023-09-11 DIAGNOSIS — E55.9 VITAMIN D DEFICIENCY: ICD-10-CM

## 2023-09-11 PROCEDURE — 99214 OFFICE O/P EST MOD 30 MIN: CPT | Performed by: FAMILY MEDICINE

## 2023-09-11 PROCEDURE — 71046 X-RAY EXAM CHEST 2 VIEWS: CPT

## 2023-09-11 PROCEDURE — G0439 PPPS, SUBSEQ VISIT: HCPCS | Performed by: FAMILY MEDICINE

## 2023-09-11 NOTE — PROGRESS NOTES
Assessment and Plan:     Problem List Items Addressed This Visit        Other    Fatigue    Relevant Orders    CBC and differential    Prediabetes - Primary     Update labs         Relevant Orders    HEMOGLOBIN A1C W/ EAG ESTIMATION    Basic metabolic panel    SOLIS (dyspnea on exertion)     Check labs, stress ECHO and CXR         Relevant Orders    Echo stress test, dobutamine    XR chest pa & lateral   Other Visit Diagnoses     Lipid screening        Relevant Orders    Lipid Panel with Direct LDL reflex    Vitamin D deficiency        Relevant Orders    Vitamin D 25 hydroxy    Medicare annual wellness visit, subsequent            BMI Counseling: Body mass index is 30.15 kg/m². The BMI is above normal. Exercise recommendations include strength training exercises. Depression Screening and Follow-up Plan: Patient was screened for depression during today's encounter. They screened negative with a PHQ-2 score of 0. Urinary Incontinence Plan of Care: counseling topics discussed: limiting fluid intake 3-4 hours before bed. Nutrition and Exercise Counseling: The patient's Body mass index is 30.15 kg/m². This is Facility age limit for growth %aron is 20 years. Nutrition counseling provided:  Reviewed long term health goals and risks of obesity. Exercise counseling provided:          Preventive health issues were discussed with patient, and age appropriate screening tests were ordered as noted in patient's After Visit Summary. Personalized health advice and appropriate referrals for health education or preventive services given if needed, as noted in patient's After Visit Summary. History of Present Illness:     Patient presents for a Medicare Wellness Visit    She is here for a check up. Labs are due. She complains of shortness of breath with exertion which started about 3 months ago. She gets winded with minimal exertion such as housekeeping. No cough or edema.   Has not had any recent stress testing. She had an EKG a year ago. Patient Care Team:  Juancarlos Lebron MD as PCP - General (Family Medicine)  Trista Oliveros MD as Surgeon (Surgical Oncology)  Melisa Ortega RN as Nurse Navigator (Oncology)  Leandro Freire MD (Hematology and Oncology)  Abeba Allen MD (Radiation Oncology)  Marie Giraldo DO (Ophthalmology)  Trae Figueroa MD as Referring Physician (Rheumatology)     Review of Systems:     Review of Systems   Constitutional: Negative. HENT: Negative. Respiratory: Positive for shortness of breath. Negative for cough and chest tightness. Cardiovascular: Negative for chest pain, palpitations and leg swelling. Gastrointestinal: Negative. Problem List:     Patient Active Problem List   Diagnosis   • Fatigue   • Age-related osteoporosis without current pathological fracture   • Malignant neoplasm of upper-outer quadrant of left breast in female, estrogen receptor positive ([address])   • Overweight (BMI 25.0-29. 9)   • Numbness of both lower extremities   • Prediabetes   • Hearing aid worn   • Spinal stenosis of lumbar region with neurogenic claudication   • Lumbar spondylolysis   • Lumbar foraminal stenosis   • Lumbar disc disease with radiculopathy   • Hot flashes   • Urge urinary incontinence   • Migraines   • S/P breast reconstruction, bilateral   • De Quervain's tenosynovitis, right   • SOLIS (dyspnea on exertion)      Past Medical and Surgical History:     Past Medical History:   Diagnosis Date   • Breast cancer ([address]) 09/2019    left breast   • History of radiation therapy 10/2019    left breast cancer   • Migraine    • Osteoporosis    • Pancreatitis    • Pleurisy    • PONV (postoperative nausea and vomiting)    • Seizures ([address])    • Varicella    • Vertigo      Past Surgical History:   Procedure Laterality Date   • BREAST LUMPECTOMY Left 10/01/2019    Procedure: BREAST NEEDLE LOCALIZED LUMPECTOMY (NEEDLE LOC AT 0730);   Surgeon: Trista Oliveros MD;  [facility];  Service: Surgical Oncology   • CHOLECYSTECTOMY     • COLONOSCOPY     • DENTAL SURGERY      Bone graft with implant   • EPIDURAL BLOCK INJECTION  02/2022   • HALLUX VALGUS CORRECTION Bilateral    • LYMPH NODE BIOPSY Left 10/01/2019    Procedure: SENTINEL LYMPH NODE BIOPSY; LYMPHATIC MAPPING WITH BLUE DYE AND RADIOACTIVE DYE (INJECT AT 0830 BY DR HA IN THE OR); Surgeon: Charles Valenzuela MD;  [facility];  Service: Surgical Oncology   • MAMMO NEEDLE LOCALIZATION LEFT (ALL INC) Left 10/01/2019   • OR BREAST REDUCTION Right 2/23/2023    Procedure: RIGHT BREAST REDUCTION;  Surgeon: Juan Jose Esparza MD;  [facility];  Service: Plastics   • OR MASTOPEXY Left 2/23/2023    Procedure: LEFT MASTOPEXY;  Surgeon: Juan Jose Esparza MD;  [facility];  Service: Plastics   • RHINOPLASTY        Family History:     Family History   Problem Relation Age of Onset   • No Known Problems Mother    • Lung cancer Father 71   • No Known Problems Maternal Grandmother    • No Known Problems Maternal Grandfather    • No Known Problems Paternal Grandmother    • No Known Problems Paternal Grandfather    • Breast cancer Neg Hx       Social History:     Social History     Socioeconomic History   • Marital status:       Spouse name: None   • Number of children: 2   • Years of education: None   • Highest education level: None   Occupational History   • Occupation: [employer] works part-time   • Occupation: medical professional   Tobacco Use   • Smoking status: Never   • Smokeless tobacco: Never   Vaping Use   • Vaping Use: Never used   Substance and Sexual Activity   • Alcohol use: Never   • Drug use: Never   • Sexual activity: Not Currently     Partners: Male     Birth control/protection: None, Post-menopausal   Other Topics Concern   • None   Social History Narrative   • None     Social Determinants of Health     Financial Resource Strain: Low Risk  (9/4/2023)    Overall Financial Resource Strain (CARDIA)    • Difficulty of Paying Living Expenses: Not hard at all   Food Insecurity: Not on file   Transportation Needs: No Transportation Needs (9/4/2023)    PRAPARE - Transportation    • Lack of Transportation (Medical): No    • Lack of Transportation (Non-Medical): No   Physical Activity: Not on file   Stress: Not on file   Social Connections: Not on file   Intimate Partner Violence: Not on file   Housing Stability: Not on file      Medications and Allergies:     Current Outpatient Medications   Medication Sig Dispense Refill   • anastrozole (ARIMIDEX) 1 mg tablet Take 1 tablet (1 mg total) by mouth daily 90 tablet 3   • Cholecalciferol (VITAMIN D) 2000 units CAPS Take 4,000 Units by mouth daily      • Diclofenac Sodium (VOLTAREN) 1 % Apply 2 g topically 4 (four) times a day 50 g 0   • meclizine (ANTIVERT) 12.5 MG tablet Take 12.5 mg by mouth 3 (three) times a day as needed for dizziness     • ondansetron (ZOFRAN) 4 mg tablet Take 1 tablet (4 mg total) by mouth every 8 (eight) hours as needed for nausea or vomiting 10 tablet 0   • pimecrolimus (ELIDEL) 1 % cream      • SUMAtriptan (IMITREX) 100 mg tablet Take 100 mg by mouth once as needed for migraine     • venlafaxine (EFFEXOR) 50 mg tablet TAKE 1 TABLET (50 MG TOTAL) BY MOUTH DAILY 30 tablet 1     No current facility-administered medications for this visit.      Allergies   Allergen Reactions   • Ciprofloxacin Seizures   • Cyclopentolate Other (See Comments) and Tachycardia     Resp distress   • Sulfa Antibiotics Hives and Rash   • Scopolamine Other (See Comments)     Precautionary for cycopentolate allergy      Immunizations:     Immunization History   Administered Date(s) Administered   • COVID-19 MODERNA VACC 0.5 ML IM 01/15/2021, 02/10/2021, 11/22/2021   • INFLUENZA 01/23/2019, 10/02/2019, 10/02/2019, 10/02/2020   • Influenza Quadrivalent 6 mos and older IM 10/11/2022   • Pneumococcal Conjugate Vaccine 20-valent (Pcv20), Polysace 09/09/2022   • Pneumococcal Polysaccharide PPV23 03/04/2013   • Tdap 03/11/2022      Health Maintenance:         Topic Date Due   • Cervical Cancer Screening  06/17/2024   • Breast Cancer Screening: Mammogram  08/28/2024   • Colorectal Cancer Screening  10/05/2025   • Hepatitis C Screening  Completed         Topic Date Due   • COVID-19 Vaccine (4 - Moderna series) 01/17/2022   • Influenza Vaccine (1) 09/01/2023      Medicare Screening Tests and Risk Assessments:     Zafar Vargas is here for her Subsequent Wellness visit. Last Medicare Wellness visit information reviewed, patient interviewed and updates made to the record today. Health Risk Assessment:   Patient rates overall health as good. Patient feels that their physical health rating is slightly better. Patient is very satisfied with their life. Eyesight was rated as same. Hearing was rated as same. Patient feels that their emotional and mental health rating is same. Patients states they are never, rarely angry. Patient states they are sometimes unusually tired/fatigued. Pain experienced in the last 7 days has been none. Patient states that she has experienced no weight loss or gain in last 6 months. Depression Screening:   PHQ-2 Score: 0      Fall Risk Screening: In the past year, patient has experienced: no history of falling in past year      Urinary Incontinence Screening:   Patient has leaked urine accidently in the last six months. Home Safety:  Patient does not have trouble with stairs inside or outside of their home. Patient has working smoke alarms and has working carbon monoxide detector. Home safety hazards include: none. Nutrition:   Current diet is Regular. BMI Counseling: @BMI@ The BMI is above normal. Nutrition recommendations include 3-5 servings of fruits/vegetables daily, consuming healthier snacks and moderation in carbohydrate intake. Exercise recommendations include strength training exercises.       Medications:   Patient is not currently taking any over-the-counter supplements. Patient is able to manage medications. Activities of Daily Living (ADLs)/Instrumental Activities of Daily Living (IADLs):   Walk and transfer into and out of bed and chair?: Yes  Dress and groom yourself?: Yes    Bathe or shower yourself?: Yes    Feed yourself? Yes  Do your laundry/housekeeping?: Yes  Manage your money, pay your bills and track your expenses?: Yes  Make your own meals?: Yes    Do your own shopping?: Yes    Previous Hospitalizations:   Any hospitalizations or ED visits within the last 12 months?: Yes    How many hospitalizations have you had in the last year?: 1-2    Advance Care Planning:   Living will: Yes    Durable POA for healthcare: No    Advanced directive: Yes      Cognitive Screening:   Provider or family/friend/caregiver concerned regarding cognition?: No    PREVENTIVE SCREENINGS      Cardiovascular Screening:    General: Screening Current and Risks and Benefits Discussed    Due for: Lipid Panel      Diabetes Screening:     General: Screening Current and Risks and Benefits Discussed    Due for: Blood Glucose      Colorectal Cancer Screening:     General: Screening Current      Breast Cancer Screening:     General: History Breast Cancer      Cervical Cancer Screening:    General: Screening Not Indicated      Osteoporosis Screening:    General: Screening Not Indicated and History Osteoporosis      Abdominal Aortic Aneurysm (AAA) Screening:        General: Screening Not Indicated      Lung Cancer Screening:     General: Screening Not Indicated      Hepatitis C Screening:    General: Screening Current    Screening, Brief Intervention, and Referral to Treatment (SBIRT)    Screening  Typical number of drinks in a day: 0  Typical number of drinks in a week: 0  Interpretation: Low risk drinking behavior.     AUDIT-C Screenin) How often did you have a drink containing alcohol in the past year? never  2) How many drinks did you have on a typical day when you were drinking in the past year? 0  3) How often did you have 6 or more drinks on one occasion in the past year? never    AUDIT-C Score: 0  Interpretation: Score 0-2 (female): Negative screen for alcohol misuse    Single Item Drug Screening:  How often have you used an illegal drug (including marijuana) or a prescription medication for non-medical reasons in the past year? never    Single Item Drug Screen Score: 0  Interpretation: Negative screen for possible drug use disorder    Brief Intervention  Alcohol & drug use screenings were reviewed. No concerns regarding substance use disorder identified. Other Counseling Topics:   Regular weightbearing exercise. No results found. Physical Exam:     /76   Pulse 96   Temp (!) 96.9 °F (36.1 °C)   Ht 5' 5.5" (1.664 m)   Wt 83.5 kg (184 lb)   SpO2 98%   BMI 30.15 kg/m²     Physical Exam  Vitals and nursing note reviewed. Constitutional:       General: She is not in acute distress. Appearance: She is well-developed. She is not diaphoretic. HENT:      Head: Normocephalic and atraumatic. Right Ear: External ear normal.      Left Ear: External ear normal.   Eyes:      Conjunctiva/sclera: Conjunctivae normal.   Cardiovascular:      Rate and Rhythm: Normal rate and regular rhythm. Heart sounds: Normal heart sounds. No murmur heard. No friction rub. No gallop. Pulmonary:      Effort: Pulmonary effort is normal. No respiratory distress. Breath sounds: Normal breath sounds. No stridor. No wheezing or rales. Chest:      Chest wall: No tenderness. Neurological:      Mental Status: She is alert. Psychiatric:         Behavior: Behavior normal.         Thought Content:  Thought content normal.         Judgment: Judgment normal.          Heri Cameron MD

## 2023-09-11 NOTE — PATIENT INSTRUCTIONS
Medicare Preventive Visit Patient Instructions  Thank you for completing your Welcome to Medicare Visit or Medicare Annual Wellness Visit today. Your next wellness visit will be due in one year (9/11/2024). The screening/preventive services that you may require over the next 5-10 years are detailed below. Some tests may not apply to you based off risk factors and/or age. Screening tests ordered at today's visit but not completed yet may show as past due. Also, please note that scanned in results may not display below. Preventive Screenings:  Service Recommendations Previous Testing/Comments   Colorectal Cancer Screening  * Colonoscopy    * Fecal Occult Blood Test (FOBT)/Fecal Immunochemical Test (FIT)  * Fecal DNA/Cologuard Test  * Flexible Sigmoidoscopy Age: 43-73 years old   Colonoscopy: every 10 years (may be performed more frequently if at higher risk)  OR  FOBT/FIT: every 1 year  OR  Cologuard: every 3 years  OR  Sigmoidoscopy: every 5 years  Screening may be recommended earlier than age 39 if at higher risk for colorectal cancer. Also, an individualized decision between you and your healthcare provider will decide whether screening between the ages of 77-80 would be appropriate. Colonoscopy: 10/14/2022  FOBT/FIT: Not on file  Cologuard: 10/05/2022  Sigmoidoscopy: Not on file    Screening Current     Breast Cancer Screening Age: 36 years old  Frequency: every 1-2 years  Not required if history of left and right mastectomy Mammogram: 08/28/2023    History Breast Cancer   Cervical Cancer Screening Between the ages of 21-29, pap smear recommended once every 3 years. Between the ages of 32-69, can perform pap smear with HPV co-testing every 5 years.    Recommendations may differ for women with a history of total hysterectomy, cervical cancer, or abnormal pap smears in past. Pap Smear: 06/17/2021    Screening Not Indicated   Hepatitis C Screening Once for adults born between 1945 and 1965  More frequently in patients at high risk for Hepatitis C Hep C Antibody: 03/30/2019    Screening Current   Diabetes Screening 1-2 times per year if you're at risk for diabetes or have pre-diabetes Fasting glucose: 123 mg/dL (2/24/2023)  A1C: 5.8 % (3/5/2022)  Screening Current   Cholesterol Screening Once every 5 years if you don't have a lipid disorder. May order more often based on risk factors. Lipid panel: 03/05/2022    Screening Current     Other Preventive Screenings Covered by Medicare:  1. Abdominal Aortic Aneurysm (AAA) Screening: covered once if your at risk. You're considered to be at risk if you have a family history of AAA. 2. Lung Cancer Screening: covers low dose CT scan once per year if you meet all of the following conditions: (1) Age 48-67; (2) No signs or symptoms of lung cancer; (3) Current smoker or have quit smoking within the last 15 years; (4) You have a tobacco smoking history of at least 20 pack years (packs per day multiplied by number of years you smoked); (5) You get a written order from a healthcare provider. 3. Glaucoma Screening: covered annually if you're considered high risk: (1) You have diabetes OR (2) Family history of glaucoma OR (3)  aged 48 and older OR (3)  American aged 72 and older  3. Osteoporosis Screening: covered every 2 years if you meet one of the following conditions: (1) You're estrogen deficient and at risk for osteoporosis based off medical history and other findings; (2) Have a vertebral abnormality; (3) On glucocorticoid therapy for more than 3 months; (4) Have primary hyperparathyroidism; (5) On osteoporosis medications and need to assess response to drug therapy. · Last bone density test (DXA Scan): 06/30/2022.  5. HIV Screening: covered annually if you're between the age of 15-65. Also covered annually if you are younger than 13 and older than 72 with risk factors for HIV infection.  For pregnant patients, it is covered up to 3 times per pregnancy. Immunizations:  Immunization Recommendations   Influenza Vaccine Annual influenza vaccination during flu season is recommended for all persons aged >= 6 months who do not have contraindications   Pneumococcal Vaccine   * Pneumococcal conjugate vaccine = PCV13 (Prevnar 13), PCV15 (Vaxneuvance), PCV20 (Prevnar 20)  * Pneumococcal polysaccharide vaccine = PPSV23 (Pneumovax) Adults 20-63 years old: 1-3 doses may be recommended based on certain risk factors  Adults 72 years old: 1-2 doses may be recommended based off what pneumonia vaccine you previously received   Hepatitis B Vaccine 3 dose series if at intermediate or high risk (ex: diabetes, end stage renal disease, liver disease)   Tetanus (Td) Vaccine - COST NOT COVERED BY MEDICARE PART B Following completion of primary series, a booster dose should be given every 10 years to maintain immunity against tetanus. Td may also be given as tetanus wound prophylaxis. Tdap Vaccine - COST NOT COVERED BY MEDICARE PART B Recommended at least once for all adults. For pregnant patients, recommended with each pregnancy. Shingles Vaccine (Shingrix) - COST NOT COVERED BY MEDICARE PART B  2 shot series recommended in those aged 48 and above     Health Maintenance Due:      Topic Date Due   • Cervical Cancer Screening  06/17/2024   • Breast Cancer Screening: Mammogram  08/28/2024   • Colorectal Cancer Screening  10/05/2025   • Hepatitis C Screening  Completed     Immunizations Due:      Topic Date Due   • COVID-19 Vaccine (4 - Moderna series) 01/17/2022   • Influenza Vaccine (1) 09/01/2023     Advance Directives   What are advance directives? Advance directives are legal documents that state your wishes and plans for medical care. These plans are made ahead of time in case you lose your ability to make decisions for yourself. Advance directives can apply to any medical decision, such as the treatments you want, and if you want to donate organs.    What are the types of advance directives? There are many types of advance directives, and each state has rules about how to use them. You may choose a combination of any of the following:  · Living will: This is a written record of the treatment you want. You can also choose which treatments you do not want, which to limit, and which to stop at a certain time. This includes surgery, medicine, IV fluid, and tube feedings. · Durable power of  for [facility]): This is a written record that states who you want to make healthcare choices for you when you are unable to make them for yourself. This person, called a proxy, is usually a family member or a friend. You may choose more than 1 proxy. · Do not resuscitate (DNR) order:  A DNR order is used in case your heart stops beating or you stop breathing. It is a request not to have certain forms of treatment, such as CPR. A DNR order may be included in other types of advance directives. · Medical directive: This covers the care that you want if you are in a coma, near death, or unable to make decisions for yourself. You can list the treatments you want for each condition. Treatment may include pain medicine, surgery, blood transfusions, dialysis, IV or tube feedings, and a ventilator (breathing machine). · Values history: This document has questions about your views, beliefs, and how you feel and think about life. This information can help others choose the care that you would choose. Why are advance directives important? An advance directive helps you control your care. Although spoken wishes may be used, it is better to have your wishes written down. Spoken wishes can be misunderstood, or not followed. Treatments may be given even if you do not want them. An advance directive may make it easier for your family to make difficult choices about your care. Urinary Incontinence   Urinary incontinence (UI)  is when you lose control of your bladder.  UI develops because your bladder cannot store or empty urine properly. The 3 most common types of UI are stress incontinence, urge incontinence, or both. Medicines:   · May be given to help strengthen your bladder control. Report any side effects of medication to your healthcare provider. Do pelvic muscle exercises often:  Your pelvic muscles help you stop urinating. Squeeze these muscles tight for 5 seconds, then relax for 5 seconds. Gradually work up to squeezing for 10 seconds. Do 3 sets of 15 repetitions a day, or as directed. This will help strengthen your pelvic muscles and improve bladder control. Train your bladder:  Go to the bathroom at set times, such as every 2 hours, even if you do not feel the urge to go. You can also try to hold your urine when you feel the urge to go. For example, hold your urine for 5 minutes when you feel the urge to go. As that becomes easier, hold your urine for 10 minutes. Self-care:   · Keep a UI record. Write down how often you leak urine and how much you leak. Make a note of what you were doing when you leaked urine. · Drink liquids as directed. You may need to limit the amount of liquid you drink to help control your urine leakage. Do not drink any liquid right before you go to bed. Limit or do not have drinks that contain caffeine or alcohol. · Prevent constipation. Eat a variety of high-fiber foods. Good examples are high-fiber cereals, beans, vegetables, and whole-grain breads. Walking is the best way to trigger your intestines to have a bowel movement. · Exercise regularly and maintain a healthy weight. Weight loss and exercise will decrease pressure on your bladder and help you control your leakage. · Use a catheter as directed  to help empty your bladder. A catheter is a tiny, plastic tube that is put into your bladder to drain your urine. · Go to behavior therapy as directed.   Behavior therapy may be used to help you learn to control your urge to urinate. Weight Management   Why it is important to manage your weight:  Being overweight increases your risk of health conditions such as heart disease, high blood pressure, type 2 diabetes, and certain types of cancer. It can also increase your risk for osteoarthritis, sleep apnea, and other respiratory problems. Aim for a slow, steady weight loss. Even a small amount of weight loss can lower your risk of health problems. How to lose weight safely:  A safe and healthy way to lose weight is to eat fewer calories and get regular exercise. You can lose up about 1 pound a week by decreasing the number of calories you eat by 500 calories each day. Healthy meal plan for weight management:  A healthy meal plan includes a variety of foods, contains fewer calories, and helps you stay healthy. A healthy meal plan includes the following:  · Eat whole-grain foods more often. A healthy meal plan should contain fiber. Fiber is the part of grains, fruits, and vegetables that is not broken down by your body. Whole-grain foods are healthy and provide extra fiber in your diet. Some examples of whole-grain foods are whole-wheat breads and pastas, oatmeal, brown rice, and bulgur. · Eat a variety of vegetables every day. Include dark, leafy greens such as spinach, kale, lonnie greens, and mustard greens. Eat yellow and orange vegetables such as carrots, sweet potatoes, and winter squash. · Eat a variety of fruits every day. Choose fresh or canned fruit (canned in its own juice or light syrup) instead of juice. Fruit juice has very little or no fiber. · Eat low-fat dairy foods. Drink fat-free (skim) milk or 1% milk. Eat fat-free yogurt and low-fat cottage cheese. Try low-fat cheeses such as mozzarella and other reduced-fat cheeses. · Choose meat and other protein foods that are low in fat. Choose beans or other legumes such as split peas or lentils.  Choose fish, skinless poultry (chicken or turkey), or lean cuts of red meat (beef or pork). Before you cook meat or poultry, cut off any visible fat. · Use less fat and oil. Try baking foods instead of frying them. Add less fat, such as margarine, sour cream, regular salad dressing and mayonnaise to foods. Eat fewer high-fat foods. Some examples of high-fat foods include french fries, doughnuts, ice cream, and cakes. · Eat fewer sweets. Limit foods and drinks that are high in sugar. This includes candy, cookies, regular soda, and sweetened drinks. Exercise:  Exercise at least 30 minutes per day on most days of the week. Some examples of exercise include walking, biking, dancing, and swimming. You can also fit in more physical activity by taking the stairs instead of the elevator or parking farther away from stores. Ask your healthcare provider about the best exercise plan for you. © Copyright [address] 2018 Information is for End User's use only and may not be sold, redistributed or otherwise used for commercial purposes.  All illustrations and images included in CareNotes® are the copyrighted property of [employer] or [address] [place] [address]

## 2023-09-26 ENCOUNTER — TELEPHONE (OUTPATIENT)
Dept: FAMILY MEDICINE CLINIC | Facility: CLINIC | Age: 68
End: 2023-09-26

## 2023-09-26 DIAGNOSIS — R23.2 HOT FLASHES: ICD-10-CM

## 2023-09-26 DIAGNOSIS — C50.412 MALIGNANT NEOPLASM OF UPPER-OUTER QUADRANT OF LEFT BREAST IN FEMALE, ESTROGEN RECEPTOR POSITIVE: ICD-10-CM

## 2023-09-26 DIAGNOSIS — Z17.0 MALIGNANT NEOPLASM OF UPPER-OUTER QUADRANT OF LEFT BREAST IN FEMALE, ESTROGEN RECEPTOR POSITIVE: ICD-10-CM

## 2023-09-26 RX ORDER — VENLAFAXINE 50 MG/1
50 TABLET ORAL DAILY
Qty: 30 TABLET | Refills: 1 | Status: SHIPPED | OUTPATIENT
Start: 2023-09-26

## 2023-09-26 NOTE — TELEPHONE ENCOUNTER
Spoke to pt, she has her ECHO stress test scheduled for 10/4.  The pt is wondering when/if she needs to stop taking her arimidex and effexor before this test.

## 2023-09-27 ENCOUNTER — TELEPHONE (OUTPATIENT)
Dept: HEMATOLOGY ONCOLOGY | Facility: CLINIC | Age: 68
End: 2023-09-27

## 2023-09-27 NOTE — TELEPHONE ENCOUNTER
ALEXA  DR karrie MARINA   Who are you speaking with? Patient   If it is not the patient, are they listed on an active communication consent form? N/A   Is this a ALEXA or DR karrie MARINA ALEXA   Which provider is patient currently scheduled or established with? Dr. Gay Choi   What is the original appointment date and time? 1/24/24 @ 1   At which location is the appointment scheduled to take place? [facility]   Which provider is the patient transitioning care to? Dr. Crow Garcia   What is the new appointment date and time? 1/24/24 @ 1   At which location is the new appointment scheduled to take place?  [facility]   What is the reason for this change? provider

## 2023-10-02 ENCOUNTER — APPOINTMENT (OUTPATIENT)
Dept: LAB | Facility: CLINIC | Age: 68
End: 2023-10-02
Payer: COMMERCIAL

## 2023-10-02 DIAGNOSIS — R53.83 FATIGUE, UNSPECIFIED TYPE: ICD-10-CM

## 2023-10-02 DIAGNOSIS — E55.9 VITAMIN D DEFICIENCY: ICD-10-CM

## 2023-10-02 DIAGNOSIS — R73.03 PREDIABETES: ICD-10-CM

## 2023-10-02 DIAGNOSIS — Z13.220 LIPID SCREENING: ICD-10-CM

## 2023-10-02 LAB
ANION GAP SERPL CALCULATED.3IONS-SCNC: 8 MMOL/L
BASOPHILS # BLD AUTO: 0.04 THOUSANDS/ÂΜL (ref 0–0.1)
BASOPHILS NFR BLD AUTO: 1 % (ref 0–1)
BUN SERPL-MCNC: 13 MG/DL (ref 5–25)
CALCIUM SERPL-MCNC: 9.8 MG/DL (ref 8.4–10.2)
CHLORIDE SERPL-SCNC: 101 MMOL/L (ref 96–108)
CHOLEST SERPL-MCNC: 162 MG/DL
CO2 SERPL-SCNC: 30 MMOL/L (ref 21–32)
CREAT SERPL-MCNC: 0.74 MG/DL (ref 0.6–1.3)
EOSINOPHIL # BLD AUTO: 0.11 THOUSAND/ÂΜL (ref 0–0.61)
EOSINOPHIL NFR BLD AUTO: 2 % (ref 0–6)
ERYTHROCYTE [DISTWIDTH] IN BLOOD BY AUTOMATED COUNT: 12.1 % (ref 11.6–15.1)
EST. AVERAGE GLUCOSE BLD GHB EST-MCNC: 137 MG/DL
GFR SERPL CREATININE-BSD FRML MDRD: 84 ML/MIN/1.73SQ M
GLUCOSE P FAST SERPL-MCNC: 96 MG/DL (ref 65–99)
HBA1C MFR BLD: 6.4 %
HCT VFR BLD AUTO: 43.7 % (ref 34.8–46.1)
HDLC SERPL-MCNC: 52 MG/DL
HGB BLD-MCNC: 14.8 G/DL (ref 11.5–15.4)
IMM GRANULOCYTES # BLD AUTO: 0.02 THOUSAND/UL (ref 0–0.2)
IMM GRANULOCYTES NFR BLD AUTO: 0 % (ref 0–2)
LDLC SERPL CALC-MCNC: 85 MG/DL (ref 0–100)
LYMPHOCYTES # BLD AUTO: 1.88 THOUSANDS/ÂΜL (ref 0.6–4.47)
LYMPHOCYTES NFR BLD AUTO: 27 % (ref 14–44)
MCH RBC QN AUTO: 33.6 PG (ref 26.8–34.3)
MCHC RBC AUTO-ENTMCNC: 33.9 G/DL (ref 31.4–37.4)
MCV RBC AUTO: 99 FL (ref 82–98)
MONOCYTES # BLD AUTO: 0.4 THOUSAND/ÂΜL (ref 0.17–1.22)
MONOCYTES NFR BLD AUTO: 6 % (ref 4–12)
NEUTROPHILS # BLD AUTO: 4.46 THOUSANDS/ÂΜL (ref 1.85–7.62)
NEUTS SEG NFR BLD AUTO: 64 % (ref 43–75)
NRBC BLD AUTO-RTO: 0 /100 WBCS
PLATELET # BLD AUTO: 349 THOUSANDS/UL (ref 149–390)
PMV BLD AUTO: 9.3 FL (ref 8.9–12.7)
POTASSIUM SERPL-SCNC: 4.3 MMOL/L (ref 3.5–5.3)
RBC # BLD AUTO: 4.41 MILLION/UL (ref 3.81–5.12)
SODIUM SERPL-SCNC: 139 MMOL/L (ref 135–147)
TRIGL SERPL-MCNC: 123 MG/DL
WBC # BLD AUTO: 6.91 THOUSAND/UL (ref 4.31–10.16)

## 2023-10-02 PROCEDURE — 83036 HEMOGLOBIN GLYCOSYLATED A1C: CPT

## 2023-10-02 PROCEDURE — 80061 LIPID PANEL: CPT

## 2023-10-02 PROCEDURE — 36415 COLL VENOUS BLD VENIPUNCTURE: CPT

## 2023-10-02 PROCEDURE — 82306 VITAMIN D 25 HYDROXY: CPT

## 2023-10-02 PROCEDURE — 80048 BASIC METABOLIC PNL TOTAL CA: CPT

## 2023-10-02 PROCEDURE — 85025 COMPLETE CBC W/AUTO DIFF WBC: CPT

## 2023-10-03 LAB — 25(OH)D3 SERPL-MCNC: 38.2 NG/ML (ref 30–100)

## 2023-10-04 ENCOUNTER — HOSPITAL ENCOUNTER (OUTPATIENT)
Dept: NON INVASIVE DIAGNOSTICS | Facility: HOSPITAL | Age: 68
Discharge: HOME | End: 2023-10-04
Attending: FAMILY MEDICINE
Payer: COMMERCIAL

## 2023-10-04 VITALS — BODY MASS INDEX: 30.66 KG/M2 | WEIGHT: 184 LBS | HEIGHT: 65 IN

## 2023-10-04 DIAGNOSIS — R06.09 DOE (DYSPNEA ON EXERTION): ICD-10-CM

## 2023-10-04 LAB
ARRHY DURING EX: NORMAL
CHEST PAIN STATEMENT: NORMAL
MAX DIASTOLIC BP: 80 MMHG
MAX HEART RATE: 153 BPM
MAX HR PERCENT: 99 %
MAX HR: 153 BPM
MAX PREDICTED HEART RATE: 154 BPM
MAX. SYSTOLIC BP: 152 MMHG
PROTOCOL NAME: NORMAL
RATE PRESSURE PRODUCT: NORMAL
REASON FOR TERMINATION: NORMAL
SL CV STRESS RECOVERY BP: NORMAL MMHG
SL CV STRESS RECOVERY HR: 88 BPM
SL CV STRESS STAGE REACHED: 2
STRESS BASELINE BP: NORMAL MMHG
STRESS BASELINE HR: 75 BPM
STRESS O2 SAT REST: 98 %
STRESS PEAK HR: 153 BPM
STRESS POST ESTIMATED WORKLOAD: 5.8 METS
STRESS POST EXERCISE DUR MIN: 3 MIN
STRESS POST EXERCISE DUR SEC: 31 SEC
STRESS POST O2 SAT PEAK: 96 %
STRESS POST PEAK BP: 152 MMHG
TARGET HR FORMULA: NORMAL
TEST INDICATION: NORMAL
TIME IN EXERCISE PHASE: NORMAL

## 2023-10-04 PROCEDURE — 93350 STRESS TTE ONLY: CPT

## 2023-10-04 PROCEDURE — 93350 STRESS TTE ONLY: CPT | Performed by: INTERNAL MEDICINE
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
## 2023-10-10 ENCOUNTER — ANNUAL EXAM (OUTPATIENT)
Dept: OBGYN CLINIC | Facility: CLINIC | Age: 68
End: 2023-10-10
Payer: COMMERCIAL

## 2023-10-10 VITALS
BODY MASS INDEX: 31.14 KG/M2 | DIASTOLIC BLOOD PRESSURE: 78 MMHG | WEIGHT: 182.4 LBS | HEIGHT: 64 IN | SYSTOLIC BLOOD PRESSURE: 124 MMHG

## 2023-10-10 DIAGNOSIS — Z01.419 ENCOUNTER FOR GYNECOLOGICAL EXAMINATION: Primary | ICD-10-CM

## 2023-10-10 PROCEDURE — G0101 CA SCREEN;PELVIC/BREAST EXAM: HCPCS | Performed by: OBSTETRICS & GYNECOLOGY

## 2023-10-10 NOTE — PATIENT INSTRUCTIONS
Wellness Visit for Adults   WHAT YOU NEED TO KNOW:   What is a wellness visit? A wellness visit is when you see your healthcare provider to get screened for health problems. Your healthcare provider will also give you advice on how to stay healthy. Write down your questions so you remember to ask them. Ask your healthcare provider how often you should have a wellness visit. What happens at a wellness visit? Your healthcare provider will ask about your health, and your family history of health problems. This includes high blood pressure, heart disease, and cancer. He or she will ask if you have symptoms that concern you, if you smoke, and about your mood. You may also be asked about your intake of medicines, supplements, food, and alcohol. Any of the following may be done: Your weight  will be checked. Your height may also be checked so your body mass index (BMI) can be calculated. Your BMI shows if you are at a healthy weight. Your blood pressure  and heart rate will be checked. Your temperature may also be checked. Blood and urine tests  may be done. Blood tests may be done to check your cholesterol levels. Abnormal cholesterol levels increase your risk for heart disease and stroke. You may also need a blood or urine test to check for diabetes if you are at increased risk. Urine tests may be done to look for signs of an infection or kidney disease. A physical exam  includes checking your heartbeat and lungs with a stethoscope. Your healthcare provider may also check your skin to look for sun damage. Screening tests  may be recommended. A screening test is done to check for diseases that may not cause symptoms. The screening tests you may need depend on your age, gender, family history, and lifestyle habits. For example, colorectal screening may be recommended if you are 48years old or older. What screening tests do I need if I am a woman? A Pap smear  is used to screen for cervical cancer.  Pap smears are usually done every 3 to 5 years depending on your age. You may need them more often if you have had abnormal Pap smear test results in the past. Ask your healthcare provider how often you should have a Pap smear. A mammogram  is an x-ray of your breasts to screen for breast cancer. Experts recommend mammograms every 2 years starting at age 48 years. You may need a mammogram at age 52 years or younger if you have an increased risk for breast cancer. Talk to your healthcare provider about when you should start having mammograms and how often you need them. What vaccines might I need? Get an influenza vaccine  every year. The influenza vaccine protects you from the flu. Several types of viruses cause the flu. The viruses change over time, so new vaccines are made each year. Get a tetanus-diphtheria (Td) booster vaccine  every 10 years. This vaccine protects you against tetanus and diphtheria. Tetanus is a severe infection that may cause painful muscle spasms and lockjaw. Diphtheria is a severe bacterial infection that causes a thick covering in the back of your mouth and throat. Get a human papillomavirus (HPV) vaccine  if you are female and aged 23 to 32 or male 23 to 24 and never received it. This vaccine protects you from HPV infection. HPV is the most common infection spread by sexual contact. HPV may also cause vaginal, penile, and anal cancers. Get a pneumococcal vaccine  if you are aged 72 years or older. The pneumococcal vaccine is an injection given to protect you from pneumococcal disease. Pneumococcal disease is an infection caused by pneumococcal bacteria. The infection may cause pneumonia, meningitis, or an ear infection. Get a shingles vaccine  if you are 60 or older, even if you have had shingles before. The shingles vaccine is an injection to protect you from the varicella-zoster virus. This is the same virus that causes chickenpox.  Shingles is a painful rash that develops in people who had chickenpox or have been exposed to the virus. How can I eat healthy? My Plate is a model for planning healthy meals. It shows the types and amounts of foods that should go on your plate. Fruits and vegetables make up about half of your plate, and grains and protein make up the other half. A serving of dairy is included on the side of your plate. The amount of calories and serving sizes you need depends on your age, gender, weight, and height. Examples of healthy foods are listed below:  Eat a variety of vegetables  such as dark green, red, and orange vegetables. You can also include canned vegetables low in sodium (salt) and frozen vegetables without added butter or sauces. Eat a variety of fresh fruits , canned fruit in 100% juice, frozen fruit, and dried fruit. Include whole grains. At least half of the grains you eat should be whole grains. Examples include whole-wheat bread, wheat pasta, brown rice, and whole-grain cereals such as oatmeal.    Eat a variety of protein foods such as seafood (fish and shellfish), lean meat, and poultry without skin (turkey and chicken). Examples of lean meats include pork leg, shoulder, or tenderloin, and beef round, sirloin, tenderloin, and extra lean ground beef. Other protein foods include eggs and egg substitutes, beans, peas, soy products, nuts, and seeds. Choose low-fat dairy products such as skim or 1% milk or low-fat yogurt, cheese, and cottage cheese. Limit unhealthy fats  such as butter, hard margarine, and shortening. How much exercise do I need? Exercise at least 30 minutes per day on most days of the week. Some examples of exercise include walking, biking, dancing, and swimming. You can also fit in more physical activity by taking the stairs instead of the elevator or parking farther away from stores. Include muscle strengthening activities 2 days each week. Regular exercise provides many health benefits.  It helps you manage your weight, and decreases your risk for type 2 diabetes, heart disease, stroke, and high blood pressure. Exercise can also help improve your mood. Ask your healthcare provider about the best exercise plan for you. What are some general health and safety guidelines I should follow? Do not smoke. Nicotine and other chemicals in cigarettes and cigars can cause lung damage. Ask your healthcare provider for information if you currently smoke and need help to quit. E-cigarettes or smokeless tobacco still contain nicotine. Talk to your healthcare provider before you use these products. Limit alcohol. A drink of alcohol is 12 ounces of beer, 5 ounces of wine, or 1½ ounces of liquor. Lose weight, if needed. Being overweight increases your risk of certain health conditions. These include heart disease, high blood pressure, type 2 diabetes, and certain types of cancer. Protect your skin. Do not sunbathe or use tanning beds. Use sunscreen with a SPF 15 or higher. Apply sunscreen at least 15 minutes before you go outside. Reapply sunscreen every 2 hours. Wear protective clothing, hats, and sunglasses when you are outside. Drive safely. Always wear your seatbelt. Make sure everyone in your car wears a seatbelt. A seatbelt can save your life if you are in an accident. Do not use your cell phone when you are driving. This could distract you and cause an accident. Pull over if you need to make a call or send a text message. Practice safe sex. Use latex condoms if are sexually active and have more than one partner. Your healthcare provider may recommend screening tests for sexually transmitted infections (STIs). Wear helmets, lifejackets, and protective gear. Always wear a helmet when you ride a bike or motorcycle, go skiing, or play sports that could cause a head injury. Wear protective equipment when you play sports. Wear a lifejacket when you are on a boat or doing water sports.     CARE AGREEMENT: You have the right to help plan your care. Learn about your health condition and how it may be treated. Discuss treatment options with your healthcare providers to decide what care you want to receive. You always have the right to refuse treatment. The above information is an  only. It is not intended as medical advice for individual conditions or treatments. Talk to your doctor, nurse or pharmacist before following any medical regimen to see if it is safe and effective for you. © Copyright [employer] 2023 Information is for End User's use only and may not be sold, redistributed or otherwise used for commercial purposes.

## 2023-10-10 NOTE — ASSESSMENT & PLAN NOTE
Pap/HPV not indicated  Mammogram ordered, currently under care for breast cancer  Cologuard current  DEXA current    Encourage healthy diet, exercise, Calcium 1200mg per day and at least 800 iu Vitamin D daily.

## 2023-10-16 ENCOUNTER — TELEPHONE (OUTPATIENT)
Dept: FAMILY MEDICINE CLINIC | Facility: CLINIC | Age: 68
End: 2023-10-16

## 2023-10-16 DIAGNOSIS — R06.09 DOE (DYSPNEA ON EXERTION): Primary | ICD-10-CM

## 2023-10-16 NOTE — TELEPHONE ENCOUNTER
The dobutamine is used for patients who can't exercise or exert themselves enough to the level needed for an exercise stress test.   It seems she did okay on the exercise test and it was negative for ischemia

## 2023-10-16 NOTE — TELEPHONE ENCOUNTER
Patient called and stated that she had the Echo stress exercise done on 10/4 - she sent you a message stating she thought you wanted the dobuatamine - she is asking if she should still have that one done because she had already did the exercise?

## 2023-10-16 NOTE — TELEPHONE ENCOUNTER
Spoke and she did have stress echo and those results are in. But she thought you wanted it with Dobuatamine. Would that make a difference?

## 2023-11-28 DIAGNOSIS — Z17.0 MALIGNANT NEOPLASM OF UPPER-OUTER QUADRANT OF LEFT BREAST IN FEMALE, ESTROGEN RECEPTOR POSITIVE: ICD-10-CM

## 2023-11-28 DIAGNOSIS — C50.412 MALIGNANT NEOPLASM OF UPPER-OUTER QUADRANT OF LEFT BREAST IN FEMALE, ESTROGEN RECEPTOR POSITIVE: ICD-10-CM

## 2023-11-28 DIAGNOSIS — R23.2 HOT FLASHES: ICD-10-CM

## 2023-11-28 RX ORDER — VENLAFAXINE 50 MG/1
50 TABLET ORAL DAILY
Qty: 30 TABLET | Refills: 1 | Status: SHIPPED | OUTPATIENT
Start: 2023-11-28

## 2023-12-09 PROBLEM — Z01.419 ENCOUNTER FOR GYNECOLOGICAL EXAMINATION: Status: RESOLVED | Noted: 2023-10-10 | Resolved: 2023-12-09

## 2024-01-10 ENCOUNTER — TELEPHONE (OUTPATIENT)
Dept: ADMINISTRATIVE | Facility: OTHER | Age: 69
End: 2024-01-10

## 2024-01-10 NOTE — TELEPHONE ENCOUNTER
01/10/24 12:33 PM    Patient contacted (left message) to bring Advance Directive, POLST, or Living Will document to next scheduled pcp visit.    Thank you.  Mine Donahue  [facility]

## 2024-01-12 ENCOUNTER — OFFICE VISIT (OUTPATIENT)
Dept: FAMILY MEDICINE CLINIC | Facility: CLINIC | Age: 69
End: 2024-01-12
Payer: COMMERCIAL

## 2024-01-12 VITALS
SYSTOLIC BLOOD PRESSURE: 116 MMHG | BODY MASS INDEX: 30.9 KG/M2 | TEMPERATURE: 97.8 F | DIASTOLIC BLOOD PRESSURE: 73 MMHG | WEIGHT: 181 LBS | OXYGEN SATURATION: 98 % | HEIGHT: 64 IN | HEART RATE: 79 BPM

## 2024-01-12 DIAGNOSIS — M25.512 CHRONIC LEFT SHOULDER PAIN: ICD-10-CM

## 2024-01-12 DIAGNOSIS — Z17.0 MALIGNANT NEOPLASM OF UPPER-OUTER QUADRANT OF LEFT BREAST IN FEMALE, ESTROGEN RECEPTOR POSITIVE: ICD-10-CM

## 2024-01-12 DIAGNOSIS — R23.2 HOT FLASHES: ICD-10-CM

## 2024-01-12 DIAGNOSIS — G89.29 CHRONIC LEFT SHOULDER PAIN: ICD-10-CM

## 2024-01-12 DIAGNOSIS — R73.03 PREDIABETES: Primary | ICD-10-CM

## 2024-01-12 DIAGNOSIS — Z13.220 LIPID SCREENING: ICD-10-CM

## 2024-01-12 DIAGNOSIS — Z23 NEED FOR COVID-19 VACCINE: ICD-10-CM

## 2024-01-12 DIAGNOSIS — C50.412 MALIGNANT NEOPLASM OF UPPER-OUTER QUADRANT OF LEFT BREAST IN FEMALE, ESTROGEN RECEPTOR POSITIVE: ICD-10-CM

## 2024-01-12 PROBLEM — R06.09 DOE (DYSPNEA ON EXERTION): Status: RESOLVED | Noted: 2023-09-11 | Resolved: 2024-01-12

## 2024-01-12 LAB — SL AMB POCT HEMOGLOBIN AIC: 5.8 (ref ?–6.5)

## 2024-01-12 PROCEDURE — 91320 SARSCV2 VAC 30MCG TRS-SUC IM: CPT | Performed by: FAMILY MEDICINE

## 2024-01-12 PROCEDURE — 90480 ADMN SARSCOV2 VAC 1/ONLY CMP: CPT | Performed by: FAMILY MEDICINE

## 2024-01-12 PROCEDURE — 99214 OFFICE O/P EST MOD 30 MIN: CPT | Performed by: FAMILY MEDICINE

## 2024-01-12 PROCEDURE — 83036 HEMOGLOBIN GLYCOSYLATED A1C: CPT | Performed by: FAMILY MEDICINE

## 2024-01-12 RX ORDER — TIZANIDINE 4 MG/1
4 TABLET ORAL
Qty: 30 TABLET | Refills: 0 | Status: SHIPPED | OUTPATIENT
Start: 2024-01-12

## 2024-01-12 RX ORDER — MELOXICAM 7.5 MG/1
7.5 TABLET ORAL DAILY PRN
Qty: 30 TABLET | Refills: 0 | Status: SHIPPED | OUTPATIENT
Start: 2024-01-12

## 2024-01-12 RX ORDER — VENLAFAXINE 50 MG/1
50 TABLET ORAL DAILY
Qty: 30 TABLET | Refills: 1 | Status: SHIPPED | OUTPATIENT
Start: 2024-01-12

## 2024-01-12 NOTE — PROGRESS NOTES
Assessment/Plan:         Problem List Items Addressed This Visit        Cardiovascular and Mediastinum    Hot flashes     On Arimidex          Relevant Medications    venlafaxine (EFFEXOR) 50 mg tablet       Other    Malignant neoplasm of upper-outer quadrant of left breast in female, estrogen receptor positive      Follows with oncology and she is on Arimidex.         Relevant Medications    venlafaxine (EFFEXOR) 50 mg tablet    Prediabetes - Primary     A1c improving, will repeat in 3-6 months         Relevant Orders    POCT hemoglobin A1c (Completed)    Hemoglobin A1C    Comprehensive metabolic panel    Chronic left shoulder pain     Will start Mobic, Tizanidine   Check Xray and refer to PT if no improvement          Relevant Medications    tiZANidine (ZANAFLEX) 4 mg tablet    meloxicam (MOBIC) 7.5 mg tablet    Other Relevant Orders    XR shoulder 2+ vw left    Ambulatory Referral to Physical Therapy   Other Visit Diagnoses     Lipid screening        Relevant Orders    Lipid Panel with Direct LDL reflex    Need for COVID-19 vaccine        Relevant Orders    COVID-19 Pfizer mRNA vaccine 12 yr and older (GRAY cap vial or pre-filled syringe) (Completed)            Subjective:      Patient ID: Ayala Nicholson is a 68 y.o. female.     Here for an A1c - her A1c was 6.4, now 5.8%, has been watching diet.  Her weight is down a few lbs.     Her L shoulder is bothering her - pain starts posteriorly and radiates down the arm. Started about 1-2 months ago.  She tried topical icy hot, tylenol and ibuprofen.     Breast cancer- on Arimidex and sees Oncology    Shoulder Pain   Pertinent negatives include no numbness.       The following portions of the patient's history were reviewed and updated as appropriate:   Past Medical History:  She has a past medical history of Breast cancer (HCC) (09/2019), Cancer (HCC) (06/2019), Cancer (HCC) (June, 2019), History of radiation therapy (10/2019), Migraine, Osteoporosis, Pancreatitis,  Pleurisy, PONV (postoperative nausea and vomiting), Seizures (HCC), Varicella, and Vertigo.,  _______________________________________________________________________  Medical Problems:  does not have any pertinent problems on file.,  _______________________________________________________________________  Past Surgical History:   has a past surgical history that includes Cholecystectomy; Dental surgery; Rhinoplasty; Hallux valgus correction (Bilateral); Colonoscopy; Mammo needle localization left (all inc) (Left, 10/01/2019); Breast lumpectomy (Left, 10/01/2019); Lymph node biopsy (Left, 10/01/2019); Epidural block injection (02/2022); pr breast reduction (Right, 02/23/2023); pr mastopexy (Left, 02/23/2023); and Breast biopsy (07/23/2019).,  _______________________________________________________________________  Family History:  family history includes Cancer in her father; Lung cancer (age of onset: 69) in her father; No Known Problems in her maternal grandfather, maternal grandmother, mother, paternal grandfather, and paternal grandmother.,  _______________________________________________________________________  Social History:   reports that she has never smoked. She has never used smokeless tobacco. She reports that she does not drink alcohol and does not use drugs.,  _______________________________________________________________________  Allergies:  is allergic to ciprofloxacin, cyclopentolate, sulfa antibiotics, and scopolamine..  _______________________________________________________________________  Current Outpatient Medications   Medication Sig Dispense Refill   • meloxicam (MOBIC) 7.5 mg tablet Take 1 tablet (7.5 mg total) by mouth daily as needed for moderate pain 30 tablet 0   • tiZANidine (ZANAFLEX) 4 mg tablet Take 1 tablet (4 mg total) by mouth daily at bedtime as needed for muscle spasms 30 tablet 0   • venlafaxine (EFFEXOR) 50 mg tablet Take 1 tablet (50 mg total) by mouth daily 30 tablet 1   •  "anastrozole (ARIMIDEX) 1 mg tablet Take 1 tablet (1 mg total) by mouth daily 90 tablet 3   • Cholecalciferol (VITAMIN D) 2000 units CAPS Take 4,000 Units by mouth daily      • meclizine (ANTIVERT) 12.5 MG tablet Take 12.5 mg by mouth 3 (three) times a day as needed for dizziness     • ondansetron (ZOFRAN) 4 mg tablet Take 1 tablet (4 mg total) by mouth every 8 (eight) hours as needed for nausea or vomiting 10 tablet 0   • pimecrolimus (ELIDEL) 1 % cream      • SUMAtriptan (IMITREX) 100 mg tablet Take 100 mg by mouth once as needed for migraine       No current facility-administered medications for this visit.     _______________________________________________________________________  Review of Systems   HENT: Negative.     Respiratory: Negative.     Gastrointestinal: Negative.    Musculoskeletal:  Positive for arthralgias.   Neurological:  Negative for numbness.         Objective:  Vitals:    01/12/24 1002   BP: 116/73   Pulse: 79   Temp: 97.8 °F (36.6 °C)   SpO2: 98%   Weight: 82.1 kg (181 lb)   Height: 5' 3.5\" (1.613 m)     Body mass index is 31.56 kg/m².     Physical Exam  Vitals and nursing note reviewed.   Constitutional:       General: She is not in acute distress.     Appearance: Normal appearance. She is not ill-appearing, toxic-appearing or diaphoretic.   HENT:      Head: Normocephalic and atraumatic.      Right Ear: External ear normal.      Left Ear: External ear normal.   Cardiovascular:      Rate and Rhythm: Normal rate and regular rhythm.      Heart sounds: No murmur heard.     No friction rub.   Pulmonary:      Effort: Pulmonary effort is normal. No respiratory distress.      Breath sounds: Normal breath sounds. No stridor. No wheezing, rhonchi or rales.   Musculoskeletal:         General: No swelling.      Right lower leg: No edema.      Left lower leg: No edema.   Neurological:      General: No focal deficit present.      Mental Status: She is alert. Mental status is at baseline.   Psychiatric:    "      Attention and Perception: Attention normal.         Mood and Affect: Mood normal.         Speech: Speech normal.         Behavior: Behavior normal.         Thought Content: Thought content normal.         Judgment: Judgment normal.

## 2024-01-12 NOTE — ASSESSMENT & PLAN NOTE
A1c improving, will repeat in 3-6 months  
Follows with oncology and she is on Arimidex.  
On Arimidex   
Will start Mobic, Tizanidine   Check Xray and refer to PT if no improvement   
Jay Grider

## 2024-01-24 ENCOUNTER — OFFICE VISIT (OUTPATIENT)
Dept: HEMATOLOGY ONCOLOGY | Facility: CLINIC | Age: 69
End: 2024-01-24
Payer: COMMERCIAL

## 2024-01-24 VITALS
RESPIRATION RATE: 18 BRPM | OXYGEN SATURATION: 98 % | HEART RATE: 80 BPM | WEIGHT: 182 LBS | HEIGHT: 64 IN | DIASTOLIC BLOOD PRESSURE: 74 MMHG | SYSTOLIC BLOOD PRESSURE: 120 MMHG | TEMPERATURE: 97.9 F | BODY MASS INDEX: 31.07 KG/M2

## 2024-01-24 DIAGNOSIS — Z17.0 MALIGNANT NEOPLASM OF UPPER-OUTER QUADRANT OF LEFT BREAST IN FEMALE, ESTROGEN RECEPTOR POSITIVE: Primary | ICD-10-CM

## 2024-01-24 DIAGNOSIS — C50.412 MALIGNANT NEOPLASM OF UPPER-OUTER QUADRANT OF LEFT BREAST IN FEMALE, ESTROGEN RECEPTOR POSITIVE: Primary | ICD-10-CM

## 2024-01-24 PROCEDURE — 99214 OFFICE O/P EST MOD 30 MIN: CPT | Performed by: INTERNAL MEDICINE

## 2024-01-24 NOTE — PROGRESS NOTES
Ayala Nicholson  1955  [address] [facility] [place]  [address]  [place] [address]    Chief Complaint   Patient presents with    Follow-up     Assessment/plan:   1.  Left breast cancer, stage IA (pT1c, pN0, M0) grade 1 with invasive lobular histology, % positive, % positive, HER2 negative disease.  Diagnosed in October 2019.   2.  Current use of aromatase inhibitor (anastrozole)    Edita Nicholson is a 68-year-old postmenopausal female with stage IA left breast cancer, grade 1, with invasive lobular histology, % positive, % positive, HER2 negative disease.  She underwent lumpectomy and sentinel lymph node biopsy, resulting in JOSUÉ.  She is currently on adjuvant hormonal therapy with anastrozole with reasonable tolerance.  She had treatment interruption from 12/22-8/2023 while undergoing breast reconstruction surgery.  She wants to take it until 8/2024 bc planning a trip to [place] and no longer wants to be on the medicine.  Lengthy discussion with the patient regarding her diagnosis, treatment option and prognosis.  Recommend continuing anastrozole to complete at least 5 years of adjuvant endocrine therapy.  We discussed compliance and I discouraged patient from taking long breaks during her treatment.  She will continue anastrozole 1 mg daily.  Office follow-up in 1 year.  Patient aware to contact the office in the interim.                   Oncology history:   Primary Diagnosis:  Left breast cancer, stage IA (pT1c, pN0, M0) grade 1 with invasive lobular histology, % positive, % positive, HER2 negative disease.  Diagnosed in October 2019.      Cancer Staging:     Malignant neoplasm of upper-outer quadrant of left breast in female, estrogen receptor positive (HCC)  Staging form: Breast, AJCC 8th Edition  - Pathologic: Stage IA (pT1c, pN0(sn), cM0, G1, ER+, TX+, HER2-) - Unsigned  Neoadjuvant therapy: No  Laterality: Left  Tumor  size (mm): 18  Method of lymph node assessment: [place] lymph node biopsy  Histologic grading system: 3 grade system     Previous Hematologic/ Oncologic Treatment:   S/p lumpectomy and sentinel lymph node biopsy  S/p radiation 11/13/2019 - 12/4/2019     Current Hematologic/ Oncologic Treatment:    Adjuvant hormonal therapy with anastrozole since October 2019.     Disease Status:   JOSUÉ status post lumpectomy and sentinel lymph node biopsy.     Test Results:   Pathology:   1.8 cm of invasive lobular carcinoma, grade 1. 2 sentinel lymph nodes was negative for metastatic disease.  % positive, % positive, HER2 negative disease.  Stage IA (pT1c, pN0, M0)     Radiology:   Mammography in June 2022 was benign.  BI-RADS 2.     DEXA scan in June 2020 showed T-score-1.6, consistent with osteopenia.     8/28/2023: Bilateral mammogram: No evidence of malignancy.  BI-RADS 2.    History of present illness:   Edita Nicholson is a 68-year-old postmenopausal female with stage IA left breast cancer, grade 1, with invasive lobular histology, % positive, % positive, HER2 negative disease.  She underwent lumpectomy and sentinel lymph node biopsy, resulting in JOSUÉ.  Since November 2019, she has been on adjuvant hormonal therapy with anastrozole.  She developed significant hot flashes.  Therefore, she started low-dose venlafaxine with improvement of hot flashes.  Last year, she was agreeable to switch over to exemestane due to the musculoskeletal symptom with anastrozole.  However, cost of medication is quite high with exemestane.  She stayed on anastrozole.  She was previously following Dr. Denise last seen in the office on 1/24/2023.  She presents today for transfer of care/follow-up visit.    Was off of anastrozole from 12/22-8/2023 while undergoing breast reconstruction surgery. Pt notes less musculoskeletal symptoms while off of medicine. She wants to take it until 8/2024 bc planning a trip to [place] and no longer  wants to be on the medicine. She continues to have moderate musculoskeletal symptoms.  She denied respiratory symptoms such as cough, sputum production or shortness of breath.  She does note experiencing some left shoulder pain and was evaluated by her PCP was recommended an x-ray  Left shoulder pain. Saw pcp and sent for xray.     Review of systems:   Review of Systems   Constitutional:  Negative for chills and fever.   Eyes:  Negative for pain and visual disturbance.   Respiratory:  Negative for cough and shortness of breath.    Cardiovascular:  Negative for chest pain and palpitations.   Gastrointestinal:  Negative for abdominal pain and vomiting.   Genitourinary:  Negative for dysuria and hematuria.   Musculoskeletal:  Negative for arthralgias and back pain.   Skin:  Negative for color change and rash.   Neurological:  Negative for seizures and syncope.   All other systems reviewed and are negative.    Patient Active Problem List   Diagnosis    Fatigue    Age-related osteoporosis without current pathological fracture    Malignant neoplasm of upper-outer quadrant of left breast in female, estrogen receptor positive     Overweight (BMI 25.0-29.9)    Numbness of both lower extremities    Prediabetes    Hearing aid worn    Spinal stenosis of lumbar region with neurogenic claudication    Lumbar spondylolysis    Lumbar foraminal stenosis    Lumbar disc disease with radiculopathy    Hot flashes    Urge urinary incontinence    Migraines    S/P breast reconstruction, bilateral    De Quervain's tenosynovitis, right    Chronic left shoulder pain     Past Medical History:   Diagnosis Date    Breast cancer (HCC) 09/2019    left breast    Cancer (HCC) 06/2019    Cancer (HCC) June, 2019    History of radiation therapy 10/2019    left breast cancer    Migraine     Osteoporosis     Pancreatitis     Pleurisy     PONV (postoperative nausea and vomiting)     Seizures ([facility])     Varicella     Vertigo      Past Surgical History:    Procedure Laterality Date    BREAST BIOPSY  07/23/2019    BREAST LUMPECTOMY Left 10/01/2019    Procedure: BREAST NEEDLE LOCALIZED LUMPECTOMY (NEEDLE LOC AT 0730);  Surgeon: Hadley Gómez MD;  [facility];  Service: Surgical Oncology    CHOLECYSTECTOMY      COLONOSCOPY      DENTAL SURGERY      Bone graft with implant    EPIDURAL BLOCK INJECTION  02/2022    HALLUX VALGUS CORRECTION Bilateral     LYMPH NODE BIOPSY Left 10/01/2019    Procedure: SENTINEL LYMPH NODE BIOPSY; LYMPHATIC MAPPING WITH BLUE DYE AND RADIOACTIVE DYE (INJECT AT 0830 BY DR GÓMEZ IN THE OR);  Surgeon: Hadley Gómez MD;  Location: [facility];  Service: Surgical Oncology    MAMMO NEEDLE LOCALIZATION LEFT (ALL INC) Left 10/01/2019    NM BREAST REDUCTION Right 02/23/2023    Procedure: RIGHT BREAST REDUCTION;  Surgeon: Cale Gonzalez MD;  [facility];  Service: Plastics    NM MASTOPEXY Left 02/23/2023    Procedure: LEFT MASTOPEXY;  Surgeon: Cale Gonzalez MD;  [facility];  Service: Plastics    RHINOPLASTY       Family History   Problem Relation Age of Onset    No Known Problems Mother     Lung cancer Father 69    Cancer Father         Lung    No Known Problems Maternal Grandmother     No Known Problems Maternal Grandfather     No Known Problems Paternal Grandmother     No Known Problems Paternal Grandfather     Breast cancer Neg Hx      Social History     Socioeconomic History    Marital status:      Spouse name: Not on file    Number of children: 2    Years of education: Not on file    Highest education level: Not on file   Occupational History    Occupation: curretnly works part-time    Occupation: medical professional   Tobacco Use    Smoking status: Never    Smokeless tobacco: Never   Vaping Use    Vaping status: Never Used   Substance and Sexual Activity    Alcohol use: No    Drug use: Never    Sexual activity: Not Currently     Partners: Male     Birth control/protection: Post-menopausal, None   Other  Topics Concern    Not on file   Social History Narrative    Not on file     Social Determinants of Health     Financial Resource Strain: Low Risk  (9/4/2023)    Overall Financial Resource Strain (CARDIA)     Difficulty of Paying Living Expenses: Not hard at all   Food Insecurity: Not on file   Transportation Needs: No Transportation Needs (9/4/2023)    PRAPARE - Transportation     Lack of Transportation (Medical): No     Lack of Transportation (Non-Medical): No   Physical Activity: Not on file   Stress: Not on file   Social Connections: Not on file   Intimate Partner Violence: Not on file   Housing Stability: Not on file       Current Outpatient Medications:     anastrozole (ARIMIDEX) 1 mg tablet, Take 1 tablet (1 mg total) by mouth daily, Disp: 90 tablet, Rfl: 3    Cholecalciferol (VITAMIN D) 2000 units CAPS, Take 4,000 Units by mouth daily , Disp: , Rfl:     meclizine (ANTIVERT) 12.5 MG tablet, Take 12.5 mg by mouth 3 (three) times a day as needed for dizziness, Disp: , Rfl:     meloxicam (MOBIC) 7.5 mg tablet, Take 1 tablet (7.5 mg total) by mouth daily as needed for moderate pain (Patient taking differently: Take 7.5 mg by mouth daily as needed for moderate pain Pt attempted to take, but upset stomach), Disp: 30 tablet, Rfl: 0    ondansetron (ZOFRAN) 4 mg tablet, Take 1 tablet (4 mg total) by mouth every 8 (eight) hours as needed for nausea or vomiting, Disp: 10 tablet, Rfl: 0    pimecrolimus (ELIDEL) 1 % cream, , Disp: , Rfl:     SUMAtriptan (IMITREX) 100 mg tablet, Take 100 mg by mouth once as needed for migraine, Disp: , Rfl:     venlafaxine (EFFEXOR) 50 mg tablet, Take 1 tablet (50 mg total) by mouth daily, Disp: 30 tablet, Rfl: 1    tiZANidine (ZANAFLEX) 4 mg tablet, Take 1 tablet (4 mg total) by mouth daily at bedtime as needed for muscle spasms (Patient not taking: Reported on 1/24/2024), Disp: 30 tablet, Rfl: 0  Allergies   Allergen Reactions    Ciprofloxacin Seizures    Cyclopentolate Other (See  Comments) and Tachycardia     Resp distress    Sulfa Antibiotics Hives and Rash    Scopolamine Other (See Comments)     Precautionary for cycopentolate allergy     Vitals:    01/24/24 1246   BP: 120/74   Pulse: 80   Resp: 18   Temp: 97.9 °F (36.6 °C)   SpO2: 98%     Wt Readings from Last 3 Encounters:   01/24/24 82.6 kg (182 lb)   01/12/24 82.1 kg (181 lb)   10/10/23 82.7 kg (182 lb 6.4 oz)     Performance Status: ECOG/Zubrod/WHO: 0 - Asymptomatic  Physical Exam  Vitals reviewed.   Constitutional:       General: She is not in acute distress.     Appearance: Normal appearance.   HENT:      Head: Normocephalic and atraumatic.      Mouth/Throat:      Mouth: Mucous membranes are moist.   Eyes:      Extraocular Movements: Extraocular movements intact.      Conjunctiva/sclera: Conjunctivae normal.   Cardiovascular:      Rate and Rhythm: Normal rate.   Pulmonary:      Effort: Pulmonary effort is normal. No respiratory distress.      Breath sounds: No wheezing, rhonchi or rales.   Chest:      Comments: Left breast: S/p lumpectomy scar at outer upper quadrant of her left breast with no palpable abnormality.      Right breast: exam is negative.  Abdominal:      General: Abdomen is flat. There is no distension.      Palpations: Abdomen is soft.   Musculoskeletal:      Cervical back: Normal range of motion and neck supple.      Right lower leg: No edema.      Left lower leg: No edema.   Skin:     General: Skin is warm.      Coloration: Skin is not pale.   Neurological:      Mental Status: She is alert and oriented to person, place, and time. Mental status is at baseline.      Cranial Nerves: No cranial nerve deficit.   Psychiatric:         Thought Content: Thought content normal.       Labs:  10/2/2023: WBC: 6.91, H/H: 14.8/43.7, MCV: 99, platelets: 349.  Creatinine: 0.74    Return in about 1 year (around 1/24/2025) for Office Visit.

## 2024-01-30 ENCOUNTER — APPOINTMENT (OUTPATIENT)
Dept: RADIOLOGY | Facility: CLINIC | Age: 69
End: 2024-01-30
Payer: COMMERCIAL

## 2024-01-30 DIAGNOSIS — M25.512 CHRONIC LEFT SHOULDER PAIN: ICD-10-CM

## 2024-01-30 DIAGNOSIS — G89.29 CHRONIC LEFT SHOULDER PAIN: ICD-10-CM

## 2024-01-30 PROCEDURE — 73030 X-RAY EXAM OF SHOULDER: CPT

## 2024-02-05 ENCOUNTER — OFFICE VISIT (OUTPATIENT)
Dept: SURGICAL ONCOLOGY | Facility: CLINIC | Age: 69
End: 2024-02-05
Payer: COMMERCIAL

## 2024-02-05 VITALS
SYSTOLIC BLOOD PRESSURE: 124 MMHG | BODY MASS INDEX: 30.9 KG/M2 | HEIGHT: 64 IN | OXYGEN SATURATION: 100 % | HEART RATE: 85 BPM | DIASTOLIC BLOOD PRESSURE: 84 MMHG | WEIGHT: 181 LBS | TEMPERATURE: 97.3 F | RESPIRATION RATE: 20 BRPM

## 2024-02-05 DIAGNOSIS — Z79.811 USE OF ANASTROZOLE: ICD-10-CM

## 2024-02-05 DIAGNOSIS — Z12.39 ENCOUNTER FOR BREAST CANCER SCREENING OTHER THAN MAMMOGRAM: ICD-10-CM

## 2024-02-05 DIAGNOSIS — R92.2 DENSE BREAST: ICD-10-CM

## 2024-02-05 DIAGNOSIS — R92.30 DENSE BREAST: ICD-10-CM

## 2024-02-05 DIAGNOSIS — Z17.0 MALIGNANT NEOPLASM OF UPPER-OUTER QUADRANT OF LEFT BREAST IN FEMALE, ESTROGEN RECEPTOR POSITIVE: Primary | ICD-10-CM

## 2024-02-05 DIAGNOSIS — C50.412 MALIGNANT NEOPLASM OF UPPER-OUTER QUADRANT OF LEFT BREAST IN FEMALE, ESTROGEN RECEPTOR POSITIVE: Primary | ICD-10-CM

## 2024-02-05 DIAGNOSIS — Z98.890 S/P BREAST RECONSTRUCTION, BILATERAL: ICD-10-CM

## 2024-02-05 PROCEDURE — 99214 OFFICE O/P EST MOD 30 MIN: CPT

## 2024-02-05 NOTE — PROGRESS NOTES
Surgical Oncology Follow Up       [address] [facility] SURGICAL ONCOLOGY [place]  [address] [facility] [address]  [facility] [facility] [address]    Ayala Nicholson  1955  [id number]  [address] [facility] SURGICAL ONCOL[facility] [place]  [address]  [place] [address]    Chief Complaint   Patient presents with   • Follow-up       Assessment/Plan:  1. Malignant neoplasm of upper-outer quadrant of left breast in female, estrogen receptor positive   - 6 mo follow up    2. S/P breast reconstruction, bilateral    3. Use of anastrozole  - continue use per medical oncology      Discussion/Summary: Patient is a 68-year-old female presenting today for six-month follow-up for left breast cancer diagnosed in July 2019. Pathology revealed invasive lobular carcinoma ER/%, HER2 -.  She had a left breast lumpectomy with sentinel node biopsy with Dr. Gómez and completed whole breast radiation therapy. She discontinued AI therapy earlier this year secondary to side effects. She is now back on anastrozole. She had a b/l dx mammogram on 8/28/23 which was BIRADS 2 category 3 density. There were no concerns on her cbe. I will see the patient back in 6 months or sooner should the need arise. She was instructed to call with any questions or concerns prior to this time. All questions were answered today.       History of Present Illness:     Oncology History   Malignant neoplasm of upper-outer quadrant of left breast in female, estrogen receptor positive    7/23/2019 Biopsy    Left breast biopsy  1 o'clock, 5 cm from nipple  Invasive lobular carcinoma  Grade 1      HER2 0    [facility]     10/1/2019 Surgery    Left breast needle localized lumpectomy with sentinel lymph node biopsy  Invasive lobular carcinoma  Grade 1  1.8 cm  Margins negative  0/2 Lymph nodes  Anatomic/Prognostic Stage IA     10/8/2019 -  Cancer Staged    Staging form: Breast, AJCC 8th Edition  -  Pathologic stage from 10/8/2019: Stage IA (pT1c, pN0(sn), cM0, G1, ER+, DC+, HER2-) - Signed by VIVIEN Naranjo on 2/5/2024  Stage prefix: Initial diagnosis  Neoadjuvant therapy: No  Method of lymph node assessment: [place] lymph node biopsy  Multigene prognostic tests performed: None  Histologic grading system: 3 grade system  Laterality: Left  Tumor size (mm): 18       10/30/2019 -  Hormone Therapy    Anastrozole 1 mg daily  Dr. Denise     11/13/2019 - 12/4/2019 Radiation    entire right breast to 4005cGy in 15 daily 267cGy fractions.  Dr Dorman          -Interval History: Patient is a 68-year-old female presenting today for six-month follow-up for left breast cancer diagnosed in July 2019. She is on anastrozole. She had a b/l dx mammogram on 8/28/23 which was BIRADS 2 category 3 density. Patient denies changes on her breast exam. She denies persistent headaches, bone pain, back pain, shortness of breath, or abdominal pain.      Review of Systems:  Review of Systems   Constitutional:  Negative for activity change, appetite change, fatigue and unexpected weight change.   Respiratory:  Negative for cough and shortness of breath.    Cardiovascular:  Negative for chest pain.   Gastrointestinal:  Negative for abdominal pain, diarrhea, nausea and vomiting.   Endocrine: Positive for heat intolerance.   Musculoskeletal:  Positive for arthralgias. Negative for back pain and myalgias.   Skin:  Negative for rash.   Neurological:  Negative for weakness and headaches.   Hematological:  Negative for adenopathy.       Patient Active Problem List   Diagnosis   • Fatigue   • Age-related osteoporosis without current pathological fracture   • Malignant neoplasm of upper-outer quadrant of left breast in female, estrogen receptor positive    • Overweight (BMI 25.0-29.9)   • Numbness of both lower extremities   • Prediabetes   • Hearing aid worn   • Spinal stenosis of lumbar region with neurogenic claudication   • Lumbar  spondylolysis   • Lumbar foraminal stenosis   • Lumbar disc disease with radiculopathy   • Hot flashes   • Urge urinary incontinence   • Migraines   • S/P breast reconstruction, bilateral   • De Quervain's tenosynovitis, right   • Chronic left shoulder pain     Past Medical History:   Diagnosis Date   • Breast cancer (HCC) 09/2019    left breast   • Cancer (HCC) 06/2019   • Cancer (HCC) June, 2019   • History of radiation therapy 10/2019    left breast cancer   • Migraine    • Osteoporosis    • Pancreatitis    • Pleurisy    • PONV (postoperative nausea and vomiting)    • Seizures (HCC)    • Varicella    • Vertigo      Past Surgical History:   Procedure Laterality Date   • BREAST BIOPSY  07/23/2019   • BREAST LUMPECTOMY Left 10/01/2019    Procedure: BREAST NEEDLE LOCALIZED LUMPECTOMY (NEEDLE LOC AT 0730);  Surgeon: Hadley Gómez MD;  [facility];  Service: Surgical Oncology   • CHOLECYSTECTOMY     • COLONOSCOPY     • DENTAL SURGERY      Bone graft with implant   • EPIDURAL BLOCK INJECTION  02/2022   • HALLUX VALGUS CORRECTION Bilateral    • LYMPH NODE BIOPSY Left 10/01/2019    Procedure: SENTINEL LYMPH NODE BIOPSY; LYMPHATIC MAPPING WITH BLUE DYE AND RADIOACTIVE DYE (INJECT AT 0830 BY DR GÓMEZ IN THE OR);  Surgeon: Hadley Gómez MD;  [facility];  Service: Surgical Oncology   • MAMMO NEEDLE LOCALIZATION LEFT (ALL INC) Left 10/01/2019   • SC BREAST REDUCTION Right 02/23/2023    Procedure: RIGHT BREAST REDUCTION;  Surgeon: Cale Gonzalez MD;  [facility];  Service: Plastics   • SC MASTOPEXY Left 02/23/2023    Procedure: LEFT MASTOPEXY;  Surgeon: Cale Gonzalez MD;  [facility];  Service: Plastics   • RHINOPLASTY       Family History   Problem Relation Age of Onset   • No Known Problems Mother    • Lung cancer Father 69   • Cancer Father         Lung   • No Known Problems Maternal Grandmother    • No Known Problems Maternal Grandfather    • No Known Problems Paternal Grandmother     • No Known Problems Paternal Grandfather    • Breast cancer Neg Hx      Social History     Socioeconomic History   • Marital status:      Spouse name: Not on file   • Number of children: 2   • Years of education: Not on file   • Highest education level: Not on file   Occupational History   • Occupation: [email or web address] works part-time   • Occupation: medical professional   Tobacco Use   • Smoking status: Never   • Smokeless tobacco: Never   Vaping Use   • Vaping status: Never Used   Substance and Sexual Activity   • Alcohol use: No   • Drug use: Never   • Sexual activity: Not Currently     Partners: Male     Birth control/protection: Post-menopausal, None   Other Topics Concern   • Not on file   Social History Narrative   • Not on file     Social Determinants of Health     Financial Resource Strain: Low Risk  (9/4/2023)    Overall Financial Resource Strain (CARDIA)    • Difficulty of Paying Living Expenses: Not hard at all   Food Insecurity: Not on file   Transportation Needs: No Transportation Needs (9/4/2023)    PRAPARE - Transportation    • Lack of Transportation (Medical): No    • Lack of Transportation (Non-Medical): No   Physical Activity: Not on file   Stress: Not on file   Social Connections: Not on file   Intimate Partner Violence: Not on file   Housing Stability: Not on file       Current Outpatient Medications:   •  anastrozole (ARIMIDEX) 1 mg tablet, Take 1 tablet (1 mg total) by mouth daily, Disp: 90 tablet, Rfl: 3  •  Cholecalciferol (VITAMIN D) 2000 units CAPS, Take 4,000 Units by mouth daily , Disp: , Rfl:   •  meclizine (ANTIVERT) 12.5 MG tablet, Take 12.5 mg by mouth 3 (three) times a day as needed for dizziness, Disp: , Rfl:   •  meloxicam (MOBIC) 7.5 mg tablet, Take 1 tablet (7.5 mg total) by mouth daily as needed for moderate pain (Patient taking differently: Take 7.5 mg by mouth daily as needed for moderate pain Pt attempted to take, but upset stomach), Disp: 30 tablet, Rfl: 0  •   ondansetron (ZOFRAN) 4 mg tablet, Take 1 tablet (4 mg total) by mouth every 8 (eight) hours as needed for nausea or vomiting, Disp: 10 tablet, Rfl: 0  •  pimecrolimus (ELIDEL) 1 % cream, , Disp: , Rfl:   •  SUMAtriptan (IMITREX) 100 mg tablet, Take 100 mg by mouth once as needed for migraine, Disp: , Rfl:   •  venlafaxine (EFFEXOR) 50 mg tablet, Take 1 tablet (50 mg total) by mouth daily, Disp: 30 tablet, Rfl: 1  •  tiZANidine (ZANAFLEX) 4 mg tablet, Take 1 tablet (4 mg total) by mouth daily at bedtime as needed for muscle spasms (Patient not taking: Reported on 1/24/2024), Disp: 30 tablet, Rfl: 0  Allergies   Allergen Reactions   • Ciprofloxacin Seizures   • Cyclopentolate Other (See Comments) and Tachycardia     Resp distress   • Sulfa Antibiotics Hives and Rash   • Scopolamine Other (See Comments)     Precautionary for cycopentolate allergy     Vitals:    02/05/24 1050   BP: 124/84   Pulse: 85   Resp: 20   Temp: (!) 97.3 °F (36.3 °C)   SpO2: 100%       Physical Exam  Constitutional:       General: She is not in acute distress.     Appearance: Normal appearance.   Cardiovascular:      Rate and Rhythm: Normal rate and regular rhythm.      Pulses: Normal pulses.      Heart sounds: Normal heart sounds.   Pulmonary:      Effort: Pulmonary effort is normal.      Breath sounds: Normal breath sounds.   Chest:      Chest wall: No mass.   Breasts:     Right: No swelling, bleeding, inverted nipple, mass, nipple discharge, skin change or tenderness.      Left: No swelling, bleeding, inverted nipple, mass, nipple discharge, skin change or tenderness.   Abdominal:      General: Abdomen is flat.      Palpations: Abdomen is soft.   Lymphadenopathy:      Upper Body:      Right upper body: No supraclavicular, axillary or pectoral adenopathy.      Left upper body: No supraclavicular, axillary or pectoral adenopathy.   Skin:     General: Skin is warm.   Neurological:      General: No focal deficit present.      Mental Status: She is  alert and oriented to person, place, and time.   Psychiatric:         Mood and Affect: Mood normal.         Behavior: Behavior normal.           Results:    Imaging  XR shoulder 2+ vw left    Result Date: 1/30/2024  Narrative: LEFT SHOULDER INDICATION:   Pain in left shoulder. Other chronic pain. COMPARISON:  There are no previous examinations available for comparison. VIEWS:  XR SHOULDER 2+ VW LEFT FINDINGS: There is no acute fracture or dislocation. No significant degenerative changes. No lytic or blastic osseous lesion. Soft tissues are unremarkable.     Impression: No acute osseous abnormality. Electronically signed: 01/30/2024 07:37 PM Angus Tavares MD      I reviewed the above imaging data.      Advance Care Planning/Advance Directives:  Discussed disease status, cancer treatment plans and/or cancer treatment goals with the patient.

## 2024-02-12 ENCOUNTER — OFFICE VISIT (OUTPATIENT)
Age: 69
End: 2024-02-12
Payer: COMMERCIAL

## 2024-02-12 DIAGNOSIS — Z98.890 S/P BREAST RECONSTRUCTION, BILATERAL: Primary | ICD-10-CM

## 2024-02-12 PROCEDURE — 99213 OFFICE O/P EST LOW 20 MIN: CPT | Performed by: PHYSICIAN ASSISTANT

## 2024-02-12 NOTE — PROGRESS NOTES
Subjective:    Patient ID: Ayala Nicholson is a 68 y.o. female.  HPI The patient is s/p right breast reduction and left mastopexy on 2/23/23. She offers no complaints today. The patient denies any severe pain, fever, chills, or other issues at this time. She has been applying scar care daily. She is pleased with her results.       Past Medical History:   Diagnosis Date    Breast cancer (Formerly [facility]) 09/2019    left breast    Cancer (Formerly [facility]) 06/2019    Cancer (Formerly [facility]) June, 2019    History of radiation therapy 10/2019    left breast cancer    Migraine     Osteoporosis     Pancreatitis     Pleurisy     PONV (postoperative nausea and vomiting)     Seizures (Formerly [facility])     Varicella     Vertigo        Patient Active Problem List   Diagnosis    Fatigue    Age-related osteoporosis without current pathological fracture    Malignant neoplasm of upper-outer quadrant of left breast in female, estrogen receptor positive     Overweight (BMI 25.0-29.9)    Numbness of both lower extremities    Prediabetes    Hearing aid worn    Spinal stenosis of lumbar region with neurogenic claudication    Lumbar spondylolysis    Lumbar foraminal stenosis    Lumbar disc disease with radiculopathy    Hot flashes    Urge urinary incontinence    Migraines    S/P breast reconstruction, bilateral    De Quervain's tenosynovitis, right    Chronic left shoulder pain         Current Outpatient Medications:     anastrozole (ARIMIDEX) 1 mg tablet, Take 1 tablet (1 mg total) by mouth daily, Disp: 90 tablet, Rfl: 3    Cholecalciferol (VITAMIN D) 2000 units CAPS, Take 4,000 Units by mouth daily , Disp: , Rfl:     meclizine (ANTIVERT) 12.5 MG tablet, Take 12.5 mg by mouth 3 (three) times a day as needed for dizziness, Disp: , Rfl:     meloxicam (MOBIC) 7.5 mg tablet, Take 1 tablet (7.5 mg total) by mouth daily as needed for moderate pain (Patient taking differently: Take 7.5 mg by mouth daily as needed for moderate pain Pt attempted to take, but upset stomach), Disp: 30 tablet,  Rfl: 0    ondansetron (ZOFRAN) 4 mg tablet, Take 1 tablet (4 mg total) by mouth every 8 (eight) hours as needed for nausea or vomiting, Disp: 10 tablet, Rfl: 0    pimecrolimus (ELIDEL) 1 % cream, , Disp: , Rfl:     SUMAtriptan (IMITREX) 100 mg tablet, Take 100 mg by mouth once as needed for migraine, Disp: , Rfl:     tiZANidine (ZANAFLEX) 4 mg tablet, Take 1 tablet (4 mg total) by mouth daily at bedtime as needed for muscle spasms (Patient not taking: Reported on 1/24/2024), Disp: 30 tablet, Rfl: 0    venlafaxine (EFFEXOR) 50 mg tablet, Take 1 tablet (50 mg total) by mouth daily, Disp: 30 tablet, Rfl: 1    .  Past Surgical History:   Procedure Laterality Date    BREAST BIOPSY  07/23/2019    BREAST LUMPECTOMY Left 10/01/2019    Procedure: BREAST NEEDLE LOCALIZED LUMPECTOMY (NEEDLE LOC AT 0730);  Surgeon: Hadley Gómez MD;  [facility];  Service: Surgical Oncology    CHOLECYSTECTOMY      COLONOSCOPY      DENTAL SURGERY      Bone graft with implant    EPIDURAL BLOCK INJECTION  02/2022    HALLUX VALGUS CORRECTION Bilateral     LYMPH NODE BIOPSY Left 10/01/2019    Procedure: SENTINEL LYMPH NODE BIOPSY; LYMPHATIC MAPPING WITH BLUE DYE AND RADIOACTIVE DYE (INJECT AT 0830 BY DR GÓMEZ IN THE OR);  Surgeon: Hadley Gómez MD;  [facility];  Service: Surgical Oncology    MAMMO NEEDLE LOCALIZATION LEFT (ALL INC) Left 10/01/2019    AZ BREAST REDUCTION Right 02/23/2023    Procedure: RIGHT BREAST REDUCTION;  Surgeon: Cale Gonzalez MD;  [facility];  Service: Plastics    AZ MASTOPEXY Left 02/23/2023    Procedure: LEFT MASTOPEXY;  Surgeon: Cale Gonzalez MD;  [facility];  Service: Plastics    RHINOPLASTY         Social History     Tobacco Use    Smoking status: Never    Smokeless tobacco: Never   Substance Use Topics    Alcohol use: No       Objective:  There were no vitals taken for this visit.   Physical Exam      General: NAD, alert    Breasts: Incisions are clean, dry, and intact. No  evidence of wound breakdown, hematoma, seroma, or drainage. Nipples viable bilaterally. No signs of infection. Great shape and contour, significantly improved symmetry.       Assessment/Plan: 68 year old s/p right breast reduction and left mastopexy      Pictures taken today. Patient doing well overall. Discussed scar care and scar massage daily. Activity as tolerated.  We will see patient back PRN. Lymphedema referral placed.         Yassine Saul PA-C  Plastic & Reconstructive Surgery

## 2024-02-14 DIAGNOSIS — G89.29 CHRONIC LEFT SHOULDER PAIN: ICD-10-CM

## 2024-02-14 DIAGNOSIS — M25.512 CHRONIC LEFT SHOULDER PAIN: ICD-10-CM

## 2024-02-14 RX ORDER — TIZANIDINE 4 MG/1
4 TABLET ORAL
Qty: 30 TABLET | Refills: 0 | Status: SHIPPED | OUTPATIENT
Start: 2024-02-14

## 2024-02-14 RX ORDER — MELOXICAM 7.5 MG/1
7.5 TABLET ORAL DAILY PRN
Qty: 30 TABLET | Refills: 0 | Status: SHIPPED | OUTPATIENT
Start: 2024-02-14

## 2024-04-16 DIAGNOSIS — R23.2 HOT FLASHES: ICD-10-CM

## 2024-04-16 DIAGNOSIS — Z17.0 MALIGNANT NEOPLASM OF UPPER-OUTER QUADRANT OF LEFT BREAST IN FEMALE, ESTROGEN RECEPTOR POSITIVE (HCC): ICD-10-CM

## 2024-04-16 DIAGNOSIS — C50.412 MALIGNANT NEOPLASM OF UPPER-OUTER QUADRANT OF LEFT BREAST IN FEMALE, ESTROGEN RECEPTOR POSITIVE (HCC): ICD-10-CM

## 2024-04-17 RX ORDER — VENLAFAXINE 50 MG/1
50 TABLET ORAL DAILY
Qty: 30 TABLET | Refills: 1 | Status: SHIPPED | OUTPATIENT
Start: 2024-04-17

## 2024-06-04 ENCOUNTER — TELEPHONE (OUTPATIENT)
Dept: SURGICAL ONCOLOGY | Facility: CLINIC | Age: 69
End: 2024-06-04

## 2024-06-11 DIAGNOSIS — Z17.0 MALIGNANT NEOPLASM OF UPPER-OUTER QUADRANT OF LEFT BREAST IN FEMALE, ESTROGEN RECEPTOR POSITIVE (HCC): ICD-10-CM

## 2024-06-11 DIAGNOSIS — C50.412 MALIGNANT NEOPLASM OF UPPER-OUTER QUADRANT OF LEFT BREAST IN FEMALE, ESTROGEN RECEPTOR POSITIVE (HCC): ICD-10-CM

## 2024-06-11 DIAGNOSIS — R23.2 HOT FLASHES: ICD-10-CM

## 2024-06-11 RX ORDER — VENLAFAXINE 50 MG/1
50 TABLET ORAL DAILY
Qty: 30 TABLET | Refills: 5 | Status: SHIPPED | OUTPATIENT
Start: 2024-06-11

## 2024-07-08 ENCOUNTER — OFFICE VISIT (OUTPATIENT)
Dept: RADIATION ONCOLOGY | Facility: CLINIC | Age: 69
End: 2024-07-08
Attending: RADIOLOGY
Payer: COMMERCIAL

## 2024-07-08 VITALS
DIASTOLIC BLOOD PRESSURE: 82 MMHG | HEART RATE: 86 BPM | OXYGEN SATURATION: 95 % | BODY MASS INDEX: 31.21 KG/M2 | SYSTOLIC BLOOD PRESSURE: 128 MMHG | TEMPERATURE: 97.2 F | WEIGHT: 179 LBS | RESPIRATION RATE: 16 BRPM

## 2024-07-08 DIAGNOSIS — C50.412 MALIGNANT NEOPLASM OF UPPER-OUTER QUADRANT OF LEFT BREAST IN FEMALE, ESTROGEN RECEPTOR POSITIVE (HCC): Primary | ICD-10-CM

## 2024-07-08 DIAGNOSIS — Z17.0 MALIGNANT NEOPLASM OF UPPER-OUTER QUADRANT OF LEFT BREAST IN FEMALE, ESTROGEN RECEPTOR POSITIVE (HCC): Primary | ICD-10-CM

## 2024-07-08 PROCEDURE — 99213 OFFICE O/P EST LOW 20 MIN: CPT | Performed by: RADIOLOGY

## 2024-07-08 PROCEDURE — 99211 OFF/OP EST MAY X REQ PHY/QHP: CPT | Performed by: RADIOLOGY

## 2024-07-08 NOTE — PROGRESS NOTES
Follow-up - Radiation Oncology   Ayala Nicholson 1955 69 y.o. female [id number]      History of Present Illness   Cancer Staging   Malignant neoplasm of upper-outer quadrant of left breast in female, estrogen receptor positive (HCC)  Staging form: Breast, AJCC 8th Edition  - Pathologic stage from 10/8/2019: Stage IA (pT1c, pN0(sn), cM0, G1, ER+, TN+, HER2-) - Signed by VIVIEN Naranjo on 2/5/2024  Stage prefix: Initial diagnosis  Neoadjuvant therapy: No  Method of lymph node assessment: [place] lymph node biopsy  Multigene prognostic tests performed: None  Histologic grading system: 3 grade system  Laterality: Left  Tumor size (mm): 18      Ayala Nicholson is a 69 y.o.woman with a history of [id number] invasive lobular carcinoma of the left breast status post resection achieving negative margins.  Tumor cells were ER/ TN positive and HER2 Mateo negative. She completed a course of adjuvant whole breast hypofractionated radiation on 12/4/19.  She has resumed anastrozole. She was last seen 7/7/23 and returns today for follow up.      8/8/23 Diagnostic B/L mammogram  LEFT  A) POST-SURGICAL FINDING: There are post-surgical findings from a previous lumpectomy seen in the upper outer quadrant of the left breast in the posterior depth.    BILATERAL  B) POST-SURGICAL FINDING: There are post-surgical findings from a previous breast reduction seen in both breasts.    Biopsy marker clips are noted in each breast.  Benign-appearing calcifications are noted in each breast.  No suspicious mass, architectural distortion, or grouped calcifications are noted in either breast.   RECOMMENDATION:       - Diagnostic mammogram in 1 year for both breasts.        1/24/24 Dr. Mccormack  Currently on adjuvant hormonal therapy with anastrozole with reasonable tolerance.    She wants to take it until 8/2024 bc planning a trip to [place] and no longer wants to be on the medicine.   Recommend continuing anastrozole to complete at least 5  years of adjuvant endocrine therapy. We discussed compliance and I discouraged patient from taking long breaks during her treatment. She will continue anastrozole 1 mg daily. Office follow-up in 1 year.      24 Surg OncLyn  She discontinued AI therapy earlier this year secondary to side effects. She is now back on anastrozole.    Follow up 6 months  no concerns on her cbe      24 Plastic surgery, Saul  s/p right breast reduction and left mastopexy on 23.   pleased with her results.      The patient denies breast pain or tenderness.  She denies suspicious skin changes, palpable nodules, or suspicious skin changes, or nipple discharge of the breasts bilaterally.      She states that she was referred to lymphedema clinic by Plastic Surgery for breast lymphedema.  The patient was not sure why she was supposed to go and did not pursue therapy as the she was offered [facility] and she was hoping to delay initiation given distance.  She denies upper extremity edema or shoulder restriction bilaterally.     Upcomin24 Surg Onc  24 Mammogram & US  25 Dr. Patel      Historical Information   Oncology History   Malignant neoplasm of upper-outer quadrant of left breast in female, estrogen receptor positive (HCC)   2019 Biopsy    Left breast biopsy  1 o'clock, 5 cm from nipple  Invasive lobular carcinoma  Grade 1      HER2 0    Done at [facility]     10/1/2019 Surgery    Left breast needle localized lumpectomy with sentinel lymph node biopsy  Invasive lobular carcinoma  Grade 1  1.8 cm  Margins negative  0/2 Lymph nodes  Anatomic/Prognostic Stage IA     10/8/2019 -  Cancer Staged    Staging form: Breast, AJCC 8th Edition  - Pathologic stage from 10/8/2019: Stage IA (pT1c, pN0(sn), cM0, G1, ER+, NM+, HER2-) - Signed by VIVIEN Naranjo on 2024  Stage prefix: Initial diagnosis  Neoadjuvant therapy: No  Method of lymph node assessment: [place]  lymph node biopsy  Multigene prognostic tests performed: None  Histologic grading system: 3 grade system  Laterality: Left  Tumor size (mm): 18       10/30/2019 -  Hormone Therapy    Anastrozole 1 mg daily  Dr. Denise     11/13/2019 - 12/4/2019 Radiation    entire right breast to 4005cGy in 15 daily 267cGy fractions.  Dr Dorman         Past Medical History:   Diagnosis Date    Breast cancer (HCC) 09/2019    left breast    Cancer (HCC) 06/2019    Cancer (HCC) June, 2019    History of radiation therapy 10/2019    left breast cancer    Migraine     Osteoporosis     Pancreatitis     Pleurisy     PONV (postoperative nausea and vomiting)     Seizures (HCC)     Varicella     Vertigo      Past Surgical History:   Procedure Laterality Date    BREAST BIOPSY  07/23/2019    BREAST LUMPECTOMY Left 10/01/2019    Procedure: BREAST NEEDLE LOCALIZED LUMPECTOMY (NEEDLE LOC AT 0730);  Surgeon: Hadley Ha MD;  [facility];  Service: Surgical Oncology    CHOLECYSTECTOMY      COLONOSCOPY      DENTAL SURGERY      Bone graft with implant    EPIDURAL BLOCK INJECTION  02/2022    HALLUX VALGUS CORRECTION Bilateral     LYMPH NODE BIOPSY Left 10/01/2019    Procedure: SENTINEL LYMPH NODE BIOPSY; LYMPHATIC MAPPING WITH BLUE DYE AND RADIOACTIVE DYE (INJECT AT 0830 BY DR HA IN THE OR);  Surgeon: Hadley Ha MD;  [facility];  Service: Surgical Oncology    MAMMO NEEDLE LOCALIZATION LEFT (ALL INC) Left 10/01/2019    SC BREAST REDUCTION Right 02/23/2023    Procedure: RIGHT BREAST REDUCTION;  Surgeon: Cale Gonzalez MD;  [facility];  Service: Plastics    SC MASTOPEXY Left 02/23/2023    Procedure: LEFT MASTOPEXY;  Surgeon: Cale Gonzalez MD;  [facility];  Service: Plastics    RHINOPLASTY         Social History   Social History     Substance and Sexual Activity   Alcohol Use No     Social History     Substance and Sexual Activity   Drug Use Never     Social History     Tobacco Use   Smoking Status Never    Smokeless Tobacco Never         Meds/Allergies     Current Outpatient Medications:     anastrozole (ARIMIDEX) 1 mg tablet, Take 1 tablet (1 mg total) by mouth daily, Disp: 90 tablet, Rfl: 3    Cholecalciferol (VITAMIN D) 2000 units CAPS, Take 4,000 Units by mouth daily , Disp: , Rfl:     meclizine (ANTIVERT) 12.5 MG tablet, Take 12.5 mg by mouth 3 (three) times a day as needed for dizziness, Disp: , Rfl:     meloxicam (MOBIC) 7.5 mg tablet, TAKE 1 TABLET (7.5 MG TOTAL) BY MOUTH DAILY AS NEEDED FOR MODERATE PAIN, Disp: 30 tablet, Rfl: 0    ondansetron (ZOFRAN) 4 mg tablet, Take 1 tablet (4 mg total) by mouth every 8 (eight) hours as needed for nausea or vomiting, Disp: 10 tablet, Rfl: 0    pimecrolimus (ELIDEL) 1 % cream, , Disp: , Rfl:     SUMAtriptan (IMITREX) 100 mg tablet, Take 100 mg by mouth once as needed for migraine, Disp: , Rfl:     tiZANidine (ZANAFLEX) 4 mg tablet, TAKE 1 TABLET (4 MG TOTAL) BY MOUTH DAILY AT BEDTIME AS NEEDED FOR MUSCLE SPASMS, Disp: 30 tablet, Rfl: 0    venlafaxine (EFFEXOR) 50 mg tablet, TAKE 1 TABLET (50 MG TOTAL) BY MOUTH DAILY, Disp: 30 tablet, Rfl: 5  Allergies   Allergen Reactions    Ciprofloxacin Seizures    Cyclopentolate Other (See Comments) and Tachycardia     Resp distress    Sulfa Antibiotics Hives and Rash    Scopolamine Other (See Comments)     Precautionary for cycopentolate allergy         Review of Systems   Constitutional:  Positive for fatigue (sometimes).   HENT:  Positive for hearing loss.    Eyes: Negative.    Respiratory: Negative.     Cardiovascular: Negative.    Gastrointestinal: Negative.    Endocrine: Negative.    Genitourinary:  Positive for frequency and urgency.   Musculoskeletal:  Positive for arthralgias and back pain.   Skin: Negative.    Allergic/Immunologic: Negative.    Neurological: Negative.    Hematological: Negative.    Psychiatric/Behavioral:  Positive for sleep disturbance.        OBJECTIVE:   /82   Pulse 86   Temp (!) 97.2 °F (36.2  °C)   Resp 16   Wt 81.2 kg (179 lb)   SpO2 95%   BMI 31.21 kg/m²   Karnofsky: 90 - Able to carry on normal activity; minor signs or symptoms of disease     Physical Exam  Vitals and nursing note reviewed.   Constitutional:       General: She is not in acute distress.     Appearance: She is well-developed.   Cardiovascular:      Rate and Rhythm: Normal rate and regular rhythm.   Pulmonary:      Breath sounds: No wheezing, rhonchi or rales.   Chest:          Comments: Breast examination demonstrates breasts in symmetric size and, when sitting, in contour.  Well-healed mastopexy scars breasts bilaterally.  There are no palpable nodules or suspicious skin changes bilaterally.  There is retraction of the inferior and lateral left breast and diffuse fibrosis.  There is some fibrosis and retraction left axilla region with mild pouching of the lateral left breast tissue.  This is soft.  There were no palpable nodules or suspicious skin changes bilaterally.  No significant edema appreciated on today's exam.  Abdominal:      Palpations: Abdomen is soft.      Tenderness: There is no abdominal tenderness.   Musculoskeletal:      Right lower leg: No edema.      Left lower leg: No edema.      Comments: Full range of motion and no edema upper extremities bilaterally.   Lymphadenopathy:      Cervical: No cervical adenopathy.      Upper Body:      Right upper body: No supraclavicular or axillary adenopathy.      Left upper body: No supraclavicular or axillary adenopathy.   Neurological:      Mental Status: She is alert and oriented to person, place, and time.            Assessment/Plan:  Ayala Nicholson is a 69 y.o.woman with a history of stage I invasive lobular carcinoma of the left breast status post resection followed by adjuvant radiation completed about 4.5 years ago. Tumor cells were ER/ NC positive and HER2 Mateo negative. She has resumed AI therapy.    She remains clinically JOSUÉ.     She is doing well overall. She has  "completely healed from her surgeries.  I did not appreciate significant lymphedema of the breast or upper extremities.  I provided her with contact information to lymphedema clinic should she develop edema or if she wishes to pursue consultation and education closer to her home.       I instructed her to practice sun protection to the irradiated skin.    She will continue follow-up with Surgical Oncology and Medical Oncology.    We will be happy to see her again should the need arise.    Danelle Dorman MD  7/8/2024,3:04 PM    Portions of the record may have been created with voice recognition software.  Occasional wrong word or \"sound a like\" substitutions may have occurred due to the inherent limitations of voice recognition software.  Read the chart carefully and recognize, using context, where substitutions have occurred.        "

## 2024-07-08 NOTE — PROGRESS NOTES
Ayala Nicholson 1955 is a 69 y.o. female with a history of [id number] invasive lobular carcinoma of the left breast status post resection achieving negative margins.  Tumor cells were ER/ AL positive and HER2 Mateo negative. She completed a course of adjuvant whole breast hypofractionated radiation on 19.  She is maintained on anastrozole. She was last seen 23 and returns today for follow up.       23 Diagnostic B/L mammogram  LEFT  A) POST-SURGICAL FINDING: There are post-surgical findings from a previous lumpectomy seen in the upper outer quadrant of the left breast in the posterior depth.    BILATERAL  B) POST-SURGICAL FINDING: There are post-surgical findings from a previous breast reduction seen in both breasts.    Biopsy marker clips are noted in each breast.  Benign-appearing calcifications are noted in each breast.  No suspicious mass, architectural distortion, or grouped calcifications are noted in either breast.   RECOMMENDATION:       - Diagnostic mammogram in 1 year for both breasts.         24 Dr. Mccormack   currently on adjuvant hormonal therapy with anastrozole with reasonable tolerance.    She wants to take it until 2024 bc planning a trip to [place] and no longer wants to be on the medicine.   Recommend continuing anastrozole to complete at least 5 years of adjuvant endocrine therapy. We discussed compliance and I discouraged patient from taking long breaks during her treatment. She will continue anastrozole 1 mg daily. Office follow-up in 1 year.       24 Surg OncLyn  She discontinued AI therapy earlier this year secondary to side effects. She is now back on anastrozole.    Follow up 6 months  no concerns on her cbe       24 Plastic surgeryKg  s/p right breast reduction and left mastopexy on 23.   pleased with her results.       Upcomin24 Surg Onc  24 Mammogram & US  25 Dr. Patel    Follow up visit     Oncology History   Malignant  neoplasm of upper-outer quadrant of left breast in female, estrogen receptor positive (HCC)   7/23/2019 Biopsy    Left breast biopsy  1 o'clock, 5 cm from nipple  Invasive lobular carcinoma  Grade 1      HER2 0    Done at [facility]     10/1/2019 Surgery    Left breast needle localized lumpectomy with sentinel lymph node biopsy  Invasive lobular carcinoma  Grade 1  1.8 cm  Margins negative  0/2 Lymph nodes  Anatomic/Prognostic Stage IA     10/8/2019 -  Cancer Staged    Staging form: Breast, AJCC 8th Edition  - Pathologic stage from 10/8/2019: Stage IA (pT1c, pN0(sn), cM0, G1, ER+, TX+, HER2-) - Signed by VIVIEN Naranjo on 2/5/2024  Stage prefix: Initial diagnosis  Neoadjuvant therapy: No  Method of lymph node assessment: [place] lymph node biopsy  Multigene prognostic tests performed: None  Histologic grading system: 3 grade system  Laterality: Left  Tumor size (mm): 18       10/30/2019 -  Hormone Therapy    Anastrozole 1 mg daily  Dr. Denise     11/13/2019 - 12/4/2019 Radiation    entire right breast to 4005cGy in 15 daily 267cGy fractions.  Dr Dorman         Review of Systems:  Review of Systems   Constitutional:  Positive for fatigue (sometimes).   HENT:  Positive for hearing loss.    Eyes: Negative.    Respiratory: Negative.     Cardiovascular: Negative.    Gastrointestinal: Negative.    Endocrine: Negative.    Genitourinary:  Positive for frequency and urgency.   Musculoskeletal:  Positive for arthralgias and back pain.   Skin: Negative.    Allergic/Immunologic: Negative.    Neurological: Negative.    Hematological: Negative.    Psychiatric/Behavioral:  Positive for sleep disturbance.        Clinical Trial: no    Pregnancy test needed:  no        Health Maintenance   Topic Date Due    Zoster Vaccine (1 of 2) Never done    RSV Vaccine Age 60+ Years (1 - 1-dose 60+ series) Never done    PT PLAN OF CARE  12/30/2022    ONC Colorectal Surgery Screening  Never done    Breast Cancer  Survivorship Visit  Never done    ONC Cervical Cancer Screening  01/10/2023    ONC DXA Scan  01/10/2023    ONC Physical Therapy Referral  Never done    Cervical Cancer Screening  06/17/2024    Breast Cancer Screening: Mammogram  08/28/2024    Influenza Vaccine (1) 09/01/2024    Fall Risk  09/11/2024    Depression Screening  09/11/2024    Medicare Annual Wellness Visit (AWV)  09/11/2024    BMI: Followup Plan  09/11/2024    Urinary Incontinence Screening  09/11/2024    BMI: Adult  02/05/2025    Colorectal Cancer Screening  10/05/2025    Hepatitis C Screening  Completed    Osteoporosis Screening  Completed    Pneumococcal Vaccine: 65+ Years  Completed    COVID-19 Vaccine  Completed    RSV Vaccine age 0-20 Months  Aged Out    HIB Vaccine  Aged Out    IPV Vaccine  Aged Out    Hepatitis A Vaccine  Aged Out    Meningococcal ACWY Vaccine  Aged Out    HPV Vaccine  Aged Out     Patient Active Problem List   Diagnosis    Fatigue    Age-related osteoporosis without current pathological fracture    Malignant neoplasm of upper-outer quadrant of left breast in female, estrogen receptor positive (HCC)    Overweight (BMI 25.0-29.9)    Numbness of both lower extremities    Prediabetes    Hearing aid worn    Spinal stenosis of lumbar region with neurogenic claudication    Lumbar spondylolysis    Lumbar foraminal stenosis    Lumbar disc disease with radiculopathy    Hot flashes    Urge urinary incontinence    Migraines    S/P breast reconstruction, bilateral    De Quervain's tenosynovitis, right    Chronic left shoulder pain     Past Medical History:   Diagnosis Date    Breast cancer (HCC) 09/2019    left breast    Cancer (HCC) 06/2019    Cancer (HCC) June, 2019    History of radiation therapy 10/2019    left breast cancer    Migraine     Osteoporosis     Pancreatitis     Pleurisy     PONV (postoperative nausea and vomiting)     Seizures (HCC)     Varicella     Vertigo      Past Surgical History:   Procedure Laterality Date    BREAST  BIOPSY  07/23/2019    BREAST LUMPECTOMY Left 10/01/2019    Procedure: BREAST NEEDLE LOCALIZED LUMPECTOMY (NEEDLE LOC AT 0730);  Surgeon: Hadley Gómez MD;  [facility];  Service: Surgical Oncology    CHOLECYSTECTOMY      COLONOSCOPY      DENTAL SURGERY      Bone graft with implant    EPIDURAL BLOCK INJECTION  02/2022    HALLUX VALGUS CORRECTION Bilateral     LYMPH NODE BIOPSY Left 10/01/2019    Procedure: SENTINEL LYMPH NODE BIOPSY; LYMPHATIC MAPPING WITH BLUE DYE AND RADIOACTIVE DYE (INJECT AT 0830 BY DR GÓMEZ IN THE OR);  Surgeon: Hadley Gómez MD;  [facility];  Service: Surgical Oncology    MAMMO NEEDLE LOCALIZATION LEFT (ALL INC) Left 10/01/2019    [place] BREAST REDUCTION Right 02/23/2023    Procedure: RIGHT BREAST REDUCTION;  Surgeon: Cale Gonzalez MD;  [facility];  Service: Plastics    TN MASTOPEXY Left 02/23/2023    Procedure: LEFT MASTOPEXY;  Surgeon: Cale Gonzalez MD;  [facility];  Service: Plastics    RHINOPLASTY       Family History   Problem Relation Age of Onset    No Known Problems Mother     Lung cancer Father 69    Cancer Father         Lung    No Known Problems Maternal Grandmother     No Known Problems Maternal Grandfather     No Known Problems Paternal Grandmother     No Known Problems Paternal Grandfather     Breast cancer Neg Hx      Social History     Socioeconomic History    Marital status:      Spouse name: Not on file    Number of children: 2    Years of education: Not on file    Highest education level: Not on file   Occupational History    Occupation: curretnly works part-time    Occupation: medical professional   Tobacco Use    Smoking status: Never    Smokeless tobacco: Never   Vaping Use    Vaping status: Never Used   Substance and Sexual Activity    Alcohol use: No    Drug use: Never    Sexual activity: Not Currently     Partners: Male     Birth control/protection: Post-menopausal, None   Other Topics Concern    Not on file   Social  History Narrative    Not on file     Social Determinants of Health     Financial Resource Strain: Low Risk  (9/4/2023)    Overall Financial Resource Strain (CARDIA)     Difficulty of Paying Living Expenses: Not hard at all   Food Insecurity: Not on file   Transportation Needs: No Transportation Needs (9/4/2023)    PRAPARE - Transportation     Lack of Transportation (Medical): No     Lack of Transportation (Non-Medical): No   Physical Activity: Not on file   Stress: Not on file   Social Connections: Unknown (6/18/2024)    Received from [employer]    Social Connections     How often do you feel lonely or isolated from those around you? (Adult - for ages 18 years and over): Not on file   Intimate Partner Violence: Not on file   Housing Stability: Not on file       Current Outpatient Medications:     anastrozole (ARIMIDEX) 1 mg tablet, Take 1 tablet (1 mg total) by mouth daily, Disp: 90 tablet, Rfl: 3    Cholecalciferol (VITAMIN D) 2000 units CAPS, Take 4,000 Units by mouth daily , Disp: , Rfl:     meclizine (ANTIVERT) 12.5 MG tablet, Take 12.5 mg by mouth 3 (three) times a day as needed for dizziness, Disp: , Rfl:     meloxicam (MOBIC) 7.5 mg tablet, TAKE 1 TABLET (7.5 MG TOTAL) BY MOUTH DAILY AS NEEDED FOR MODERATE PAIN, Disp: 30 tablet, Rfl: 0    ondansetron (ZOFRAN) 4 mg tablet, Take 1 tablet (4 mg total) by mouth every 8 (eight) hours as needed for nausea or vomiting, Disp: 10 tablet, Rfl: 0    pimecrolimus (ELIDEL) 1 % cream, , Disp: , Rfl:     SUMAtriptan (IMITREX) 100 mg tablet, Take 100 mg by mouth once as needed for migraine, Disp: , Rfl:     tiZANidine (ZANAFLEX) 4 mg tablet, TAKE 1 TABLET (4 MG TOTAL) BY MOUTH DAILY AT BEDTIME AS NEEDED FOR MUSCLE SPASMS, Disp: 30 tablet, Rfl: 0    venlafaxine (EFFEXOR) 50 mg tablet, TAKE 1 TABLET (50 MG TOTAL) BY MOUTH DAILY, Disp: 30 tablet, Rfl: 5  Allergies   Allergen Reactions    Ciprofloxacin Seizures    Cyclopentolate Other (See Comments) and Tachycardia     Resp  distress    Sulfa Antibiotics Hives and Rash    Scopolamine Other (See Comments)     Precautionary for cycopentolate allergy     There were no vitals filed for this visit.

## 2024-07-22 DIAGNOSIS — C50.412 MALIGNANT NEOPLASM OF UPPER-OUTER QUADRANT OF LEFT BREAST IN FEMALE, ESTROGEN RECEPTOR POSITIVE (HCC): ICD-10-CM

## 2024-07-22 DIAGNOSIS — Z17.0 MALIGNANT NEOPLASM OF UPPER-OUTER QUADRANT OF LEFT BREAST IN FEMALE, ESTROGEN RECEPTOR POSITIVE (HCC): ICD-10-CM

## 2024-07-22 RX ORDER — ANASTROZOLE 1 MG/1
1 TABLET ORAL DAILY
Refills: 3 | OUTPATIENT
Start: 2024-07-22

## 2024-07-24 ENCOUNTER — OFFICE VISIT (OUTPATIENT)
Dept: SURGICAL ONCOLOGY | Facility: CLINIC | Age: 69
End: 2024-07-24
Payer: COMMERCIAL

## 2024-07-24 VITALS
OXYGEN SATURATION: 98 % | RESPIRATION RATE: 16 BRPM | HEIGHT: 64 IN | DIASTOLIC BLOOD PRESSURE: 80 MMHG | BODY MASS INDEX: 30.56 KG/M2 | SYSTOLIC BLOOD PRESSURE: 112 MMHG | WEIGHT: 179 LBS | TEMPERATURE: 97.2 F | HEART RATE: 90 BPM

## 2024-07-24 DIAGNOSIS — Z79.811 USE OF ANASTROZOLE: ICD-10-CM

## 2024-07-24 DIAGNOSIS — Z17.0 MALIGNANT NEOPLASM OF UPPER-OUTER QUADRANT OF LEFT BREAST IN FEMALE, ESTROGEN RECEPTOR POSITIVE (HCC): Primary | ICD-10-CM

## 2024-07-24 DIAGNOSIS — C50.412 MALIGNANT NEOPLASM OF UPPER-OUTER QUADRANT OF LEFT BREAST IN FEMALE, ESTROGEN RECEPTOR POSITIVE (HCC): Primary | ICD-10-CM

## 2024-07-24 PROCEDURE — 99214 OFFICE O/P EST MOD 30 MIN: CPT

## 2024-07-24 RX ORDER — ANASTROZOLE 1 MG/1
1 TABLET ORAL DAILY
Qty: 90 TABLET | Refills: 3 | Status: SHIPPED | OUTPATIENT
Start: 2024-07-24

## 2024-07-24 NOTE — PROGRESS NOTES
Surgical Oncology Follow Up       [address] [facility] SURGICAL ONCOLOGY [place]  [address] [facility] [address]  [place] [address]    Ayala Nicholson  1955  [id number]  [address] [place] [address] SURGICAL ONCOLOGY [place]  [address] [facility] [address]  [facility] [address]    Chief Complaint   Patient presents with   • Follow-up       Assessment/Plan:  1. Malignant neoplasm of upper-outer quadrant of left breast in female, estrogen receptor positive (HCC)  - 6 mo f/u  - anastrozole (ARIMIDEX) 1 mg tablet; Take 1 tablet (1 mg total) by mouth daily  Dispense: 90 tablet; Refill: 3    2. Use of anastrozole  - continue use per medical oncology      Discussion/Summary: Patient is a 69-year-old female presenting today for six-month follow-up for left breast cancer diagnosed in July 2019. Pathology revealed invasive lobular carcinoma ER/%, HER2 -.  She had a left breast lumpectomy with sentinel node biopsy with Dr. Gómez and completed whole breast radiation therapy. She discontinued AI therapy earlier this year secondary to side effects. She is now back on anastrozole. She requests a refill as she states he was denied when she placed a request for one. I will give this to her as she doesn't see med/onc until the new year. She is scheduled for mammo and ABUS at the end of next month. There were no concerns on her cbe. I will see the patient back in 6 months or sooner should the need arise. She was instructed to call with any questions or concerns prior to this time. All questions were answered today.     History of Present Illness:     Oncology History   Malignant neoplasm of upper-outer quadrant of left breast in female, estrogen receptor positive (HCC)   7/23/2019 Biopsy    Left breast biopsy  1 o'clock, 5 cm from nipple  Invasive lobular carcinoma  Grade 1      HER2 0    Done at [facility]     10/1/2019 Surgery    Left breast needle localized lumpectomy with sentinel  lymph node biopsy  Invasive lobular carcinoma  Grade 1  1.8 cm  Margins negative  0/2 Lymph nodes  Anatomic/Prognostic Stage IA     10/8/2019 -  Cancer Staged    Staging form: Breast, AJCC 8th Edition  - Pathologic stage from 10/8/2019: Stage IA (pT1c, pN0(sn), cM0, G1, ER+, DE+, HER2-) - Signed by VIVIEN Naranjo on 2/5/2024  Stage prefix: Initial diagnosis  Neoadjuvant therapy: No  Method of lymph node assessment: [place] lymph node biopsy  Multigene prognostic tests performed: None  Histologic grading system: 3 grade system  Laterality: Left  Tumor size (mm): 18       10/30/2019 -  Hormone Therapy    Anastrozole 1 mg daily  Dr. Denise     11/13/2019 - 12/4/2019 Radiation    Course: C1  Plan ID Energy Fractions Dose per Fraction (cGy) Total Dose Delivered (cGy) Elapsed Days   BH L Breast 6X 15 / 15 267 4,005 21   Treatment Dates:  11/13/2019 - 12/4/2019.           -Interval History:  Patient is a 69-year-old female presenting today for six-month follow-up for left breast cancer diagnosed in July 2019. She is now back on anastrozole. She is scheduled for mammo and ABUS at the end of next month.Patient denies changes on her breast exam. She denies persistent headaches, bone pain, back pain, shortness of breath, or abdominal pain.      Review of Systems:  Review of Systems   Constitutional:  Negative for activity change, appetite change, fatigue and unexpected weight change.   Respiratory:  Negative for cough and shortness of breath.    Cardiovascular:  Negative for chest pain.   Gastrointestinal:  Negative for abdominal pain, diarrhea, nausea and vomiting.   Endocrine: Positive for heat intolerance.   Musculoskeletal:  Negative for arthralgias, back pain and myalgias.   Skin:  Negative for rash.   Neurological:  Negative for weakness and headaches.   Hematological:  Negative for adenopathy.       Patient Active Problem List   Diagnosis   • Fatigue   • Age-related osteoporosis without current  pathological fracture   • Malignant neoplasm of upper-outer quadrant of left breast in female, estrogen receptor positive (HCC)   • Overweight (BMI 25.0-29.9)   • Numbness of both lower extremities   • Prediabetes   • Hearing aid worn   • Spinal stenosis of lumbar region with neurogenic claudication   • Lumbar spondylolysis   • Lumbar foraminal stenosis   • Lumbar disc disease with radiculopathy   • Hot flashes   • Urge urinary incontinence   • Migraines   • S/P breast reconstruction, bilateral   • De Quervain's tenosynovitis, right   • Chronic left shoulder pain   • Use of anastrozole     Past Medical History:   Diagnosis Date   • Breast cancer (HCC) 09/2019    left breast   • Cancer (HCC) 06/2019   • Cancer (HCC) June, 2019   • History of radiation therapy 10/2019    left breast cancer   • Migraine    • Osteoporosis    • Pancreatitis    • Pleurisy    • PONV (postoperative nausea and vomiting)    • Seizures (HCC)    • Varicella    • Vertigo      Past Surgical History:   Procedure Laterality Date   • BREAST BIOPSY  07/23/2019   • BREAST LUMPECTOMY Left 10/01/2019    Procedure: BREAST NEEDLE LOCALIZED LUMPECTOMY (NEEDLE LOC AT 0730);  Surgeon: Hadley Gómez MD;  [facility];  Service: Surgical Oncology   • CHOLECYSTECTOMY     • COLONOSCOPY     • DENTAL SURGERY      Bone graft with implant   • EPIDURAL BLOCK INJECTION  02/2022   • HALLUX VALGUS CORRECTION Bilateral    • LYMPH NODE BIOPSY Left 10/01/2019    Procedure: SENTINEL LYMPH NODE BIOPSY; LYMPHATIC MAPPING WITH BLUE DYE AND RADIOACTIVE DYE (INJECT AT 0830 BY DR GÓMEZ IN THE OR);  Surgeon: Hadley Gómez MD;  [facility];  Service: Surgical Oncology   • MAMMO NEEDLE LOCALIZATION LEFT (ALL INC) Left 10/01/2019   • [place] BREAST REDUCTION Right 02/23/2023    Procedure: RIGHT BREAST REDUCTION;  Surgeon: Cale Gonzalez MD;  [facility];  Service: Plastics   • [place] MASTOPEXY Left 02/23/2023    Procedure: LEFT MASTOPEXY;  Surgeon: Cale  MD Carlos;  Location: [facility] OR;  Service: Plastics   • RHINOPLASTY       Family History   Problem Relation Age of Onset   • No Known Problems Mother    • Lung cancer Father 69   • Cancer Father         Lung   • No Known Problems Maternal Grandmother    • No Known Problems Maternal Grandfather    • No Known Problems Paternal Grandmother    • No Known Problems Paternal Grandfather    • Breast cancer Neg Hx      Social History     Socioeconomic History   • Marital status:      Spouse name: Not on file   • Number of children: 2   • Years of education: Not on file   • Highest education level: Not on file   Occupational History   • Occupation: [employer] works part-time   • Occupation: medical professional   Tobacco Use   • Smoking status: Never   • Smokeless tobacco: Never   Vaping Use   • Vaping status: Never Used   Substance and Sexual Activity   • Alcohol use: No   • Drug use: Never   • Sexual activity: Not Currently     Partners: Male     Birth control/protection: Post-menopausal, None   Other Topics Concern   • Not on file   Social History Narrative   • Not on file     Social Determinants of Health     Financial Resource Strain: Low Risk  (9/4/2023)    Overall Financial Resource Strain (CARDIA)    • Difficulty of Paying Living Expenses: Not hard at all   Food Insecurity: Not on file   Transportation Needs: No Transportation Needs (9/4/2023)    PRAPARE - Transportation    • Lack of Transportation (Medical): No    • Lack of Transportation (Non-Medical): No   Physical Activity: Not on file   Stress: Not on file   Social Connections: Unknown (6/18/2024)    Received from [employer]    Social Connections    • How often do you feel lonely or isolated from those around you? (Adult - for ages 18 years and over): Not on file   Intimate Partner Violence: Not on file   Housing Stability: Not on file       Current Outpatient Medications:   •  anastrozole (ARIMIDEX) 1 mg tablet, Take 1 tablet (1 mg total) by mouth daily,  Disp: 90 tablet, Rfl: 3  •  Cholecalciferol (VITAMIN D) 2000 units CAPS, Take 4,000 Units by mouth daily , Disp: , Rfl:   •  meclizine (ANTIVERT) 12.5 MG tablet, Take 12.5 mg by mouth 3 (three) times a day as needed for dizziness, Disp: , Rfl:   •  meloxicam (MOBIC) 7.5 mg tablet, TAKE 1 TABLET (7.5 MG TOTAL) BY MOUTH DAILY AS NEEDED FOR MODERATE PAIN, Disp: 30 tablet, Rfl: 0  •  ondansetron (ZOFRAN) 4 mg tablet, Take 1 tablet (4 mg total) by mouth every 8 (eight) hours as needed for nausea or vomiting, Disp: 10 tablet, Rfl: 0  •  pimecrolimus (ELIDEL) 1 % cream, , Disp: , Rfl:   •  SUMAtriptan (IMITREX) 100 mg tablet, Take 100 mg by mouth once as needed for migraine, Disp: , Rfl:   •  tiZANidine (ZANAFLEX) 4 mg tablet, TAKE 1 TABLET (4 MG TOTAL) BY MOUTH DAILY AT BEDTIME AS NEEDED FOR MUSCLE SPASMS, Disp: 30 tablet, Rfl: 0  •  venlafaxine (EFFEXOR) 50 mg tablet, TAKE 1 TABLET (50 MG TOTAL) BY MOUTH DAILY, Disp: 30 tablet, Rfl: 5  Allergies   Allergen Reactions   • Ciprofloxacin Seizures   • Cyclopentolate Other (See Comments) and Tachycardia     Resp distress   • Sulfa Antibiotics Hives and Rash   • Scopolamine Other (See Comments)     Precautionary for cycopentolate allergy     Vitals:    07/24/24 1430   BP: 112/80   Pulse: 90   Resp: 16   Temp: (!) 97.2 °F (36.2 °C)   SpO2: 98%       Physical Exam  Constitutional:       General: She is not in acute distress.     Appearance: Normal appearance.   Cardiovascular:      Rate and Rhythm: Normal rate and regular rhythm.      Pulses: Normal pulses.      Heart sounds: Normal heart sounds.   Pulmonary:      Effort: Pulmonary effort is normal.      Breath sounds: Normal breath sounds.   Chest:      Chest wall: No mass.   Breasts:     Right: No swelling, bleeding, inverted nipple, mass, nipple discharge, skin change or tenderness.      Left: No swelling, bleeding, inverted nipple, mass, nipple discharge, skin change or tenderness.   Abdominal:      General: Abdomen is flat.       Palpations: Abdomen is soft.   Lymphadenopathy:      Upper Body:      Right upper body: No supraclavicular, axillary or pectoral adenopathy.      Left upper body: No supraclavicular, axillary or pectoral adenopathy.   Skin:     General: Skin is warm.   Neurological:      General: No focal deficit present.      Mental Status: She is alert and oriented to person, place, and time.   Psychiatric:         Mood and Affect: Mood normal.         Behavior: Behavior normal.           Results:    Imaging  No results found.    I reviewed the above imaging data.      Advance Care Planning/Advance Directives:  Discussed disease status, cancer treatment plans and/or cancer treatment goals with the patient.

## 2024-08-29 ENCOUNTER — HOSPITAL ENCOUNTER (OUTPATIENT)
Dept: ULTRASOUND IMAGING | Facility: CLINIC | Age: 69
End: 2024-08-29
Payer: COMMERCIAL

## 2024-08-29 ENCOUNTER — HOSPITAL ENCOUNTER (OUTPATIENT)
Dept: MAMMOGRAPHY | Facility: CLINIC | Age: 69
End: 2024-08-29
Payer: COMMERCIAL

## 2024-08-29 VITALS — HEIGHT: 64 IN | WEIGHT: 179 LBS | BODY MASS INDEX: 30.56 KG/M2

## 2024-08-29 DIAGNOSIS — R92.30 DENSE BREAST: ICD-10-CM

## 2024-08-29 DIAGNOSIS — R92.8 MAMMOGRAM ABNORMAL: ICD-10-CM

## 2024-08-29 DIAGNOSIS — Z12.39 ENCOUNTER FOR BREAST CANCER SCREENING OTHER THAN MAMMOGRAM: ICD-10-CM

## 2024-08-29 PROCEDURE — 77066 DX MAMMO INCL CAD BI: CPT

## 2024-08-29 PROCEDURE — G0279 TOMOSYNTHESIS, MAMMO: HCPCS

## 2024-08-29 PROCEDURE — 76641 ULTRASOUND BREAST COMPLETE: CPT

## 2024-09-16 ENCOUNTER — RA CDI HCC (OUTPATIENT)
Dept: OTHER | Facility: HOSPITAL | Age: 69
End: 2024-09-16

## 2024-09-16 ENCOUNTER — APPOINTMENT (OUTPATIENT)
Dept: LAB | Facility: CLINIC | Age: 69
End: 2024-09-16
Payer: COMMERCIAL

## 2024-09-16 DIAGNOSIS — R73.03 PREDIABETES: ICD-10-CM

## 2024-09-16 DIAGNOSIS — Z13.220 LIPID SCREENING: ICD-10-CM

## 2024-09-16 LAB
ALBUMIN SERPL BCG-MCNC: 4.2 G/DL (ref 3.5–5)
ALP SERPL-CCNC: 100 U/L (ref 34–104)
ALT SERPL W P-5'-P-CCNC: 21 U/L (ref 7–52)
ANION GAP SERPL CALCULATED.3IONS-SCNC: 10 MMOL/L (ref 4–13)
AST SERPL W P-5'-P-CCNC: 18 U/L (ref 13–39)
BILIRUB SERPL-MCNC: 0.63 MG/DL (ref 0.2–1)
BUN SERPL-MCNC: 12 MG/DL (ref 5–25)
CALCIUM SERPL-MCNC: 10.1 MG/DL (ref 8.4–10.2)
CHLORIDE SERPL-SCNC: 102 MMOL/L (ref 96–108)
CHOLEST SERPL-MCNC: 177 MG/DL
CO2 SERPL-SCNC: 29 MMOL/L (ref 21–32)
CREAT SERPL-MCNC: 0.72 MG/DL (ref 0.6–1.3)
GFR SERPL CREATININE-BSD FRML MDRD: 86 ML/MIN/1.73SQ M
GLUCOSE P FAST SERPL-MCNC: 100 MG/DL (ref 65–99)
HDLC SERPL-MCNC: 60 MG/DL
LDLC SERPL CALC-MCNC: 89 MG/DL (ref 0–100)
POTASSIUM SERPL-SCNC: 4.9 MMOL/L (ref 3.5–5.3)
PROT SERPL-MCNC: 7.5 G/DL (ref 6.4–8.4)
SODIUM SERPL-SCNC: 141 MMOL/L (ref 135–147)
TRIGL SERPL-MCNC: 138 MG/DL

## 2024-09-16 PROCEDURE — 80053 COMPREHEN METABOLIC PANEL: CPT

## 2024-09-16 PROCEDURE — 83036 HEMOGLOBIN GLYCOSYLATED A1C: CPT

## 2024-09-16 PROCEDURE — 36415 COLL VENOUS BLD VENIPUNCTURE: CPT

## 2024-09-16 PROCEDURE — 80061 LIPID PANEL: CPT

## 2024-09-17 LAB
EST. AVERAGE GLUCOSE BLD GHB EST-MCNC: 137 MG/DL
HBA1C MFR BLD: 6.4 %

## 2024-09-20 ENCOUNTER — OFFICE VISIT (OUTPATIENT)
Dept: FAMILY MEDICINE CLINIC | Facility: CLINIC | Age: 69
End: 2024-09-20
Payer: COMMERCIAL

## 2024-09-20 VITALS
WEIGHT: 180 LBS | BODY MASS INDEX: 30.73 KG/M2 | OXYGEN SATURATION: 96 % | DIASTOLIC BLOOD PRESSURE: 64 MMHG | HEIGHT: 64 IN | HEART RATE: 87 BPM | SYSTOLIC BLOOD PRESSURE: 116 MMHG | TEMPERATURE: 97.6 F

## 2024-09-20 DIAGNOSIS — Z00.00 MEDICARE ANNUAL WELLNESS VISIT, SUBSEQUENT: ICD-10-CM

## 2024-09-20 DIAGNOSIS — G25.81 RESTLESS LEG: ICD-10-CM

## 2024-09-20 DIAGNOSIS — R73.03 PREDIABETES: ICD-10-CM

## 2024-09-20 DIAGNOSIS — Z13.220 LIPID SCREENING: ICD-10-CM

## 2024-09-20 DIAGNOSIS — R19.7 DIARRHEA, UNSPECIFIED TYPE: Primary | ICD-10-CM

## 2024-09-20 DIAGNOSIS — E55.9 VITAMIN D DEFICIENCY: ICD-10-CM

## 2024-09-20 DIAGNOSIS — Z78.0 POSTMENOPAUSAL ESTROGEN DEFICIENCY: ICD-10-CM

## 2024-09-20 PROCEDURE — 99214 OFFICE O/P EST MOD 30 MIN: CPT | Performed by: FAMILY MEDICINE

## 2024-09-20 PROCEDURE — G0439 PPPS, SUBSEQ VISIT: HCPCS | Performed by: FAMILY MEDICINE

## 2024-09-20 NOTE — ASSESSMENT & PLAN NOTE
Discussed treatment options - declined oral med like Requip  She will continue the topical treatment she bought OTC

## 2024-09-20 NOTE — ASSESSMENT & PLAN NOTE
Will check stool studies and refer to GI   Orders:    Ambulatory Referral to Gastroenterology; Future    Stool Enteric Bacterial Panel by PCR; Future    Occult Blood, Fecal Immunochemical; Future    Clostridium difficile toxin by PCR with EIA; Future    Ova and parasite examination; Future    CBC and differential; Future

## 2024-09-20 NOTE — PATIENT INSTRUCTIONS
Medicare Preventive Visit Patient Instructions  Thank you for completing your Welcome to Medicare Visit or Medicare Annual Wellness Visit today. Your next wellness visit will be due in one year (9/21/2025).  The screening/preventive services that you may require over the next 5-10 years are detailed below. Some tests may not apply to you based off risk factors and/or age. Screening tests ordered at today's visit but not completed yet may show as past due. Also, please note that scanned in results may not display below.  Preventive Screenings:  Service Recommendations Previous Testing/Comments   Colorectal Cancer Screening  * Colonoscopy    * Fecal Occult Blood Test (FOBT)/Fecal Immunochemical Test (FIT)  * Fecal DNA/Cologuard Test  * Flexible Sigmoidoscopy Age: 45-75 years old   Colonoscopy: every 10 years (may be performed more frequently if at higher risk)  OR  FOBT/FIT: every 1 year  OR  Cologuard: every 3 years  OR  Sigmoidoscopy: every 5 years  Screening may be recommended earlier than age 45 if at higher risk for colorectal cancer. Also, an individualized decision between you and your healthcare provider will decide whether screening between the ages of 76-85 would be appropriate. Colonoscopy: 03/24/2009  FOBT/FIT: Not on file  Cologuard: 10/05/2022  Sigmoidoscopy: Not on file    Screening Current     Breast Cancer Screening Age: 40+ years old  Frequency: every 1-2 years  Not required if history of left and right mastectomy Mammogram: 08/29/2024    History Breast Cancer   Cervical Cancer Screening Between the ages of 21-29, pap smear recommended once every 3 years.   Between the ages of 30-65, can perform pap smear with HPV co-testing every 5 years.   Recommendations may differ for women with a history of total hysterectomy, cervical cancer, or abnormal pap smears in past. Pap Smear: 10/10/2023    Screening Not Indicated   Hepatitis C Screening Once for adults born between 1945 and 1965  More frequently in  patients at high risk for Hepatitis C Hep C Antibody: 03/30/2019    Screening Current   Diabetes Screening 1-2 times per year if you're at risk for diabetes or have pre-diabetes Fasting glucose: 100 mg/dL (9/16/2024)  A1C: 6.4 % (9/16/2024)  Screening Current   Cholesterol Screening Once every 5 years if you don't have a lipid disorder. May order more often based on risk factors. Lipid panel: 09/16/2024    Screening Current     Other Preventive Screenings Covered by Medicare:  Abdominal Aortic Aneurysm (AAA) Screening: covered once if your at risk. You're considered to be at risk if you have a family history of AAA.  Lung Cancer Screening: covers low dose CT scan once per year if you meet all of the following conditions: (1) Age 55-77; (2) No signs or symptoms of lung cancer; (3) Current smoker or have quit smoking within the last 15 years; (4) You have a tobacco smoking history of at least 20 pack years (packs per day multiplied by number of years you smoked); (5) You get a written order from a healthcare provider.  Glaucoma Screening: covered annually if you're considered high risk: (1) You have diabetes OR (2) Family history of glaucoma OR (3)  aged 50 and older OR (4)  American aged 65 and older  Osteoporosis Screening: covered every 2 years if you meet one of the following conditions: (1) You're estrogen deficient and at risk for osteoporosis based off medical history and other findings; (2) Have a vertebral abnormality; (3) On glucocorticoid therapy for more than 3 months; (4) Have primary hyperparathyroidism; (5) On osteoporosis medications and need to assess response to drug therapy.   Last bone density test (DXA Scan): 06/30/2022.  HIV Screening: covered annually if you're between the age of 15-65. Also covered annually if you are younger than 15 and older than 65 with risk factors for HIV infection. For pregnant patients, it is covered up to 3 times per  pregnancy.    Immunizations:  Immunization Recommendations   Influenza Vaccine Annual influenza vaccination during flu season is recommended for all persons aged >= 6 months who do not have contraindications   Pneumococcal Vaccine   * Pneumococcal conjugate vaccine = PCV13 (Prevnar 13), PCV15 (Vaxneuvance), PCV20 (Prevnar 20)  * Pneumococcal polysaccharide vaccine = PPSV23 (Pneumovax) Adults 19-63 yo with certain risk factors or if 65+ yo  If never received any pneumonia vaccine: recommend Prevnar 20 (PCV20)  Give PCV20 if previously received 1 dose of PCV13 or PPSV23   Hepatitis B Vaccine 3 dose series if at intermediate or high risk (ex: diabetes, end stage renal disease, liver disease)   Respiratory syncytial virus (RSV) Vaccine - COVERED BY MEDICARE PART D  * RSVPreF3 (Arexvy) CDC recommends that adults 60 years of age and older may receive a single dose of RSV vaccine using shared clinical decision-making (SCDM)   Tetanus (Td) Vaccine - COST NOT COVERED BY MEDICARE PART B Following completion of primary series, a booster dose should be given every 10 years to maintain immunity against tetanus. Td may also be given as tetanus wound prophylaxis.   Tdap Vaccine - COST NOT COVERED BY MEDICARE PART B Recommended at least once for all adults. For pregnant patients, recommended with each pregnancy.   Shingles Vaccine (Shingrix) - COST NOT COVERED BY MEDICARE PART B  2 shot series recommended in those 19 years and older who have or will have weakened immune systems or those 50 years and older     Health Maintenance Due:      Topic Date Due   • Breast Cancer Screening: Mammogram  08/29/2025   • Colorectal Cancer Screening  10/05/2025   • Hepatitis C Screening  Completed   • Cervical Cancer Screening  Discontinued     Immunizations Due:      Topic Date Due   • Influenza Vaccine (1) 09/01/2024     Advance Directives   What are advance directives?  Advance directives are legal documents that state your wishes and plans for  medical care. These plans are made ahead of time in case you lose your ability to make decisions for yourself. Advance directives can apply to any medical decision, such as the treatments you want, and if you want to donate organs.   What are the types of advance directives?  There are many types of advance directives, and each state has rules about how to use them. You may choose a combination of any of the following:  Living will:  This is a written record of the treatment you want. You can also choose which treatments you do not want, which to limit, and which to stop at a certain time. This includes surgery, medicine, IV fluid, and tube feedings.   Durable power of  for healthcare (DPAHC):  This is a written record that states who you want to make healthcare choices for you when you are unable to make them for yourself. This person, called a proxy, is usually a family member or a friend. You may choose more than 1 proxy.  Do not resuscitate (DNR) order:  A DNR order is used in case your heart stops beating or you stop breathing. It is a request not to have certain forms of treatment, such as CPR. A DNR order may be included in other types of advance directives.  Medical directive:  This covers the care that you want if you are in a coma, near death, or unable to make decisions for yourself. You can list the treatments you want for each condition. Treatment may include pain medicine, surgery, blood transfusions, dialysis, IV or tube feedings, and a ventilator (breathing machine).  Values history:  This document has questions about your views, beliefs, and how you feel and think about life. This information can help others choose the care that you would choose.  Why are advance directives important?  An advance directive helps you control your care. Although spoken wishes may be used, it is better to have your wishes written down. Spoken wishes can be misunderstood, or not followed. Treatments may be given  even if you do not want them. An advance directive may make it easier for your family to make difficult choices about your care.   Urinary Incontinence   Urinary incontinence (UI)  is when you lose control of your bladder. UI develops because your bladder cannot store or empty urine properly. The 3 most common types of UI are stress incontinence, urge incontinence, or both.  Medicines:   May be given to help strengthen your bladder control. Report any side effects of medication to your healthcare provider.  Do pelvic muscle exercises often:  Your pelvic muscles help you stop urinating. Squeeze these muscles tight for 5 seconds, then relax for 5 seconds. Gradually work up to squeezing for 10 seconds. Do 3 sets of 15 repetitions a day, or as directed. This will help strengthen your pelvic muscles and improve bladder control.  Train your bladder:  Go to the bathroom at set times, such as every 2 hours, even if you do not feel the urge to go. You can also try to hold your urine when you feel the urge to go. For example, hold your urine for 5 minutes when you feel the urge to go. As that becomes easier, hold your urine for 10 minutes.   Self-care:   Keep a UI record.  Write down how often you leak urine and how much you leak. Make a note of what you were doing when you leaked urine.  Drink liquids as directed. You may need to limit the amount of liquid you drink to help control your urine leakage. Do not drink any liquid right before you go to bed. Limit or do not have drinks that contain caffeine or alcohol.   Prevent constipation.  Eat a variety of high-fiber foods. Good examples are high-fiber cereals, beans, vegetables, and whole-grain breads. Walking is the best way to trigger your intestines to have a bowel movement.  Exercise regularly and maintain a healthy weight.  Weight loss and exercise will decrease pressure on your bladder and help you control your leakage.   Use a catheter as directed  to help empty your  bladder. A catheter is a tiny, plastic tube that is put into your bladder to drain your urine.   Go to behavior therapy as directed.  Behavior therapy may be used to help you learn to control your urge to urinate.    Weight Management   Why it is important to manage your weight:  Being overweight increases your risk of health conditions such as heart disease, high blood pressure, type 2 diabetes, and certain types of cancer. It can also increase your risk for osteoarthritis, sleep apnea, and other respiratory problems. Aim for a slow, steady weight loss. Even a small amount of weight loss can lower your risk of health problems.  How to lose weight safely:  A safe and healthy way to lose weight is to eat fewer calories and get regular exercise. You can lose up about 1 pound a week by decreasing the number of calories you eat by 500 calories each day.   Healthy meal plan for weight management:  A healthy meal plan includes a variety of foods, contains fewer calories, and helps you stay healthy. A healthy meal plan includes the following:  Eat whole-grain foods more often.  A healthy meal plan should contain fiber. Fiber is the part of grains, fruits, and vegetables that is not broken down by your body. Whole-grain foods are healthy and provide extra fiber in your diet. Some examples of whole-grain foods are whole-wheat breads and pastas, oatmeal, brown rice, and bulgur.  Eat a variety of vegetables every day.  Include dark, leafy greens such as spinach, kale, lonnie greens, and mustard greens. Eat yellow and orange vegetables such as carrots, sweet potatoes, and winter squash.   Eat a variety of fruits every day.  Choose fresh or canned fruit (canned in its own juice or light syrup) instead of juice. Fruit juice has very little or no fiber.  Eat low-fat dairy foods.  Drink fat-free (skim) milk or 1% milk. Eat fat-free yogurt and low-fat cottage cheese. Try low-fat cheeses such as mozzarella and other reduced-fat  cheeses.  Choose meat and other protein foods that are low in fat.  Choose beans or other legumes such as split peas or lentils. Choose fish, skinless poultry (chicken or turkey), or lean cuts of red meat (beef or pork). Before you cook meat or poultry, cut off any visible fat.   Use less fat and oil.  Try baking foods instead of frying them. Add less fat, such as margarine, sour cream, regular salad dressing and mayonnaise to foods. Eat fewer high-fat foods. Some examples of high-fat foods include french fries, doughnuts, ice cream, and cakes.  Eat fewer sweets.  Limit foods and drinks that are high in sugar. This includes candy, cookies, regular soda, and sweetened drinks.  Exercise:  Exercise at least 30 minutes per day on most days of the week. Some examples of exercise include walking, biking, dancing, and swimming. You can also fit in more physical activity by taking the stairs instead of the elevator or parking farther away from stores. Ask your healthcare provider about the best exercise plan for you.      © Copyright [employer] 2018 Information is for End User's use only and may not be sold, redistributed or otherwise used for commercial purposes. All illustrations and images included in CareNotes® are the copyrighted property of A.D.A.M., Inc[employer] or [employer]

## 2024-09-20 NOTE — ASSESSMENT & PLAN NOTE
Low carb diet, exercise  Orders:    Hemoglobin A1C; Future    Comprehensive metabolic panel; Future

## 2024-09-20 NOTE — PROGRESS NOTES
"Ambulatory Visit  Name: Ayala Nicholson      : 1955      MRN: [id number]  Encounter Provider: Greta Etienne MD  Encounter Date: 2024   Encounter department: [facility]    Assessment & Plan  Diarrhea, unspecified type  Will check stool studies and refer to GI   Orders:    Ambulatory Referral to Gastroenterology; Future    Stool Enteric Bacterial Panel by PCR; Future    Occult Blood, Fecal Immunochemical; Future    Clostridium difficile toxin by PCR with EIA; Future    Ova and parasite examination; Future    CBC and differential; Future    Medicare annual wellness visit, subsequent         Postmenopausal estrogen deficiency  Update DEXA  Orders:    DXA bone density spine hip and pelvis; Future    Restless leg  Discussed treatment options - declined oral med like Requip  She will continue the topical treatment she bought OTC       Prediabetes  Low carb diet, exercise  Orders:    Hemoglobin A1C; Future    Comprehensive metabolic panel; Future    Lipid screening    Orders:    Lipid Panel with Direct LDL reflex; Future    Vitamin D deficiency    Orders:    Vitamin D 25 hydroxy; Future       Preventive health issues were discussed with patient, and age appropriate screening tests were ordered as noted in patient's After Visit Summary. Personalized health advice and appropriate referrals for health education or preventive services given if needed, as noted in patient's After Visit Summary.    Return in 6 mo w/ labs    History of Present Illness     She is here for AWV and has concerns. She says since March having bowel issues on and off.  She has \"explosive\" diarrhea after a meal, typically about once per day. She tried to identify triggers.  She tried Lactacid pills. She has lactose intolerance. Denies blood in the stool.  She had a colonoscopy at age 52 at [facility]. Cologuard in  was negative. Denies any recent travel or abx use.     She has restless legs and is trying a " topical cream.  She tried gabapentin in the past but it made her too sleepy.     She had labs done - A1c 6.4%, CMP within normal limits, lipids stable       Patient Care Team:  Greta Etienne MD as PCP - General (Family Medicine)  Hadley Gómez MD as Surgeon (Surgical Oncology)  Tracy Russell RN as Nurse Navigator (Oncology)  Tyrone Denise MD (Hematology and Oncology)  Danelle Dorman MD (Radiation Oncology)  Derek Meng DO (Ophthalmology)  Tommie Robles MD as Referring Physician (Rheumatology)  VIVIEN Naranjo as Nurse Practitioner (Surgical Oncology)    Review of Systems   Constitutional:  Negative for activity change.   Respiratory:  Negative for chest tightness and shortness of breath.    Cardiovascular:  Negative for chest pain and leg swelling.   Gastrointestinal:  Positive for abdominal pain (cramps with the diarrhea) and diarrhea. Negative for anal bleeding and blood in stool.   Musculoskeletal:         Restless legs    Neurological:  Negative for headaches.   Psychiatric/Behavioral:  Negative for dysphoric mood. The patient is not nervous/anxious.      Medical History Reviewed by provider this encounter:  Tobacco  Allergies  Meds  Problems  Med Hx  Surg Hx  Fam Hx       Annual Wellness Visit Questionnaire   Ayala is here for her Subsequent Wellness visit. Last Medicare Wellness visit information reviewed, patient interviewed and updates made to the record today.      Health Risk Assessment:   Patient rates overall health as very good. Patient feels that their physical health rating is same. Patient is satisfied with their life. Eyesight was rated as same. Hearing was rated as same. Patient feels that their emotional and mental health rating is same. Patients states they are never, rarely angry. Patient states they are never, rarely unusually tired/fatigued. Pain experienced in the last 7 days has been none. Patient states that she has experienced no weight loss or gain in last 6  months.     Depression Screening:   PHQ-2 Score: 0      Fall Risk Screening:   In the past year, patient has experienced: no history of falling in past year      Urinary Incontinence Screening:   Patient has leaked urine accidently in the last six months.     Home Safety:  Patient does not have trouble with stairs inside or outside of their home. Patient has working smoke alarms and has working carbon monoxide detector. Home safety hazards include: none.     Nutrition:   Current diet is Regular.     Medications:   Patient is not currently taking any over-the-counter supplements. Patient is able to manage medications.     Activities of Daily Living (ADLs)/Instrumental Activities of Daily Living (IADLs):   Walk and transfer into and out of bed and chair?: Yes  Dress and groom yourself?: Yes    Bathe or shower yourself?: Yes    Feed yourself? Yes  Do your laundry/housekeeping?: Yes  Manage your money, pay your bills and track your expenses?: Yes  Make your own meals?: Yes    Do your own shopping?: Yes    Previous Hospitalizations:   Any hospitalizations or ED visits within the last 12 months?: No      Advance Care Planning:   Living will: Yes    Durable POA for healthcare: No    Advanced directive: Yes      Cognitive Screening:   Provider or family/friend/caregiver concerned regarding cognition?: No    PREVENTIVE SCREENINGS      Cardiovascular Screening:    General: Screening Current and Risks and Benefits Discussed      Diabetes Screening:     General: Screening Current and Risks and Benefits Discussed      Colorectal Cancer Screening:     General: Screening Current      Breast Cancer Screening:     General: History Breast Cancer, Risks and Benefits Discussed and Screening Current      Cervical Cancer Screening:    General: Screening Not Indicated      Osteoporosis Screening:    General: Screening Not Indicated, History Osteoporosis and Risks and Benefits Discussed    Due for: DXA Axial      Abdominal Aortic Aneurysm  (AAA) Screening:        General: Screening Not Indicated      Lung Cancer Screening:     General: Screening Not Indicated      Hepatitis C Screening:    General: Screening Current    Screening, Brief Intervention, and Referral to Treatment (SBIRT)    Screening  Typical number of drinks in a day: 0  Typical number of drinks in a week: 0  Interpretation: Low risk drinking behavior.    AUDIT-C Screenin) How often did you have a drink containing alcohol in the past year? never  2) How many drinks did you have on a typical day when you were drinking in the past year? 0  3) How often did you have 6 or more drinks on one occasion in the past year? never    AUDIT-C Score: 0  Interpretation: Score 0-2 (female): Negative screen for alcohol misuse    Single Item Drug Screening:  How often have you used an illegal drug (including marijuana) or a prescription medication for non-medical reasons in the past year? never    Single Item Drug Screen Score: 0  Interpretation: Negative screen for possible drug use disorder    Other Counseling Topics:   Regular weightbearing exercise and calcium and vitamin D intake.     Social Determinants of Health     Financial Resource Strain: Low Risk  (2023)    Overall Financial Resource Strain (CARDIA)     Difficulty of Paying Living Expenses: Not hard at all   Food Insecurity: No Food Insecurity (2024)    Hunger Vital Sign     Worried About Running Out of Food in the Last Year: Never true     Ran Out of Food in the Last Year: Never true   Transportation Needs: No Transportation Needs (2024)    PRAPARE - Transportation     Lack of Transportation (Medical): No     Lack of Transportation (Non-Medical): No   Housing Stability: Low Risk  (2024)    Housing Stability Vital Sign     Unable to Pay for Housing in the Last Year: No     Number of Times Moved in the Last Year: 0     Homeless in the Last Year: No   Utilities: Not At Risk (2024)    [facility] Utilities     Threatened with  "loss of utilities: No     No results found.    Objective     /64   Pulse 87   Temp 97.6 °F (36.4 °C)   Ht 5' 3.5\" (1.613 m)   Wt 81.6 kg (180 lb)   SpO2 96%   BMI 31.39 kg/m²     Physical Exam  Vitals and nursing note reviewed.   Constitutional:       General: She is not in acute distress.     Appearance: She is well-developed. She is obese. She is not diaphoretic.   HENT:      Head: Normocephalic and atraumatic.      Right Ear: External ear normal.      Left Ear: External ear normal.   Eyes:      Conjunctiva/sclera: Conjunctivae normal.   Cardiovascular:      Rate and Rhythm: Normal rate and regular rhythm.      Heart sounds: Normal heart sounds. No murmur heard.     No friction rub. No gallop.   Pulmonary:      Effort: Pulmonary effort is normal. No respiratory distress.      Breath sounds: Normal breath sounds. No stridor. No wheezing or rales.   Chest:      Chest wall: No tenderness.   Abdominal:      General: There is no distension.      Palpations: Abdomen is soft.      Tenderness: There is abdominal tenderness (mild generalized). There is no guarding.   Musculoskeletal:      Right lower leg: No edema.      Left lower leg: No edema.   Neurological:      General: No focal deficit present.      Mental Status: She is alert.   Psychiatric:         Mood and Affect: Mood normal.         Behavior: Behavior normal.         Thought Content: Thought content normal.         Judgment: Judgment normal.         "

## 2024-09-27 ENCOUNTER — APPOINTMENT (OUTPATIENT)
Dept: LAB | Facility: CLINIC | Age: 69
End: 2024-09-27
Payer: COMMERCIAL

## 2024-09-27 DIAGNOSIS — R19.7 DIARRHEA, UNSPECIFIED TYPE: ICD-10-CM

## 2024-09-27 LAB — HEMOCCULT STL QL IA: NEGATIVE

## 2024-09-27 PROCEDURE — 87209 SMEAR COMPLEX STAIN: CPT

## 2024-09-27 PROCEDURE — G0328 FECAL BLOOD SCRN IMMUNOASSAY: HCPCS

## 2024-09-27 PROCEDURE — 87505 NFCT AGENT DETECTION GI: CPT

## 2024-09-27 PROCEDURE — 87177 OVA AND PARASITES SMEARS: CPT

## 2024-09-28 LAB — C DIFF TOX GENS STL QL NAA+PROBE: NEGATIVE

## 2024-09-29 LAB
C COLI+JEJUNI TUF STL QL NAA+PROBE: NEGATIVE
EC STX1+STX2 GENES STL QL NAA+PROBE: NEGATIVE
SALMONELLA SP SPAO STL QL NAA+PROBE: NEGATIVE
SHIGELLA SP+EIEC IPAH STL QL NAA+PROBE: NEGATIVE

## 2024-10-02 ENCOUNTER — HOSPITAL ENCOUNTER (OUTPATIENT)
Age: 69
Discharge: HOME | End: 2024-10-02
Payer: COMMERCIAL

## 2024-10-02 VITALS — BODY MASS INDEX: 30.9 KG/M2 | HEIGHT: 64 IN

## 2024-10-02 DIAGNOSIS — Z78.0 POSTMENOPAUSAL ESTROGEN DEFICIENCY: ICD-10-CM

## 2024-10-02 PROCEDURE — 77080 DXA BONE DENSITY AXIAL: CPT

## 2024-10-14 ENCOUNTER — ANNUAL EXAM (OUTPATIENT)
Dept: OBGYN CLINIC | Facility: CLINIC | Age: 69
End: 2024-10-14
Payer: COMMERCIAL

## 2024-10-14 VITALS
SYSTOLIC BLOOD PRESSURE: 122 MMHG | WEIGHT: 178.8 LBS | DIASTOLIC BLOOD PRESSURE: 80 MMHG | HEIGHT: 64 IN | BODY MASS INDEX: 30.52 KG/M2

## 2024-10-14 DIAGNOSIS — Z01.419 ENCOUNTER FOR GYNECOLOGICAL EXAMINATION: ICD-10-CM

## 2024-10-14 DIAGNOSIS — Z85.3 HISTORY OF BREAST CANCER: ICD-10-CM

## 2024-10-14 DIAGNOSIS — Z91.89 GYN EXAM FOR HIGH-RISK MEDICARE PATIENT: Primary | ICD-10-CM

## 2024-10-14 PROCEDURE — G0101 CA SCREEN;PELVIC/BREAST EXAM: HCPCS | Performed by: OBSTETRICS & GYNECOLOGY

## 2024-10-14 NOTE — ASSESSMENT & PLAN NOTE
Pap/HPV not indicated  Mammogram/US ordered  h/o breast cancer 2019  Colonoscopy: cologuard 2022  DEXA 2024 osteopenia    Encourage healthy diet, exercise, Calcium 1200mg per day and at least 800 iu Vitamin D daily.

## 2024-10-14 NOTE — PROGRESS NOTES
Assessment/Plan:   Encounter for gynecological examination  Pap/HPV not indicated  Mammogram/US ordered  h/o breast cancer 2019  Colonoscopy: cologuard   DEXA  osteopenia    Encourage healthy diet, exercise, Calcium 1200mg per day and at least 800 iu Vitamin D daily.       Diagnoses and all orders for this visit:    GYN exam for high-risk Medicare patient    Encounter for gynecological examination    History of breast cancer          Subjective:     Patient ID: Ayala Nicholson is a 69 y.o. female.    Patient presents for a routine annual visit  Menarche- 19Y/O  Last Pap Smear- 21 neg/neg.     Mammogram-24. Next year mammo and US scheduled   Cologuard-10/5/22 normal  DEXA-10/2/24 osteopenia    Non smoker  Social drinker  Currently not sexually active  No family history of uterine, ovarian, cervical or breast cancer    No concerns/questions for today's visit     Gynecologic Exam  She reports no genital itching, genital lesions, genital odor, genital rash, pelvic pain, vaginal bleeding or vaginal discharge. Pertinent negatives include no chills, constipation, diarrhea, fever, nausea, sore throat or vomiting.     Review of Systems   Constitutional:  Negative for activity change, appetite change, chills, fatigue and fever.   HENT:  Negative for rhinorrhea, sneezing and sore throat.    Eyes:  Negative for visual disturbance.   Respiratory:  Negative for cough, shortness of breath and wheezing.    Cardiovascular:  Negative for chest pain, palpitations and leg swelling.   Gastrointestinal:  Negative for abdominal distention, constipation, diarrhea, nausea and vomiting.   Genitourinary:  Negative for difficulty urinating, pelvic pain and vaginal discharge.   Neurological:  Negative for syncope and light-headedness.         Objective:     Physical Exam  Constitutional:       General: She is not in acute distress.     Appearance: She is well-developed. She is not diaphoretic.   Chest:   Breasts:      Breasts are symmetrical.      Right: No inverted nipple, mass, nipple discharge, skin change or tenderness.      Left: No inverted nipple, mass, nipple discharge, skin change or tenderness.      Comments: Left lumpectomy scars, well healed.   Abdominal:      General: Bowel sounds are normal. There is no distension.      Palpations: Abdomen is soft. There is no mass.      Tenderness: There is no abdominal tenderness. There is no guarding or rebound.   Genitourinary:     Labia:         Right: No rash, tenderness, lesion or injury.         Left: No rash, tenderness, lesion or injury.       Vagina: No vaginal discharge, erythema, tenderness or bleeding.      Cervix: No cervical motion tenderness, discharge or friability.      Uterus: Not deviated, not enlarged, not fixed and not tender.       Adnexa:         Right: No mass, tenderness or fullness.          Left: No mass, tenderness or fullness.        Comments: Urethral meatus- no lesions, non tender  Urethra non tender  Bladder non tender  Thin, atrophic vaginal mucosa  Skin:     General: Skin is warm and dry.      Coloration: Skin is not pale.      Findings: No erythema or rash.

## 2024-11-26 DIAGNOSIS — Z17.0 MALIGNANT NEOPLASM OF UPPER-OUTER QUADRANT OF LEFT BREAST IN FEMALE, ESTROGEN RECEPTOR POSITIVE (HCC): ICD-10-CM

## 2024-11-26 DIAGNOSIS — R23.2 HOT FLASHES: ICD-10-CM

## 2024-11-26 DIAGNOSIS — C50.412 MALIGNANT NEOPLASM OF UPPER-OUTER QUADRANT OF LEFT BREAST IN FEMALE, ESTROGEN RECEPTOR POSITIVE (HCC): ICD-10-CM

## 2024-11-27 ENCOUNTER — CONSULT (OUTPATIENT)
Dept: GASTROENTEROLOGY | Facility: AMBULARY SURGERY CENTER | Age: 69
End: 2024-11-27
Payer: COMMERCIAL

## 2024-11-27 VITALS
SYSTOLIC BLOOD PRESSURE: 124 MMHG | BODY MASS INDEX: 30.32 KG/M2 | OXYGEN SATURATION: 99 % | DIASTOLIC BLOOD PRESSURE: 84 MMHG | HEIGHT: 64 IN | HEART RATE: 82 BPM | WEIGHT: 177.6 LBS

## 2024-11-27 DIAGNOSIS — R11.2 NAUSEA AND VOMITING, UNSPECIFIED VOMITING TYPE: ICD-10-CM

## 2024-11-27 DIAGNOSIS — R10.84 GENERALIZED ABDOMINAL PAIN: ICD-10-CM

## 2024-11-27 DIAGNOSIS — R19.7 DIARRHEA, UNSPECIFIED TYPE: Primary | ICD-10-CM

## 2024-11-27 PROCEDURE — 99204 OFFICE O/P NEW MOD 45 MIN: CPT | Performed by: PHYSICIAN ASSISTANT

## 2024-11-27 RX ORDER — SODIUM CHLORIDE, SODIUM LACTATE, POTASSIUM CHLORIDE, CALCIUM CHLORIDE 600; 310; 30; 20 MG/100ML; MG/100ML; MG/100ML; MG/100ML
125 INJECTION, SOLUTION INTRAVENOUS CONTINUOUS
OUTPATIENT
Start: 2024-11-27

## 2024-11-27 RX ORDER — VENLAFAXINE 50 MG/1
50 TABLET ORAL DAILY
Qty: 30 TABLET | Refills: 5 | Status: SHIPPED | OUTPATIENT
Start: 2024-11-27

## 2024-11-27 RX ORDER — DICYCLOMINE HCL 20 MG
20 TABLET ORAL
Qty: 90 TABLET | Refills: 1 | Status: SHIPPED | OUTPATIENT
Start: 2024-11-27

## 2024-11-27 NOTE — ASSESSMENT & PLAN NOTE
New onset of diarrhea since around 3/2024.  Followed up with PCP with stool infectious studies negative.  Symptoms do not occur every day.  They seem to be worse with eating out when it oc stable weight. She will get abdominal cramping urgency and have a large loose bowel movement.  No blood.  She has known lactose intolerance and avoids dairy products.  Possible component of irritable bowel syndrome however rule out underlying microscopic colitis, less likely IBD, celiac, possible bile acid diarrhea component, appears less likely pancreatic etiology.    -Due to new symptoms now becoming chronic, recommend colonoscopy with biopsies for microscopic colitis.  - MiraLAX/Dulcolax bowel prep.    -Continue avoiding dairy or use Lactaid as needed.  - Recommend trial of Bentyl before meals.  We discussed side effects of dizziness, lightheadedness and fatigue and recommend stopping if this occurs.  - Advised patient to reach out to me if no improvement with Bentyl, could consider trial of Questran/colestipol for bile acid diarrhea component.    -If no improvement with above, could consider checking pancreatic elastase.  This seems unlikely as it does not occur with every meal.  However she does have 1 episode of pancreatitis in the past.  She also had a previous CT scan showing possible pancreatic atrophy however repeat CT scan was unremarkable. We will further investigate this based on above workup.

## 2024-11-27 NOTE — PATIENT INSTRUCTIONS
Scheduled date of EGD/colonoscopy (as of today): Feb 7, 2025  Physician performing EGD/colonoscopy: Dr. Lennon   Location of EGD[facility]colonoscopy[facility]  Desired bowel prep reviewed with patient: geronimo/dulc  Instructions reviewed with patient by: OLESYA   Clearances: n/a

## 2024-11-27 NOTE — PROGRESS NOTES
Name: Ayala Nicholson      : 1955      MRN: [id number]  Encounter Provider: Jenni Gipson PA-C  Encounter Date: 2024   Encounter department: [facility] [place]  :  Assessment & Plan  Diarrhea, unspecified type  New onset of diarrhea since around 3/2024.  Followed up with PCP with stool infectious studies negative.  Symptoms do not occur every day.  They seem to be worse with eating out when it oc stable weight. She will get abdominal cramping urgency and have a large loose bowel movement.  No blood.  She has known lactose intolerance and avoids dairy products.  Possible component of irritable bowel syndrome however rule out underlying microscopic colitis, less likely IBD, celiac, possible bile acid diarrhea component, appears less likely pancreatic etiology.    -Due to new symptoms now becoming chronic, recommend colonoscopy with biopsies for microscopic colitis.  - MiraLAX/Dulcolax bowel prep.    -Continue avoiding dairy or use Lactaid as needed.  - Recommend trial of Bentyl before meals.  We discussed side effects of dizziness, lightheadedness and fatigue and recommend stopping if this occurs.  - Advised patient to reach out to me if no improvement with Bentyl, could consider trial of Questran/colestipol for bile acid diarrhea component.    -If no improvement with above, could consider checking pancreatic elastase.  This seems unlikely as it does not occur with every meal.  However she does have 1 episode of pancreatitis in the past.  She also had a previous CT scan showing possible pancreatic atrophy however repeat CT scan was unremarkable. We will further investigate this based on above workup.    Generalized abdominal pain  See above      Nausea and vomiting, unspecified vomiting type  Patient reports new symptoms of reflux, nausea and regurgitation/vomiting when laying down for bed at night.  She will use Tums for this.  She reports she does not eat close to  bedtime.    -Recommend EGD along with colonoscopy as noted above.  - Can continue regimen of Tums for now.  Will await for results of EGD to consider starting PPI or H2 blocker.      Patient will schedule follow-up office visit now.  I advise she call if no improvement with Bentyl.  I also advised she call if any development of new or worsening symptoms.    History of Present Illness     HPI  Ayala Nicholson is a pleasant 69 y.o. female who presents with history of breast cancer, osteoporosis who presents the office as a new patient for diarrhea.    Patient saw PCP for symptoms around 2 months ago.  Stool studies were obtained with normal stool enteric, food, C. difficile and ova and parasite testing.  Most recent lab work with normal hemoglobin.  CMP unremarkable besides mild elevation of fasting glucose.    Patient reports since about January she will have occasional episodes of severe urgency with large, loose nonbloody bowel movements.  She reports this is unpredictable.  She has not had any incontinence episodes with this.  She reports this does not happen every day or with every meal.  She will have 1-2 bowel movements a day on average.  It seems to be worse when going out to eat.  She does have some lactose intolerance for which she will use nondairy products or use Lactaid pills.  She will use Pepto for symptoms which does seem to help however not completely.  She has associated abdominal pain and cramping with the urgency occurs.  She also reports new symptoms of reflux and regurgitation/nausea and vomiting when laying down for bed.  She will use Tums for this.  Weight stable.    She reports an episode of pancreatitis in 2014 which appears to have been biliary etiology as she underwent cholecystectomy following this.  She did not have any problems with diarrhea until more recently.    Most recent lab work with normal CBC and CMP.  Her most recent TSH was normal.    Last colonoscopy was in 2009.  She had  Cologuard testing in 2022 that was normal.          Review of Systems   All other systems reviewed and are negative.    Past Medical History   Past Medical History:   Diagnosis Date    Breast cancer (HCC) 09/2019    left breast    Cancer (HCC) 06/2019    Cancer (HCC) June, 2019    History of radiation therapy 10/2019    left breast cancer    Migraine     Osteoporosis     Pancreatitis     Pleurisy     PONV (postoperative nausea and vomiting)     Seizures (HCC)     Varicella     Vertigo      Past Surgical History:   Procedure Laterality Date    BREAST BIOPSY  07/23/2019    BREAST LUMPECTOMY Left 10/01/2019    Procedure: BREAST NEEDLE LOCALIZED LUMPECTOMY (NEEDLE LOC AT 0730);  Surgeon: Hadley Gómez MD;  [facility];  Service: Surgical Oncology    CHOLECYSTECTOMY      COLONOSCOPY      DENTAL SURGERY      Bone graft with implant    EPIDURAL BLOCK INJECTION  02/2022    HALLUX VALGUS CORRECTION Bilateral     LYMPH NODE BIOPSY Left 10/01/2019    Procedure: SENTINEL LYMPH NODE BIOPSY; LYMPHATIC MAPPING WITH BLUE DYE AND RADIOACTIVE DYE (INJECT AT 0830 BY DR GÓMEZ IN THE OR);  Surgeon: Hadley Gómez MD;  [facility];  Service: Surgical Oncology    MAMMO NEEDLE LOCALIZATION LEFT (ALL INC) Left 10/01/2019    CT BREAST REDUCTION Right 02/23/2023    Procedure: RIGHT BREAST REDUCTION;  Surgeon: Cale Gonzalez MD;  [facility];  Service: Plastics    CT MASTOPEXY Left 02/23/2023    Procedure: LEFT MASTOPEXY;  Surgeon: Cale Gonzalez MD;  [facility];  Service: Plastics    RHINOPLASTY       Family History   Problem Relation Age of Onset    No Known Problems Mother     Lung cancer Father 69    Cancer Father         Lung    No Known Problems Maternal Grandmother     No Known Problems Maternal Grandfather     No Known Problems Paternal Grandmother     No Known Problems Paternal Grandfather     Breast cancer Neg Hx       reports that she has never smoked. She has never used smokeless tobacco.  She reports that she does not drink alcohol and does not use drugs.  Current Outpatient Medications on File Prior to Visit   Medication Sig Dispense Refill    anastrozole (ARIMIDEX) 1 mg tablet Take 1 tablet (1 mg total) by mouth daily 90 tablet 3    Cholecalciferol (VITAMIN D) 2000 units CAPS Take 4,000 Units by mouth daily       meclizine (ANTIVERT) 12.5 MG tablet Take 12.5 mg by mouth 3 (three) times a day as needed for dizziness      meloxicam (MOBIC) 7.5 mg tablet TAKE 1 TABLET (7.5 MG TOTAL) BY MOUTH DAILY AS NEEDED FOR MODERATE PAIN 30 tablet 0    ondansetron (ZOFRAN) 4 mg tablet Take 1 tablet (4 mg total) by mouth every 8 (eight) hours as needed for nausea or vomiting 10 tablet 0    pimecrolimus (ELIDEL) 1 % cream       SUMAtriptan (IMITREX) 100 mg tablet Take 100 mg by mouth once as needed for migraine      tiZANidine (ZANAFLEX) 4 mg tablet TAKE 1 TABLET (4 MG TOTAL) BY MOUTH DAILY AT BEDTIME AS NEEDED FOR MUSCLE SPASMS (Patient not taking: Reported on 10/14/2024) 30 tablet 0    venlafaxine (EFFEXOR) 50 mg tablet TAKE 1 TABLET (50 MG TOTAL) BY MOUTH DAILY 30 tablet 5     No current facility-administered medications on file prior to visit.     Allergies   Allergen Reactions    Ciprofloxacin Seizures    Cyclopentolate Other (See Comments) and Tachycardia     Resp distress    Sulfa Antibiotics Hives and Rash    Scopolamine Other (See Comments)     Precautionary for cycopentolate allergy           Objective   There were no vitals taken for this visit.     Physical Exam  Vitals reviewed.   Constitutional:       General: She is not in acute distress.     Appearance: Normal appearance. She is not ill-appearing.   HENT:      Head: Normocephalic and atraumatic.   Eyes:      Conjunctiva/sclera: Conjunctivae normal.   Cardiovascular:      Rate and Rhythm: Normal rate and regular rhythm.      Heart sounds: No murmur heard.  Pulmonary:      Effort: Pulmonary effort is normal. No respiratory distress.      Breath sounds:  Normal breath sounds.   Abdominal:      General: Abdomen is flat. There is no distension.      Palpations: Abdomen is soft.      Tenderness: There is no abdominal tenderness. There is no guarding or rebound.   Musculoskeletal:         General: No swelling.      Cervical back: Normal range of motion.      Right lower leg: No edema.      Left lower leg: No edema.   Skin:     General: Skin is warm.      Coloration: Skin is not jaundiced.   Neurological:      General: No focal deficit present.      Mental Status: She is alert and oriented to person, place, and time. Mental status is at baseline.   Psychiatric:         Mood and Affect: Mood normal.         Behavior: Behavior normal.

## 2025-01-03 ENCOUNTER — TELEPHONE (OUTPATIENT)
Age: 70
End: 2025-01-03

## 2025-01-03 NOTE — TELEPHONE ENCOUNTER
Patient called because hr Rheumatologist in [facility] Dr. Robles has left the practice and recommended she see Dr. Banuelos. Patient is going to fax over her records or bring them with her to the appointment. She was provided the centralized fax number [phone].      Thank you.   today

## 2025-01-24 ENCOUNTER — ANESTHESIA (OUTPATIENT)
Dept: ANESTHESIOLOGY | Facility: HOSPITAL | Age: 70
End: 2025-01-24

## 2025-01-24 ENCOUNTER — ANESTHESIA EVENT (OUTPATIENT)
Dept: ANESTHESIOLOGY | Facility: HOSPITAL | Age: 70
End: 2025-01-24

## 2025-01-31 ENCOUNTER — OFFICE VISIT (OUTPATIENT)
Dept: URGENT CARE | Facility: CLINIC | Age: 70
End: 2025-01-31
Payer: COMMERCIAL

## 2025-01-31 VITALS
HEART RATE: 96 BPM | TEMPERATURE: 97.8 F | WEIGHT: 178.38 LBS | DIASTOLIC BLOOD PRESSURE: 76 MMHG | BODY MASS INDEX: 30.62 KG/M2 | RESPIRATION RATE: 16 BRPM | SYSTOLIC BLOOD PRESSURE: 108 MMHG | OXYGEN SATURATION: 98 %

## 2025-01-31 DIAGNOSIS — R10.84 GENERALIZED ABDOMINAL PAIN: ICD-10-CM

## 2025-01-31 DIAGNOSIS — N30.01 ACUTE CYSTITIS WITH HEMATURIA: Primary | ICD-10-CM

## 2025-01-31 DIAGNOSIS — R11.2 NAUSEA AND VOMITING, UNSPECIFIED VOMITING TYPE: ICD-10-CM

## 2025-01-31 LAB
SL AMB  POCT GLUCOSE, UA: ABNORMAL
SL AMB LEUKOCYTE ESTERASE,UA: ABNORMAL
SL AMB POCT BILIRUBIN,UA: ABNORMAL
SL AMB POCT BLOOD,UA: ABNORMAL
SL AMB POCT CLARITY,UA: ABNORMAL
SL AMB POCT COLOR,UA: ABNORMAL
SL AMB POCT KETONES,UA: ABNORMAL
SL AMB POCT NITRITE,UA: ABNORMAL
SL AMB POCT PH,UA: 5
SL AMB POCT SPECIFIC GRAVITY,UA: 1.3
SL AMB POCT URINE PROTEIN: ABNORMAL
SL AMB POCT UROBILINOGEN: 0.2

## 2025-01-31 PROCEDURE — 87086 URINE CULTURE/COLONY COUNT: CPT | Performed by: PHYSICIAN ASSISTANT

## 2025-01-31 PROCEDURE — 81002 URINALYSIS NONAUTO W/O SCOPE: CPT | Performed by: PHYSICIAN ASSISTANT

## 2025-01-31 PROCEDURE — 99213 OFFICE O/P EST LOW 20 MIN: CPT | Performed by: PHYSICIAN ASSISTANT

## 2025-01-31 RX ORDER — ONDANSETRON 4 MG/1
8 TABLET, ORALLY DISINTEGRATING ORAL EVERY 8 HOURS PRN
Qty: 20 TABLET | Refills: 0 | Status: SHIPPED | OUTPATIENT
Start: 2025-01-31

## 2025-01-31 RX ORDER — CEPHALEXIN 500 MG/1
500 CAPSULE ORAL EVERY 12 HOURS SCHEDULED
Qty: 10 CAPSULE | Refills: 0 | Status: SHIPPED | OUTPATIENT
Start: 2025-01-31 | End: 2025-02-05

## 2025-01-31 NOTE — PROGRESS NOTES
[facility]        NAME: Ayala Nicholson is a 69 y.o. female  : 1955    MRN: [id number]  DATE: 2025  TIME: 6:45 PM    Assessment and Plan   Acute cystitis with hematuria [N30.01]  1. Acute cystitis with hematuria  cephalexin (KEFLEX) 500 mg capsule      2. Generalized abdominal pain  POCT urine dip    Urine culture      3. Nausea and vomiting, unspecified vomiting type  ondansetron (ZOFRAN-ODT) 4 mg disintegrating tablet            Patient Instructions     Your urinalysis revealed moderate blood.  I suspect UTI  Culture sent  Cephalexin 500 mg 1 capsule twice daily for 5 days    Continue Tums  Continue with Pepto-Bismol for your loose stool however, no greater than 8 doses in 24 hours  Increase fluid intake and remain well-hydrated  Ondansetron 8 mg every 8 hours as needed      Follow up with PCP in 3-5 days.  Proceed to  ER if symptoms worsen.    If tests are performed, our office will contact you with results only if changes need to made to the care plan discussed with you at the visit. You can review your full results on [facility].    Chief Complaint     Chief Complaint   Patient presents with    Vomiting     Vomiting and diarrhea. Unable to eat. Sx since 5-6 days. Abd cramping and pain. Taking tums and pepto. Has not been around anyone with similar sx. Fatigued. Food and drink making her nauseous.          History of Present Illness       vomiting and diarrhea. Unable to eat. Sx since 5-6 days. Abd cramping and pain. Taking tums and pepto. Has not been around anyone with similar sx. Fatigued. Food and drink making her nauseous.             Review of Systems   Review of Systems   Constitutional:  Negative for activity change, appetite change, chills and fever.   HENT:  Negative for congestion, postnasal drip, rhinorrhea and sore throat.    Respiratory:  Negative for cough.    Gastrointestinal:  Positive for abdominal pain and diarrhea. Negative for nausea and vomiting.    Endocrine: Positive for polyuria.   Genitourinary:  Negative for dysuria, frequency and urgency.   Musculoskeletal:  Negative for back pain.   Skin:  Negative for rash.   Neurological:  Negative for headaches.         Current Medications       Current Outpatient Medications:     cephalexin (KEFLEX) 500 mg capsule, Take 1 capsule (500 mg total) by mouth every 12 (twelve) hours for 5 days, Disp: 10 capsule, Rfl: 0    ondansetron (ZOFRAN-ODT) 4 mg disintegrating tablet, Take 2 tablets (8 mg total) by mouth every 8 (eight) hours as needed for nausea or vomiting, Disp: 20 tablet, Rfl: 0    anastrozole (ARIMIDEX) 1 mg tablet, Take 1 tablet (1 mg total) by mouth daily, Disp: 90 tablet, Rfl: 3    Cholecalciferol (VITAMIN D) 2000 units CAPS, Take 4,000 Units by mouth daily , Disp: , Rfl:     dicyclomine (BENTYL) 20 mg tablet, Take 1 tablet (20 mg total) by mouth 3 (three) times a day before meals, Disp: 90 tablet, Rfl: 1    meclizine (ANTIVERT) 12.5 MG tablet, Take 12.5 mg by mouth 3 (three) times a day as needed for dizziness, Disp: , Rfl:     meloxicam (MOBIC) 7.5 mg tablet, TAKE 1 TABLET (7.5 MG TOTAL) BY MOUTH DAILY AS NEEDED FOR MODERATE PAIN, Disp: 30 tablet, Rfl: 0    ondansetron (ZOFRAN) 4 mg tablet, Take 1 tablet (4 mg total) by mouth every 8 (eight) hours as needed for nausea or vomiting, Disp: 10 tablet, Rfl: 0    pimecrolimus (ELIDEL) 1 % cream, , Disp: , Rfl:     SUMAtriptan (IMITREX) 100 mg tablet, Take 100 mg by mouth once as needed for migraine, Disp: , Rfl:     tiZANidine (ZANAFLEX) 4 mg tablet, TAKE 1 TABLET (4 MG TOTAL) BY MOUTH DAILY AT BEDTIME AS NEEDED FOR MUSCLE SPASMS (Patient not taking: Reported on 10/14/2024), Disp: 30 tablet, Rfl: 0    venlafaxine (EFFEXOR) 50 mg tablet, TAKE 1 TABLET (50 MG TOTAL) BY MOUTH DAILY, Disp: 30 tablet, Rfl: 5    Current Allergies     Allergies as of 01/31/2025 - Reviewed 01/31/2025   Allergen Reaction Noted    Ciprofloxacin Seizures 12/28/2016    Cyclopentolate Other  (See Comments) and Tachycardia 09/15/1999    Sulfa antibiotics Hives and Rash 03/22/2001    Scopolamine Other (See Comments) 02/21/2014            The following portions of the patient's history were reviewed and updated as appropriate: allergies, current medications, past family history, past medical history, past social history, past surgical history and problem list.     Past Medical History:   Diagnosis Date    Breast cancer (HCC) 09/2019    left breast    Cancer (HCC) 06/2019    Cancer (HCC) June, 2019    History of radiation therapy 10/2019    left breast cancer    Migraine     Osteoporosis     Pancreatitis     Pleurisy     PONV (postoperative nausea and vomiting)     Seizures (HCC)     Varicella     Vertigo        Past Surgical History:   Procedure Laterality Date    BREAST BIOPSY  07/23/2019    BREAST LUMPECTOMY Left 10/01/2019    Procedure: BREAST NEEDLE LOCALIZED LUMPECTOMY (NEEDLE LOC AT 0730);  Surgeon: Hadley Gómez MD;  [facility];  Service: Surgical Oncology    CHOLECYSTECTOMY      COLONOSCOPY      DENTAL SURGERY      Bone graft with implant    EPIDURAL BLOCK INJECTION  02/2022    HALLUX VALGUS CORRECTION Bilateral     LYMPH NODE BIOPSY Left 10/01/2019    Procedure: SENTINEL LYMPH NODE BIOPSY; LYMPHATIC MAPPING WITH BLUE DYE AND RADIOACTIVE DYE (INJECT AT 0830 BY DR GÓMEZ IN THE OR);  Surgeon: Hadley Gómez MD;  [facility];  Service: Surgical Oncology    MAMMO NEEDLE LOCALIZATION LEFT (ALL INC) Left 10/01/2019    CO BREAST REDUCTION Right 02/23/2023    Procedure: RIGHT BREAST REDUCTION;  Surgeon: Cale Gonzalez MD;  [facility];  Service: Plastics    CO MASTOPEXY Left 02/23/2023    Procedure: LEFT MASTOPEXY;  Surgeon: Cale Gonzalez MD;  [facility];  Service: Plastics    RHINOPLASTY         Family History   Problem Relation Age of Onset    No Known Problems Mother     Lung cancer Father 69    Cancer Father         Lung    No Known Problems Maternal  Grandmother     No Known Problems Maternal Grandfather     No Known Problems Paternal Grandmother     No Known Problems Paternal Grandfather     Breast cancer Neg Hx          Medications have been verified.        Objective   /76   Pulse 96   Temp 97.8 °F (36.6 °C)   Resp 16   Wt 80.9 kg (178 lb 6 oz)   SpO2 98%   BMI 30.62 kg/m²        Physical Exam     Physical Exam  Vitals and nursing note reviewed.   Constitutional:       General: She is not in acute distress.     Appearance: Normal appearance. She is normal weight. She is not ill-appearing, toxic-appearing or diaphoretic.   HENT:      Nose: Congestion present. No rhinorrhea.      Mouth/Throat:      Mouth: Mucous membranes are moist.      Pharynx: Oropharynx is clear.   Eyes:      General: No scleral icterus.     Extraocular Movements: Extraocular movements intact.      Conjunctiva/sclera: Conjunctivae normal.      Pupils: Pupils are equal, round, and reactive to light.   Cardiovascular:      Rate and Rhythm: Normal rate and regular rhythm.      Pulses: Normal pulses.   Pulmonary:      Effort: Pulmonary effort is normal. No respiratory distress.   Abdominal:      Palpations: There is no mass.      Tenderness: There is no abdominal tenderness. There is no right CVA tenderness, left CVA tenderness, guarding or rebound.   Musculoskeletal:      Cervical back: Normal range of motion and neck supple.   Skin:     General: Skin is warm and dry.      Capillary Refill: Capillary refill takes less than 2 seconds.   Neurological:      General: No focal deficit present.      Mental Status: She is alert and oriented to person, place, and time.      Coordination: Coordination normal.      Gait: Gait normal.   Psychiatric:         Mood and Affect: Mood normal.         Behavior: Behavior normal.         Thought Content: Thought content normal.         Judgment: Judgment normal.

## 2025-01-31 NOTE — PATIENT INSTRUCTIONS
"Patient Education     Urinary tract infection - Discharge instructions   The Basics   Written by the doctors and editors at [facility]   What are discharge instructions? -- Discharge instructions are information about how to take care of yourself after getting medical care for a health problem.  What is a urinary tract infection? -- A urinary tract infection (\"UTI\") is an infection that affects either the bladder or the kidneys (figure 1). A kidney infection is more serious, and can lead to other serious problems if it is not treated properly.  You need to take antibiotics to treat a UTI. It is important to take all of your antibiotics, even if you start to feel better.  How do I care for myself at home? -- Ask the doctor or nurse what you should do when you go home. Make sure that you understand exactly what you need to do to care for yourself. Ask questions if there is anything you do not understand.  You should also:   Take all of your medicines as instructed.   Take phenazopyridine (sample brand name: AZO Urinary Pain Relief) for the first day or so, if you choose. This is an over-the-counter medicine. It will help numb your bladder and decrease the urge to urinate. This medicine causes your urine and tears to look orange.   Take acetaminophen (sample brand name: Tylenol) if needed for pain.   Drink extra fluids. This can help prevent more bladder infections. If you have sex, these things might also help:   Urinate right afterward.   If you use birth control, use a form that does not contain spermicide.  When should I call the doctor? -- Call for advice if:   You have pain in your back, shoulder, or belly.   You have a fever, shaking chills, or sweats even though you are taking antibiotics.   You notice more blood in your urine.   Your symptoms get worse or do not get better within 24 hours of starting antibiotics.   Your symptoms come back after finishing treatment.   You have any new or worrying symptoms.  All " topics are updated as new evidence becomes available and our peer review process is complete.  This topic retrieved from [employer] on: Feb 26, 2024.  Topic 385167 Version 1.0  Release: 32.2.4 - C32.56  © 2024 UpToDate, Inc. and/or its affiliates. All rights reserved.  figure 1: Anatomy of the urinary tract     Urine is made by the kidneys. It passes from the kidneys into the bladder through 2 tubes called the ureters. Then, it leaves the bladder through another tube called the urethra.  Graphic 96993 Version 8.0  Consumer Information Use and Disclaimer   Disclaimer: This generalized information is a limited summary of diagnosis, treatment, and/or medication information. It is not meant to be comprehensive and should be used as a tool to help the user understand and/or assess potential diagnostic and treatment options. It does NOT include all information about conditions, treatments, medications, side effects, or risks that may apply to a specific patient. It is not intended to be medical advice or a substitute for the medical advice, diagnosis, or treatment of a health care provider based on the health care provider's examination and assessment of a patient's specific and unique circumstances. Patients must speak with a health care provider for complete information about their health, medical questions, and treatment options, including any risks or benefits regarding use of medications. This information does not endorse any treatments or medications as safe, effective, or approved for treating a specific patient. UpToDate, Inc. and its affiliates disclaim any warranty or liability relating to this information or the use thereof.The use of this information is governed by the Terms of Use, available at https://www.wolterskluwer.com/en/know/clinical-effectiveness-terms. 2024© UpToDate, Inc. and its affiliates and/or licensors. All rights reserved.  Copyright   © 2024 UpToDate, Inc. and/or its affiliates. All rights  reserved.

## 2025-02-01 LAB — BACTERIA UR CULT: NORMAL

## 2025-02-02 ENCOUNTER — RESULTS FOLLOW-UP (OUTPATIENT)
Dept: URGENT CARE | Facility: CLINIC | Age: 70
End: 2025-02-02

## 2025-02-07 ENCOUNTER — ANESTHESIA (OUTPATIENT)
Dept: GASTROENTEROLOGY | Facility: AMBULARY SURGERY CENTER | Age: 70
End: 2025-02-07
Payer: COMMERCIAL

## 2025-02-07 ENCOUNTER — HOSPITAL ENCOUNTER (OUTPATIENT)
Dept: GASTROENTEROLOGY | Facility: AMBULARY SURGERY CENTER | Age: 70
Setting detail: OUTPATIENT SURGERY
End: 2025-02-07
Payer: COMMERCIAL

## 2025-02-07 VITALS
WEIGHT: 172 LBS | HEIGHT: 64 IN | OXYGEN SATURATION: 97 % | HEART RATE: 76 BPM | SYSTOLIC BLOOD PRESSURE: 109 MMHG | BODY MASS INDEX: 29.37 KG/M2 | TEMPERATURE: 97.3 F | DIASTOLIC BLOOD PRESSURE: 58 MMHG | RESPIRATION RATE: 18 BRPM

## 2025-02-07 DIAGNOSIS — R11.2 NAUSEA AND VOMITING, UNSPECIFIED VOMITING TYPE: ICD-10-CM

## 2025-02-07 DIAGNOSIS — R19.7 DIARRHEA, UNSPECIFIED TYPE: ICD-10-CM

## 2025-02-07 PROCEDURE — 88305 TISSUE EXAM BY PATHOLOGIST: CPT | Performed by: PATHOLOGY

## 2025-02-07 PROCEDURE — 43239 EGD BIOPSY SINGLE/MULTIPLE: CPT | Performed by: INTERNAL MEDICINE

## 2025-02-07 PROCEDURE — 45380 COLONOSCOPY AND BIOPSY: CPT | Performed by: INTERNAL MEDICINE

## 2025-02-07 RX ORDER — SODIUM CHLORIDE, SODIUM LACTATE, POTASSIUM CHLORIDE, CALCIUM CHLORIDE 600; 310; 30; 20 MG/100ML; MG/100ML; MG/100ML; MG/100ML
INJECTION, SOLUTION INTRAVENOUS CONTINUOUS PRN
Status: DISCONTINUED | OUTPATIENT
Start: 2025-02-07 | End: 2025-02-07

## 2025-02-07 RX ORDER — LIDOCAINE HYDROCHLORIDE 10 MG/ML
INJECTION, SOLUTION EPIDURAL; INFILTRATION; INTRACAUDAL; PERINEURAL AS NEEDED
Status: DISCONTINUED | OUTPATIENT
Start: 2025-02-07 | End: 2025-02-07

## 2025-02-07 RX ORDER — PROPOFOL 10 MG/ML
INJECTION, EMULSION INTRAVENOUS AS NEEDED
Status: DISCONTINUED | OUTPATIENT
Start: 2025-02-07 | End: 2025-02-07

## 2025-02-07 RX ADMIN — PROPOFOL 20 MG: 10 INJECTION, EMULSION INTRAVENOUS at 11:39

## 2025-02-07 RX ADMIN — PROPOFOL 100 MG: 10 INJECTION, EMULSION INTRAVENOUS at 11:43

## 2025-02-07 RX ADMIN — PROPOFOL 50 MG: 10 INJECTION, EMULSION INTRAVENOUS at 11:48

## 2025-02-07 RX ADMIN — LIDOCAINE HYDROCHLORIDE 50 MG: 10 INJECTION, SOLUTION EPIDURAL; INFILTRATION; INTRACAUDAL at 11:37

## 2025-02-07 RX ADMIN — PROPOFOL 100 MG: 10 INJECTION, EMULSION INTRAVENOUS at 11:37

## 2025-02-07 RX ADMIN — SODIUM CHLORIDE, SODIUM LACTATE, POTASSIUM CHLORIDE, AND CALCIUM CHLORIDE: .6; .31; .03; .02 INJECTION, SOLUTION INTRAVENOUS at 11:11

## 2025-02-07 NOTE — ANESTHESIA PREPROCEDURE EVALUATION
Procedure:  COLONOSCOPY  EGD    Relevant Problems   CARDIO   (+) Hot flashes      GYN   (+) Malignant neoplasm of upper-outer quadrant of left breast in female, estrogen receptor positive (HCC)      NEURO/PSYCH   (+) Chronic left shoulder pain   (+) Migraines      PONV      Stress ECG: No ST deviation is noted. The ECG was negative for ischemia.     Physical Exam    Airway    Mallampati score: II  TM Distance: >3 FB  Neck ROM: full     Dental       Cardiovascular  Cardiovascular exam normal    Pulmonary  Pulmonary exam normal     Other Findings  post-pubertal.      Anesthesia Plan  ASA Score- 2     Anesthesia Type- IV sedation with anesthesia with ASA Monitors.         Additional Monitors:     Airway Plan:            Plan Factors-    Chart reviewed. EKG reviewed.  Existing labs reviewed. Patient summary reviewed.                  Induction- intravenous.    Postoperative Plan-         Informed Consent- Anesthetic plan and risks discussed with patient.  I personally reviewed this patient with the CRNA. Discussed and agreed on the Anesthesia Plan with the CRNA..      NPO Status:  No vitals data found for the desired time range.

## 2025-02-07 NOTE — H&P
History and Physical - [facility]  Ayala Nicholson 69 y.o. female MRN: [id number]        HPI: 69-year-old female with history of breast cancer was seen in the office because of diarrhea.  Patient also reported having nausea.    Historical Information   Past Medical History:   Diagnosis Date    Breast cancer (HCC) 09/2019    left breast    Cancer (HCC) 06/2019    Cancer (HCC) June, 2019    History of radiation therapy 10/2019    left breast cancer    Migraine     Osteoporosis     Pancreatitis     Pleurisy     PONV (postoperative nausea and vomiting)     Seizures (HCC)     Varicella     Vertigo      Past Surgical History:   Procedure Laterality Date    BREAST BIOPSY  07/23/2019    BREAST LUMPECTOMY Left 10/01/2019    Procedure: BREAST NEEDLE LOCALIZED LUMPECTOMY (NEEDLE LOC AT 0730);  Surgeon: Hadley Gómez MD;  Location: [facility];  Service: Surgical Oncology    CHOLECYSTECTOMY      COLONOSCOPY      DENTAL SURGERY      Bone graft with implant    EPIDURAL BLOCK INJECTION  02/2022    HALLUX VALGUS CORRECTION Bilateral     LYMPH NODE BIOPSY Left 10/01/2019    Procedure: SENTINEL LYMPH NODE BIOPSY; LYMPHATIC MAPPING WITH BLUE DYE AND RADIOACTIVE DYE (INJECT AT 0830 BY DR GÓMEZ IN THE OR);  Surgeon: Hadley Gómez MD;  Location: [facility];  Service: Surgical Oncology    MAMMO NEEDLE LOCALIZATION LEFT (ALL INC) Left 10/01/2019    AL BREAST REDUCTION Right 02/23/2023    Procedure: RIGHT BREAST REDUCTION;  Surgeon: Cale Gonzalez MD;  Location: [facility];  Service: Plastics    AL MASTOPEXY Left 02/23/2023    Procedure: LEFT MASTOPEXY;  Surgeon: Cale Gonzalez MD;  Location: [facility];  Service: Plastics    RHINOPLASTY       Social History   Social History     Substance and Sexual Activity   Alcohol Use No     Social History     Substance and Sexual Activity   Drug Use Never     Social History     Tobacco Use   Smoking Status Never   Smokeless Tobacco Never     Family History   Problem Relation  Age of Onset    No Known Problems Mother     Lung cancer Father 69    Cancer Father         Lung    No Known Problems Maternal Grandmother     No Known Problems Maternal Grandfather     No Known Problems Paternal Grandmother     No Known Problems Paternal Grandfather     Breast cancer Neg Hx        Meds/Allergies     Not in a hospital admission.    Allergies   Allergen Reactions    Ciprofloxacin Seizures    Cyclopentolate Other (See Comments) and Tachycardia     Resp distress    Sulfa Antibiotics Hives and Rash    Scopolamine Other (See Comments)     Precautionary for cycopentolate allergy       Objective     not currently breastfeeding.    Physical Exam:    Chest- CTA  Heart- RRR  Abdomen- NT/ND  Extremities- No edema    ASSESSMENT:     History of diarrhea and nausea    PLAN:    EGD and colonoscopy

## 2025-02-07 NOTE — ANESTHESIA POSTPROCEDURE EVALUATION
Post-Op Assessment Note    CV Status:  Stable  Pain Score: 0    Pain management: adequate       Mental Status:  Alert and awake   Hydration Status:  Euvolemic   PONV Controlled:  Controlled   Airway Patency:  Patent     Post Op Vitals Reviewed: Yes    No anethesia notable event occurred.    Staff: CRNA           Last Filed PACU Vitals:  Vitals Value Taken Time   Temp     Pulse 75 02/07/25 1154   BP 96/53 02/07/25 1154   Resp 15 02/07/25 1154   SpO2 99 % 02/07/25 1154

## 2025-02-10 ENCOUNTER — TELEPHONE (OUTPATIENT)
Age: 70
End: 2025-02-10

## 2025-02-10 PROCEDURE — 88305 TISSUE EXAM BY PATHOLOGIST: CPT | Performed by: PATHOLOGY

## 2025-02-10 NOTE — TELEPHONE ENCOUNTER
[facility] Advised we can see Dr. Robles's records.

## 2025-02-11 ENCOUNTER — RESULTS FOLLOW-UP (OUTPATIENT)
Dept: GASTROENTEROLOGY | Facility: AMBULARY SURGERY CENTER | Age: 70
End: 2025-02-11

## 2025-02-11 ENCOUNTER — OFFICE VISIT (OUTPATIENT)
Dept: RHEUMATOLOGY | Facility: CLINIC | Age: 70
End: 2025-02-11

## 2025-02-11 VITALS
HEIGHT: 64 IN | BODY MASS INDEX: 30.73 KG/M2 | DIASTOLIC BLOOD PRESSURE: 88 MMHG | HEART RATE: 81 BPM | OXYGEN SATURATION: 98 % | WEIGHT: 180 LBS | SYSTOLIC BLOOD PRESSURE: 128 MMHG

## 2025-02-11 DIAGNOSIS — M81.0 AGE-RELATED OSTEOPOROSIS WITHOUT CURRENT PATHOLOGICAL FRACTURE: Primary | ICD-10-CM

## 2025-02-11 NOTE — PATIENT INSTRUCTIONS
We will repeat your DXA scan around October 2026.    Please get your vitamin D level checked. Based on those results, I will let you know if we need to change your vitamin D supplement.

## 2025-02-11 NOTE — PROGRESS NOTES
"Name: Ayala Nicholson      : 1955      MRN: [id number]  Encounter Provider: Salvatore Banuelos DO  Encounter Date: 2025   Encounter department: [facility]  :  Assessment & Plan  Age-related osteoporosis without current pathological fracture  Most recent DXA with worsening    Would be hesitant to put her on any bisphosphonates given about 10 years of use in the recent past    Would be hesitant to commit to Prolia at such a young age    Discussed repeating DXA around Oct 2026 and making decision based on that    Will updated VitD level  Orders:    Vitamin D 25 hydroxy; Future      Patient's rheumatologic disease(s) threaten long-term function if not appropriately managed.    History of Present Illness     Most recent DXA Oct 2024 with significant decrease in L-spine and hip compared to . Given 10 years total of oral BP and 2 years of an anabolic, decision was made to hold off on further treatment at that time.    She is hopeful that she will be able to get off of the arimidex within the next 6 months    No falls, has never had a fracture as an adult; had a nasal bone fracture as a teenager    Permanent history: Osteoporosis dxd in  with history invasive lobular breast cancer s/p lumpectomy and radiation treatment.    Osteoporosis treatment history:  Fosamax [id number], stopped by oral surgeon due to L mandibular bone grafts  Forteo [id number]  Monthly ibandronate [id number]    Also with osteoarthritis complicated by lumbar radiculopathy     Objective   /88 (BP Location: Left arm)   Pulse 81   Ht 5' 4\" (1.626 m)   Wt 81.6 kg (180 lb)   SpO2 98%   BMI 30.90 kg/m²      General: Well appearing, in no distress.   Eyes: Sclera non-icteric. EOMI  Extremities: Warm, well perfused, no edema.   Neuro: Alert and oriented. No gross focal neurological deficits.   Skin: No rashes.  MSK exam: no tenderness to palpation or synovitis or synovial hypertrophy any peripheral " joint or spine

## 2025-02-11 NOTE — ASSESSMENT & PLAN NOTE
Most recent DXA with worsening    Would be hesitant to put her on any bisphosphonates given about 10 years of use in the recent past    Would be hesitant to commit to Prolia at such a young age    Discussed repeating DXA around Oct 2026 and making decision based on that    Will updated VitD level  Orders:    Vitamin D 25 hydroxy; Future

## 2025-02-13 NOTE — PROGRESS NOTES
Name: Ayala Nicholson      : 1955      MRN: [id number]  Encounter Provider: Thomas Anaya MD  Encounter Date: 2025   Encounter department: [facility] [place]    Chief Complaint   Patient presents with   • Follow-up     :  Assessment & Plan  Malignant neoplasm of upper-outer quadrant of left breast in female, estrogen receptor positive (HCC)  Patient has no evidence of recurrent or new breast cancer on today's review.  She has missed several months of anastrozole risk related to reconstructive surgery and other complications.  In addition the patient has severe hot flashes on anastrozole.  She had a brief trial of exemestane which was less bothersome to her but is far too expensive.  Will seek financial assistance on her behalf for exemestane       Osteopenia after menopause         Heterogeneously dense tissue of both breasts on mammography             Clinical/Pathologic Stage  Cancer Staging   Malignant neoplasm of upper-outer quadrant of left breast in female, estrogen receptor positive (HCC)  Staging form: Breast, AJCC 8th Edition  - Pathologic stage from 10/8/2019: Stage IA (pT1c, pN0(sn), cM0, G1, ER+, HI+, HER2-) - Signed by VIVIEN Naranjo on 2024  Stage prefix: Initial diagnosis  Neoadjuvant therapy: No  Method of lymph node assessment: [place] lymph node biopsy  Multigene prognostic tests performed: None  Histologic grading system: 3 grade system  Laterality: Left  Tumor size (mm): 18      Current Therapy  Anastrozole 1 mg/day  Seek transition to exemestane 25 mg a day with financial assistance    Discussion  Patient returns to medical oncology.  She is in good spirits but acknowledges that the anastrozole continues as it has caused extreme hot flashes which are bothersome and difficult routinely throughout the day.  Will try to switch her to exemestane with its different biochemical configuration.  She has had before and it was less  "bothersome but far too expensive.  The patient is retired and is a .    I believe that she should not take aromatase inhibitor therapy for minimum of 1 additional year while we reestablish consistency of care and tolerance.        Special Studies      No results found for: \"GENETIC\", \"INTERP\", \"GENES\"      History of Present Illness     This 69-year-old postmenopausal female returns to medical oncology since January 2024 and ongoing care and follow-up surrounding her diagnosis of early-stage invasive lobular carcinoma of the left breast established in July 2019.    The patient had a screening mammogram which was abnormal on May 24, 2019    The patient completed an ultrasound-guided biopsy on August 19, 2019 .  Please see [facility]'s pathology number [id number] for confirmation and review.  Pathologically, the patient was found to have an invasive lobular carcinoma grade 1 with estrogen receptor positive and progesterone receptor positive both strongly positive.  HER2/diane was negative.    On October 1, 2019 the patient had a left-sided lumpectomy partial mastectomy surgery performed with sentinel lymph node reviews and margin excisions.  She was again demonstrated to have a 1.8 cm invasive lobular carcinoma grade 1 with sentinel lymph nodes negative for metastatic spread of disease.  Pathologic stage was pT1 cpN0 clinical M0 or stage Ia .    In late October 2019 the patient was initiated on anastrozole 1 mg/day.  By chart review the patient had treatment interruption with anastrozole between December 2022 and August 2023 at least as related to protracted reconstructive breast surgery..  She has tolerated anastrozole poorly by my history with extreme hot flashes. She notes that the hot flashes were improved while off of anastrozole.    Post partial mastectomy radiation therapy was completed on December 4, 2019.    The patient has a background history of osteoporosis treated with Boniva her most recent bone " density scan was performed in Early October 2024 and demonstrates an improvement to osteopenia.    The patient reports to medical oncology      Pertinent History from January 24,2024  Still suffers with severe hot flashes which are uncomfortable and continuous on anastrozole.  The patient notes that when she briefly took exemestane the hot flashes were less bothersome but the exemestane is far too expensive for her and [facility] and as a       Cancer Screening and Prevention  Mammography: 08/29/2024   GI/Colonoscopy: 02/07/2025   GYN: 10/14/2024   Bone Density: 10/02/2024   Covid 19 Vaccination Status: Moderna series times 2 January 2000January 2000 21 February 2021.  Moderna booster 2021 November, Pfizer booster January 2024    Oncology Therapeutic Summary:    October 2019-present anastrozole 1 mg/day    December 4, 2019 status post regional radiation therapy    October 1, 2019 status post left breast partial mastectomy with sentinel lymph node review and margin excision    Oncology History   Cancer Staging   Malignant neoplasm of upper-outer quadrant of left breast in female, estrogen receptor positive (HCC)  Staging form: Breast, AJCC 8th Edition  - Pathologic stage from 10/8/2019: Stage IA (pT1c, pN0(sn), cM0, G1, ER+, PA+, HER2-) - Signed by VIVIEN Naranjo on 2/5/2024  Stage prefix: Initial diagnosis  Neoadjuvant therapy: No  Method of lymph node assessment: [place] lymph node biopsy  Multigene prognostic tests performed: None  Histologic grading system: 3 grade system  Laterality: Left  Tumor size (mm): 18  Oncology History   Malignant neoplasm of upper-outer quadrant of left breast in female, estrogen receptor positive (HCC)   7/23/2019 Biopsy    Left breast biopsy  1 o'clock, 5 cm from nipple  Invasive lobular carcinoma  Grade 1      HER2 0    Done at [facility]     10/1/2019 Surgery    Left breast needle localized lumpectomy with sentinel lymph node biopsy  Invasive lobular  carcinoma  Grade 1  1.8 cm  Margins negative  0/2 Lymph nodes  Anatomic/Prognostic Stage IA     10/8/2019 -  Cancer Staged    Staging form: Breast, AJCC 8th Edition  - Pathologic stage from 10/8/2019: Stage IA (pT1c, pN0(sn), cM0, G1, ER+, CA+, HER2-) - Signed by VIVIEN Naranjo on 2/5/2024  Stage prefix: Initial diagnosis  Neoadjuvant therapy: No  Method of lymph node assessment: [place] lymph node biopsy  Multigene prognostic tests performed: None  Histologic grading system: 3 grade system  Laterality: Left  Tumor size (mm): 18       10/30/2019 -  Hormone Therapy    Anastrozole 1 mg daily  Dr. Denise     11/13/2019 - 12/4/2019 Radiation    Course: C1  Plan ID Energy Fractions Dose per Fraction (cGy) Total Dose Delivered (cGy) Elapsed Days   BH L Breast 6X 15 / 15 267 4,005 21   Treatment Dates:  11/13/2019 - 12/4/2019.         Review of Systems  Medical History Reviewed by provider this encounter:     .    Current Outpatient Medications on File Prior to Visit   Medication Sig Dispense Refill   • anastrozole (ARIMIDEX) 1 mg tablet Take 1 tablet (1 mg total) by mouth daily 90 tablet 3   • Cholecalciferol (VITAMIN D) 2000 units CAPS Take 4,000 Units by mouth daily      • dicyclomine (BENTYL) 20 mg tablet Take 1 tablet (20 mg total) by mouth 3 (three) times a day before meals 90 tablet 1   • meclizine (ANTIVERT) 12.5 MG tablet Take 12.5 mg by mouth 3 (three) times a day as needed for dizziness     • meloxicam (MOBIC) 7.5 mg tablet TAKE 1 TABLET (7.5 MG TOTAL) BY MOUTH DAILY AS NEEDED FOR MODERATE PAIN 30 tablet 0   • ondansetron (ZOFRAN) 4 mg tablet Take 1 tablet (4 mg total) by mouth every 8 (eight) hours as needed for nausea or vomiting 10 tablet 0   • ondansetron (ZOFRAN-ODT) 4 mg disintegrating tablet Take 2 tablets (8 mg total) by mouth every 8 (eight) hours as needed for nausea or vomiting 20 tablet 0   • pimecrolimus (ELIDEL) 1 % cream      • SUMAtriptan (IMITREX) 100 mg tablet Take 100 mg by  "mouth once as needed for migraine     • tiZANidine (ZANAFLEX) 4 mg tablet TAKE 1 TABLET (4 MG TOTAL) BY MOUTH DAILY AT BEDTIME AS NEEDED FOR MUSCLE SPASMS 30 tablet 0   • venlafaxine (EFFEXOR) 50 mg tablet TAKE 1 TABLET (50 MG TOTAL) BY MOUTH DAILY 30 tablet 5     No current facility-administered medications on file prior to visit.      Social History     Tobacco Use   • Smoking status: Never   • Smokeless tobacco: Never   Vaping Use   • Vaping status: Never Used   Substance and Sexual Activity   • Alcohol use: No   • Drug use: Never   • Sexual activity: Not Currently     Partners: Male     Birth control/protection: Post-menopausal, None         Objective   /80 (BP Location: Left arm, Patient Position: Sitting, Cuff Size: Standard)   Pulse (!) 111   Temp (!) 97.2 °F (36.2 °C) (Temporal)   Ht 5' 4\" (1.626 m)   Wt 80.7 kg (178 lb)   SpO2 97%   BMI 30.55 kg/m²     Pain Screening:  Pain Score: 0-No pain  ECOG   1  Physical Exam  Vitals reviewed.   Constitutional:       General: She is not in acute distress.     Appearance: She is not ill-appearing.   Neck:      Thyroid: No thyromegaly.      Trachea: Trachea normal.   Cardiovascular:      Rate and Rhythm: Normal rate and regular rhythm.      Heart sounds: Normal heart sounds.      No systolic murmur is present.      No diastolic murmur is present.   Pulmonary:      Breath sounds: Normal breath sounds. No stridor, decreased air movement or transmitted upper airway sounds. No decreased breath sounds, wheezing, rhonchi or rales.   Chest:      Chest wall: No mass. There is no dullness to percussion.   Breasts:     Right: Normal.      Left: Skin change present. No swelling, inverted nipple, mass or nipple discharge.   Musculoskeletal:      Cervical back: Normal range of motion.   Lymphadenopathy:      Cervical: No cervical adenopathy.      Right cervical: No superficial, deep or posterior cervical adenopathy.     Left cervical: No superficial, deep or posterior " "cervical adenopathy.   Neurological:      Mental Status: She is alert.         Labs: I have reviewed the following labs:  No results found for: \"WBC\", \"RBC\", \"HGB\", \"HCT\", \"MCV\", \"MCH\", \"RDW\", \"PLT\", \"NEUTOPHILPCT\", \"LYMPHOPCT\", \"MONOPCT\", \"EOSPCT\", \"BASOPCT\", \"IMMGRANS\", \"NEUTROABS\"  Lab Results   Component Value Date/Time    Potassium 4.9 09/16/2024 12:30 PM    Potassium 4.3 08/15/2024 02:02 PM    Chloride 102 09/16/2024 12:30 PM    Chloride 101 08/15/2024 02:02 PM    Carbon Dioxide 26 08/15/2024 02:02 PM    CO2 29 09/16/2024 12:30 PM    BUN 12 09/16/2024 12:30 PM    BUN 13 08/15/2024 02:02 PM    Creatinine 0.72 09/16/2024 12:30 PM    Creatinine 0.8 08/15/2024 02:02 PM    Glucose, Fasting 100 (H) 09/16/2024 12:30 PM    Calcium 10.1 09/16/2024 12:30 PM    Calcium 9.9 08/15/2024 02:02 PM    AST 18 09/16/2024 12:30 PM    AST 23 08/15/2024 02:02 PM    ALT 21 09/16/2024 12:30 PM    ALT 26 08/15/2024 02:02 PM    Alkaline Phosphatase 100 09/16/2024 12:30 PM    Alkaline Phosphatase 118 08/15/2024 02:02 PM    Total Protein 7.5 09/16/2024 12:30 PM    Protein, Total 7.0 08/15/2024 02:02 PM    Albumin 4.2 09/16/2024 12:30 PM    Albumin 4.5 08/15/2024 02:02 PM    Total Bilirubin 0.63 09/16/2024 12:30 PM    Total Bilirubin 0.3 08/15/2024 02:02 PM    EGFR 80 08/15/2024 02:02 PM    eGFR 86 09/16/2024 12:30 PM         Pathology:     October 1, 2019    [id number]  status post left partial mastectomy lumpectomy with sentinel lymph node reviews  Final Diagnosis   A. Breast, NOS, left breast lumpectomy: - Invasive lobular carcinoma. See comment and synoptic report. - Focal sclerosing adenosis and usual ductal hyperplasia (UDH).     B. Breast, NOS, left breast lumpectomy/anterior margin: - Benign skin and subcutaneous tissue.     C. Breast, NOS, left breast lumpectomy/inferior margin: - Benign papilloma with    D. Breast, NOS, left breast lumpectomy/lateral margin: - Benign breast tissue. - Negative for malignancy.     E. Breast, NOS, " left breast lumpectomy/medial margin: - Benign breast tissue with usual ductal hyperplasia (UDH). - Negative for malignancy.     F. Breast, NOS, left breast lumpectomy/posterior margin: - Benign breast tissue. - Negative for malignancy.     G. Breast, NOS, left breast lumpectomy/superior margin: - Benign breast tissue. - Negative for malignancy     H. Lymph Node, [place], left axillary sentinel lymph node: - Two benign lymph nodes (0/2).     1. Ancillary Studies: Performed on original biopsy material, [id number], and reported to be: -     ER: 100% / Positive -     VA: 100% / Positive -     HER2 (IHC): 0 / Negative -     Pathologic Stage Classification (pTNM, AJCC 8th Edition): 8th ed, AJCC Anatomic Stage: at least Stage IA - pT1c, (sn)pN0, cM0, G1 3.     8th ed. AJCC Pathologic Prognostic Stage (use AJCC update): IA       August 19, 2019  [id number] status post ultrasound-guided biopsy left breast  Final Diagnosis   A. Breast, Left at 1:00 5cm from the nipple, core biopsy ([employer], [id number], collected on 07/23/2019): - Invasive lobular carcinoma. -- [place]aid histologic grade 1 of 3 (total score: 5 of 9) * Glandular (acinar) Tubular Differentiation < 10%, score 3 * Nuclear Pleomorphism 2 of 3, score 1 * Mitotic Rate < 3/mm2, (  -- Estrogen, Progesterone & HER2 receptor studies performed at the referring institution show the tumor to be positive for ER(100%,strong) and   VA (100%, strong) and   Her-2  negative (Result: 0). -     Ductal carcinoma in-situ (DCIS): Notidentified. - Lymphovascular invasion: Not identified/Present. [id number]    Imaging:     October 2, 2024 bone density DEXA scan  Impression  1. Low bone mass (osteopenia).    2.  Since a DXA study from 6/23/2020, there has been:  A  STATISTICALLY SIGNIFICANT DECREASE in bone mineral density of 0.099 g/cm2 (10.7%) in the lumbar spine.  A  STATISTICALLY SIGNIFICANT DECREASE in bone mineral density of 0.035 g/cm2 (4.3%) in the left total  hip.    August 29, 2024 bilateral screening ultrasound with bilateral diagnostic mammogram  FINDINGS:   Bilateral  There are no suspicious masses, grouped microcalcifications or areas of unexplained architectural distortion. The skin and nipple areolar complex are unremarkable.  Postsurgical changes are present in both breasts from bilateral reduction mammoplasty and left breast lumpectomy.  Stable benign-appearing calcifications present in the inner left breast.  2 biopsy marker clips are present in the left breast.        IMPRESSION:      No findings of malignancy in either breast.        ASSESSMENT/BI-RADS CATEGORY:  Overall: 2 - Benign

## 2025-02-17 ENCOUNTER — TELEPHONE (OUTPATIENT)
Dept: HEMATOLOGY ONCOLOGY | Facility: CLINIC | Age: 70
End: 2025-02-17

## 2025-02-17 ENCOUNTER — OFFICE VISIT (OUTPATIENT)
Dept: HEMATOLOGY ONCOLOGY | Facility: CLINIC | Age: 70
End: 2025-02-17
Payer: COMMERCIAL

## 2025-02-17 ENCOUNTER — OFFICE VISIT (OUTPATIENT)
Dept: SURGICAL ONCOLOGY | Facility: CLINIC | Age: 70
End: 2025-02-17
Payer: COMMERCIAL

## 2025-02-17 VITALS
SYSTOLIC BLOOD PRESSURE: 128 MMHG | OXYGEN SATURATION: 97 % | BODY MASS INDEX: 30.39 KG/M2 | TEMPERATURE: 97.3 F | DIASTOLIC BLOOD PRESSURE: 80 MMHG | HEIGHT: 64 IN | HEART RATE: 90 BPM | WEIGHT: 178 LBS

## 2025-02-17 VITALS
WEIGHT: 178 LBS | DIASTOLIC BLOOD PRESSURE: 80 MMHG | TEMPERATURE: 97.2 F | OXYGEN SATURATION: 97 % | SYSTOLIC BLOOD PRESSURE: 128 MMHG | BODY MASS INDEX: 30.39 KG/M2 | HEART RATE: 111 BPM | HEIGHT: 64 IN

## 2025-02-17 DIAGNOSIS — M85.80 OSTEOPENIA AFTER MENOPAUSE: ICD-10-CM

## 2025-02-17 DIAGNOSIS — R92.333 HETEROGENEOUSLY DENSE TISSUE OF BOTH BREASTS ON MAMMOGRAPHY: ICD-10-CM

## 2025-02-17 DIAGNOSIS — C50.412 MALIGNANT NEOPLASM OF UPPER-OUTER QUADRANT OF LEFT BREAST IN FEMALE, ESTROGEN RECEPTOR POSITIVE (HCC): ICD-10-CM

## 2025-02-17 DIAGNOSIS — Z17.0 MALIGNANT NEOPLASM OF UPPER-OUTER QUADRANT OF LEFT BREAST IN FEMALE, ESTROGEN RECEPTOR POSITIVE (HCC): ICD-10-CM

## 2025-02-17 DIAGNOSIS — Z17.0 MALIGNANT NEOPLASM OF UPPER-OUTER QUADRANT OF LEFT BREAST IN FEMALE, ESTROGEN RECEPTOR POSITIVE (HCC): Primary | ICD-10-CM

## 2025-02-17 DIAGNOSIS — Z78.0 OSTEOPENIA AFTER MENOPAUSE: ICD-10-CM

## 2025-02-17 DIAGNOSIS — Z08 ENCOUNTER FOR FOLLOW-UP EXAMINATION AFTER COMPLETED TREATMENT FOR MALIGNANT NEOPLASM: Primary | ICD-10-CM

## 2025-02-17 DIAGNOSIS — C50.412 MALIGNANT NEOPLASM OF UPPER-OUTER QUADRANT OF LEFT BREAST IN FEMALE, ESTROGEN RECEPTOR POSITIVE (HCC): Primary | ICD-10-CM

## 2025-02-17 DIAGNOSIS — Z79.811 USE OF ANASTROZOLE: ICD-10-CM

## 2025-02-17 PROCEDURE — 99215 OFFICE O/P EST HI 40 MIN: CPT | Performed by: INTERNAL MEDICINE

## 2025-02-17 PROCEDURE — 99214 OFFICE O/P EST MOD 30 MIN: CPT

## 2025-02-17 NOTE — ASSESSMENT & PLAN NOTE
Patient is a 69-year-old female presenting today for a follow-up for left breast cancer diagnosed in July 2019. Pathology revealed invasive lobular carcinoma ER/%, HER2 -.  She had a left breast lumpectomy with sentinel node biopsy with Dr. Gómez and completed whole breast radiation therapy. She is currently on anastrozole. She had a b/l dx mammo on 8/29/24 which was BIRADS 2 category 2 density. She is scheduled for mammo and ABUS in Sept this year. There were no concerns on her cbe. Patient denies changes on her breast exam. She denies persistent headaches, bone pain, back pain, shortness of breath, or abdominal pain. I will see the patient back in 1 year or sooner should the need arise. She was instructed to call with any questions or concerns prior to this time. All questions were answered today.

## 2025-02-17 NOTE — ASSESSMENT & PLAN NOTE
Patient has no evidence of recurrent or new breast cancer on today's review.  She has missed several months of anastrozole risk related to reconstructive surgery and other complications.  In addition the patient has severe hot flashes on anastrozole.  She had a brief trial of exemestane which was less bothersome to her but is far too expensive.  Will seek financial assistance on her behalf for exemestane

## 2025-02-17 NOTE — TELEPHONE ENCOUNTER
Spoke with pt regarding Exemestane cost. Using her insurance copay is coming back around $86 for a 90 day supply. [employer] apears to be offering a 90 day supply for around $28. I sent pt an email with the website link, and she will let me know when she has signed up for an account so I can send the script.

## 2025-02-17 NOTE — PROGRESS NOTES
Name: Ayala Nicholson      : 1955      MRN: [id number]  Encounter Provider: VIVIEN Naranjo  Encounter Date: 2025   Encounter department: [facility] [place]  :  Assessment & Plan  Encounter for follow-up examination after completed treatment for malignant neoplasm  - 1 year f/u  - gyn 10/22/25       Malignant neoplasm of upper-outer quadrant of left breast in female, estrogen receptor positive (HCC)  Patient is a 69-year-old female presenting today for a follow-up for left breast cancer diagnosed in 2019. Pathology revealed invasive lobular carcinoma ER/%, HER2 -.  She had a left breast lumpectomy with sentinel node biopsy with Dr. Gómez and completed whole breast radiation therapy. She is currently on anastrozole. She had a b/l dx mammo on 24 which was BIRADS 2 category 2 density. She is scheduled for mammo and ABUS in Sept this year. There were no concerns on her cbe. Patient denies changes on her breast exam. She denies persistent headaches, bone pain, back pain, shortness of breath, or abdominal pain. I will see the patient back in 1 year or sooner should the need arise. She was instructed to call with any questions or concerns prior to this time. All questions were answered today.        Use of anastrozole  - continue use per medical oncology           Oncology History   Cancer Staging   Malignant neoplasm of upper-outer quadrant of left breast in female, estrogen receptor positive (HCC)  Staging form: Breast, AJCC 8th Edition  - Pathologic stage from 10/8/2019: Stage IA (pT1c, pN0(sn), cM0, G1, ER+, IA+, HER2-) - Signed by VIVIEN Naranjo on 2024  Stage prefix: Initial diagnosis  Neoadjuvant therapy: No  Method of lymph node assessment: [place] lymph node biopsy  Multigene prognostic tests performed: None  Histologic grading system: 3 grade system  Laterality: Left  Tumor size (mm): 18  Oncology History   Malignant  neoplasm of upper-outer quadrant of left breast in female, estrogen receptor positive (HCC)   7/23/2019 Biopsy    Left breast biopsy  1 o'clock, 5 cm from nipple  Invasive lobular carcinoma  Grade 1      HER2 0    Done at [facility]     10/1/2019 Surgery    Left breast needle localized lumpectomy with sentinel lymph node biopsy  Invasive lobular carcinoma  Grade 1  1.8 cm  Margins negative  0/2 Lymph nodes  Anatomic/Prognostic Stage IA     10/8/2019 -  Cancer Staged    Staging form: Breast, AJCC 8th Edition  - Pathologic stage from 10/8/2019: Stage IA (pT1c, pN0(sn), cM0, G1, ER+, ID+, HER2-) - Signed by VIVIEN Naranjo on 2/5/2024  Stage prefix: Initial diagnosis  Neoadjuvant therapy: No  Method of lymph node assessment: [place] lymph node biopsy  Multigene prognostic tests performed: None  Histologic grading system: 3 grade system  Laterality: Left  Tumor size (mm): 18       10/30/2019 -  Hormone Therapy    Anastrozole 1 mg daily  Dr. Denise     11/13/2019 - 12/4/2019 Radiation    Course: C1  Plan ID Energy Fractions Dose per Fraction (cGy) Total Dose Delivered (cGy) Elapsed Days   BH L Breast 6X 15 / 15 267 4,005 21   Treatment Dates:  11/13/2019 - 12/4/2019.         Review of Systems   Constitutional:  Negative for activity change, appetite change, fatigue and unexpected weight change.   Respiratory:  Negative for cough and shortness of breath.    Cardiovascular:  Negative for chest pain.   Gastrointestinal:  Negative for abdominal pain, diarrhea, nausea and vomiting.   Endocrine: Negative for heat intolerance.   Musculoskeletal:  Negative for arthralgias, back pain and myalgias.   Skin:  Negative for rash.   Neurological:  Negative for weakness and headaches.   Hematological:  Negative for adenopathy.    A complete review of systems is negative other than that noted above in the HPI.         Objective     Vitals:    02/17/25 1127   BP: 128/80   Pulse: 90   Temp: (!) 97.3 °F (36.3 °C)    SpO2: 97%       Pain Screening:  Pain Score: 0-No pain  ECOG    Physical Exam  Constitutional:       General: She is not in acute distress.     Appearance: Normal appearance.   Cardiovascular:      Rate and Rhythm: Normal rate and regular rhythm.      Pulses: Normal pulses.      Heart sounds: Normal heart sounds.   Pulmonary:      Effort: Pulmonary effort is normal.      Breath sounds: Normal breath sounds.   Chest:      Chest wall: No mass.   Breasts:     Right: No swelling, bleeding, inverted nipple, mass, nipple discharge, skin change or tenderness.      Left: No swelling, bleeding, inverted nipple, mass, nipple discharge, skin change or tenderness.   Abdominal:      General: Abdomen is flat.      Palpations: Abdomen is soft.   Lymphadenopathy:      Upper Body:      Right upper body: No supraclavicular, axillary or pectoral adenopathy.      Left upper body: No supraclavicular, axillary or pectoral adenopathy.   Skin:     General: Skin is warm.   Neurological:      General: No focal deficit present.      Mental Status: She is alert and oriented to person, place, and time.   Psychiatric:         Mood and Affect: Mood normal.         Behavior: Behavior normal.          Labs: I have reviewed pertinent labs.  Hospital Outpatient Visit on 02/07/2025   Component Date Value Ref Range Status   • Case Report 02/07/2025    Final                    Value:Surgical Pathology Report                         Case: [id number]                                  Authorizing Provider:  Manfred Lennon MD          Collected:           02/07/2025 1140              Ordering Location:     [facility]        Received:            02/07/2025 9795                                     [facility]                                                    Pathologist:           Kaylin Otoole DO                                                     Specimens:   A) - Stomach, Gastric body bx                                       "                                 B) - Colon, random colon cold biopsies                                                    • Final Diagnosis 02/07/2025    Final                    Value:A. Stomach, Body, Biopsy:  - Oxyntic-type gastric mucosa with mild chronic inactive gastritis.  - Negative for intestinal metaplasia and dysplasia.  - Negative for H. pylori organisms on H&E stain.    B. Colon, Random, Biopsies:  - Colonic mucosa with no specific pathologic change.     • Note 02/07/2025    Final                    Value:The endoscopic report was reviewed.     • Additional Information 02/07/2025    Final                    Value:All reported additional testing was performed with appropriately reactive controls.  These tests were developed and their performance characteristics determined by [facility] or appropriate performing facility, though some tests may be performed on tissues which have not been validated for performance characteristics (such as staining performed on alcohol exposed cell blocks and decalcified tissues).  Results should be interpreted with caution and in the context of the patients’ clinical condition. These tests may not be cleared or approved by the U.S. Food and Drug Administration, though the FDA has determined that such clearance or approval is not necessary. These tests are used for clinical purposes and they should not be regarded as investigational or for research. This laboratory has been approved by CLIA 88, designated as a high-complexity laboratory and is qualified to perform these tests.      Interpretation performed at [facility], [address] [place] [id number]     • Gross Description 02/07/2025    Final                    Value:A. The specimen is received in formalin, labeled with the patient's name and hospital number, and is designated \" gastric body\".  The specimen consists of 2 tan soft tissue fragments measuring 0.1-0.8 " "cm in greatest dimension.  Entirely submitted. One screened cassette.  B. The specimen is received in formalin, labeled with the patient's name and hospital number, and is designated \" random colon\".  The specimen consists of multiple tan soft tissue fragments measuring in aggregate of 0.9 x 0.4 x 0.2 cm.  Entirely submitted. One screened cassette.    Note: The estimated total formalin fixation time based upon information provided by the submitting clinician and the standard processing schedule is under 72 hours.      Stuart     Office Visit on 01/31/2025   Component Date Value Ref Range Status   • LEUKOCYTE ESTERASE,UA 01/31/2025 trace   Final   • NITRITE,UA 01/31/2025 neg   Final   • SL AMB POCT UROBILINOGEN 01/31/2025 0.2   Final   • POCT URINE PROTEIN 01/31/2025 trace   Final   •  PH,UA 01/31/2025 5.0   Final   • BLOOD,UA 01/31/2025 moderate   Final   • SPECIFIC GRAVITY,UA 01/31/2025 1.303   Final   • KETONES,UA 01/31/2025 trace   Final   • BILIRUBIN,UA 01/31/2025 neg   Final   • GLUCOSE, UA 01/31/2025 neg   Final   •  COLOR,UA 01/31/2025 tor   Final   • CLARITY,UA 01/31/2025 hazy   Final   • Urine Culture 01/31/2025 40,000-49,000 cfu/ml   Final    Mixed Contaminants X4     "

## 2025-02-19 DIAGNOSIS — C50.412 MALIGNANT NEOPLASM OF UPPER-OUTER QUADRANT OF LEFT BREAST IN FEMALE, ESTROGEN RECEPTOR POSITIVE (HCC): Primary | ICD-10-CM

## 2025-02-19 DIAGNOSIS — Z17.0 MALIGNANT NEOPLASM OF UPPER-OUTER QUADRANT OF LEFT BREAST IN FEMALE, ESTROGEN RECEPTOR POSITIVE (HCC): Primary | ICD-10-CM

## 2025-02-19 RX ORDER — EXEMESTANE 25 MG/1
25 TABLET ORAL DAILY
Qty: 30 TABLET | Refills: 5 | Status: SHIPPED | OUTPATIENT
Start: 2025-02-19

## 2025-03-10 ENCOUNTER — APPOINTMENT (OUTPATIENT)
Dept: LAB | Facility: CLINIC | Age: 70
End: 2025-03-10
Payer: COMMERCIAL

## 2025-03-10 DIAGNOSIS — R19.7 DIARRHEA, UNSPECIFIED TYPE: ICD-10-CM

## 2025-03-10 DIAGNOSIS — E55.9 VITAMIN D DEFICIENCY: ICD-10-CM

## 2025-03-10 DIAGNOSIS — R73.03 PREDIABETES: ICD-10-CM

## 2025-03-10 DIAGNOSIS — Z13.220 LIPID SCREENING: ICD-10-CM

## 2025-03-10 PROCEDURE — 80061 LIPID PANEL: CPT

## 2025-03-10 PROCEDURE — 85025 COMPLETE CBC W/AUTO DIFF WBC: CPT

## 2025-03-10 PROCEDURE — 83036 HEMOGLOBIN GLYCOSYLATED A1C: CPT

## 2025-03-10 PROCEDURE — 36415 COLL VENOUS BLD VENIPUNCTURE: CPT

## 2025-03-10 PROCEDURE — 80053 COMPREHEN METABOLIC PANEL: CPT

## 2025-03-10 PROCEDURE — 82306 VITAMIN D 25 HYDROXY: CPT

## 2025-03-11 ENCOUNTER — RESULTS FOLLOW-UP (OUTPATIENT)
Dept: FAMILY MEDICINE CLINIC | Facility: CLINIC | Age: 70
End: 2025-03-11

## 2025-03-11 LAB
25(OH)D3 SERPL-MCNC: 35.3 NG/ML (ref 30–100)
ALBUMIN SERPL BCG-MCNC: 4.5 G/DL (ref 3.5–5)
ALP SERPL-CCNC: 94 U/L (ref 34–104)
ALT SERPL W P-5'-P-CCNC: 20 U/L (ref 7–52)
ANION GAP SERPL CALCULATED.3IONS-SCNC: 9 MMOL/L (ref 4–13)
AST SERPL W P-5'-P-CCNC: 20 U/L (ref 13–39)
BASOPHILS # BLD AUTO: 0.05 THOUSANDS/ÂΜL (ref 0–0.1)
BASOPHILS NFR BLD AUTO: 1 % (ref 0–1)
BILIRUB SERPL-MCNC: 0.43 MG/DL (ref 0.2–1)
BUN SERPL-MCNC: 12 MG/DL (ref 5–25)
CALCIUM SERPL-MCNC: 9.7 MG/DL (ref 8.4–10.2)
CHLORIDE SERPL-SCNC: 103 MMOL/L (ref 96–108)
CHOLEST SERPL-MCNC: 177 MG/DL (ref ?–200)
CO2 SERPL-SCNC: 28 MMOL/L (ref 21–32)
CREAT SERPL-MCNC: 0.71 MG/DL (ref 0.6–1.3)
EOSINOPHIL # BLD AUTO: 0.18 THOUSAND/ÂΜL (ref 0–0.61)
EOSINOPHIL NFR BLD AUTO: 2 % (ref 0–6)
ERYTHROCYTE [DISTWIDTH] IN BLOOD BY AUTOMATED COUNT: 12.4 % (ref 11.6–15.1)
EST. AVERAGE GLUCOSE BLD GHB EST-MCNC: 134 MG/DL
GFR SERPL CREATININE-BSD FRML MDRD: 88 ML/MIN/1.73SQ M
GLUCOSE P FAST SERPL-MCNC: 108 MG/DL (ref 65–99)
HBA1C MFR BLD: 6.3 %
HCT VFR BLD AUTO: 44.3 % (ref 34.8–46.1)
HDLC SERPL-MCNC: 61 MG/DL
HGB BLD-MCNC: 14.5 G/DL (ref 11.5–15.4)
IMM GRANULOCYTES # BLD AUTO: 0.01 THOUSAND/UL (ref 0–0.2)
IMM GRANULOCYTES NFR BLD AUTO: 0 % (ref 0–2)
LDLC SERPL CALC-MCNC: 87 MG/DL (ref 0–100)
LYMPHOCYTES # BLD AUTO: 2.29 THOUSANDS/ÂΜL (ref 0.6–4.47)
LYMPHOCYTES NFR BLD AUTO: 31 % (ref 14–44)
MCH RBC QN AUTO: 32.8 PG (ref 26.8–34.3)
MCHC RBC AUTO-ENTMCNC: 32.7 G/DL (ref 31.4–37.4)
MCV RBC AUTO: 100 FL (ref 82–98)
MONOCYTES # BLD AUTO: 0.45 THOUSAND/ÂΜL (ref 0.17–1.22)
MONOCYTES NFR BLD AUTO: 6 % (ref 4–12)
NEUTROPHILS # BLD AUTO: 4.5 THOUSANDS/ÂΜL (ref 1.85–7.62)
NEUTS SEG NFR BLD AUTO: 60 % (ref 43–75)
NRBC BLD AUTO-RTO: 0 /100 WBCS
PLATELET # BLD AUTO: 369 THOUSANDS/UL (ref 149–390)
PMV BLD AUTO: 10 FL (ref 8.9–12.7)
POTASSIUM SERPL-SCNC: 4.2 MMOL/L (ref 3.5–5.3)
PROT SERPL-MCNC: 7.3 G/DL (ref 6.4–8.4)
RBC # BLD AUTO: 4.42 MILLION/UL (ref 3.81–5.12)
SODIUM SERPL-SCNC: 140 MMOL/L (ref 135–147)
TRIGL SERPL-MCNC: 143 MG/DL (ref ?–150)
WBC # BLD AUTO: 7.48 THOUSAND/UL (ref 4.31–10.16)

## 2025-03-18 ENCOUNTER — TELEPHONE (OUTPATIENT)
Dept: ADMINISTRATIVE | Facility: OTHER | Age: 70
End: 2025-03-18

## 2025-03-18 NOTE — TELEPHONE ENCOUNTER
03/18/25 3:26 PM    Patient contacted to bring Advance Directive, POLST, or Living Will document to next scheduled pcp visit.[facility] Department spoke with patient/ caregiver.  Will ask at check in for the 5 Wishes document    Thank you.  Tiffany Gates MA  [facility]

## 2025-03-19 ENCOUNTER — OFFICE VISIT (OUTPATIENT)
Dept: FAMILY MEDICINE CLINIC | Facility: CLINIC | Age: 70
End: 2025-03-19
Payer: COMMERCIAL

## 2025-03-19 VITALS
WEIGHT: 179 LBS | HEART RATE: 92 BPM | TEMPERATURE: 98.1 F | BODY MASS INDEX: 30.56 KG/M2 | SYSTOLIC BLOOD PRESSURE: 124 MMHG | HEIGHT: 64 IN | DIASTOLIC BLOOD PRESSURE: 82 MMHG | OXYGEN SATURATION: 99 %

## 2025-03-19 DIAGNOSIS — R73.03 PREDIABETES: ICD-10-CM

## 2025-03-19 DIAGNOSIS — R10.32 LLQ PAIN: ICD-10-CM

## 2025-03-19 DIAGNOSIS — Z13.220 LIPID SCREENING: ICD-10-CM

## 2025-03-19 DIAGNOSIS — E66.9 OBESITY (BMI 30-39.9): ICD-10-CM

## 2025-03-19 DIAGNOSIS — K58.0 IRRITABLE BOWEL SYNDROME WITH DIARRHEA: Primary | ICD-10-CM

## 2025-03-19 DIAGNOSIS — R19.7 DIARRHEA, UNSPECIFIED TYPE: ICD-10-CM

## 2025-03-19 PROBLEM — Z08 ENCOUNTER FOR FOLLOW-UP EXAMINATION AFTER COMPLETED TREATMENT FOR MALIGNANT NEOPLASM: Status: RESOLVED | Noted: 2025-02-17 | Resolved: 2025-03-19

## 2025-03-19 PROCEDURE — G2211 COMPLEX E/M VISIT ADD ON: HCPCS | Performed by: FAMILY MEDICINE

## 2025-03-19 PROCEDURE — 99214 OFFICE O/P EST MOD 30 MIN: CPT | Performed by: FAMILY MEDICINE

## 2025-03-19 NOTE — PROGRESS NOTES
Name: Ayala Nicholson      : 1955      MRN: [id number]  Encounter Provider: Greta Etienne MD  Encounter Date: 3/19/2025   Encounter department: [facility]  :  Assessment & Plan  Irritable bowel syndrome with diarrhea  Low FODMAP diet advised-- gave info in patient summary          Prediabetes  Low carb diet, weight loss. She wants to go to weight management. Referral placed  Orders:  •  Hemoglobin A1C; Future  •  Comprehensive metabolic panel; Future    Diarrhea, unspecified type  Stool testing & EGD, Colonoscopy have been within normal limits  She is having some LLQ pain - will check CT scan  Advised follow-up with GI - has appt in May  Orders:  •  CT abdomen pelvis w contrast; Future  •  Comprehensive metabolic panel; Future  •  CBC and differential; Future    LLQ pain  Will check Abd CT  Orders:  •  CT abdomen pelvis w contrast; Future    Lipid screening    Orders:  •  Lipid Panel with Direct LDL reflex; Future    Obesity (BMI 30-39.9)    BMI Counseling: Body mass index is 30.73 kg/m². The BMI is above normal. Nutrition recommendations include moderation in carbohydrate intake.    Orders:  •  Ambulatory Referral to Weight Management; Future           History of Present Illness   She is here for lab review. A1c 6.3%. otherwise labs within normal limits  She saw Gastro for diarrhea, had an EGD & Colonoscopy.  Both were within normal limits. Over the winter she had a few episodes of diarrhea. She had diarrhea multiple times this month. She was prescribed Dicyclomine by GI - she does think it helps.   Stool testing in the fall was negative for infection.   She occasionally gets pain on the LLQ and radiates to her back. She used a heating pad. No relief with moving bowels.     Complains of feeling fatigued over the past few months.     She was started on exemestane from heme/onc.       Review of Systems   Constitutional:  Positive for fatigue. Negative for activity change.  "  Respiratory:  Negative for chest tightness and shortness of breath.    Cardiovascular:  Negative for chest pain and leg swelling.   Gastrointestinal:  Positive for abdominal pain and diarrhea. Negative for anal bleeding, blood in stool and constipation.   Neurological:  Negative for headaches.   Psychiatric/Behavioral:  Negative for dysphoric mood. The patient is not nervous/anxious.        Objective   /82 (BP Location: Right arm, Patient Position: Sitting)   Pulse 92   Temp 98.1 °F (36.7 °C) (Temporal)   Ht 5' 4\" (1.626 m)   Wt 81.2 kg (179 lb)   SpO2 99%   BMI 30.73 kg/m²      Physical Exam  Vitals and nursing note reviewed.   Constitutional:       General: She is not in acute distress.     Appearance: She is well-developed. She is obese. She is not diaphoretic.   HENT:      Head: Normocephalic and atraumatic.      Right Ear: External ear normal.      Left Ear: External ear normal.   Eyes:      Conjunctiva/sclera: Conjunctivae normal.   Cardiovascular:      Rate and Rhythm: Normal rate and regular rhythm.      Heart sounds: Normal heart sounds. No murmur heard.     No friction rub. No gallop.   Pulmonary:      Effort: Pulmonary effort is normal. No respiratory distress.      Breath sounds: Normal breath sounds. No stridor. No wheezing or rales.   Abdominal:      General: There is no distension.      Palpations: Abdomen is soft.      Tenderness: There is abdominal tenderness in the left lower quadrant.   Musculoskeletal:      Right lower leg: No edema.      Left lower leg: No edema.   Neurological:      General: No focal deficit present.      Mental Status: She is alert.   Psychiatric:         Mood and Affect: Mood normal.         Behavior: Behavior normal.         Thought Content: Thought content normal.         Judgment: Judgment normal.         "

## 2025-03-19 NOTE — ASSESSMENT & PLAN NOTE
Low carb diet, weight loss. She wants to go to weight management. Referral placed  Orders:  •  Hemoglobin A1C; Future  •  Comprehensive metabolic panel; Future

## 2025-03-19 NOTE — ASSESSMENT & PLAN NOTE
Stool testing & EGD, Colonoscopy have been within normal limits  She is having some LLQ pain - will check CT scan  Advised follow-up with GI - has appt in May  Orders:  •  CT abdomen pelvis w contrast; Future  •  Comprehensive metabolic panel; Future  •  CBC and differential; Future

## 2025-03-28 ENCOUNTER — HOSPITAL ENCOUNTER (OUTPATIENT)
Dept: CT IMAGING | Facility: HOSPITAL | Age: 70
End: 2025-03-28
Attending: FAMILY MEDICINE
Payer: COMMERCIAL

## 2025-03-28 DIAGNOSIS — R10.32 LLQ PAIN: ICD-10-CM

## 2025-03-28 DIAGNOSIS — R19.7 DIARRHEA, UNSPECIFIED TYPE: ICD-10-CM

## 2025-03-28 PROCEDURE — 74177 CT ABD & PELVIS W/CONTRAST: CPT

## 2025-03-28 RX ADMIN — IOHEXOL 100 ML: 350 INJECTION, SOLUTION INTRAVENOUS at 06:38

## 2025-03-30 ENCOUNTER — RESULTS FOLLOW-UP (OUTPATIENT)
Dept: FAMILY MEDICINE CLINIC | Facility: CLINIC | Age: 70
End: 2025-03-30

## 2025-04-03 ENCOUNTER — CLINICAL SUPPORT (OUTPATIENT)
Dept: BARIATRICS | Facility: CLINIC | Age: 70
End: 2025-04-03

## 2025-04-03 VITALS — WEIGHT: 179.2 LBS | HEIGHT: 64 IN | BODY MASS INDEX: 30.59 KG/M2

## 2025-04-03 DIAGNOSIS — E66.9 OBESITY (BMI 30-39.9): ICD-10-CM

## 2025-04-03 PROCEDURE — WMDI30

## 2025-04-03 PROCEDURE — RECHECK

## 2025-04-03 NOTE — PROGRESS NOTES
Weight Management Medical Nutrition Assessment  Edita was here today for medical meal planning.  Today she weighs 179.2 lbs and has a goal weight  of 145 - 150 lbs.  She has a history of breast cancer and has GI issues.  Recently had an endoscopy and colonoscopy with normal results.  She continues to diarrhea and vomiting after eating certain foods and also needs help with weight loss.  Her PCP told her that I could help her with her GI issue and weight loss.  I only addressed her weight loss issues. I gave her information about Medical Nutritional Therapy that is available as an out pt with the RD's at the hospital.  She is following up with GI in a couple of weeks, and told her to mention it to them as well.  Recommend that she start logging her food to accountable for weight loss but also to help her see what is causing her stomach issues that way she would have a record for GI as well.  Reviewed her daily needs.  Reiterated the importance of regular exercise.     Patient seen by Medical Provider in past 6 months:  yes  Requested to schedule appointment with Medical Provider: No      Anthropometric Measurements  Start Weight (#) & Date: 179.2  Current Weight (#): 179.2  TBW % Change from start weight:0%  Ideal Body Weight (#):120  Goal Weight (#):145 - 150  Highest: current wt  Lowest: 122 yrs ago    Weight Loss History  Previous weight loss attempts: Self Created Diets (Portion Control, Healthy Food Choices, etc.)    Food and Nutrition Related History  Wake up: 8- 10    Bed Time:10:30    Food Recall  Breakfast:oatmeal and decaf tea    Snack:applesauce or pretzels  Lunch:vegetable soup   Snack:  Dinner:ham  sandwich or chicken and veg  Snack:cookies and tea      Beverages: water and coffee/tea  Volume of beverage intake: not sure    Weekends: Same  Cravings: sweetss  Trouble area of day:evenings    Frequency of Eating out: irregularly  Food restrictions:lactose  Cooking: self   Food Shopping: self    Physical  Activity Intake  Activity:Walking  Frequency:infrequently  Physical limitations/barriers to exercise: none    Estimated Needs  Energy    Ally Dillard Energy Needs: BMR : 1333   1-2# loss weekly sedentary:  1099             1-2# loss weekly lightly active:313 - 15924  Maintenance calories for sedentary activity level: 1599  Protein:65 - 82      (1.2-1.5g/kg IBW)  Fluid Requirement Calculator   Total Fluid: 92.1   oz  (Ayanna-Segar Method)  Free Fluid: 18 oz (Ayanna-Segar Method - 20%)    Nutrition Diagnosis  Yes;    Overweight/obesity  related to Excess energy intake as evidenced by  BMI more than normative standard for age and sex (obesity-grade I 30-34.9)       Nutrition Intervention    Nutrition Prescription  Calories:1000 - 1200  Protein:65 - 82  Fluid:74      Nutrition Education:    Calorie controlled menu  Lean protein food choices  Healthy snack options  Food journaling tips      Nutrition Counseling:  Strategies: meal planning, portion sizes, healthy snack choices, hydration, fiber intake, protein intake, exercise, food journal      Monitoring and Evaluation:  Evaluation criteria:  Energy Intake  Meet protein needs  Maintain adequate hydration  Monitor weekly weight  Meal planning/preparation  Food journal   Decreased portions at mealtimes and snacks  Physical activity     Barriers to learning:none  Readiness to change: Action:  (Changing behavior)  Comprehension: fair  Expected Compliance: fair

## 2025-04-13 NOTE — PROGRESS NOTES
"Chief Complaint   Patient presents with    Follow-up      :  Assessment & Plan  Malignant neoplasm of upper-outer quadrant of left breast in female, estrogen receptor positive (HCC)  Patient has no evidence of recurrent or new breast cancer on today's review.    The patient is nearly asymptomatic as she has transitioned to the use of exemestane.    She has received financial assistance with more gratitude to pay for this medication as we navigate the final 6-8 months of adjuvant hormonal based therapies      Osteopenia after menopause        Heterogeneously dense tissue of both breasts on mammography  I will plan for an MRI baseline at 6-month intervals after routine annual mammography in the fall 2025           Clinical/Pathologic Stage  Cancer Staging   Malignant neoplasm of upper-outer quadrant of left breast in female, estrogen receptor positive (HCC)  Staging form: Breast, AJCC 8th Edition  - Pathologic stage from 10/8/2019: Stage IA (pT1c, pN0(sn), cM0, G1, ER+, [place]+, HER2-) - Signed by VIVIEN Naranjo on 2/5/2024  Stage prefix: Initial diagnosis  Neoadjuvant therapy: No  Method of lymph node assessment: [place] lymph node biopsy  Multigene prognostic tests performed: None  Histologic grading system: 3 grade system  Laterality: Left  Tumor size (mm): 18        Current Therapy  Anastrozole 1 mg/day  Seek transition to exemestane 25 mg a day with financial assistance     Discussion  Patient returns to medical oncology.    The patient feels far better on exemestane.  Financial health was brought to her and she can afford the exemestane.    I will continue for at least 6 months and then decide on final endpoints for aromatase inhibitor therapies    The patient is traveling home to [place] in August September 2025.  She is very excited      Special Studies        No results found for: \"GENETIC\", \"INTERP\", \"GENES\"        History of Present Illness        This 69-year-old postmenopausal female returns to " medical oncology since January 2024 and ongoing care and follow-up surrounding her diagnosis of early-stage invasive lobular carcinoma of the left breast established in July 2019.     The patient had a screening mammogram which was abnormal on May 24, 2019     The patient completed an ultrasound-guided biopsy on August 19, 2019 .  Please see [facility] pathology number [id number] for confirmation and review.  Pathologically, the patient was found to have an invasive lobular carcinoma grade 1 with estrogen receptor positive and progesterone receptor positive both strongly positive.  HER2/diane was negative.     On October 1, 2019 the patient had a left-sided lumpectomy partial mastectomy surgery performed with sentinel lymph node reviews and margin excisions.  She was again demonstrated to have a 1.8 cm invasive lobular carcinoma grade 1 with sentinel lymph nodes negative for metastatic spread of disease.  Pathologic stage was pT1 cpN0 clinical M0 or stage Ia .     In late October 2019 the patient was initiated on anastrozole 1 mg/day.  By chart review the patient had treatment interruption with anastrozole between December 2022 and August 2023 at least as related to protracted reconstructive breast surgery..  She has tolerated anastrozole poorly by my history with extreme hot flashes. She notes that the hot flashes were improved while off of anastrozole.     Post partial mastectomy radiation therapy was completed on December 4, 2019.     The patient has a background history of osteoporosis treated with Boniva her most recent bone density scan was performed in Early October 2024 and demonstrates an improvement to osteopenia.     The patient reports to medical oncology        Pertinent History from January 24,2024  Still suffers with severe hot flashes which are uncomfortable and continuous on anastrozole.  The patient notes that when she briefly took exemestane the hot flashes were less bothersome but the exemestane  is far too expensive for her and detention and as a         Cancer Screening and Prevention  Mammography: 08/29/2024   GI/Colonoscopy: 02/07/2025   GYN: 10/14/2024   Bone Density: 10/02/2024   Covid 19 Vaccination Status: Moderna series times 2 January 2000 21 February 2021.  Moderna booster 2021 November, Pfizer booster January 2024     Oncology Therapeutic Summary:     October 2019-present anastrozole 1 mg/day     December 4, 2019 status post regional radiation therapy     October 1, 2019 status post left breast partial mastectomy with sentinel lymph node review and margin excision     Oncology History   Cancer Staging   Malignant neoplasm of upper-outer quadrant of left breast in female, estrogen receptor positive (HCC)  Staging form: Breast, AJCC 8th Edition  - Pathologic stage from 10/8/2019: Stage IA (pT1c, pN0(sn), cM0, G1, ER+, [place]+, HER2-) - Signed by VIVIEN Naranjo on 2/5/2024  Stage prefix: Initial diagnosis  Neoadjuvant therapy: No  Method of lymph node assessment: [place] lymph node biopsy  Multigene prognostic tests performed: None  Histologic grading system: 3 grade system  Laterality: Left  Tumor size (mm): 18      Oncology History   Malignant neoplasm of upper-outer quadrant of left breast in female, estrogen receptor positive (HCC)   7/23/2019 Biopsy     Left breast biopsy  1 o'clock, 5 cm from nipple  Invasive lobular carcinoma  Grade 1      HER2 0     Done at [facility]      10/1/2019 Surgery     Left breast needle localized lumpectomy with sentinel lymph node biopsy  Invasive lobular carcinoma  Grade 1  1.8 cm  Margins negative  0/2 Lymph nodes  Anatomic/Prognostic Stage IA      10/8/2019 -  Cancer Staged     Staging form: Breast, AJCC 8th Edition  - Pathologic stage from 10/8/2019: Stage IA (pT1c, pN0(sn), cM0, G1, ER+, KS+, HER2-) - Signed by VIVIEN Naranjo on 2/5/2024  Stage prefix: Initial diagnosis  Neoadjuvant therapy: No  Method of lymph node  assessment: [place] lymph node biopsy  Multigene prognostic tests performed: None  Histologic grading system: 3 grade system  Laterality: Left  Tumor size (mm): 18         10/30/2019 -  Hormone Therapy     Anastrozole 1 mg daily  Dr. Denise      11/13/2019 - 12/4/2019 Radiation     Course: C1  Plan ID Energy Fractions Dose per Fraction (cGy) Total Dose Delivered (cGy) Elapsed Days   BH L Breast 6X 15 / 15 [id number] [id number] 21   Treatment Dates:  11/13/2019 - 12/4/2019.          Review of Systems  Medical History Reviewed by provider this encounter:     .     Medications Ordered Prior to Encounter          Current Outpatient Medications on File Prior to Visit   Medication Sig Dispense Refill    anastrozole (ARIMIDEX) 1 mg tablet Take 1 tablet (1 mg total) by mouth daily 90 tablet 3    Cholecalciferol (VITAMIN D) 2000 units CAPS Take 4,000 Units by mouth daily         dicyclomine (BENTYL) 20 mg tablet Take 1 tablet (20 mg total) by mouth 3 (three) times a day before meals 90 tablet 1    meclizine (ANTIVERT) 12.5 MG tablet Take 12.5 mg by mouth 3 (three) times a day as needed for dizziness        meloxicam (MOBIC) 7.5 mg tablet TAKE 1 TABLET (7.5 MG TOTAL) BY MOUTH DAILY AS NEEDED FOR MODERATE PAIN 30 tablet 0    ondansetron (ZOFRAN) 4 mg tablet Take 1 tablet (4 mg total) by mouth every 8 (eight) hours as needed for nausea or vomiting 10 tablet 0    ondansetron (ZOFRAN-ODT) 4 mg disintegrating tablet Take 2 tablets (8 mg total) by mouth every 8 (eight) hours as needed for nausea or vomiting 20 tablet 0    pimecrolimus (ELIDEL) 1 % cream          SUMAtriptan (IMITREX) 100 mg tablet Take 100 mg by mouth once as needed for migraine        tiZANidine (ZANAFLEX) 4 mg tablet TAKE 1 TABLET (4 MG TOTAL) BY MOUTH DAILY AT BEDTIME AS NEEDED FOR MUSCLE SPASMS 30 tablet 0    venlafaxine (EFFEXOR) 50 mg tablet TAKE 1 TABLET (50 MG TOTAL) BY MOUTH DAILY 30 tablet 5      No current facility-administered medications on file prior to visit.          Social History               Tobacco Use    Smoking status: Never    Smokeless tobacco: Never   Vaping Use    Vaping status: Never Used   Substance and Sexual Activity    Alcohol use: No    Drug use: Never    Sexual activity: Not Currently       Partners: Male       Birth control/protection: Post-menopausal, None             Objective

## 2025-04-14 ENCOUNTER — OFFICE VISIT (OUTPATIENT)
Dept: HEMATOLOGY ONCOLOGY | Facility: CLINIC | Age: 70
End: 2025-04-14
Payer: COMMERCIAL

## 2025-04-14 VITALS
BODY MASS INDEX: 30.56 KG/M2 | HEART RATE: 101 BPM | OXYGEN SATURATION: 96 % | WEIGHT: 179 LBS | DIASTOLIC BLOOD PRESSURE: 82 MMHG | SYSTOLIC BLOOD PRESSURE: 118 MMHG | TEMPERATURE: 97.1 F | HEIGHT: 64 IN | RESPIRATION RATE: 18 BRPM

## 2025-04-14 DIAGNOSIS — C50.412 MALIGNANT NEOPLASM OF UPPER-OUTER QUADRANT OF LEFT BREAST IN FEMALE, ESTROGEN RECEPTOR POSITIVE (HCC): Primary | ICD-10-CM

## 2025-04-14 DIAGNOSIS — Z17.0 MALIGNANT NEOPLASM OF UPPER-OUTER QUADRANT OF LEFT BREAST IN FEMALE, ESTROGEN RECEPTOR POSITIVE (HCC): Primary | ICD-10-CM

## 2025-04-14 PROCEDURE — 99214 OFFICE O/P EST MOD 30 MIN: CPT | Performed by: INTERNAL MEDICINE

## 2025-05-21 ENCOUNTER — OFFICE VISIT (OUTPATIENT)
Dept: GASTROENTEROLOGY | Facility: AMBULARY SURGERY CENTER | Age: 70
End: 2025-05-21

## 2025-05-21 VITALS
OXYGEN SATURATION: 96 % | SYSTOLIC BLOOD PRESSURE: 130 MMHG | BODY MASS INDEX: 30.56 KG/M2 | WEIGHT: 179 LBS | DIASTOLIC BLOOD PRESSURE: 94 MMHG | HEIGHT: 64 IN | HEART RATE: 89 BPM

## 2025-05-21 DIAGNOSIS — K59.09 CHRONIC CONSTIPATION WITH OVERFLOW: ICD-10-CM

## 2025-05-21 DIAGNOSIS — R10.84 GENERALIZED ABDOMINAL PAIN: ICD-10-CM

## 2025-05-21 DIAGNOSIS — R19.7 DIARRHEA, UNSPECIFIED TYPE: ICD-10-CM

## 2025-05-21 DIAGNOSIS — R19.8 IRREGULAR BOWEL HABITS: Primary | ICD-10-CM

## 2025-05-21 DIAGNOSIS — K21.9 GASTROESOPHAGEAL REFLUX DISEASE WITHOUT ESOPHAGITIS: ICD-10-CM

## 2025-05-21 RX ORDER — FAMOTIDINE 40 MG/1
40 TABLET, FILM COATED ORAL DAILY
Qty: 30 TABLET | Refills: 5 | Status: SHIPPED | OUTPATIENT
Start: 2025-05-21

## 2025-05-21 RX ORDER — DICYCLOMINE HCL 20 MG
20 TABLET ORAL 3 TIMES DAILY PRN
Qty: 90 TABLET | Refills: 1 | Status: SHIPPED | OUTPATIENT
Start: 2025-05-21

## 2025-05-21 NOTE — PROGRESS NOTES
Name: Ayala Nicholson      : 1955      MRN: [id number]  Encounter Provider: Jenni Gipson PA-C  Encounter Date: 2025   Encounter department: [facility]  :  Assessment & Plan  Chronic constipation with overflow  Initially seen in consultation 2024 for diarrhea.  Colonoscopy performed 2025 with biopsies negative for microscopic colitis.  Symptoms were improved until a few months ago.  She had CT scan for symptoms which was unremarkable however did show a large amount of stool in the colon.    - Patient symptoms of diarrhea are likely overflow diarrhea from constipation.  We had discussion about this.  -Recommend starting Metamucil or Benefiber 1 tablespoon every day.  - Recommend decreasing Bentyl instead of before every meal to as needed instead  -Avoid known food triggers.  - Consider MiraLAX in the future if no improvement.       Gastroesophageal reflux disease without esophagitis  EGD 2024 was relatively unremarkable besides 2 cm hiatal hernia.  She continues to have reflux symptoms when lying down for bed.  Typically eats 2 hours before bedtime.  Possibly worse in the setting of her hiatal hernia.    - Start Pepcid 40 mg half hour before lying down for bed.  - Recommend avoiding laying down at least 3 to 4 hours before bedtime.      Patient was recommended to follow-up in 6 months.  Patient was recommended to reach out via [employer] or call the office with any questions or concerns in the meantime.      History of Present Illness   Ayala Nicholson is a 70 y.o. female who presents irritable bowel syndrome, osteoporosis, breast cancer who presents the office for follow-up after initial consultation 2024 for diarrhea, nausea and vomiting.    Patient was doing pretty well however followed with PCP due to some abdominal cramping and diarrhea.  She had CT scan which was unremarkable besides a large amount of stool.  She was recommended start MiraLAX which she  "did not take.  She reports she has been doing well however occasionally will have abdominal cramping with diarrhea.  She uses Bentyl before meals which seems to be helping.  She reports she often has symptoms triggered by eating a banana.  She had a banana few days ago and had diarrhea after this therefore she took Pepto-Bismol.  Otherwise weight is stable.  She reports reflux symptoms after laying down for bed at night still.    Most recent labs with normal hemoglobin and liver enzymes.    EGD and colonoscopy were performed 2/2025.  EGD 2 cm hiatal hernia.  Colonoscopy appeared normal and biopsies were negative.  At the time of procedure she reported she has had no more diarrhea.    Patient hopefully going to [place] this year.    HPI    Review of Systems   All other systems reviewed and are negative.   A complete review of systems is negative other than that noted above in the HPI.      Current Medications[1]  Objective   /94 (BP Location: Left arm, Patient Position: Sitting, Cuff Size: Standard)   Pulse 89   Ht 5' 4\" (1.626 m)   Wt 81.2 kg (179 lb)   SpO2 96%   BMI 30.73 kg/m²     Physical Exam  Vitals reviewed.   Constitutional:       General: She is not in acute distress.     Appearance: Normal appearance. She is not ill-appearing.   HENT:      Head: Normocephalic and atraumatic.     Eyes:      Conjunctiva/sclera: Conjunctivae normal.       Cardiovascular:      Rate and Rhythm: Normal rate and regular rhythm.      Heart sounds: No murmur heard.  Pulmonary:      Effort: Pulmonary effort is normal. No respiratory distress.      Breath sounds: Normal breath sounds.   Abdominal:      General: Abdomen is flat. There is no distension.      Palpations: Abdomen is soft.      Tenderness: There is no abdominal tenderness. There is no guarding or rebound.     Musculoskeletal:         General: No swelling.      Cervical back: Normal range of motion.      Right lower leg: No edema.      Left lower leg: No edema. "     Skin:     General: Skin is warm.      Coloration: Skin is not jaundiced.     Neurological:      General: No focal deficit present.      Mental Status: She is alert and oriented to person, place, and time. Mental status is at baseline.     Psychiatric:         Mood and Affect: Mood normal.         Behavior: Behavior normal.          Lab Results: I personally reviewed relevant lab results.       Results for orders placed during the hospital encounter of 02/07/25    EGD    Impression  2 cm hiatal hernia  The stomach appeared normal. Performed random biopsy.  The duodenum appeared normal.      RECOMMENDATION:  Await pathology results            Manfred Lennon MD               [1]   Current Outpatient Medications   Medication Sig Dispense Refill    Cholecalciferol (VITAMIN D) 2000 units CAPS Take 4,000 Units by mouth in the morning.      dicyclomine (BENTYL) 20 mg tablet Take 1 tablet (20 mg total) by mouth 3 (three) times a day as needed (abdominal cramping and diarrhea) 90 tablet 1    exemestane (AROMASIN) 25 MG tablet Take 1 tablet (25 mg total) by mouth daily 30 tablet 5    famotidine (PEPCID) 40 MG tablet Take 1 tablet (40 mg total) by mouth before bedtime. 30 tablet 5    meclizine (ANTIVERT) 12.5 MG tablet Take 12.5 mg by mouth as needed in the morning and 12.5 mg as needed at noon and 12.5 mg as needed in the evening for dizziness.      meloxicam (MOBIC) 7.5 mg tablet TAKE 1 TABLET (7.5 MG TOTAL) BY MOUTH DAILY AS NEEDED FOR MODERATE PAIN 30 tablet 0    ondansetron (ZOFRAN) 4 mg tablet Take 1 tablet (4 mg total) by mouth every 8 (eight) hours as needed for nausea or vomiting 10 tablet 0    pimecrolimus (ELIDEL) 1 % cream       SUMAtriptan (IMITREX) 100 mg tablet Take 100 mg by mouth once as needed for migraine      tiZANidine (ZANAFLEX) 4 mg tablet TAKE 1 TABLET (4 MG TOTAL) BY MOUTH DAILY AT BEDTIME AS NEEDED FOR MUSCLE SPASMS 30 tablet 0    venlafaxine (EFFEXOR) 50 mg tablet TAKE 1 TABLET (50 MG TOTAL) BY  MOUTH DAILY 30 tablet 5    ondansetron (ZOFRAN-ODT) 4 mg disintegrating tablet Take 2 tablets (8 mg total) by mouth every 8 (eight) hours as needed for nausea or vomiting (Patient not taking: Reported on 5/21/2025) 20 tablet 0     No current facility-administered medications for this visit.

## 2025-05-21 NOTE — PATIENT INSTRUCTIONS
I recommend starting a fiber supplement such as Metamucil or Benefiber 1 tablespoon daily.    Take dicyclomine as needed for abdominal cramping and diarrhea.    Start Pepcid 40 mg once daily 30 minutes before bedtime.    Try to wait 3-4 hours after your last meal before laying down for bed.

## 2025-07-01 ENCOUNTER — HOSPITAL ENCOUNTER (OUTPATIENT)
Dept: VASCULAR ULTRASOUND | Facility: HOSPITAL | Age: 70
Discharge: HOME | End: 2025-07-01
Attending: PHYSICIAN ASSISTANT
Payer: COMMERCIAL

## 2025-07-01 ENCOUNTER — OFFICE VISIT (OUTPATIENT)
Dept: FAMILY MEDICINE CLINIC | Facility: CLINIC | Age: 70
End: 2025-07-01
Payer: COMMERCIAL

## 2025-07-01 ENCOUNTER — APPOINTMENT (OUTPATIENT)
Dept: RADIOLOGY | Facility: CLINIC | Age: 70
End: 2025-07-01
Payer: COMMERCIAL

## 2025-07-01 VITALS
HEART RATE: 74 BPM | HEIGHT: 64 IN | DIASTOLIC BLOOD PRESSURE: 82 MMHG | BODY MASS INDEX: 30.9 KG/M2 | WEIGHT: 181 LBS | TEMPERATURE: 98.2 F | OXYGEN SATURATION: 98 % | SYSTOLIC BLOOD PRESSURE: 118 MMHG

## 2025-07-01 DIAGNOSIS — R05.1 ACUTE COUGH: ICD-10-CM

## 2025-07-01 DIAGNOSIS — R06.02 SOB (SHORTNESS OF BREATH): ICD-10-CM

## 2025-07-01 DIAGNOSIS — M79.89 PAIN AND SWELLING OF LEFT LOWER LEG: ICD-10-CM

## 2025-07-01 DIAGNOSIS — M79.662 PAIN AND SWELLING OF LEFT LOWER LEG: ICD-10-CM

## 2025-07-01 DIAGNOSIS — M79.662 PAIN AND SWELLING OF LEFT LOWER LEG: Primary | ICD-10-CM

## 2025-07-01 DIAGNOSIS — M79.89 PAIN AND SWELLING OF LEFT LOWER LEG: Primary | ICD-10-CM

## 2025-07-01 PROCEDURE — 93000 ELECTROCARDIOGRAM COMPLETE: CPT | Performed by: PHYSICIAN ASSISTANT

## 2025-07-01 PROCEDURE — G2211 COMPLEX E/M VISIT ADD ON: HCPCS | Performed by: PHYSICIAN ASSISTANT

## 2025-07-01 PROCEDURE — 93971 EXTREMITY STUDY: CPT | Performed by: INTERNAL MEDICINE

## 2025-07-01 PROCEDURE — 71046 X-RAY EXAM CHEST 2 VIEWS: CPT

## 2025-07-01 PROCEDURE — 99214 OFFICE O/P EST MOD 30 MIN: CPT | Performed by: PHYSICIAN ASSISTANT

## 2025-07-01 PROCEDURE — 93971 EXTREMITY STUDY: CPT

## 2025-07-01 RX ORDER — NAPROXEN 500 MG/1
500 TABLET ORAL 2 TIMES DAILY WITH MEALS
Qty: 20 TABLET | Refills: 0 | Status: SHIPPED | OUTPATIENT
Start: 2025-07-01

## 2025-07-01 NOTE — PROGRESS NOTES
"Name: Ayala Nicholson      : 1955      MRN: [id number]  Encounter Provider: Dax Howard PA-C  Encounter Date: 2025   Encounter department: [facility]    Assessment & Plan  Pain and swelling of left lower leg  R/o DVT, stat duplex ordered  Orders:  •  VAS VENOUS DUPLEX -LOWER LIMB UNILATERAL; Future    SOB (shortness of breath)  EKG in office shows NSR, ?LVH, no other significant change from previous EKG  VSS, normal O2/HR  Stat CXR  If there is DVT on duplex send to ER to r/o PE  ER precautions provided  Orders:  •  XR chest pa and lateral; Future    Acute cough  Seek CXR  Lungs clear by auscultation  Orders:  •  XR chest pa and lateral; Future         History of Present Illness     Pt presents with concerns of LLE pain for 2 weeks, L calf to L foot. No injuries, bruising, color change. Does note some swelling. Also notes she developed a cough and has some chest discomfort with SOB. No VTE hx. No known chronic cardiopulmonary disease. She had a normal stress echo 10/2023. O2 98% with normal HR. She has limited ambulatory ability 2/2 pain. She has significant lumbar spinal stenosis, but this feels different that her \"Sciatic\" pain per pt       Review of Systems   Constitutional:  Negative for chills and fever.   HENT:  Negative for ear pain and sore throat.    Eyes:  Negative for pain and visual disturbance.   Respiratory:  Positive for cough and shortness of breath.    Cardiovascular:  Positive for chest pain and leg swelling. Negative for palpitations.   Gastrointestinal:  Negative for abdominal pain and vomiting.   Genitourinary:  Negative for dysuria and hematuria.   Musculoskeletal:  Positive for gait problem and myalgias. Negative for arthralgias and back pain.   Skin:  Negative for color change and rash.   Neurological:  Negative for seizures and syncope.   All other systems reviewed and are negative.    Past Medical History[1]  Past Surgical History[2]  Family " "History[3]  Social History[4]  Medications[5]  Allergies   Allergen Reactions   • Ciprofloxacin Seizures   • Cyclopentolate Other (See Comments) and Tachycardia     Resp distress   • Sulfa Antibiotics Hives and Rash   • Scopolamine Other (See Comments)     Precautionary for cycopentolate allergy     Immunization History   Administered Date(s) Administered   • COVID-19 MODERNA VACC 0.5 ML IM 01/15/2021, 02/10/2021, 11/22/2021   • COVID-19 Pfizer mRNA vacc PF storm-sucrose 12 yr and older (Comirnaty) 01/12/2024   • INFLUENZA 01/23/2019, 10/02/2019, 10/02/2019, 10/02/2020   • Influenza Quadrivalent 6 mos and older IM 10/11/2022   • Influenza, high dose seasonal 0.7 mL 10/17/2023   • Pneumococcal Conjugate Vaccine 20-valent (Pcv20), Polysace 09/09/2022   • Pneumococcal Polysaccharide PPV23 03/04/2013   • Tdap 03/11/2022     Objective   /82   Pulse 74   Temp 98.2 °F (36.8 °C)   Ht 5' 4\" (1.626 m)   Wt 82.1 kg (181 lb)   SpO2 98%   BMI 31.07 kg/m²     Physical Exam  Vitals and nursing note reviewed.   Constitutional:       General: She is not in acute distress.     Appearance: She is well-developed.   HENT:      Head: Normocephalic and atraumatic.     Cardiovascular:      Rate and Rhythm: Normal rate and regular rhythm.      Heart sounds: No murmur heard.  Pulmonary:      Effort: Pulmonary effort is normal. No respiratory distress.      Breath sounds: Normal breath sounds. No wheezing, rhonchi or rales.     Musculoskeletal:         General: Tenderness (L calf, foot pain/tenderness) present. No swelling or signs of injury.      Cervical back: Neck supple.      Right lower leg: No edema.      Left lower leg: Edema (trace) present.     Skin:     General: Skin is warm and dry.      Capillary Refill: Capillary refill takes less than 2 seconds.      Findings: No bruising, erythema or rash.     Neurological:      Mental Status: She is alert.                [1]  Past Medical History:  Diagnosis Date   • Breast cancer " (HCC) 09/2019    left breast   • Cancer (HCC) 06/2019   • Cancer (HCC) June, 2019   • History of radiation therapy 10/2019    left breast cancer   • Migraine    • Osteoporosis    • Pancreatitis    • Pleurisy    • PONV (postoperative nausea and vomiting)    • Seizures (HCC)    • Varicella    • Vertigo    [2]  Past Surgical History:  Procedure Laterality Date   • BREAST BIOPSY  07/23/2019   • BREAST LUMPECTOMY Left 10/01/2019    Procedure: BREAST NEEDLE LOCALIZED LUMPECTOMY (NEEDLE LOC AT 0730);  Surgeon: Hadley Gómez MD;  Location: [facility];  Service: Surgical Oncology   • CHOLECYSTECTOMY     • COLONOSCOPY  02/07/2025   • DENTAL SURGERY      Bone graft with implant   • EPIDURAL BLOCK INJECTION  02/2022   • HALLUX VALGUS CORRECTION Bilateral    • LYMPH NODE BIOPSY Left 10/01/2019    Procedure: SENTINEL LYMPH NODE BIOPSY; LYMPHATIC MAPPING WITH BLUE DYE AND RADIOACTIVE DYE (INJECT AT 0830 BY DR GÓMEZ IN THE OR);  Surgeon: Hadley Gómez MD;  Location: [facility];  Service: Surgical Oncology   • MAMMO NEEDLE LOCALIZATION LEFT (ALL INC) Left 10/01/2019   • DE BREAST REDUCTION Right 02/23/2023    Procedure: RIGHT BREAST REDUCTION;  Surgeon: Cale Gonzalez MD;  Location: [facility];  Service: Plastics   • DE MASTOPEXY Left 02/23/2023    Procedure: LEFT MASTOPEXY;  Surgeon: Cale Gonzalez MD;  Location: [facility];  Service: Plastics   • RHINOPLASTY     [3]  Family History  Problem Relation Name Age of Onset   • No Known Problems Mother     • Lung cancer Father Julius Magdaleno 69   • Cancer Father Julius Magdaleno         Lung   • No Known Problems Maternal Grandmother     • No Known Problems Maternal Grandfather     • No Known Problems Paternal Grandmother     • No Known Problems Paternal Grandfather     • Breast cancer Neg Hx     [4]  Social History  Tobacco Use   • Smoking status: Never   • Smokeless tobacco: Never   Vaping Use   • Vaping status: Never Used   Substance and Sexual Activity   • Alcohol use: No   • Drug  use: Never   • Sexual activity: Not Currently     Partners: Male     Birth control/protection: Post-menopausal, None   [5]  Current Outpatient Medications on File Prior to Visit   Medication Sig   • Cholecalciferol (VITAMIN D) 2000 units CAPS Take 4,000 Units by mouth in the morning.   • dicyclomine (BENTYL) 20 mg tablet Take 1 tablet (20 mg total) by mouth 3 (three) times a day as needed (abdominal cramping and diarrhea)   • exemestane (AROMASIN) 25 MG tablet Take 1 tablet (25 mg total) by mouth daily   • famotidine (PEPCID) 40 MG tablet Take 1 tablet (40 mg total) by mouth before bedtime.   • meclizine (ANTIVERT) 12.5 MG tablet Take 12.5 mg by mouth as needed in the morning and 12.5 mg as needed at noon and 12.5 mg as needed in the evening for dizziness.   • meloxicam (MOBIC) 7.5 mg tablet TAKE 1 TABLET (7.5 MG TOTAL) BY MOUTH DAILY AS NEEDED FOR MODERATE PAIN   • ondansetron (ZOFRAN) 4 mg tablet Take 1 tablet (4 mg total) by mouth every 8 (eight) hours as needed for nausea or vomiting   • ondansetron (ZOFRAN-ODT) 4 mg disintegrating tablet Take 2 tablets (8 mg total) by mouth every 8 (eight) hours as needed for nausea or vomiting (Patient not taking: Reported on 5/21/2025)   • pimecrolimus (ELIDEL) 1 % cream    • SUMAtriptan (IMITREX) 100 mg tablet Take 100 mg by mouth once as needed for migraine   • tiZANidine (ZANAFLEX) 4 mg tablet TAKE 1 TABLET (4 MG TOTAL) BY MOUTH DAILY AT BEDTIME AS NEEDED FOR MUSCLE SPASMS   • [DISCONTINUED] venlafaxine (EFFEXOR) 50 mg tablet TAKE 1 TABLET (50 MG TOTAL) BY MOUTH DAILY

## 2025-07-28 ENCOUNTER — NURSE TRIAGE (OUTPATIENT)
Age: 70
End: 2025-07-28

## 2025-07-28 ENCOUNTER — OFFICE VISIT (OUTPATIENT)
Dept: RHEUMATOLOGY | Facility: CLINIC | Age: 70
End: 2025-07-28
Payer: COMMERCIAL

## 2025-07-28 ENCOUNTER — HOSPITAL ENCOUNTER (OUTPATIENT)
Dept: RADIOLOGY | Facility: HOSPITAL | Age: 70
Discharge: HOME | End: 2025-07-28
Payer: COMMERCIAL

## 2025-07-28 VITALS
WEIGHT: 178 LBS | SYSTOLIC BLOOD PRESSURE: 116 MMHG | HEIGHT: 64 IN | OXYGEN SATURATION: 97 % | HEART RATE: 92 BPM | BODY MASS INDEX: 30.39 KG/M2 | TEMPERATURE: 98.2 F | DIASTOLIC BLOOD PRESSURE: 80 MMHG

## 2025-07-28 DIAGNOSIS — M81.0 AGE-RELATED OSTEOPOROSIS WITHOUT CURRENT PATHOLOGICAL FRACTURE: ICD-10-CM

## 2025-07-28 DIAGNOSIS — M16.12 PRIMARY OSTEOARTHRITIS OF LEFT HIP: Primary | ICD-10-CM

## 2025-07-28 DIAGNOSIS — M25.552 PAIN OF LEFT HIP: ICD-10-CM

## 2025-07-28 DIAGNOSIS — M25.552 PAIN OF LEFT HIP: Primary | ICD-10-CM

## 2025-07-28 PROCEDURE — 99215 OFFICE O/P EST HI 40 MIN: CPT | Performed by: STUDENT IN AN ORGANIZED HEALTH CARE EDUCATION/TRAINING PROGRAM

## 2025-07-28 PROCEDURE — 73552 X-RAY EXAM OF FEMUR 2/>: CPT

## 2025-07-28 PROCEDURE — G2211 COMPLEX E/M VISIT ADD ON: HCPCS | Performed by: STUDENT IN AN ORGANIZED HEALTH CARE EDUCATION/TRAINING PROGRAM

## 2025-07-28 PROCEDURE — 72170 X-RAY EXAM OF PELVIS: CPT

## 2025-08-12 ENCOUNTER — OFFICE VISIT (OUTPATIENT)
Dept: OBGYN CLINIC | Facility: CLINIC | Age: 70
End: 2025-08-12
Attending: STUDENT IN AN ORGANIZED HEALTH CARE EDUCATION/TRAINING PROGRAM
Payer: COMMERCIAL

## 2025-08-12 PROBLEM — M47.816 LUMBAR FACET ARTHROPATHY: Status: ACTIVE | Noted: 2025-08-12

## 2025-08-12 PROBLEM — M43.16 SPONDYLOLISTHESIS OF LUMBAR REGION: Status: ACTIVE | Noted: 2025-08-12

## 2025-08-12 PROBLEM — M51.362 DEGENERATION OF INTERVERTEBRAL DISC OF LUMBAR REGION WITH DISCOGENIC BACK PAIN AND LOWER EXTREMITY PAIN: Status: ACTIVE | Noted: 2025-08-12

## 2025-08-12 PROBLEM — M54.16 LUMBAR RADICULOPATHY: Status: ACTIVE | Noted: 2025-08-12

## 2025-08-12 PROBLEM — M70.62 TROCHANTERIC BURSITIS OF LEFT HIP: Status: ACTIVE | Noted: 2025-08-12

## (undated) DEVICE — PROXIMATE SKIN STAPLERS (35 WIDE) CONTAINS 35 STAINLESS STEEL STAPLES (FIXED HEAD): Brand: PROXIMATE

## (undated) DEVICE — SUT VICRYL 3-0 SH 27 IN J416H

## (undated) DEVICE — LIGHT HANDLE COVER SLEEVE DISP BLUE STELLAR

## (undated) DEVICE — SUT MONOCRYL 3-0 PS-2 18 IN Y497G

## (undated) DEVICE — BETHLEHEM UNIVERSAL MINOR GEN: Brand: CARDINAL HEALTH

## (undated) DEVICE — ANTIBACTERIAL UNDYED BRAIDED (POLYGLACTIN 910), SYNTHETIC ABSORBABLE SUTURE: Brand: COATED VICRYL

## (undated) DEVICE — STERILE MUSCLE FLAP PACK: Brand: CARDINAL HEALTH

## (undated) DEVICE — DRAPE PROBE NEO-PROBE/ULTRASOUND

## (undated) DEVICE — INTENDED FOR TISSUE SEPARATION, AND OTHER PROCEDURES THAT REQUIRE A SHARP SURGICAL BLADE TO PUNCTURE OR CUT.: Brand: BARD-PARKER ® CARBON RIB-BACK BLADES

## (undated) DEVICE — SUT VICRYL 2-0 CT-1 27 IN J259H

## (undated) DEVICE — INTENDED FOR TISSUE SEPARATION, AND OTHER PROCEDURES THAT REQUIRE A SHARP SURGICAL BLADE TO PUNCTURE OR CUT.: Brand: BARD-PARKER SAFETY BLADES SIZE 15, STERILE

## (undated) DEVICE — PENCIL ELECTROSURG E-Z CLEAN -0035H

## (undated) DEVICE — CHLORAPREP HI-LITE 26ML ORANGE

## (undated) DEVICE — DRAPE TOWEL: Brand: CONVERTORS

## (undated) DEVICE — MARGIN MARKER SET

## (undated) DEVICE — DRAPE EQUIPMENT RF WAND

## (undated) DEVICE — SPONGE LAP 18 X 18 IN STRL RFD

## (undated) DEVICE — VIAL DECANTER

## (undated) DEVICE — PLUMEPEN PRO 10FT

## (undated) DEVICE — TUBING SUCTION 5MM X 12 FT

## (undated) DEVICE — SUT STRATAFIX SPIRAL MONOCRYL PLUS 3-0 PS-2 45CM SXMP1B107

## (undated) DEVICE — STRL PENROSE DRAIN 18" X 3/4": Brand: CARDINAL HEALTH

## (undated) DEVICE — INVIEW CLEAR LEGGINGS: Brand: CONVERTORS

## (undated) DEVICE — JP CHAN DRN SIL HUBLESS 15FR W/TRO: Brand: CARDINAL HEALTH

## (undated) DEVICE — ADHESIVE SKIN HIGH VISCOSITY EXOFIN 1ML

## (undated) DEVICE — DRAPE SHEET THREE QUARTER

## (undated) DEVICE — MEDI-VAC YANK SUCT HNDL W/TPRD BULBOUS TIP: Brand: CARDINAL HEALTH

## (undated) DEVICE — NEEDLE 25G X 1 1/2

## (undated) DEVICE — GLOVE SRG BIOGEL 7

## (undated) DEVICE — SUT VICRYL 0 CT-1 CR/8 27 IN JJ41G

## (undated) DEVICE — 3M™ STERI-STRIP™ REINFORCED ADHESIVE SKIN CLOSURES, R1547, 1/2 IN X 4 IN (12 MM X 100 MM), 6 STRIPS/ENVELOPE: Brand: 3M™ STERI-STRIP™

## (undated) DEVICE — SPONGE STICK WITH PVP-I: Brand: KENDALL

## (undated) DEVICE — DECANTER: Brand: UNBRANDED

## (undated) DEVICE — SUT STRATAFIX SPIRAL 4-0 PGA/PCL 30 X 30 CM SXMD2B409

## (undated) DEVICE — SMOKE EVACUATION TUBING WITH 8 IN INTEGRAL WAND AND SPONGE GUARD: Brand: BUFFALO FILTER

## (undated) DEVICE — ACE WRAP 6 IN UNSTERILE

## (undated) DEVICE — ELECTRODE BLADE MOD E-Z CLEAN  2.75IN 7CM -0012AM

## (undated) DEVICE — ABDOMINAL PAD: Brand: DERMACEA

## (undated) DEVICE — ACE WRAP 6 IN STERILE

## (undated) DEVICE — SYRINGE 10ML LL

## (undated) DEVICE — SYRINGE 1ML TB 26G X 3/8 SAFETY

## (undated) DEVICE — JACKSON-PRATT 100CC BULB RESERVOIR: Brand: CARDINAL HEALTH

## (undated) DEVICE — SUT MONOCRYL 4-0 PS-2 18 IN Y496G

## (undated) DEVICE — PICO 7 SINGLE 10X20CM: Brand: PICO™ 7

## (undated) DEVICE — SKIN MARKER DUAL TIP WITH RULER CAP, FLEXIBLE RULER AND LABELS: Brand: DEVON